# Patient Record
Sex: FEMALE | Race: WHITE | NOT HISPANIC OR LATINO | Employment: UNEMPLOYED | URBAN - METROPOLITAN AREA
[De-identification: names, ages, dates, MRNs, and addresses within clinical notes are randomized per-mention and may not be internally consistent; named-entity substitution may affect disease eponyms.]

---

## 2017-02-20 ENCOUNTER — CONVERSION ENCOUNTER (OUTPATIENT)
Dept: MAMMOGRAPHY | Facility: HOSPITAL | Age: 72
End: 2017-02-20

## 2017-10-13 ENCOUNTER — CONVERSION ENCOUNTER (OUTPATIENT)
Dept: GENERAL RADIOLOGY | Facility: HOSPITAL | Age: 72
End: 2017-10-13

## 2018-01-23 ENCOUNTER — CONVERSION ENCOUNTER (OUTPATIENT)
Dept: GASTROENTEROLOGY | Facility: CLINIC | Age: 73
End: 2018-01-23

## 2018-04-18 ENCOUNTER — OFFICE VISIT CONVERTED (OUTPATIENT)
Dept: SURGERY | Facility: CLINIC | Age: 73
End: 2018-04-18
Attending: SURGERY

## 2018-05-09 ENCOUNTER — OFFICE VISIT CONVERTED (OUTPATIENT)
Dept: SURGERY | Facility: CLINIC | Age: 73
End: 2018-05-09
Attending: SURGERY

## 2018-05-09 ENCOUNTER — CONVERSION ENCOUNTER (OUTPATIENT)
Dept: SURGERY | Facility: CLINIC | Age: 73
End: 2018-05-09

## 2018-08-16 ENCOUNTER — OFFICE VISIT CONVERTED (OUTPATIENT)
Dept: GASTROENTEROLOGY | Facility: CLINIC | Age: 73
End: 2018-08-16
Attending: NURSE PRACTITIONER

## 2018-08-16 ENCOUNTER — CONVERSION ENCOUNTER (OUTPATIENT)
Dept: GASTROENTEROLOGY | Facility: CLINIC | Age: 73
End: 2018-08-16

## 2018-10-15 ENCOUNTER — CONVERSION ENCOUNTER (OUTPATIENT)
Dept: OTHER | Facility: HOSPITAL | Age: 73
End: 2018-10-15

## 2018-10-16 ENCOUNTER — CONVERSION ENCOUNTER (OUTPATIENT)
Dept: GASTROENTEROLOGY | Facility: CLINIC | Age: 73
End: 2018-10-16

## 2018-10-16 ENCOUNTER — OFFICE VISIT CONVERTED (OUTPATIENT)
Dept: GASTROENTEROLOGY | Facility: CLINIC | Age: 73
End: 2018-10-16
Attending: NURSE PRACTITIONER

## 2018-10-25 ENCOUNTER — CONVERSION ENCOUNTER (OUTPATIENT)
Dept: OTHER | Facility: HOSPITAL | Age: 73
End: 2018-10-25

## 2019-04-16 ENCOUNTER — OFFICE VISIT CONVERTED (OUTPATIENT)
Dept: GASTROENTEROLOGY | Facility: CLINIC | Age: 74
End: 2019-04-16
Attending: NURSE PRACTITIONER

## 2019-04-23 ENCOUNTER — HOSPITAL ENCOUNTER (OUTPATIENT)
Dept: OTHER | Facility: HOSPITAL | Age: 74
Discharge: HOME | End: 2019-04-23
Attending: NURSE PRACTITIONER

## 2019-05-07 ENCOUNTER — HOSPITAL ENCOUNTER (OUTPATIENT)
Dept: OTHER | Facility: HOSPITAL | Age: 74
Discharge: HOME | End: 2019-05-07
Attending: NURSE PRACTITIONER

## 2019-05-07 LAB
CREAT BLD-MCNC: 1 MG/DL (ref 0.6–1.4)
GFR SERPLBLD BASED ON 1.73 SQ M-ARVRAT: 55 ML/MIN/{1.73_M2}

## 2019-05-08 ENCOUNTER — HOSPITAL ENCOUNTER (OUTPATIENT)
Dept: MAMMOGRAPHY | Facility: HOSPITAL | Age: 74
Discharge: HOME | End: 2019-05-08
Attending: FAMILY MEDICINE

## 2019-05-15 ENCOUNTER — OFFICE VISIT CONVERTED (OUTPATIENT)
Dept: SURGERY | Facility: CLINIC | Age: 74
End: 2019-05-15
Attending: SURGERY

## 2019-05-16 ENCOUNTER — HOSPITAL ENCOUNTER (OUTPATIENT)
Dept: PET IMAGING | Facility: HOSPITAL | Age: 74
Discharge: HOME | End: 2019-05-16
Attending: INTERNAL MEDICINE

## 2019-05-29 ENCOUNTER — OFFICE VISIT CONVERTED (OUTPATIENT)
Dept: SURGERY | Facility: CLINIC | Age: 74
End: 2019-05-29
Attending: SURGERY

## 2019-06-05 ENCOUNTER — OFFICE VISIT CONVERTED (OUTPATIENT)
Dept: SURGERY | Facility: CLINIC | Age: 74
End: 2019-06-05
Attending: SURGERY

## 2019-06-10 ENCOUNTER — OFFICE VISIT CONVERTED (OUTPATIENT)
Dept: SURGERY | Facility: CLINIC | Age: 74
End: 2019-06-10
Attending: SURGERY

## 2019-06-20 ENCOUNTER — OFFICE VISIT CONVERTED (OUTPATIENT)
Dept: SURGERY | Facility: CLINIC | Age: 74
End: 2019-06-20
Attending: SURGERY

## 2019-07-29 ENCOUNTER — HOSPITAL ENCOUNTER (OUTPATIENT)
Dept: MAMMOGRAPHY | Facility: HOSPITAL | Age: 74
Discharge: HOME | End: 2019-07-29
Attending: INTERNAL MEDICINE

## 2019-12-04 ENCOUNTER — OFFICE VISIT CONVERTED (OUTPATIENT)
Dept: GASTROENTEROLOGY | Facility: CLINIC | Age: 74
End: 2019-12-04
Attending: NURSE PRACTITIONER

## 2019-12-04 ENCOUNTER — CONVERSION ENCOUNTER (OUTPATIENT)
Dept: GASTROENTEROLOGY | Facility: CLINIC | Age: 74
End: 2019-12-04

## 2020-06-04 ENCOUNTER — TELEMEDICINE CONVERTED (OUTPATIENT)
Dept: GASTROENTEROLOGY | Facility: CLINIC | Age: 75
End: 2020-06-04
Attending: NURSE PRACTITIONER

## 2020-12-07 ENCOUNTER — OFFICE VISIT CONVERTED (OUTPATIENT)
Dept: GASTROENTEROLOGY | Facility: CLINIC | Age: 75
End: 2020-12-07
Attending: NURSE PRACTITIONER

## 2021-03-16 ENCOUNTER — HOSPITAL ENCOUNTER (OUTPATIENT)
Dept: GASTROENTEROLOGY | Facility: HOSPITAL | Age: 76
Setting detail: HOSPITAL OUTPATIENT SURGERY
Discharge: HOME | End: 2021-03-16
Attending: INTERNAL MEDICINE

## 2021-03-16 LAB — GLUCOSE BLD-MCNC: 157 MG/DL (ref 65–99)

## 2021-03-25 ENCOUNTER — HOSPITAL ENCOUNTER (OUTPATIENT)
Dept: PET IMAGING | Facility: HOSPITAL | Age: 76
Discharge: HOME | End: 2021-03-25
Attending: INTERNAL MEDICINE

## 2021-03-30 ENCOUNTER — HOSPITAL ENCOUNTER (OUTPATIENT)
Dept: NUCLEAR MEDICINE | Facility: HOSPITAL | Age: 76
Discharge: HOME | End: 2021-03-30
Attending: INTERNAL MEDICINE

## 2021-03-30 LAB
CREAT BLD-MCNC: 1 MG/DL (ref 0.6–1.4)
GFR SERPLBLD BASED ON 1.73 SQ M-ARVRAT: 54 ML/MIN/{1.73_M2}

## 2021-05-13 NOTE — PROGRESS NOTES
"   Progress Note      Patient Name: Mirna Meredith   Patient ID: [id number]   Sex: Female   YOB: 1945    Primary Care Provider: Pan Pacheco MD   Referring Provider: Christina TAFOYA    Visit Date: December 7, 2020    Provider: MICHELET Benítez   Location: [facility] [address]   Location Address: [address] [place] [address]  [id number]   Location Phone: [phone]          Chief Complaint  · Crohn's Disease      History Of Present Illness  Mirna Meredith is a 75 year old /White female who presents to the office today.      Hepatic w/u negative Fibrosure F0-F1, A0    RUQ US 4/23/2019: new biliary ductal dilatation and pancreatic ductal dilatation could not exclude choledocholithiasis, CBD 8 mm    MRI of the abdomen MRCP with and without contrast 5/7/2019: Normal MRI, mild stable pancreatic and biliary ductal dilatation appears unchanged from 2016 and likely related to prior cholecystectomy. Read by DR Gil.    Patient was last seen June 2020 she still had not tried hyoscyamine that had been prescribed December 2019 for some abdominal cramping.  Reported diarrhea controlled with Imodium for in the morning.  Labs were ordered and EnteraGam refilled.  Labs were not received.    Today pt states usually 3stools/day in AM and then resolves. No blood in stool, usually no abd pain except precedes BM at times. Still taking Imodium and Enteragam.\">Patient with long history of Crohn's disease. Patient has history of C. 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 History of positive TB skin test but no active infection ever, and followed with Dr. May previously. History of Imuran for many years. It seemed to be ineffective for pt and she was taken off of it in 2016. Patient has tried Entyvio in 2017 but had neurological sx of dizziness and confusion after so this was DC'd. Patient unable to afford Stelara or Humira. We were " planning on Remicade but after last colonoscopy results Dr. Germain recommended to watch and wait. Budesonide Rx'd in 10/2018 but pt could not afford.    Last colonoscopy May 2018: A few shallow ulcers in the TI, normal mucosa otherwise, 7 mm polyp in the TI thought to be inflammatory at endoscopy with biopsy showing chronic active ileitis; 2 small polyps in the left colon--adenomatous.    RUQ US showed fatty liver 10/2018-->Hepatic w/u negative Fibrosure F0-F1, A0    RUQ US 4/23/2019: new biliary ductal dilatation and pancreatic ductal dilatation could not exclude choledocholithiasis, CBD 8 mm    MRI of the abdomen MRCP with and without contrast 5/7/2019: Normal MRI, mild stable pancreatic and biliary ductal dilatation appears unchanged from 2016 and likely related to prior cholecystectomy. Read by DR Gil.    Patient was last seen June 2020 she still had not tried hyoscyamine that had been prescribed December 2019 for some abdominal cramping.  Reported diarrhea controlled with Imodium for in the morning.  Labs were ordered and EnteraGam refilled.  Labs were not received.    Today pt states usually 3stools/day in AM and then resolves. No blood in stool, usually no abd pain except precedes BM at times. Still taking Imodium and Enteragam.       Past Medical History  Anxiety; Arthritis; Breast cancer; Colon Polyps; Crohns disease; Diabetes; Diarrhea; Essential hypertension; High cholesterol; Hypercholesteremia; Hypertension; Irritable bowel syndrome         Past Surgical History  Bilateral knee replacement; Bilateral mastectomy; Bladder Repair; Bladder Surgery; Cholecystecomy; Colonoscopy; EGD; Gallbladder; Hysterectomy         Medication List  Name Date Started Instructions   anastrozole 1 mg oral tablet  take 1 tablet (1 mg) by oral route once daily   atenolol 50 mg oral tablet  take 1 tablet (50 mg) by oral route once daily   bupropion HCl 300 mg oral tablet extended release 24 hr  take 1 tablet (300 mg) by oral  "route once daily   EnteraGam 5 gram oral powder in packet 12/07/2020 DISSOLVE AS DIRECTED 1 PACKET AND TAKE BY MOUTH DAILY FOR 30 DAYS.   Fish Oil 340-1,000 mg oral capsule  take 1 capsule by oral route daily   folic acid 800 mcg oral tablet  take 1 tablet (0.8 mg) by oral route once daily   IBgard 90 mg oral capsule,delayed,extend.release  1 PO BID   Imodium A-D 2 mg oral tablet  take 4 tablets by oral route daily   memantine 10 mg oral tablet  take 1 tablet (10 mg) by oral route 2 times per day   metformin 500 mg oral tablet  take 1 tablet by oral route daily   Probiotic oral  --    risperidone 3 mg oral tablet  take 1 tablet (3 mg) by oral route once daily   rosuvastatin 10 mg oral tablet  take 1 tablet (10 mg) by oral route once daily   Singulair 10 mg oral tablet  take 1 tablet (10 mg) by oral route once daily in the evening   vitamin E 400 unit oral capsule  take 1 capsule by oral route daily         Allergy List  PENICILLINS         Family Medical History  Hypertension; Family history of breast cancer; No family history of colorectal cancer         Social History  Alcohol (Never); Homemaker; lives with spouse; ; Tobacco (Former)         Review of Systems  · Constitutional  o Admits  o : good general health lately, no acute distress  · Gastrointestinal  o Denies  o : additional gastrointestinal symptoms except as noted in the HPI  · Psychiatric  o Admits  o : pleasant affect      Vitals  Date Time BP Position Site L\R Cuff Size HR RR TEMP (F) WT  HT  BMI kg/m2 BSA m2 O2 Sat FR L/min FiO2 HC       12/07/2020 10:16 /64 Sitting    89 - R  98.6 234lbs 4oz 5'  6\" 37.81 2.22             Physical Examination  · Constitutional  o Appearance  o : well developed, well-nourished, in no acute distress  · Head and Face  o Head  o :   § Inspection  § : atraumatic, normocephalic  · Eyes  o Sclerae  o : sclerae white, no sclerae icterus  · Neck  o Inspection/Palpation  o : supple  · Respiratory  o Respiratory " Effort  o : breathing unlabored  o Inspection of Chest  o : normal appearance, no retractions  · Cardiovascular  o Peripheral Vascular System  o :   § Extremities  § : no cyanosis, clubbing or edema  · Gastrointestinal  o Abdominal Examination  o : soft, nontender to palpation  · Skin and Subcutaneous Tissue  o General Inspection  o : no lesions present, no rashes present  · Neurologic  o Mental Status Examination  o :   § Orientation  § : grossly oriented to person, place and time  § Speech/Language  § : communication ability within normal limits, voice quality normal, articulation of speech normal, no evidence of aphasia  § Attention  § : attention normal, concentration abilities normal  o Sensation  o : grossly intact  o Gait and Station  o :   § Gait Screening  § : normal gait  · Psychiatric  o General  o : Alert and oriented x3  o Mood and Affect  o : Mood and affect are appropriate to circumstances          Assessment  · Pre-op exam     V72.84/Z01.818  · Crohns disease     555.9/K50.90  · Fatty liver     571.8/K76.0      Plan  · Orders  o CBC with Auto Diff [facility] ([id number]) - - 12/07/2020  o CMP [facility] ([id number]) - - 12/07/2020  o PT/INR ([id number]) - - 12/10/2020  · Medications  o Golytely 236-22.74-6.74 -5.86 gram oral recon soln   SIG: take as directed for 1 day   DISP: (1) Box with 0 refills  Prescribed on 12/07/2020     o EnteraGam 5 gram oral powder in packet   SIG: DISSOLVE AS DIRECTED 1 PACKET AND TAKE BY MOUTH DAILY FOR 30 DAYS.   DISP: (30) Packet with 5 refills  Refilled on 12/07/2020     o Medications have been Reconciled  o Transition of Care or Provider Policy  · Instructions  o Please Sign Permit for: colonosocpy   o Indication: crohns  o Surgical Risk and Benefits: Possible risks/complications, benefits, and alternatives to surgical or invasive procedure have been explained to patient and/or legal guardian; Patient has been evaluated and can tolerate anesthesia and/or sedation. Risks, benefits, and  "alternatives to anesthesia and sedation have been explained to patient and/or legal guardian.  o Follow Up after Procedure.  o Encouraged to follow-up with Primary Care Provider for preventative care.  o Patient was educated/instructed on their diagnosis, treatment and medications prior to discharge from the clinic today.  o Patient instructed to seek medical attention urgently for new or worsening symptoms.  o ADDENDUM: I received CT from Dr Grissom after visit and r/w DR Germain. (showed incidental heterogeneity of liver partially seen) He wanted labs already ordered plus PT/INR, so order within note updated.   · Associate Tasks  o Task ID [id number] \"''Provider to Nurse: need additional lab            Electronically Signed by: MICHELET Benítez -Author on December 10, 2020 05:14:48 PM  "

## 2021-05-13 NOTE — PROGRESS NOTES
Progress Note      Patient Name: Mirna Meredith   Patient ID: [id number]   Sex: Female   YOB: 1945    Primary Care Provider: Pan Pacheco MD   Referring Provider: Christina TAFOYA    Visit Date: June 4, 2020    Provider: MICHELET Benítez   Location: [facility]   Location Address: [address] [place] [address]  [id number]   Location Phone: [phone]          Chief Complaint  · 6 month Follow up   · Rt side pain      History Of Present Illness  TELEHEALTH TELEPHONE VISIT  Chief Complaint: PT states she has not had trouble with her crohns. She has had pain on her rt side under her Ribs cage, it has happened about two times. No other issue at this time.   Mirna Meredith is a 74 year old /White female who is presenting for evaluation via telehealth telephone visit. Verbal consent obtained before beginning visit.   Provider spent 11 minutes with the patient during the telehealth visit.   The following staff were present during this visit: Hussain White MA ; Taylor Jewell MA   Past Medical History/ Overview of Patient Symptoms     Hepatic w/u negative Fibrosure F0-F1, A0    RUQ US 4/23/2019: new biliary ductal dilatation and pancreatic ductal dilatation could not exclude choledocholithiasis, CBD 8 mm    MRI of the abdomen MRCP with and without contrast 5/7/2019: Normal MRI, mild stable pancreatic and biliary ductal dilatation appears unchanged from 2016 and likely related to prior cholecystectomy. Read by DR Gil.    9/24/2019 hemoglobin unremarkable except platelets noted at 145, ALT 14, AST 16, alk phos 87, GFR 56, bilirubin 0.3    Pt was last seen in Dec 2019 and was stable.  She had lost 10 pounds over the 8-month interval  and I advised her to monitor her weight.  Today she denies any unintentional weight loss, and reports having a good appetite.  She continues to take her probiotic, IBgard, and EnteraGam daily.  Hyoscyamine was prescribed at the last  "visit to try if she had diarrhea or abdominal cramping as she had complained of some cramping before bowel movement but pt states she hasn't tried Hyoscyamine yet. No blood in stool.   Pt states she had abd pain for 2-3 days about a week ago, feeling better now. No fever. States diarrhea controlled w Imodium, 4 qAM and has 3-4 stools in AM only.   \">  Patient with long history of Crohn's disease. Patient has history of C. 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 History of positive TB skin test but no active infection ever, and followed with Dr. May previously. History of Imuran for many years. It seemed to be ineffective for pt and she was taken off of it in 2016. Patient has tried Entyvio in 2017 but had neurological sx of dizziness and confusion after so this was DC'd. Patient unable to afford Stelara or Humira. We were planning on Remicade but after last colonoscopy results Dr. Germain recommended to watch and wait. Budesonide Rx'd in 10/2018 but pt could not afford.    Last colonoscopy May 2018: A few shallow ulcers in the TI, normal mucosa otherwise, 7 mm polyp in the TI thought to be inflammatory at endoscopy with biopsy showing chronic active ileitis; 2 small polyps in the left colonadenomatous.    RUQ US showed fatty liver 10/2018-->Hepatic w/u negative Fibrosure F0-F1, A0    RUQ US 4/23/2019: new biliary ductal dilatation and pancreatic ductal dilatation could not exclude choledocholithiasis, CBD 8 mm    MRI of the abdomen MRCP with and without contrast 5/7/2019: Normal MRI, mild stable pancreatic and biliary ductal dilatation appears unchanged from 2016 and likely related to prior cholecystectomy. Read by DR Gil.    9/24/2019 hemoglobin unremarkable except platelets noted at 145, ALT 14, AST 16, alk phos 87, GFR 56, bilirubin 0.3    Pt was last seen in Dec 2019 and was stable.  She had lost 10 pounds over the 8-month interval  and I advised her to monitor " her weight.  Today she denies any unintentional weight loss, and reports having a good appetite.  She continues to take her probiotic, IBgard, and EnteraGam daily.  Hyoscyamine was prescribed at the last visit to try if she had diarrhea or abdominal cramping as she had complained of some cramping before bowel movement but pt states she hasn't tried Hyoscyamine yet. No blood in stool.   Pt states she had abd pain for 2-3 days about a week ago, feeling better now. No fever. States diarrhea controlled w Imodium, 4 qAM and has 3-4 stools in AM only.          Past Medical History  Anxiety; Arthritis; Breast cancer; Colon Polyps; Crohns disease; Diabetes; Diarrhea; Essential hypertension; High cholesterol; Hypercholesteremia; Hypertension; Irritable bowel syndrome         Past Surgical History  Bilateral knee replacement; Bilateral mastectomy; Bladder Repair; Bladder Surgery; Cholecystecomy; Colonoscopy; EGD; Gallbladder; Hysterectomy         Medication List  Name Date Started Instructions   atenolol 50 mg oral tablet  take 1 tablet (50 mg) by oral route once daily   bupropion HCl 300 mg oral tablet extended release 24 hr  take 1 tablet (300 mg) by oral route once daily   EnteraGam 5 gram oral powder in packet 01/29/2020 DISSOLVE AS DIRECTED 1 PACKET AND TAKE BY MOUTH DAILY FOR 30 DAYS.   Fish Oil 340-1,000 mg oral capsule  take 1 capsule by oral route daily   folic acid 800 mcg oral tablet  take 1 tablet (0.8 mg) by oral route once daily   IBgard 90 mg oral capsule,delayed,extend.release  1 PO BID   Imodium A-D 2 mg oral tablet  take 4 tablets by oral route daily   memantine 10 mg oral tablet  take 1 tablet (10 mg) by oral route 2 times per day   metformin 500 mg oral tablet  take 1 tablet by oral route daily   Probiotic oral  --    risperidone 3 mg oral tablet  take 1 tablet (3 mg) by oral route once daily   rosuvastatin 10 mg oral tablet  take 1 tablet (10 mg) by oral route once daily   Singulair 10 mg oral tablet   take 1 tablet (10 mg) by oral route once daily in the evening   vitamin E 400 unit oral capsule  take 1 capsule by oral route daily         Allergy List  PENICILLINS         Family Medical History  Hypertension; Family history of breast cancer; No family history of colorectal cancer         Social History  Alcohol (Never); Homemaker; lives with spouse; ; Tobacco (Former)             Assessment  · Crohns disease     555.9/K50.90  · Fatty liver     571.8/K76.0  last imaging negative; thrombocytopenia but no evidence of cirrhosis   · Loose stools     787.7/R19.5      Plan  · Orders  o CBC with Auto Diff [facility] ([id number]) - - 06/04/2020  o CMP [facility] ([id number]) - - 06/04/2020  o PT/INR ([id number]) - - 06/04/2020  · Medications  o EnteraGam 5 gram oral powder in packet   SIG: DISSOLVE AS DIRECTED 1 PACKET AND TAKE BY MOUTH DAILY FOR 30 DAYS.   DISP: (30) Packet with 5 refills  Refilled on 06/04/2020     o Medications have been Reconciled  o Transition of Care or Provider Policy  · Instructions  o Patient is taking medications as prescribed and doing well.   o Call the office with any concerns or questions. Advised pt if she had any further abd pain to call back, or if diarrhea occurs persistently, blood in stool, please call. Pt stated she was fine w a 6 mo f/u and would call if needed anything prior.   o Recent CT from [place] requested, ordered by Dr Grissom            Electronically Signed by: MICHELET Benítez -Author on June 4, 2020 09:58:31 AM

## 2021-05-14 VITALS
DIASTOLIC BLOOD PRESSURE: 64 MMHG | WEIGHT: 234.25 LBS | BODY MASS INDEX: 37.65 KG/M2 | TEMPERATURE: 98.6 F | HEIGHT: 66 IN | HEART RATE: 89 BPM | SYSTOLIC BLOOD PRESSURE: 148 MMHG

## 2021-05-15 VITALS — RESPIRATION RATE: 18 BRPM | HEIGHT: 66 IN | BODY MASS INDEX: 39.86 KG/M2 | WEIGHT: 248 LBS

## 2021-05-15 VITALS — BODY MASS INDEX: 40.02 KG/M2 | RESPIRATION RATE: 14 BRPM | WEIGHT: 249 LBS | HEIGHT: 66 IN

## 2021-05-15 VITALS — BODY MASS INDEX: 40.18 KG/M2 | RESPIRATION RATE: 14 BRPM | WEIGHT: 250 LBS | HEIGHT: 66 IN

## 2021-05-15 VITALS
HEART RATE: 69 BPM | DIASTOLIC BLOOD PRESSURE: 70 MMHG | HEIGHT: 66 IN | SYSTOLIC BLOOD PRESSURE: 183 MMHG | BODY MASS INDEX: 38.57 KG/M2 | WEIGHT: 240 LBS

## 2021-05-15 VITALS
HEART RATE: 81 BPM | SYSTOLIC BLOOD PRESSURE: 144 MMHG | BODY MASS INDEX: 40.18 KG/M2 | DIASTOLIC BLOOD PRESSURE: 58 MMHG | HEIGHT: 66 IN | WEIGHT: 250 LBS

## 2021-05-15 VITALS — RESPIRATION RATE: 14 BRPM | WEIGHT: 250 LBS | HEIGHT: 66 IN | BODY MASS INDEX: 40.18 KG/M2

## 2021-05-15 VITALS — WEIGHT: 248 LBS | RESPIRATION RATE: 16 BRPM | BODY MASS INDEX: 39.86 KG/M2 | HEIGHT: 66 IN

## 2021-05-16 VITALS
DIASTOLIC BLOOD PRESSURE: 79 MMHG | WEIGHT: 244 LBS | SYSTOLIC BLOOD PRESSURE: 139 MMHG | HEIGHT: 66 IN | HEART RATE: 70 BPM | TEMPERATURE: 98.4 F | BODY MASS INDEX: 39.21 KG/M2

## 2021-05-16 VITALS — BODY MASS INDEX: 43.65 KG/M2 | HEIGHT: 63 IN | RESPIRATION RATE: 16 BRPM | WEIGHT: 246.37 LBS

## 2021-05-16 VITALS
WEIGHT: 246 LBS | HEART RATE: 65 BPM | HEIGHT: 66 IN | DIASTOLIC BLOOD PRESSURE: 64 MMHG | BODY MASS INDEX: 39.53 KG/M2 | SYSTOLIC BLOOD PRESSURE: 147 MMHG

## 2021-05-16 VITALS — HEIGHT: 63 IN | RESPIRATION RATE: 16 BRPM | BODY MASS INDEX: 42.88 KG/M2 | WEIGHT: 242 LBS

## 2021-05-16 VITALS
RESPIRATION RATE: 16 BRPM | HEART RATE: 105 BPM | DIASTOLIC BLOOD PRESSURE: 73 MMHG | WEIGHT: 250 LBS | SYSTOLIC BLOOD PRESSURE: 157 MMHG

## 2021-06-07 PROCEDURE — 99285 EMERGENCY DEPT VISIT HI MDM: CPT

## 2021-06-08 ENCOUNTER — APPOINTMENT (OUTPATIENT)
Dept: CT IMAGING | Facility: HOSPITAL | Age: 76
End: 2021-06-08

## 2021-06-08 ENCOUNTER — HOSPITAL ENCOUNTER (INPATIENT)
Facility: HOSPITAL | Age: 76
LOS: 2 days | Discharge: HOME HEALTH | End: 2021-06-10
Attending: EMERGENCY MEDICINE | Admitting: GENERAL PRACTICE

## 2021-06-08 ENCOUNTER — APPOINTMENT (OUTPATIENT)
Dept: GENERAL RADIOLOGY | Facility: HOSPITAL | Age: 76
End: 2021-06-08

## 2021-06-08 DIAGNOSIS — N28.9 RENAL INSUFFICIENCY: ICD-10-CM

## 2021-06-08 DIAGNOSIS — R11.2 NAUSEA AND VOMITING, INTRACTABILITY OF VOMITING NOT SPECIFIED, UNSPECIFIED VOMITING TYPE: ICD-10-CM

## 2021-06-08 DIAGNOSIS — R26.2 DIFFICULTY WALKING: ICD-10-CM

## 2021-06-08 DIAGNOSIS — K50.119 CROHN'S DISEASE OF COLON WITH COMPLICATION (HCC): ICD-10-CM

## 2021-06-08 DIAGNOSIS — E83.42 HYPOMAGNESEMIA: ICD-10-CM

## 2021-06-08 DIAGNOSIS — R10.9 ABDOMINAL PAIN, UNSPECIFIED ABDOMINAL LOCATION: Primary | ICD-10-CM

## 2021-06-08 DIAGNOSIS — R19.7 DIARRHEA, UNSPECIFIED TYPE: ICD-10-CM

## 2021-06-08 DIAGNOSIS — I95.9 TRANSIENT HYPOTENSION: ICD-10-CM

## 2021-06-08 LAB
027 TOXIN: NORMAL
ALBUMIN SERPL-MCNC: 3.8 G/DL (ref 3.5–5.2)
ALBUMIN/GLOB SERPL: 1.3 G/DL
ALP SERPL-CCNC: 108 U/L (ref 39–117)
ALT SERPL W P-5'-P-CCNC: 15 U/L (ref 1–33)
ANION GAP SERPL CALCULATED.3IONS-SCNC: 12.6 MMOL/L (ref 5–15)
ANISOCYTOSIS BLD QL: NORMAL
APTT PPP: 23.1 SECONDS (ref 22.2–34.2)
AST SERPL-CCNC: 21 U/L (ref 1–32)
BACTERIA UR QL AUTO: ABNORMAL /HPF
BASOPHILS # BLD AUTO: 0.02 10*3/MM3 (ref 0–0.2)
BASOPHILS NFR BLD AUTO: 0.5 % (ref 0–1.5)
BILIRUB SERPL-MCNC: 0.5 MG/DL (ref 0–1.2)
BILIRUB UR QL STRIP: NEGATIVE
BUN SERPL-MCNC: 17 MG/DL (ref 8–23)
BUN/CREAT SERPL: 8.6 (ref 7–25)
C COLI+JEJ+UPSA DNA STL QL NAA+NON-PROBE: NOT DETECTED
C DIFF TOX GENS STL QL NAA+PROBE: NEGATIVE
CALCIUM SPEC-SCNC: 9.4 MG/DL (ref 8.6–10.5)
CHLORIDE SERPL-SCNC: 95 MMOL/L (ref 98–107)
CLARITY UR: ABNORMAL
CO2 SERPL-SCNC: 26.4 MMOL/L (ref 22–29)
COLOR UR: YELLOW
CREAT SERPL-MCNC: 1.98 MG/DL (ref 0.57–1)
CRP SERPL-MCNC: 3.59 MG/DL (ref 0–0.5)
CRYPTOSP DNA STL QL NAA+NON-PROBE: NOT DETECTED
D-LACTATE SERPL-SCNC: 1.2 MMOL/L (ref 0.5–2)
D-LACTATE SERPL-SCNC: 1.6 MMOL/L (ref 0.5–2)
DEPRECATED RDW RBC AUTO: 48.4 FL (ref 37–54)
E HISTOLYT DNA STL QL NAA+NON-PROBE: NOT DETECTED
EC STX1+STX2 GENES STL QL NAA+NON-PROBE: NOT DETECTED
EOSINOPHIL # BLD AUTO: 0.06 10*3/MM3 (ref 0–0.4)
EOSINOPHIL NFR BLD AUTO: 1.6 % (ref 0.3–6.2)
ERYTHROCYTE [DISTWIDTH] IN BLOOD BY AUTOMATED COUNT: 16.3 % (ref 12.3–15.4)
G LAMBLIA DNA STL QL NAA+NON-PROBE: NOT DETECTED
GFR SERPL CREATININE-BSD FRML MDRD: 24 ML/MIN/1.73
GLOBULIN UR ELPH-MCNC: 3 GM/DL
GLUCOSE BLDC GLUCOMTR-MCNC: 388 MG/DL (ref 70–130)
GLUCOSE SERPL-MCNC: 171 MG/DL (ref 65–99)
GLUCOSE UR STRIP-MCNC: NEGATIVE MG/DL
HCT VFR BLD AUTO: 34.7 % (ref 34–46.6)
HGB BLD-MCNC: 11.1 G/DL (ref 12–15.9)
HGB UR QL STRIP.AUTO: ABNORMAL
HOLD SPECIMEN: NORMAL
HOLD SPECIMEN: NORMAL
HYALINE CASTS UR QL AUTO: ABNORMAL /LPF
IMM GRANULOCYTES # BLD AUTO: 0.01 10*3/MM3 (ref 0–0.05)
IMM GRANULOCYTES NFR BLD AUTO: 0.3 % (ref 0–0.5)
INR PPP: 0.93 (ref 2–3)
KETONES UR QL STRIP: ABNORMAL
LEUKOCYTE ESTERASE UR QL STRIP.AUTO: ABNORMAL
LIPASE SERPL-CCNC: 61 U/L (ref 13–60)
LYMPHOCYTES # BLD AUTO: 0.37 10*3/MM3 (ref 0.7–3.1)
LYMPHOCYTES NFR BLD AUTO: 9.7 % (ref 19.6–45.3)
MAGNESIUM SERPL-MCNC: 1.4 MG/DL (ref 1.6–2.4)
MCH RBC QN AUTO: 26.4 PG (ref 26.6–33)
MCHC RBC AUTO-ENTMCNC: 32 G/DL (ref 31.5–35.7)
MCV RBC AUTO: 82.6 FL (ref 79–97)
MONOCYTES # BLD AUTO: 0.21 10*3/MM3 (ref 0.1–0.9)
MONOCYTES NFR BLD AUTO: 5.5 % (ref 5–12)
NEUTROPHILS NFR BLD AUTO: 3.14 10*3/MM3 (ref 1.7–7)
NEUTROPHILS NFR BLD AUTO: 82.4 % (ref 42.7–76)
NITRITE UR QL STRIP: NEGATIVE
NRBC BLD AUTO-RTO: 0 /100 WBC (ref 0–0.2)
OVALOCYTES BLD QL SMEAR: NORMAL
PH UR STRIP.AUTO: <=5 [PH] (ref 5–8)
PHOSPHATE SERPL-MCNC: 3.9 MG/DL (ref 2.5–4.5)
PLAT MORPH BLD: NORMAL
PLATELET # BLD AUTO: 212 10*3/MM3 (ref 140–450)
PMV BLD AUTO: 10 FL (ref 6–12)
POLYCHROMASIA BLD QL SMEAR: NORMAL
POTASSIUM SERPL-SCNC: 4.9 MMOL/L (ref 3.5–5.2)
PROT SERPL-MCNC: 6.8 G/DL (ref 6–8.5)
PROT UR QL STRIP: ABNORMAL
PROTHROMBIN TIME: 10.3 SECONDS (ref 9.4–12)
RBC # BLD AUTO: 4.2 10*6/MM3 (ref 3.77–5.28)
RBC # UR: ABNORMAL /HPF
REF LAB TEST METHOD: ABNORMAL
S ENT+BONG DNA STL QL NAA+NON-PROBE: NOT DETECTED
SHIGELLA SP+EIEC IPAH ST NAA+NON-PROBE: NOT DETECTED
SODIUM SERPL-SCNC: 134 MMOL/L (ref 136–145)
SP GR UR STRIP: 1.02 (ref 1–1.03)
SQUAMOUS #/AREA URNS HPF: ABNORMAL /HPF
UROBILINOGEN UR QL STRIP: ABNORMAL
WBC # BLD AUTO: 3.81 10*3/MM3 (ref 3.4–10.8)
WBC MORPH BLD: NORMAL
WBC UR QL AUTO: ABNORMAL /HPF
WHOLE BLOOD HOLD SPECIMEN: NORMAL
WHOLE BLOOD HOLD SPECIMEN: NORMAL

## 2021-06-08 PROCEDURE — 85007 BL SMEAR W/DIFF WBC COUNT: CPT | Performed by: EMERGENCY MEDICINE

## 2021-06-08 PROCEDURE — 81001 URINALYSIS AUTO W/SCOPE: CPT | Performed by: EMERGENCY MEDICINE

## 2021-06-08 PROCEDURE — 87493 C DIFF AMPLIFIED PROBE: CPT | Performed by: PHYSICIAN ASSISTANT

## 2021-06-08 PROCEDURE — 86140 C-REACTIVE PROTEIN: CPT | Performed by: EMERGENCY MEDICINE

## 2021-06-08 PROCEDURE — 36600 WITHDRAWAL OF ARTERIAL BLOOD: CPT | Performed by: EMERGENCY MEDICINE

## 2021-06-08 PROCEDURE — 63710000001 INSULIN LISPRO (HUMAN) PER 5 UNITS: Performed by: INTERNAL MEDICINE

## 2021-06-08 PROCEDURE — 25010000002 MAGNESIUM SULFATE IN D5W 1G/100ML (PREMIX) 1-5 GM/100ML-% SOLUTION: Performed by: PHYSICIAN ASSISTANT

## 2021-06-08 PROCEDURE — 84100 ASSAY OF PHOSPHORUS: CPT | Performed by: EMERGENCY MEDICINE

## 2021-06-08 PROCEDURE — 25010000002 METHYLPREDNISOLONE PER 125 MG: Performed by: INTERNAL MEDICINE

## 2021-06-08 PROCEDURE — 87506 IADNA-DNA/RNA PROBE TQ 6-11: CPT | Performed by: PHYSICIAN ASSISTANT

## 2021-06-08 PROCEDURE — 71045 X-RAY EXAM CHEST 1 VIEW: CPT

## 2021-06-08 PROCEDURE — 82375 ASSAY CARBOXYHB QUANT: CPT | Performed by: EMERGENCY MEDICINE

## 2021-06-08 PROCEDURE — 82805 BLOOD GASES W/O2 SATURATION: CPT | Performed by: EMERGENCY MEDICINE

## 2021-06-08 PROCEDURE — 93005 ELECTROCARDIOGRAM TRACING: CPT | Performed by: EMERGENCY MEDICINE

## 2021-06-08 PROCEDURE — 25010000002 ONDANSETRON PER 1 MG: Performed by: EMERGENCY MEDICINE

## 2021-06-08 PROCEDURE — 83605 ASSAY OF LACTIC ACID: CPT | Performed by: INTERNAL MEDICINE

## 2021-06-08 PROCEDURE — 83605 ASSAY OF LACTIC ACID: CPT | Performed by: EMERGENCY MEDICINE

## 2021-06-08 PROCEDURE — 85610 PROTHROMBIN TIME: CPT | Performed by: EMERGENCY MEDICINE

## 2021-06-08 PROCEDURE — 87040 BLOOD CULTURE FOR BACTERIA: CPT | Performed by: EMERGENCY MEDICINE

## 2021-06-08 PROCEDURE — 85025 COMPLETE CBC W/AUTO DIFF WBC: CPT | Performed by: EMERGENCY MEDICINE

## 2021-06-08 PROCEDURE — 36415 COLL VENOUS BLD VENIPUNCTURE: CPT | Performed by: EMERGENCY MEDICINE

## 2021-06-08 PROCEDURE — 93005 ELECTROCARDIOGRAM TRACING: CPT

## 2021-06-08 PROCEDURE — 83735 ASSAY OF MAGNESIUM: CPT | Performed by: EMERGENCY MEDICINE

## 2021-06-08 PROCEDURE — 80053 COMPREHEN METABOLIC PANEL: CPT | Performed by: EMERGENCY MEDICINE

## 2021-06-08 PROCEDURE — 82962 GLUCOSE BLOOD TEST: CPT

## 2021-06-08 PROCEDURE — 74176 CT ABD & PELVIS W/O CONTRAST: CPT

## 2021-06-08 PROCEDURE — 83050 HGB METHEMOGLOBIN QUAN: CPT | Performed by: EMERGENCY MEDICINE

## 2021-06-08 PROCEDURE — 25010000002 MORPHINE PER 10 MG: Performed by: EMERGENCY MEDICINE

## 2021-06-08 PROCEDURE — 83690 ASSAY OF LIPASE: CPT | Performed by: EMERGENCY MEDICINE

## 2021-06-08 PROCEDURE — 25010000002 METHYLPREDNISOLONE PER 125 MG: Performed by: EMERGENCY MEDICINE

## 2021-06-08 PROCEDURE — 85730 THROMBOPLASTIN TIME PARTIAL: CPT | Performed by: EMERGENCY MEDICINE

## 2021-06-08 PROCEDURE — 25010000002 LEVOFLOXACIN PER 250 MG: Performed by: INTERNAL MEDICINE

## 2021-06-08 PROCEDURE — 87505 NFCT AGENT DETECTION GI: CPT | Performed by: PHYSICIAN ASSISTANT

## 2021-06-08 PROCEDURE — 99223 1ST HOSP IP/OBS HIGH 75: CPT | Performed by: INTERNAL MEDICINE

## 2021-06-08 PROCEDURE — 94799 UNLISTED PULMONARY SVC/PX: CPT

## 2021-06-08 RX ORDER — RISPERIDONE 3 MG/1
3 TABLET ORAL DAILY
Status: DISCONTINUED | OUTPATIENT
Start: 2021-06-08 | End: 2021-06-08

## 2021-06-08 RX ORDER — ONDANSETRON 2 MG/ML
4 INJECTION INTRAMUSCULAR; INTRAVENOUS EVERY 6 HOURS PRN
Status: DISCONTINUED | OUTPATIENT
Start: 2021-06-08 | End: 2021-06-10 | Stop reason: HOSPADM

## 2021-06-08 RX ORDER — ANASTROZOLE 1 MG/1
1 TABLET ORAL DAILY
COMMUNITY

## 2021-06-08 RX ORDER — LISINOPRIL 10 MG/1
10 TABLET ORAL DAILY
COMMUNITY
End: 2021-11-19 | Stop reason: HOSPADM

## 2021-06-08 RX ORDER — IMMUNE GLOB/PLASMA FRA BOVINE 5 G
1 POWDER IN PACKET (EA) ORAL NIGHTLY
COMMUNITY
End: 2021-08-09 | Stop reason: SDUPTHER

## 2021-06-08 RX ORDER — SODIUM CHLORIDE 0.9 % (FLUSH) 0.9 %
10 SYRINGE (ML) INJECTION EVERY 12 HOURS SCHEDULED
Status: DISCONTINUED | OUTPATIENT
Start: 2021-06-08 | End: 2021-06-10 | Stop reason: HOSPADM

## 2021-06-08 RX ORDER — CHOLECALCIFEROL (VITAMIN D3) 125 MCG
5 CAPSULE ORAL NIGHTLY PRN
Status: DISCONTINUED | OUTPATIENT
Start: 2021-06-08 | End: 2021-06-10 | Stop reason: HOSPADM

## 2021-06-08 RX ORDER — MAGNESIUM SULFATE 1 G/100ML
1 INJECTION INTRAVENOUS
Status: COMPLETED | OUTPATIENT
Start: 2021-06-08 | End: 2021-06-08

## 2021-06-08 RX ORDER — ROSUVASTATIN CALCIUM 5 MG/1
10 TABLET, COATED ORAL DAILY
Status: DISCONTINUED | OUTPATIENT
Start: 2021-06-08 | End: 2021-06-08

## 2021-06-08 RX ORDER — ALUMINA, MAGNESIA, AND SIMETHICONE 2400; 2400; 240 MG/30ML; MG/30ML; MG/30ML
15 SUSPENSION ORAL EVERY 6 HOURS PRN
Status: DISCONTINUED | OUTPATIENT
Start: 2021-06-08 | End: 2021-06-10 | Stop reason: HOSPADM

## 2021-06-08 RX ORDER — RISPERIDONE 3 MG/1
3 TABLET ORAL NIGHTLY
Status: DISCONTINUED | OUTPATIENT
Start: 2021-06-08 | End: 2021-06-10 | Stop reason: HOSPADM

## 2021-06-08 RX ORDER — DEXTROSE MONOHYDRATE 100 MG/ML
25 INJECTION, SOLUTION INTRAVENOUS
Status: DISCONTINUED | OUTPATIENT
Start: 2021-06-08 | End: 2021-06-10 | Stop reason: HOSPADM

## 2021-06-08 RX ORDER — SODIUM CHLORIDE 0.9 % (FLUSH) 0.9 %
10 SYRINGE (ML) INJECTION AS NEEDED
Status: DISCONTINUED | OUTPATIENT
Start: 2021-06-08 | End: 2021-06-10 | Stop reason: HOSPADM

## 2021-06-08 RX ORDER — FOLIC ACID 1 MG/1
1 TABLET ORAL DAILY
COMMUNITY

## 2021-06-08 RX ORDER — MEMANTINE HYDROCHLORIDE 10 MG/1
10 TABLET ORAL 2 TIMES DAILY
Status: DISCONTINUED | OUTPATIENT
Start: 2021-06-08 | End: 2021-06-10 | Stop reason: HOSPADM

## 2021-06-08 RX ORDER — ATENOLOL 50 MG/1
50 TABLET ORAL DAILY
Status: DISCONTINUED | OUTPATIENT
Start: 2021-06-08 | End: 2021-06-10 | Stop reason: HOSPADM

## 2021-06-08 RX ORDER — FOLIC ACID 1 MG/1
1 TABLET ORAL DAILY
Status: DISCONTINUED | OUTPATIENT
Start: 2021-06-08 | End: 2021-06-10 | Stop reason: HOSPADM

## 2021-06-08 RX ORDER — MEMANTINE HYDROCHLORIDE 10 MG/1
10 TABLET ORAL 2 TIMES DAILY
COMMUNITY

## 2021-06-08 RX ORDER — METHYLPREDNISOLONE SODIUM SUCCINATE 125 MG/2ML
125 INJECTION, POWDER, LYOPHILIZED, FOR SOLUTION INTRAMUSCULAR; INTRAVENOUS ONCE
Status: COMPLETED | OUTPATIENT
Start: 2021-06-08 | End: 2021-06-08

## 2021-06-08 RX ORDER — NICOTINE POLACRILEX 4 MG
15 LOZENGE BUCCAL
Status: DISCONTINUED | OUTPATIENT
Start: 2021-06-08 | End: 2021-06-10 | Stop reason: HOSPADM

## 2021-06-08 RX ORDER — SODIUM CHLORIDE 9 MG/ML
INJECTION, SOLUTION INTRAVENOUS
Status: DISPENSED
Start: 2021-06-08 | End: 2021-06-08

## 2021-06-08 RX ORDER — ROSUVASTATIN CALCIUM 5 MG/1
10 TABLET, COATED ORAL NIGHTLY
Status: DISCONTINUED | OUTPATIENT
Start: 2021-06-09 | End: 2021-06-10 | Stop reason: HOSPADM

## 2021-06-08 RX ORDER — RISPERIDONE 3 MG/1
3 TABLET ORAL NIGHTLY
COMMUNITY

## 2021-06-08 RX ORDER — ONDANSETRON 2 MG/ML
4 INJECTION INTRAMUSCULAR; INTRAVENOUS ONCE
Status: COMPLETED | OUTPATIENT
Start: 2021-06-08 | End: 2021-06-08

## 2021-06-08 RX ORDER — VITAMIN E 268 MG
1 CAPSULE ORAL DAILY
COMMUNITY

## 2021-06-08 RX ORDER — LEVOFLOXACIN 5 MG/ML
750 INJECTION, SOLUTION INTRAVENOUS
Status: DISCONTINUED | OUTPATIENT
Start: 2021-06-08 | End: 2021-06-10

## 2021-06-08 RX ORDER — ATENOLOL 50 MG/1
50 TABLET ORAL DAILY
Status: ON HOLD | COMMUNITY
End: 2021-12-05

## 2021-06-08 RX ORDER — MONTELUKAST SODIUM 10 MG/1
10 TABLET ORAL DAILY
COMMUNITY

## 2021-06-08 RX ORDER — METHYLPREDNISOLONE SODIUM SUCCINATE 125 MG/2ML
60 INJECTION, POWDER, LYOPHILIZED, FOR SOLUTION INTRAMUSCULAR; INTRAVENOUS EVERY 12 HOURS SCHEDULED
Status: DISCONTINUED | OUTPATIENT
Start: 2021-06-08 | End: 2021-06-10 | Stop reason: HOSPADM

## 2021-06-08 RX ORDER — BUPROPION HYDROCHLORIDE 300 MG/1
300 TABLET ORAL DAILY
COMMUNITY

## 2021-06-08 RX ORDER — BUPROPION HYDROCHLORIDE 150 MG/1
300 TABLET ORAL DAILY
Status: DISCONTINUED | OUTPATIENT
Start: 2021-06-08 | End: 2021-06-10 | Stop reason: HOSPADM

## 2021-06-08 RX ORDER — SODIUM CHLORIDE, SODIUM LACTATE, POTASSIUM CHLORIDE, CALCIUM CHLORIDE 600; 310; 30; 20 MG/100ML; MG/100ML; MG/100ML; MG/100ML
100 INJECTION, SOLUTION INTRAVENOUS CONTINUOUS
Status: DISCONTINUED | OUTPATIENT
Start: 2021-06-08 | End: 2021-06-10 | Stop reason: HOSPADM

## 2021-06-08 RX ORDER — ROSUVASTATIN CALCIUM 10 MG/1
10 TABLET, COATED ORAL NIGHTLY
COMMUNITY

## 2021-06-08 RX ADMIN — ATENOLOL 50 MG: 50 TABLET ORAL at 13:52

## 2021-06-08 RX ADMIN — SODIUM CHLORIDE 500 ML: 9 INJECTION, SOLUTION INTRAVENOUS at 06:33

## 2021-06-08 RX ADMIN — MAGNESIUM SULFATE HEPTAHYDRATE 1 G: 1 INJECTION, SOLUTION INTRAVENOUS at 13:47

## 2021-06-08 RX ADMIN — SODIUM CHLORIDE 500 ML: 9 INJECTION, SOLUTION INTRAVENOUS at 01:26

## 2021-06-08 RX ADMIN — LEVOFLOXACIN 750 MG: 750 INJECTION, SOLUTION INTRAVENOUS at 16:37

## 2021-06-08 RX ADMIN — INSULIN LISPRO 6 UNITS: 100 INJECTION, SOLUTION INTRAVENOUS; SUBCUTANEOUS at 16:37

## 2021-06-08 RX ADMIN — ONDANSETRON 4 MG: 2 INJECTION INTRAMUSCULAR; INTRAVENOUS at 01:27

## 2021-06-08 RX ADMIN — ROSUVASTATIN CALCIUM 10 MG: 5 TABLET, FILM COATED ORAL at 14:28

## 2021-06-08 RX ADMIN — BUPROPION HYDROCHLORIDE 300 MG: 150 TABLET, EXTENDED RELEASE ORAL at 13:52

## 2021-06-08 RX ADMIN — MORPHINE SULFATE 4 MG: 4 INJECTION, SOLUTION INTRAMUSCULAR; INTRAVENOUS at 01:27

## 2021-06-08 RX ADMIN — FOLIC ACID 1 MG: 1 TABLET ORAL at 14:28

## 2021-06-08 RX ADMIN — MEMANTINE 10 MG: 10 TABLET ORAL at 20:38

## 2021-06-08 RX ADMIN — MAGNESIUM SULFATE HEPTAHYDRATE 1 G: 1 INJECTION, SOLUTION INTRAVENOUS at 14:04

## 2021-06-08 RX ADMIN — METRONIDAZOLE 500 MG: 500 INJECTION, SOLUTION INTRAVENOUS at 13:47

## 2021-06-08 RX ADMIN — METRONIDAZOLE 500 MG: 500 INJECTION, SOLUTION INTRAVENOUS at 19:03

## 2021-06-08 RX ADMIN — SODIUM CHLORIDE, POTASSIUM CHLORIDE, SODIUM LACTATE AND CALCIUM CHLORIDE 100 ML/HR: 600; 310; 30; 20 INJECTION, SOLUTION INTRAVENOUS at 13:47

## 2021-06-08 RX ADMIN — ONDANSETRON 4 MG: 2 INJECTION INTRAMUSCULAR; INTRAVENOUS at 03:39

## 2021-06-08 RX ADMIN — METHYLPREDNISOLONE SODIUM SUCCINATE 125 MG: 125 INJECTION, POWDER, FOR SOLUTION INTRAMUSCULAR; INTRAVENOUS at 06:33

## 2021-06-08 RX ADMIN — METHYLPREDNISOLONE SODIUM SUCCINATE 60 MG: 125 INJECTION, POWDER, FOR SOLUTION INTRAMUSCULAR; INTRAVENOUS at 20:38

## 2021-06-08 RX ADMIN — RISPERIDONE 3 MG: 3 TABLET, FILM COATED ORAL at 20:38

## 2021-06-08 RX ADMIN — RISPERIDONE 3 MG: 3 TABLET, FILM COATED ORAL at 14:28

## 2021-06-08 RX ADMIN — SODIUM CHLORIDE, PRESERVATIVE FREE 10 ML: 5 INJECTION INTRAVENOUS at 20:39

## 2021-06-08 RX ADMIN — SODIUM CHLORIDE, PRESERVATIVE FREE 10 ML: 5 INJECTION INTRAVENOUS at 13:48

## 2021-06-08 NOTE — H&P
[facility] HISTORY AND PHYSICAL  Date: 2021   Patient Name: Mirna Meredith  : 1945  MRN: [id number]  Primary Care Physician:  Pan Pacheco MD  Date of admission: 2021    Subjective   Subjective     Chief Complaint: Abdominal pain    HPI:    Mirna Meredith is a 75 y.o. female with PMH Crohn's disease, breast cancer with metastases to the bone and colon currently on chemotherapy, diabetes, hypertension, hyperlipidemia, COPD who presented to the ED on  due to complaints of abdominal pain.  The patient states she has been having significant abdominal pain for about 2 weeks.  Pain is central, does not radiate, feels a cramping pain, 7/10 at its worst.  Comes and goes.  Has not seen any of her physicians so has not taken anything to help.  She then started having loose stools and diarrhea in the last 24 hours.  She also developed nausea and vomiting yesterday prompting her to seek medical attention in the ED.  Found to have an JOESPH on admission as well as evidence of dehydration.  CT abdomen pelvis showed dilated small bowel and colon, concern for infectious enterocolitis.  Due to inability to tolerate oral antibiotics, she is admitted for further care.      Personal History     Past Medical History:  Crohn's disease  Breast cancer with bone and colon metastases on chemotherapy  Type 2 diabetes mellitus  Hypertension  Hyperlipidemia    Past Surgical History:  Double mastectomy, knee replacement, cholecystectomy    Family History:   Father had ESRD    Social History:   Lives alone.  Was a previous 1 PPD smoker for about 20 years but quit 20 years ago.  No alcohol or illicit drug use.    Home Medications:  Peppermint Oil, anastrozole, atenolol, buPROPion XL, folic acid, lisinopril, memantine, metFORMIN, montelukast, risperiDONE, rosuvastatin, and vitamin E    Allergies:  Allergies   Allergen Reactions   • Penicillins Rash       Review of Systems   All systems were reviewed and  negative except for fatigue, weakness, nausea and vomiting    Objective   Objective     Vitals:   Temp:  [97.3 °F (36.3 °C)-99.3 °F (37.4 °C)] 98 °F (36.7 °C)  Heart Rate:  [73-88] 84  Resp:  [17-22] 20  BP: ()/(46-72) 124/71  Flow (L/min):  [2] 2    Physical Exam    Constitutional: Awake, alert, no acute distress   Eyes: Pupils equal, sclerae anicteric, no conjunctival injection   HENT: NCAT, dry mucous membranes    Neck: Supple, no thyromegaly, no lymphadenopathy, trachea midline   Respiratory: Clear to auscultation bilaterally, nonlabored respirations    Cardiovascular: RRR, no murmurs, rubs, or gallops, palpable pedal pulses bilaterally   Gastrointestinal: Positive bowel sounds, soft, slightly TTP diffusely, nondistended   Musculoskeletal: No bilateral ankle edema, no clubbing or cyanosis to extremities   Psychiatric: Appropriate affect, cooperative   Neurologic: Oriented x 3, strength symmetric in all extremities, Cranial Nerves grossly intact to confrontation, speech clear   Skin: No rashes     Result Review    Result Review:  I have personally reviewed the results from the time of this admission to 6/8/2021 12:33 EDT and agree with these findings:  [x]  Laboratory  [x]  Microbiology  []  Radiology  []  EKG/Telemetry   []  Cardiology/Vascular   []  Pathology  []  Old records  []  Other:      Assessment/Plan   Assessment / Plan     Assessment/Plan:   Acute infectious enterocolitis  C. difficile ruled out  Intractable nausea and vomiting  Questionable Crohn's flare  JOESPH  Clinical dehydration  Normocytic anemia  Crohn's disease  Breast cancer with bone and colon metastases on chemotherapy  Type 2 diabetes mellitus  Hypertension  Hyperlipidemia    Admit to hospital for work-up and management of the above  Start broad-spectrum IV Levaquin and Flagyl for intra-abdominal infection  Start Solu-Medrol 60 mg IV every 12 hours  LR at 100 cc/h, monitor renal function closely  Continue appropriate home  medications  Trend renal function and electrolytes with a.m. BMP  Trend Hgb and WBC with a.m. CBC  Clinical course will dictate further management    Discussed case with: ED physician, bedside RN    DVT prophylaxis:  Mechanical DVT prophylaxis orders are present.    CODE STATUS:    Code Status: CPR  Medical Interventions (Level of Support Prior to Arrest): Full      Admission Status:  I believe this patient meets inpatient status.    Electronically signed by Nishant Cornelius MD, 06/08/21, 12:33 PM EDT.

## 2021-06-08 NOTE — ED PROVIDER NOTES
Subjective   Pt is a 75-yo female who reports to the ED with c/o nausea, vomiting, and abdominal pain. Pt has vomited at least 7 times and this started today. Pt vomit was bilious. Pt's last bowel movement was today which was normal. . Pt has had a hysterectomy. Pt has a port due to being on chemo. Pt has had breast cancer twice and found out a few weeks ago by Dr. Germain that it has spread to her colon. Pt was in the ER a few weeks ago due to transudated pleural effusion secondary to cancer.     Pt has a history of Crohn's and COPD.    Pt's daughter is at bedside.     PCP- Pan Germain      History provided by:  Patient and relative   used: No    Vomiting  The primary symptoms include abdominal pain, nausea and vomiting. Primary symptoms do not include fever, diarrhea, dysuria or rash. The illness began today. The onset was sudden.   The abdominal pain began today. The abdominal pain has been unchanged since its onset. Pain location: mid  The abdominal pain is relieved by nothing.   Nausea began today.   The vomiting began today. Vomiting occurs 6 to 10 times per day. The emesis contains bilious material.   The illness does not include chills or back pain. Associated medical issues comments: Crohn's.   Nausea  The primary symptoms include abdominal pain, nausea and vomiting. Primary symptoms do not include fever, diarrhea, dysuria or rash.   The illness does not include chills or back pain. Associated medical issues comments: Crohn's.       Review of Systems   Constitutional: Negative for chills and fever.   HENT: Negative for nosebleeds.    Eyes: Negative for redness.   Respiratory: Negative for cough and shortness of breath.    Cardiovascular: Negative for chest pain.   Gastrointestinal: Positive for abdominal pain, nausea and vomiting. Negative for diarrhea.   Genitourinary: Negative for dysuria and frequency.   Musculoskeletal: Negative for back pain and neck pain.   Skin: Negative  for rash.   Neurological: Negative for seizures and syncope.       Past Medical History:   Diagnosis Date   • Cancer (CMS/HCC)     Breast with mets to colon and bones   • COPD (chronic obstructive pulmonary disease) (CMS/HCC)    • Depression    • Diabetes mellitus (CMS/HCC)    • Hyperlipidemia    • Hypertension        Allergies   Allergen Reactions   • Penicillins Rash       Past Surgical History:   Procedure Laterality Date   • CHOLECYSTECTOMY     • HYSTERECTOMY     • JOINT REPLACEMENT Bilateral     Knees   • MASTECTOMY Bilateral        History reviewed. No pertinent family history.    Social History     Socioeconomic History   • Marital status:      Spouse name: Not on file   • Number of children: Not on file   • Years of education: Not on file   • Highest education level: Not on file   Tobacco Use   • Smoking status: Former Smoker     Types: Cigarettes     Quit date: 2001     Years since quittin.0   Substance and Sexual Activity   • Alcohol use: Not Currently   • Drug use: Never   • Sexual activity: Defer         Objective   Physical Exam  Vitals and nursing note reviewed.   Constitutional:       General: She is not in acute distress.     Appearance: Normal appearance.   HENT:      Head: Normocephalic and atraumatic.      Nose: Nose normal.      Mouth/Throat:      Pharynx: Oropharynx is clear.   Eyes:      General: No scleral icterus.     Conjunctiva/sclera: Conjunctivae normal.   Neck:      Thyroid: No thyroid mass.   Cardiovascular:      Rate and Rhythm: Normal rate and regular rhythm.      Heart sounds: Normal heart sounds. No murmur heard.     Pulmonary:      Effort: No respiratory distress.      Breath sounds: Examination of the right-upper field reveals decreased breath sounds and wheezing. Examination of the left-upper field reveals decreased breath sounds, wheezing and rales. Decreased breath sounds, wheezing and rales present. No rhonchi.   Chest:      Chest wall: No tenderness.       Comments: Port- palpable left superior anterior chest wall  Abdominal:      Palpations: Abdomen is soft.      Tenderness: There is abdominal tenderness (mid). There is no guarding or rebound.      Hernia: No hernia is present.      Comments: No rigidity.   Musculoskeletal:         General: No tenderness. Normal range of motion.      Cervical back: Normal range of motion and neck supple. No tenderness.      Right lower leg: Edema (trace) present.      Left lower leg: Edema (trace) present.   Skin:     General: Skin is warm and dry.   Neurological:      General: No focal deficit present.      Mental Status: She is alert and oriented to person, place, and time.      Sensory: No sensory deficit.      Motor: No weakness.   Psychiatric:         Mood and Affect: Mood normal.         Behavior: Behavior normal.         Procedures         ED Course  ED Course as of Jun 08 0643 Tue Jun 08, 2021   0558 The patient had resolution of her emesis.  However the patient continues to have abdominal pain and the patient has developed diarrhea while in the emergency department    [SD]      ED Course User Index  [SD] Gennaro Daly DO           Labs Reviewed   COMPREHENSIVE METABOLIC PANEL - Abnormal; Notable for the following components:       Result Value    Glucose 171 (*)     Creatinine 1.98 (*)     Sodium 134 (*)     Chloride 95 (*)     eGFR Non  Amer 24 (*)     All other components within normal limits    Narrative:     GFR Normal >60  Chronic Kidney Disease <60  Kidney Failure <15     PROTIME-INR - Abnormal; Notable for the following components:    INR 0.93 (*)     All other components within normal limits    Narrative:     Suggested Therapeutic Ranges For Oral Anticoagulant Therapy:  Level of Therapy                      INR Target Range  Standard Dose                            2.0-3.0  High Dose                                2.5-3.5  Patients not receiving anticoagulant  Therapy Normal Range                      0.6-1.2   MAGNESIUM - Abnormal; Notable for the following components:    Magnesium 1.4 (*)     All other components within normal limits   C-REACTIVE PROTEIN - Abnormal; Notable for the following components:    C-Reactive Protein 3.59 (*)     All other components within normal limits   BLOOD GAS, ARTERIAL - Abnormal; Notable for the following components:    pO2, Arterial 68.5 (*)     HCO3, Arterial 29.1 (*)     Base Excess, Arterial 4.3 (*)     O2 Saturation, Arterial 93.5 (*)     Oxyhemoglobin 92.1 (*)     FHHB 6.4 (*)     All other components within normal limits   CBC WITH AUTO DIFFERENTIAL - Abnormal; Notable for the following components:    Hemoglobin 11.1 (*)     MCH 26.4 (*)     RDW 16.3 (*)     Neutrophil % 82.4 (*)     Lymphocyte % 9.7 (*)     Lymphocytes, Absolute 0.37 (*)     All other components within normal limits   LIPASE - Abnormal; Notable for the following components:    Lipase 61 (*)     All other components within normal limits   APTT - Normal   PHOSPHORUS - Normal   LACTIC ACID, PLASMA - Normal   BLOOD CULTURE   BLOOD CULTURE   RAINBOW DRAW    Narrative:     The following orders were created for panel order [place] Draw.  Procedure                               Abnormality         Status                     ---------                               -----------         ------                     Light Blue Top[[id number]]                                   Final result               Green Top (Gel)[[id number]]                                  Final result               Lavender Top[[id number]]                                     Final result               Gold Top - SST[[id number]]                                   Final result                 Please view results for these tests on the individual orders.   SCAN SLIDE   CALCIUM, IONIZED   URINALYSIS W/ MICROSCOPIC IF INDICATED (NO CULTURE)   CBC AND DIFFERENTIAL    Narrative:     The following orders were created for panel order CBC &  Differential.  Procedure                               Abnormality         Status                     ---------                               -----------         ------                     Scan Slide[[id number]]                                       Final result               CBC Auto Differential[[id number]]        Abnormal            Final result                 Please view results for these tests on the individual orders.   LIGHT BLUE TOP   GREEN TOP   LAVENDER TOP   GOLD TOP - SST         CT Abdomen Pelvis Without Contrast    Result Date: 6/8/2021  Narrative: PROCEDURE: CT ABDOMEN PELVIS WO CONTRAST  COMPARISONS: [facility], CR, XR CHEST 1 VW, 6/08/2021, 0:28.   [facility], CT, CHEST W/O CONTRAST, 5/17/2021, 16:43.  INDICATIONS: UNSPECIFIED ABDOMINAL PAIN, ACUTE, NONLOCALIZED; HISTORY OF BREAST CARCINOMA.  TECHNIQUE: 718 CT images were created without intravenous contrast.  No oral contrast agent was administered for the study.  PROTOCOL:   Standard imaging protocol performed    RADIATION:   DLP: 897.9mGy*cm   Automated exposure control was utilized to minimize radiation dose.  FINDINGS: Diffuse osseous metastatic disease is seen as on prior exams.  A definite pathologic fracture or impending pathologic fracture is not appreciated by this study.  There is a small left pleural effusion.  The left pleural effusion has decreased in size since the prior CT exam.  There may be minimal right pleural effusion.  The right pleural effusion is also decreased in size since the prior CT exam.  A trace amount of pericardial effusion is seen.  Mild atelectasis and/or infiltrate(s) is (are) suggested in the lower lobe of the left lung.  Atelectasis is favored over pneumonia as the etiology.  A tiny hiatal hernia is identified.  There is a small amount of ascites.  Grossly, no hepatic metastases are suspected.  The lack of intravenous contrast limits assessment for hepatic metastases.  Abdominal  ultrasound examination, tailored at evaluation of the liver, may be helpful in further imaging evaluation if clinically warranted.  No hepatomegaly.  No splenomegaly.  No renal stones or hydronephrosis or obstructive uropathy.  No acute pancreatitis is seen.  The patient has undergone cholecystectomy and hysterectomy.  There is a stool and fluid distended colon without definite pneumatosis or change in caliber to suggest a mechanical bowel obstruction.  No pneumoperitoneum is seen.  The appendix is not clearly identified.  Please correlate with the surgical history.  Colonic diverticulosis is seen without definite acute diverticulitis.  Also, there is dilated small bowel with fluid; again, no pneumatosis is associated with the small bowel.  A definite transition point in the caliber of the small bowel is not appreciated.  Minimal, if any, mural thickening is seen.  There is mild distension of the stomach as well.  The gastrointestinal CT findings are thought more likely to represent a generalized adynamic ileus than to represent a mechanical bowel obstruction.  Again, the lack of intravenous and oral contrast agents on the study may limit assessment for possible mechanical bowel obstruction.  An infectious/inflammatory enterocolitis cannot be excluded.  There are pelvic phleboliths.  CONCLUSION:   1. The patient has undergone cholecystectomy and hysterectomy.  There may have been appendectomy.  Please correlate with the surgical history.  The appendix is not clearly identified.   2. Diffuse osseous metastatic disease is seen, as before.   3. There is a small amount of ascites.   4. The bilateral pleural effusions have decreased since 5/17/2021.   5. Atelectasis and/or pneumonia in the lower lobe of the left lung is possible.  It has improved since the 5/17/2021 study.   6. There is dilated small bowel and colon, which may represent a generalized adynamic ileus.  An infectious/inflammatory enterocolitis cannot be  excluded.  A definite caliber change in the bowel to suggest a mechanical obstruction is not appreciated by nonenhanced CT examination.  CT examination of the abdomen and pelvis after administration of oral contrast agent may be helpful in further imaging assessment if clinically indicated and if not contraindicated.  No pneumoperitoneum or pneumatosis.  No portal or mesenteric venous gas is seen.   7. No hydronephrosis or obstructive uropathy.  No nephrolithiasis or ureterolithiasis.   8. There may be a small hiatal hernia.   9. No acute pancreatitis is suggested.   10. No definite adrenal mass is seen.   11. Please see above comments for further detail.     ELIJAH DOUGLAS JR, MD       Electronically Signed and Approved By: ELIJAH DOUGLAS JR, MD on 6/08/2021 at 4:26               XR Chest 1 View    Result Date: 6/8/2021  Narrative: PROCEDURE: XR CHEST 1 VW  COMPARISON: [facility], , CHEST AP/PA 1 VIEW, 5/18/2021, 12:23.  INDICATIONS: SHORTNESS OF BREATH; CHEST PAIN X TODAY; H/O BREAST CARCINOMA.  FINDINGS: A single AP upright portable chest radiograph was performed.  Interval decrease in opacification within the left lung base is noted since the prior exam.  Also, decreased pleural effusions are suspected bilaterally.  No new infiltrate or atelectasis is suggested.  There is possible interval exchange of the left-sided central venous line with its distal aspect more parallel to the superior vena cava (SVC) margins than on the prior study (versus differences in cardiac motion in vascular pulsation).  Its tip is in the expected location of the upper to mid SVC.  Surgical clips are projected over the bilateral chest, as before.  The thoracic aorta is atherosclerotic and ectatic.  Degenerative changes involve the bilateral shoulders.  The cardiac silhouette is less prominent than on prior study.  No cardiac enlargement is suspected.  CONCLUSION: Favorable progression is suggested radiographically with  continued decrease in bilateral pleural effusions and left basilar airspace disease since 5/18/2021.  Please see above comments for further detail.      ELIJAH DOUGLAS JR, MD       Electronically Signed and Approved By: ELIJAH DOUGLAS JR, MD on 6/08/2021 at 1:50                                        MDM    Final diagnoses:   Abdominal pain, unspecified abdominal location   Crohn's disease of colon with complication (CMS/HCC)   Nausea and vomiting, intractability of vomiting not specified, unspecified vomiting type   Diarrhea, unspecified type   Hypomagnesemia   Renal insufficiency   Transient hypotension       Documentation assistance provided by paige Norris.  Information recorded by the scribbri was done at my direction and has been verified and validated by me.     Zina Norris  06/08/21 0055       Zina Norris  06/08/21 0103       Gennaro Daly DO  06/08/21 0644

## 2021-06-09 LAB
ANION GAP SERPL CALCULATED.3IONS-SCNC: 10.4 MMOL/L (ref 5–15)
BUN SERPL-MCNC: 25 MG/DL (ref 8–23)
BUN/CREAT SERPL: 16.1 (ref 7–25)
CALCIUM SPEC-SCNC: 9 MG/DL (ref 8.6–10.5)
CHLORIDE SERPL-SCNC: 95 MMOL/L (ref 98–107)
CLUMPED PLATELETS: PRESENT
CO2 SERPL-SCNC: 25.6 MMOL/L (ref 22–29)
CREAT SERPL-MCNC: 1.55 MG/DL (ref 0.57–1)
DEPRECATED RDW RBC AUTO: 48.9 FL (ref 37–54)
ERYTHROCYTE [DISTWIDTH] IN BLOOD BY AUTOMATED COUNT: 16.6 % (ref 12.3–15.4)
GFR SERPL CREATININE-BSD FRML MDRD: 32 ML/MIN/1.73
GLUCOSE BLDC GLUCOMTR-MCNC: 244 MG/DL (ref 70–130)
GLUCOSE BLDC GLUCOMTR-MCNC: 277 MG/DL (ref 70–130)
GLUCOSE BLDC GLUCOMTR-MCNC: 298 MG/DL (ref 70–130)
GLUCOSE SERPL-MCNC: 267 MG/DL (ref 65–99)
HCT VFR BLD AUTO: 29 % (ref 34–46.6)
HGB BLD-MCNC: 9.1 G/DL (ref 12–15.9)
HYPOCHROMIA BLD QL: ABNORMAL
LARGE PLATELETS: ABNORMAL
LYMPHOCYTES # BLD MANUAL: 0.28 10*3/MM3 (ref 0.7–3.1)
LYMPHOCYTES NFR BLD MANUAL: 1 % (ref 5–12)
LYMPHOCYTES NFR BLD MANUAL: 6 % (ref 19.6–45.3)
MAGNESIUM SERPL-MCNC: 2 MG/DL (ref 1.6–2.4)
MCH RBC QN AUTO: 25.9 PG (ref 26.6–33)
MCHC RBC AUTO-ENTMCNC: 31.4 G/DL (ref 31.5–35.7)
MCV RBC AUTO: 82.4 FL (ref 79–97)
MICROCYTES BLD QL: ABNORMAL
MONOCYTES # BLD AUTO: 0.05 10*3/MM3 (ref 0.1–0.9)
NEUTROPHILS # BLD AUTO: 4.28 10*3/MM3 (ref 1.7–7)
NEUTROPHILS NFR BLD MANUAL: 76 % (ref 42.7–76)
NEUTS BAND NFR BLD MANUAL: 17 % (ref 0–5)
OVALOCYTES BLD QL SMEAR: ABNORMAL
PLATELET # BLD AUTO: 174 10*3/MM3 (ref 140–450)
PMV BLD AUTO: 10.1 FL (ref 6–12)
POTASSIUM SERPL-SCNC: 4.5 MMOL/L (ref 3.5–5.2)
RBC # BLD AUTO: 3.52 10*6/MM3 (ref 3.77–5.28)
SODIUM SERPL-SCNC: 131 MMOL/L (ref 136–145)
WBC # BLD AUTO: 4.6 10*3/MM3 (ref 3.4–10.8)
WBC MORPH BLD: NORMAL

## 2021-06-09 PROCEDURE — 85007 BL SMEAR W/DIFF WBC COUNT: CPT | Performed by: INTERNAL MEDICINE

## 2021-06-09 PROCEDURE — 80048 BASIC METABOLIC PNL TOTAL CA: CPT | Performed by: PHYSICIAN ASSISTANT

## 2021-06-09 PROCEDURE — 94799 UNLISTED PULMONARY SVC/PX: CPT

## 2021-06-09 PROCEDURE — 63710000001 INSULIN LISPRO (HUMAN) PER 5 UNITS: Performed by: INTERNAL MEDICINE

## 2021-06-09 PROCEDURE — 99233 SBSQ HOSP IP/OBS HIGH 50: CPT | Performed by: INTERNAL MEDICINE

## 2021-06-09 PROCEDURE — 82962 GLUCOSE BLOOD TEST: CPT

## 2021-06-09 PROCEDURE — 25010000002 METHYLPREDNISOLONE PER 125 MG: Performed by: INTERNAL MEDICINE

## 2021-06-09 PROCEDURE — 83735 ASSAY OF MAGNESIUM: CPT | Performed by: PHYSICIAN ASSISTANT

## 2021-06-09 PROCEDURE — 85025 COMPLETE CBC W/AUTO DIFF WBC: CPT | Performed by: INTERNAL MEDICINE

## 2021-06-09 PROCEDURE — 97161 PT EVAL LOW COMPLEX 20 MIN: CPT

## 2021-06-09 RX ADMIN — MEMANTINE 10 MG: 10 TABLET ORAL at 08:23

## 2021-06-09 RX ADMIN — SODIUM CHLORIDE, PRESERVATIVE FREE 10 ML: 5 INJECTION INTRAVENOUS at 08:25

## 2021-06-09 RX ADMIN — METHYLPREDNISOLONE SODIUM SUCCINATE 60 MG: 125 INJECTION, POWDER, FOR SOLUTION INTRAMUSCULAR; INTRAVENOUS at 20:27

## 2021-06-09 RX ADMIN — INSULIN LISPRO 4 UNITS: 100 INJECTION, SOLUTION INTRAVENOUS; SUBCUTANEOUS at 12:35

## 2021-06-09 RX ADMIN — SODIUM CHLORIDE, POTASSIUM CHLORIDE, SODIUM LACTATE AND CALCIUM CHLORIDE 100 ML/HR: 600; 310; 30; 20 INJECTION, SOLUTION INTRAVENOUS at 02:24

## 2021-06-09 RX ADMIN — INSULIN LISPRO 4 UNITS: 100 INJECTION, SOLUTION INTRAVENOUS; SUBCUTANEOUS at 17:55

## 2021-06-09 RX ADMIN — ROSUVASTATIN CALCIUM 10 MG: 5 TABLET, FILM COATED ORAL at 20:27

## 2021-06-09 RX ADMIN — SODIUM CHLORIDE, PRESERVATIVE FREE 10 ML: 5 INJECTION INTRAVENOUS at 20:28

## 2021-06-09 RX ADMIN — INSULIN LISPRO 3 UNITS: 100 INJECTION, SOLUTION INTRAVENOUS; SUBCUTANEOUS at 08:24

## 2021-06-09 RX ADMIN — METRONIDAZOLE 500 MG: 500 INJECTION, SOLUTION INTRAVENOUS at 19:20

## 2021-06-09 RX ADMIN — BUPROPION HYDROCHLORIDE 300 MG: 150 TABLET, EXTENDED RELEASE ORAL at 09:15

## 2021-06-09 RX ADMIN — METRONIDAZOLE 500 MG: 500 INJECTION, SOLUTION INTRAVENOUS at 10:43

## 2021-06-09 RX ADMIN — MEMANTINE 10 MG: 10 TABLET ORAL at 20:27

## 2021-06-09 RX ADMIN — FOLIC ACID 1 MG: 1 TABLET ORAL at 08:23

## 2021-06-09 RX ADMIN — ATENOLOL 50 MG: 50 TABLET ORAL at 08:23

## 2021-06-09 RX ADMIN — SODIUM CHLORIDE, POTASSIUM CHLORIDE, SODIUM LACTATE AND CALCIUM CHLORIDE 100 ML/HR: 600; 310; 30; 20 INJECTION, SOLUTION INTRAVENOUS at 23:03

## 2021-06-09 RX ADMIN — METHYLPREDNISOLONE SODIUM SUCCINATE 60 MG: 125 INJECTION, POWDER, FOR SOLUTION INTRAMUSCULAR; INTRAVENOUS at 08:24

## 2021-06-09 RX ADMIN — SODIUM CHLORIDE, POTASSIUM CHLORIDE, SODIUM LACTATE AND CALCIUM CHLORIDE 100 ML/HR: 600; 310; 30; 20 INJECTION, SOLUTION INTRAVENOUS at 12:56

## 2021-06-09 RX ADMIN — RISPERIDONE 3 MG: 3 TABLET, FILM COATED ORAL at 20:27

## 2021-06-09 RX ADMIN — METRONIDAZOLE 500 MG: 500 INJECTION, SOLUTION INTRAVENOUS at 02:20

## 2021-06-09 NOTE — PLAN OF CARE
Goal Outcome Evaluation:  Plan of Care Reviewed With: patient        Progress: improving  Outcome Summary: Pt presents with functional limitations that impede ability to safely and independently transfer and ambulate. Skilled PT services are needed to address mobility needs.

## 2021-06-09 NOTE — PLAN OF CARE
Goal Outcome Evaluation:      No change in condition today. Vital signs remain stable. Patient up to chair multiple times today. Continue plan of care.

## 2021-06-09 NOTE — PLAN OF CARE
Goal Outcome Evaluation:  Plan of Care Reviewed With: patient        Progress: improving  Outcome Summary: denies pain/nausea. mult bm this shift but eased up throughout shift. no new issues/needs noted.

## 2021-06-09 NOTE — THERAPY EVALUATION
Acute Care - Physical Therapy Initial Evaluation   [facility]     Patient Name: Mirna Meredith  : 1945  MRN: [id number]  Today's Date: 2021           PT Assessment (last 12 hours)      PT Evaluation and Treatment     Row Name 21 1703          Physical Therapy Time and Intention    Subjective Information  no complaints  -CS     Document Type  evaluation  -CS     Mode of Treatment  individual therapy;physical therapy  -CS     Patient Effort  good  -CS     Row Name 21 1703          General Information    Patient Profile Reviewed  yes  -CS     Prior Level of Function  independent:;all household mobility;gait;transfer;bed mobility;ADL's  -CS     Equipment Currently Used at Home  none  -CS     Existing Precautions/Restrictions  fall  -CS     Row Name 21 1703          Previous Level of Function/Home Environm    BADLs, Premorbid Functional Level  independent  -CS     IADLs, Premorbid Functional Level  needs assistance for safe performance  -CS     Household Ambulation, Premorbid Functional Level  independent  -CS     Community Ambulation, Premorbid Functional Level  needs assistance for safe performance  -CS     Row Name 21 1703          Living Environment    Current Living Arrangements  home/apartment/condo  -CS     Lives With  alone daughter comes in evening and night time  -CS     Row Name 21 1703          Home Use of Assistive/Adaptive Equipment    Equipment Currently Used at Home  none  -CS     Row Name 21 1703          Cognition    Orientation Status (Cognition)  oriented x 3  -CS     Row Name 21          Range of Motion Comprehensive    General Range of Motion  bilateral lower extremity ROM WFL  -CS     Row Name 21          Strength Comprehensive (MMT)    General Manual Muscle Testing (MMT) Assessment  lower extremity strength deficits identified  -CS     Comment, General Manual Muscle Testing (MMT) Assessment  3+/5  -CS     Row Name 21           Bed Mobility    Bed Mobility  rolling left;rolling right  -     All Activities, [place] (Bed Mobility)  modified independence  -CS     Assistive Device (Bed Mobility)  bed rails  -CS     Comment (Bed Mobility)  sit<>supine transfer contact guard assist  -CS     Row Name 06/09/21 1703          Transfers    Transfers  bed-chair transfer;chair-bed transfer;sit-stand transfer;stand-sit transfer  -CS     Bed-Chair [place] (Transfers)  contact guard;minimum assist (75% patient effort)  -CS     Chair-Bed [place] (Transfers)  contact guard;minimum assist (75% patient effort)  -CS     Sit-Stand [place] (Transfers)  contact guard  -CS     Stand-Sit [place] (Transfers)  contact guard  -CS     Row Name 06/09/21 1703          Gait/Stairs (Locomotion)    Gait/Stairs Locomotion  gait/ambulation independence  -     [place] Level (Gait)  contact guard  -CS     Assistive Device (Gait)  -- hha  -CS     Distance in Feet (Gait)  40  -     Row Name 06/09/21 1703          Safety Issues, Functional Mobility    Safety Issues Affecting Function (Mobility)  impulsivity;insight into deficits/self-awareness;problem-solving  -     Impairments Affecting Function (Mobility)  balance;endurance/activity tolerance  -     Row Name 06/09/21 1703          Plan of Care Review    Plan of Care Reviewed With  patient  -CS     Progress  improving  -CS     Outcome Summary  Pt presents with functional limitations that impede ability to safely and independently transfer and ambulate. Skilled PT services are needed to address mobility needs.  -     Row Name 06/09/21 1703          Physical Therapy Goals    Bed Mobility Goal Selection (PT)  bed mobility, PT goal 1  -     Transfer Goal Selection (PT)  transfer, PT goal 1  -     Gait Training Goal Selection (PT)  gait training, PT goal 1  -     Row Name 06/09/21 1703          Bed Mobility Goal 1 (PT)    Activity/Assistive Device (Bed Mobility Goal 1, PT)  bed  mobility activities, all  -CS     [place] Level/Cues Needed (Bed Mobility Goal 1, PT)  independent  -CS     Time Frame (Bed Mobility Goal 1, PT)  10 days  -CS     Row Name 06/09/21 1703          Transfer Goal 1 (PT)    Activity/Assistive Device (Transfer Goal 1, PT)  sit-to-stand/stand-to-sit;bed-to-chair/chair-to-bed  -CS     [place] Level/Cues Needed (Transfer Goal 1, PT)  independent  -CS     Time Frame (Transfer Goal 1, PT)  10 days  -CS     Row Name 06/09/21 1703          Gait Training Goal 1 (PT)    Activity/Assistive Device (Gait Training Goal 1, PT)  gait (walking locomotion);walker, rolling  -CS     [place] Level (Gait Training Goal 1, PT)  modified independence  -CS     Distance (Gait Training Goal 1, PT)  150  -CS     Time Frame (Gait Training Goal 1, PT)  10 days  -CS     Row Name 06/09/21 1703          Therapy Assessment/Plan (PT)    Rehab Potential (PT)  good, to achieve stated therapy goals  -CS     Criteria for Skilled Interventions Met (PT)  yes;skilled treatment is necessary  -CS     Problem List (PT)  problems related to;balance;mobility;strength  -CS     Activity Limitations Related to Problem List (PT)  unable to ambulate safely;unable to transfer safely  -CS     Row Name 06/09/21 1703          PT Evaluation Complexity    History, PT Evaluation Complexity  no personal factors and/or comorbidities  -CS     Examination of Body Systems (PT Eval Complexity)  total of 4 or more elements  -CS     Clinical Presentation (PT Evaluation Complexity)  stable  -CS     Clinical Decision Making (PT Evaluation Complexity)  low complexity  -CS     Overall Complexity (PT Evaluation Complexity)  low complexity  -CS     Row Name 06/09/21 1703          Therapy Plan Review/Discharge Plan (PT)    Therapy Plan Review (PT)  patient  -CS       User Key  (r) = Recorded By, (t) = Taken By, (c) = Cosigned By    Initials Name Provider Type    CS Annabella Guo, PT Physical Therapist        Physical Therapy  Education                 Title: PT OT SLP Therapies (Done)     Topic: Physical Therapy (Done)     Point: Mobility training (Done)     Learning Progress Summary           Patient Acceptance, E,TB, VU by  at 6/9/2021 1714                   Point: Precautions (Done)     Learning Progress Summary           Patient Acceptance, E,TB, VU by  at 6/9/2021 1714                               User Key     Initials Effective Dates Name Provider Type Discipline     04/25/21 -  Annabella Guo, MARILIA Physical Therapist PT              PT Recommendation and Plan  Anticipated Discharge Disposition (PT): home with home health, home with assist  Planned Therapy Interventions (PT): bed mobility training, gait training, strengthening, transfer training  Therapy Frequency (PT): daily  Plan of Care Reviewed With: patient  Progress: improving  Outcome Summary: Pt presents with functional limitations that impede ability to safely and independently transfer and ambulate. Skilled PT services are needed to address mobility needs.  Outcome Measures     Row Name 06/09/21 1700             How much help from another person do you currently need...    Turning from your back to your side while in flat bed without using bedrails?  4  -CS      Moving from lying on back to sitting on the side of a flat bed without bedrails?  4  -CS      Moving to and from a bed to a chair (including a wheelchair)?  3  -CS      Standing up from a chair using your arms (e.g., wheelchair, bedside chair)?  3  -CS      Climbing 3-5 steps with a railing?  2  -CS      To walk in hospital room?  3  -CS      AM-PAC 6 Clicks Score (PT)  19  -CS         Functional Assessment    Outcome Measure Options  AM-PAC 6 Clicks Basic Mobility (PT)  -CS        User Key  (r) = Recorded By, (t) = Taken By, (c) = Cosigned By    Initials Name Provider Type    CS Annabella Guo PT Physical Therapist           Time Calculation:   PT Charges     Row Name 06/09/21 1702             Time  Calculation    PT Received On  06/09/21  -CS      PT Goal Re-Cert Due Date  06/18/21  -CS         Untimed Charges    PT Eval/Re-eval Minutes  50  -CS         Total Minutes    Untimed Charges Total Minutes  50  -CS       Total Minutes  50  -CS        User Key  (r) = Recorded By, (t) = Taken By, (c) = Cosigned By    Initials Name Provider Type    CS Annabella Guo, PT Physical Therapist        Therapy Charges for Today     Code Description Service Date Service Provider Modifiers Qty    [id number] HC PT EVAL LOW COMPLEXITY 4 6/9/2021 Annablela Guo, PT GP 1          PT G-Codes  Outcome Measure Options: AM-PAC 6 Clicks Basic Mobility (PT)  AM-PAC 6 Clicks Score (PT): 19    Annabella Guo PT  6/9/2021

## 2021-06-09 NOTE — PROGRESS NOTES
[facility]   Hospitalist Progress Note  Date: 2021  Patient Name: Mirna Meredith  : 1945  MRN: [id number]  Date of admission: 2021      Subjective   Subjective     Chief Complaint: Abdominal pain    Summary: 75 y.o. female with PMH Crohn's disease, breast cancer with metastases to the bone and colon currently on chemotherapy, diabetes, hypertension, hyperlipidemia, COPD who presented to the ED on  due to complaints of abdominal pain.  The patient states she has been having significant abdominal pain for about 2 weeks.  Pain is central, does not radiate, feels a cramping pain, 7/10 at its worst.  Comes and goes.  Has not seen any of her physicians so has not taken anything to help.  She then started having loose stools and diarrhea in the last 24 hours.  She also developed nausea and vomiting yesterday prompting her to seek medical attention in the ED.  Found to have an JOESPH on admission as well as evidence of dehydration.  CT abdomen pelvis showed dilated small bowel and colon, concern for infectious enterocolitis.  Due to inability to tolerate oral antibiotics, she is admitted for further care.  Started on Levaquin and Flagyl as well as steroids due to concern for Crohn's flare.  Her symptoms improved rapidly and her diet was advanced.     Interval Followup: No events overnight.  Patient states she is feeling significantly better today.  States her diarrhea has slowed down considerably.  Nausea and vomiting have improved as well as her abdominal pain.  She was able to tolerate oral intake.    Review of Systems   Denies chest pain or shortness of air    Objective   Objective     Vitals:   Temp:  [97.6 °F (36.4 °C)-98.2 °F (36.8 °C)] 97.9 °F (36.6 °C)  Heart Rate:  [78-91] 91  Resp:  [18-20] 18  BP: (108-142)/(48-70) 142/64  Flow (L/min):  [2] 2  Physical Exam    Constitutional: Awake, alert, no acute distress, pleasant   Eyes: Pupils equal, sclerae anicteric, no conjunctival injection, hard  of hearing   HENT: NCAT, mucous membranes moist   Neck: Supple, no thyromegaly, no lymphadenopathy, trachea midline   Respiratory: Clear to auscultation bilaterally, nonlabored respirations    Cardiovascular: RRR, no murmurs, rubs, or gallops, palpable pedal pulses bilaterally   Gastrointestinal: Positive bowel sounds, soft, nontender, nondistended   Musculoskeletal: No bilateral ankle edema, no clubbing or cyanosis to extremities   Psychiatric: Appropriate affect, cooperative   Neurologic: Oriented x 3, strength symmetric in all extremities, Cranial Nerves grossly intact to confrontation, speech clear   Skin: No rashes     Result Review    Result Review:  I have personally reviewed the results from the time of this admission to 6/9/2021 13:28 EDT and agree with these findings:  [x]  Laboratory  [x]  Microbiology  []  Radiology  []  EKG/Telemetry   []  Cardiology/Vascular   []  Pathology  []  Old records  []  Other:    Assessment/Plan   Assessment / Plan     Assessment/Plan:  Acute infectious enterocolitis  C. difficile ruled out  Intractable nausea and vomiting  Questionable Crohn's flare  JOESPH  Clinical dehydration  Normocytic anemia  Crohn's disease  Breast cancer with bone and colon metastases on chemotherapy  Type 2 diabetes mellitus  Hypertension  Hyperlipidemia     Continue to monitor in the hospital for work-up and management of the above  Continue IV Levaquin and Flagyl for intra-abdominal infection  Continue Solu-Medrol 60 mg IV every 12 hours, will likely transition to prednisone tomorrow with taper at discharge  LR at 100 cc/h, monitor renal function closely  Advance to low residue diet  Continue appropriate home medications  Trend renal function and electrolytes with a.m. BMP  Trend Hgb and WBC with a.m. CBC  Clinical course will dictate further management    Discussed plan with RN.    DVT prophylaxis:  Mechanical DVT prophylaxis orders are present.    CODE STATUS:   Code Status: CPR  Medical  Interventions (Level of Support Prior to Arrest): Full      Electronically signed by Nishant Cornelius MD, 06/09/21, 1:28 PM EDT.

## 2021-06-09 NOTE — CONSULTS
The Pt has a good attitude. She was calm and feels happy. Spiritual Conversation provided. The Pt is happy because she says she will be discharged tomorrow. She is grateful for the visit.

## 2021-06-10 VITALS
BODY MASS INDEX: 35.5 KG/M2 | RESPIRATION RATE: 16 BRPM | WEIGHT: 220.9 LBS | TEMPERATURE: 97.9 F | DIASTOLIC BLOOD PRESSURE: 68 MMHG | HEIGHT: 66 IN | SYSTOLIC BLOOD PRESSURE: 161 MMHG | OXYGEN SATURATION: 96 % | HEART RATE: 81 BPM

## 2021-06-10 LAB
ANION GAP SERPL CALCULATED.3IONS-SCNC: 10.6 MMOL/L (ref 5–15)
BASOPHILS # BLD AUTO: 0.01 10*3/MM3 (ref 0–0.2)
BASOPHILS NFR BLD AUTO: 0.2 % (ref 0–1.5)
BUN SERPL-MCNC: 18 MG/DL (ref 8–23)
BUN/CREAT SERPL: 17.8 (ref 7–25)
CALCIUM SPEC-SCNC: 9.1 MG/DL (ref 8.6–10.5)
CHLORIDE SERPL-SCNC: 97 MMOL/L (ref 98–107)
CO2 SERPL-SCNC: 27.4 MMOL/L (ref 22–29)
CREAT SERPL-MCNC: 1.01 MG/DL (ref 0.57–1)
DEPRECATED RDW RBC AUTO: 47.9 FL (ref 37–54)
EOSINOPHIL # BLD AUTO: 0 10*3/MM3 (ref 0–0.4)
EOSINOPHIL NFR BLD AUTO: 0 % (ref 0.3–6.2)
ERYTHROCYTE [DISTWIDTH] IN BLOOD BY AUTOMATED COUNT: 16.3 % (ref 12.3–15.4)
GFR SERPL CREATININE-BSD FRML MDRD: 53 ML/MIN/1.73
GLUCOSE BLDC GLUCOMTR-MCNC: 246 MG/DL (ref 70–130)
GLUCOSE BLDC GLUCOMTR-MCNC: 308 MG/DL (ref 70–130)
GLUCOSE SERPL-MCNC: 253 MG/DL (ref 65–99)
HCT VFR BLD AUTO: 31.4 % (ref 34–46.6)
HGB BLD-MCNC: 9.9 G/DL (ref 12–15.9)
IMM GRANULOCYTES # BLD AUTO: 0.07 10*3/MM3 (ref 0–0.05)
IMM GRANULOCYTES NFR BLD AUTO: 1.1 % (ref 0–0.5)
LYMPHOCYTES # BLD AUTO: 0.59 10*3/MM3 (ref 0.7–3.1)
LYMPHOCYTES NFR BLD AUTO: 9 % (ref 19.6–45.3)
MAGNESIUM SERPL-MCNC: 1.7 MG/DL (ref 1.6–2.4)
MCH RBC QN AUTO: 25.9 PG (ref 26.6–33)
MCHC RBC AUTO-ENTMCNC: 31.5 G/DL (ref 31.5–35.7)
MCV RBC AUTO: 82.2 FL (ref 79–97)
MONOCYTES # BLD AUTO: 0.51 10*3/MM3 (ref 0.1–0.9)
MONOCYTES NFR BLD AUTO: 7.8 % (ref 5–12)
NEUTROPHILS NFR BLD AUTO: 5.39 10*3/MM3 (ref 1.7–7)
NEUTROPHILS NFR BLD AUTO: 81.9 % (ref 42.7–76)
NRBC BLD AUTO-RTO: 0 /100 WBC (ref 0–0.2)
PLATELET # BLD AUTO: 179 10*3/MM3 (ref 140–450)
PMV BLD AUTO: 10.7 FL (ref 6–12)
POTASSIUM SERPL-SCNC: 4.4 MMOL/L (ref 3.5–5.2)
QT INTERVAL: 356 MS
RBC # BLD AUTO: 3.82 10*6/MM3 (ref 3.77–5.28)
SODIUM SERPL-SCNC: 135 MMOL/L (ref 136–145)
WBC # BLD AUTO: 6.57 10*3/MM3 (ref 3.4–10.8)

## 2021-06-10 PROCEDURE — 83735 ASSAY OF MAGNESIUM: CPT | Performed by: INTERNAL MEDICINE

## 2021-06-10 PROCEDURE — 25010000002 LEVOFLOXACIN PER 250 MG: Performed by: INTERNAL MEDICINE

## 2021-06-10 PROCEDURE — 25010000002 HEPARIN LOCK FLUSH PER 10 UNITS: Performed by: INTERNAL MEDICINE

## 2021-06-10 PROCEDURE — 80048 BASIC METABOLIC PNL TOTAL CA: CPT | Performed by: INTERNAL MEDICINE

## 2021-06-10 PROCEDURE — 63710000001 INSULIN LISPRO (HUMAN) PER 5 UNITS: Performed by: INTERNAL MEDICINE

## 2021-06-10 PROCEDURE — 85025 COMPLETE CBC W/AUTO DIFF WBC: CPT | Performed by: INTERNAL MEDICINE

## 2021-06-10 PROCEDURE — 97110 THERAPEUTIC EXERCISES: CPT

## 2021-06-10 PROCEDURE — 25010000002 METHYLPREDNISOLONE PER 125 MG: Performed by: INTERNAL MEDICINE

## 2021-06-10 PROCEDURE — 99239 HOSP IP/OBS DSCHRG MGMT >30: CPT | Performed by: INTERNAL MEDICINE

## 2021-06-10 PROCEDURE — 82962 GLUCOSE BLOOD TEST: CPT

## 2021-06-10 PROCEDURE — 97116 GAIT TRAINING THERAPY: CPT

## 2021-06-10 PROCEDURE — 94799 UNLISTED PULMONARY SVC/PX: CPT

## 2021-06-10 RX ORDER — METRONIDAZOLE 500 MG/1
500 TABLET ORAL 3 TIMES DAILY
Qty: 15 TABLET | Refills: 0 | Status: SHIPPED | OUTPATIENT
Start: 2021-06-10 | End: 2021-10-22 | Stop reason: HOSPADM

## 2021-06-10 RX ORDER — PREDNISONE 20 MG/1
20 TABLET ORAL DAILY
Qty: 14 TABLET | Refills: 0 | Status: SHIPPED | OUTPATIENT
Start: 2021-06-10 | End: 2021-06-10 | Stop reason: SDUPTHER

## 2021-06-10 RX ORDER — LEVOFLOXACIN 5 MG/ML
750 INJECTION, SOLUTION INTRAVENOUS EVERY 24 HOURS
Status: DISCONTINUED | OUTPATIENT
Start: 2021-06-11 | End: 2021-06-10 | Stop reason: HOSPADM

## 2021-06-10 RX ORDER — CIPROFLOXACIN 250 MG/1
500 TABLET, FILM COATED ORAL EVERY 12 HOURS SCHEDULED
Status: SHIPPED | OUTPATIENT
Start: 2021-06-10 | End: 2021-06-15

## 2021-06-10 RX ORDER — HEPARIN SODIUM (PORCINE) LOCK FLUSH IV SOLN 100 UNIT/ML 100 UNIT/ML
400 SOLUTION INTRAVENOUS ONCE
Status: COMPLETED | OUTPATIENT
Start: 2021-06-10 | End: 2021-06-10

## 2021-06-10 RX ORDER — PREDNISONE 20 MG/1
40 TABLET ORAL DAILY
Qty: 14 TABLET | Refills: 0 | Status: SHIPPED | OUTPATIENT
Start: 2021-06-10 | End: 2021-06-17

## 2021-06-10 RX ADMIN — METHYLPREDNISOLONE SODIUM SUCCINATE 60 MG: 125 INJECTION, POWDER, FOR SOLUTION INTRAMUSCULAR; INTRAVENOUS at 08:37

## 2021-06-10 RX ADMIN — HEPARIN SODIUM (PORCINE) LOCK FLUSH IV SOLN 100 UNIT/ML 400 UNITS: 100 SOLUTION at 15:22

## 2021-06-10 RX ADMIN — METRONIDAZOLE 500 MG: 500 INJECTION, SOLUTION INTRAVENOUS at 02:46

## 2021-06-10 RX ADMIN — SODIUM CHLORIDE, POTASSIUM CHLORIDE, SODIUM LACTATE AND CALCIUM CHLORIDE 100 ML/HR: 600; 310; 30; 20 INJECTION, SOLUTION INTRAVENOUS at 09:40

## 2021-06-10 RX ADMIN — FOLIC ACID 1 MG: 1 TABLET ORAL at 08:36

## 2021-06-10 RX ADMIN — MEMANTINE 10 MG: 10 TABLET ORAL at 08:36

## 2021-06-10 RX ADMIN — LEVOFLOXACIN 750 MG: 750 INJECTION, SOLUTION INTRAVENOUS at 10:33

## 2021-06-10 RX ADMIN — METRONIDAZOLE 500 MG: 500 INJECTION, SOLUTION INTRAVENOUS at 09:40

## 2021-06-10 RX ADMIN — INSULIN LISPRO 3 UNITS: 100 INJECTION, SOLUTION INTRAVENOUS; SUBCUTANEOUS at 08:36

## 2021-06-10 RX ADMIN — SODIUM CHLORIDE, PRESERVATIVE FREE 10 ML: 5 INJECTION INTRAVENOUS at 08:39

## 2021-06-10 RX ADMIN — INSULIN LISPRO 5 UNITS: 100 INJECTION, SOLUTION INTRAVENOUS; SUBCUTANEOUS at 12:20

## 2021-06-10 RX ADMIN — BUPROPION HYDROCHLORIDE 300 MG: 150 TABLET, EXTENDED RELEASE ORAL at 08:36

## 2021-06-10 RX ADMIN — ATENOLOL 50 MG: 50 TABLET ORAL at 08:36

## 2021-06-10 NOTE — DISCHARGE SUMMARY
[facility]         HOSPITALIST  DISCHARGE SUMMARY    Patient Name: Mirna Meredith  : 1945  MRN: [id number]    Date of Admission: 2021  Date of Discharge: 6/10/2021  Primary Care Physician: Pan Pacheco MD    Consults     Date and Time Order Name Status Description    2021  5:56 AM Inpatient Hospitalist Consult Completed           Active and Resolved Hospital Problems:  Active Hospital Problems    Diagnosis POA   • Abdominal pain [R10.9] Yes      Resolved Hospital Problems   No resolved problems to display.   Acute infectious enterocolitis  C. difficile ruled out  Intractable nausea and vomiting  Crohn's disease with flare  JOESPH, resolved  Clinical dehydration  Normocytic anemia  Breast cancer with bone and colon metastases on chemotherapy  Type 2 diabetes mellitus  Hypertension  Hyperlipidemia    Hospital Course     Hospital Course:  Mirna Meredith is a 75 y.o. female PMH Crohn's disease, breast cancer with metastases to the bone and colon currently on chemotherapy, diabetes, hypertension, hyperlipidemia, COPD who presented to the ED on  due to complaints of abdominal pain.  The patient states she has been having significant abdominal pain for about 2 weeks.  Pain is central, does not radiate, feels a cramping pain, 7/10 at its worst.  Comes and goes.  She then started having loose stools and diarrhea in the last 24 hours.  She also developed nausea and vomiting yesterday prompting her to seek medical attention in the ED.  Found to have an JOESPH on admission as well as evidence of dehydration.  CT abdomen pelvis showed dilated small bowel and colon, concern for infectious enterocolitis.  Due to inability to tolerate oral antibiotics, she is admitted for further care.  Started on Levaquin and Flagyl as well as steroids due to concern for Crohn's flare.  Her symptoms improved rapidly and her diet was advanced.  On the day of discharge, the patient was pain-free and was  tolerating a regular diet.  She was discharged home in stable condition on 6/10/2021 to complete 7 days of ciprofloxacin and Flagyl.  She is also given 7 days of steroids at discharge.  Recommend follow with PCP in 1 week.    Part of this note is an electronic transcription of spoken language to printed text. The electronic translation/transcription may permit erroneous, or at times, nonsensical words or phrases to be inadvertently transcribed; I have reviewed the note for such errors however some may still exist.      Day of Discharge     Vital Signs:  Temp:  [97.7 °F (36.5 °C)-98.1 °F (36.7 °C)] 97.7 °F (36.5 °C)  Heart Rate:  [86-98] 86  Resp:  [16-18] 18  BP: (130-150)/(61-71) 133/71  Physical Exam:   Gen: NAD, WDWN, pleasant  ENT: PERRL, EOMI, hard of hearing  CV: RRR no MRG   Pulm: CTAB, no w/r/r    GI: Abd soft, NTND, +bs  Neuro: Moving all extremities spontaneously, CN II-XII grossly intact   Psych: A&O*3, normal mood and affect   Skin: No lesions or rashes noted      Discharge Details        Discharge Medications      New Medications      Instructions Start Date   metroNIDAZOLE 500 MG tablet  Commonly known as: Flagyl   500 mg, Oral, 3 Times Daily      predniSONE 20 MG tablet  Commonly known as: DELTASONE   20 mg, Oral, Daily         Continue These Medications      Instructions Start Date   anastrozole 1 MG tablet  Commonly known as: ARIMIDEX   Oral, Daily      atenolol 50 MG tablet  Commonly known as: TENORMIN   50 mg, Oral, Daily      buPROPion  MG 24 hr tablet  Commonly known as: WELLBUTRIN XL   300 mg, Oral, Daily      EnteraGam 5 g pack  Generic drug: SBI/Protein Isolate   1 packet, Oral, Nightly, Mix 1 packet with 6-8 ounces of water and drink nightly.      folic acid 1 MG tablet  Commonly known as: FOLVITE   1 mg, Oral, Daily      IBGARD PO   Oral      lisinopril 10 MG tablet  Commonly known as: PRINIVIL,ZESTRIL   10 mg, Oral, Daily      memantine 10 MG tablet  Commonly known as: NAMENDA   10  mg, Oral, 2 Times Daily      metFORMIN 500 MG tablet  Commonly known as: GLUCOPHAGE   500 mg, Oral, 2 Times Daily With Meals      montelukast 10 MG tablet  Commonly known as: SINGULAIR   10 mg, Oral, Daily      risperiDONE 3 MG tablet  Commonly known as: risperDAL   3 mg, Oral, Nightly      rosuvastatin 10 MG tablet  Commonly known as: CRESTOR   10 mg, Oral, Nightly      vitamin E 400 UNIT capsule   1 capsule, Oral, Daily             Allergies   Allergen Reactions   • Penicillins Rash       Discharge Disposition:  Home or Self Care    Diet:  Hospital:  Diet Order   Procedures   • Diet Regular; Low Fiber / Low Residue       Discharge Activity:   Activity Instructions     Activity as Tolerated            CODE STATUS:  Code Status and Medical Interventions:   Ordered at: 06/08/21 0932     Code Status:    CPR     Medical Interventions (Level of Support Prior to Arrest):    Full         No future appointments.    Additional Instructions for the Follow-ups that You Need to Schedule     Discharge Follow-up with PCP   As directed       Currently Documented PCP:    Pan Pacheco MD    PCP Phone Number:    [phone]     Follow Up Details: 3-5 days               Pertinent  and/or Most Recent Results     PROCEDURES:   None  LAB RESULTS:      Lab 06/10/21  0855 06/09/21  0613 06/08/21  0958 06/08/21  0027   WBC 6.57 4.60  --  3.81   HEMOGLOBIN 9.9* 9.1*  --  11.1*   HEMATOCRIT 31.4* 29.0*  --  34.7   PLATELETS 179 174  --  212   NEUTROS ABS 5.39 4.28  --  3.14   IMMATURE GRANS (ABS) 0.07*  --   --  0.01   LYMPHS ABS 0.59*  --   --  0.37*   MONOS ABS 0.51  --   --  0.21   EOS ABS 0.00  --   --  0.06   MCV 82.2 82.4  --  82.6   CRP  --   --   --  3.59*   LACTATE  --   --  1.2 1.6   PROTIME  --   --   --  10.3   APTT  --   --   --  23.1         Lab 06/10/21  0855 06/09/21  0613 06/08/21  0027   SODIUM 135* 131* 134*   POTASSIUM 4.4 4.5 4.9   CHLORIDE 97* 95* 95*   CO2 27.4 25.6 26.4   ANION GAP 10.6 10.4 12.6   BUN 18 25*  17   CREATININE 1.01* 1.55* 1.98*   GLUCOSE 253* 267* 171*   CALCIUM 9.1 9.0 9.4   MAGNESIUM 1.7 2.0 1.4*   PHOSPHORUS  --   --  3.9         Lab 06/08/21  0027   TOTAL PROTEIN 6.8   ALBUMIN 3.80   GLOBULIN 3.0   ALT (SGPT) 15   AST (SGOT) 21   BILIRUBIN 0.5   ALK PHOS 108   LIPASE 61*         Lab 06/08/21  0027   PROTIME 10.3   INR 0.93*                 Lab 06/08/21  0056   PH, ARTERIAL 7.434   PCO2, ARTERIAL 44.4   PO2 ART 68.5*   O2 SATURATION ART 93.5*   FIO2 21   HCO3 ART 29.1*   BASE EXCESS ART 4.3*   CARBOXYHEMOGLOBIN 1.2     Brief Urine Lab Results  (Last result in the past 365 days)      Color   Clarity   Blood   Leuk Est   Nitrite   Protein   CREAT   Urine HCG        06/08/21 1132 Yellow Turbid Trace Moderate (2+) Negative Trace             Microbiology Results (last 10 days)     Procedure Component Value - Date/Time    Clostridium Difficile Toxin - Stool, Per Rectum [[id number]] Collected: 06/08/21 1017    Lab Status: Final result Specimen: Stool from Per Rectum Updated: 06/08/21 1138    Narrative:      The following orders were created for panel order Clostridium Difficile Toxin - Stool, Per Rectum.  Procedure                               Abnormality         Status                     ---------                               -----------         ------                     Clostridium Difficile To...[[id number]]                      Final result                 Please view results for these tests on the individual orders.    Enteric Bacterial Panel - Stool, Per Rectum [[id number]]  (Normal) Collected: 06/08/21 1017    Lab Status: Final result Specimen: Stool from Per Rectum Updated: 06/08/21 1436     Salmonella Not Detected     Campylobacter Not Detected     Shigella/Enteroinvasive E. coli (EIEC) Not Detected     Shiga-like toxin-producing E. coli (STEC) stx1/stx2 Not Detected    Enteric Parasite Panel - Stool, Per Rectum [[id number]]  (Normal) Collected: 06/08/21 1017    Lab Status: Final result Specimen:  Stool from Per Rectum Updated: 06/08/21 1436     Giardia lamblia Not Detected     Cryptosporidium Not Detected     Entamoeba histolytica Not Detected    Clostridium Difficile Toxin, PCR - Stool, Per Rectum [[id number]] Collected: 06/08/21 1017    Lab Status: Final result Specimen: Stool from Per Rectum Updated: 06/08/21 1138     C. Difficile Toxins by PCR Negative     027 Toxin Presumptive Negative    Blood Culture - Blood, Arm, Left [[id number]] Collected: 06/08/21 0059    Lab Status: Preliminary result Specimen: Blood from Arm, Left Updated: 06/10/21 0116     Blood Culture No growth at 2 days    Blood Culture - Blood, Arm, Left [[id number]] Collected: 06/08/21 0027    Lab Status: Preliminary result Specimen: Blood from Arm, Left Updated: 06/10/21 0045     Blood Culture No growth at 2 days                           Labs Pending at Discharge:  Pending Labs     Order Current Status    Blood Culture - Blood, Arm, Left Preliminary result    Blood Culture - Blood, Arm, Left Preliminary result            Time spent on Discharge including face to face service:  32 minutes    Electronically signed by Nishant Cornelius MD, 06/10/21, 2:40 PM EDT.

## 2021-06-10 NOTE — THERAPY TREATMENT NOTE
Acute Care - Physical Therapy Treatment Note   [facility]     Patient Name: Mirna Meredith  : 1945  MRN: [id number]  Today's Date: 6/10/2021           PT Assessment (last 12 hours)      PT Evaluation and Treatment     Row Name 06/10/21 1110          Physical Therapy Time and Intention    Subjective Information  complains of;weakness  -DK     Document Type  therapy note (daily note)  -DK     Mode of Treatment  individual therapy;physical therapy  -DK     Patient Effort  good  -DK     Symptoms Noted During/After Treatment  fatigue;shortness of breath  -     Row Name 06/10/21 1110          Cognition    Affect/Mental Status (Cognitive)  WFL  -DK     Orientation Status (Cognition)  oriented to;person;place;situation  -DK     Follows Commands (Cognition)  WFL  -DK     Cognitive Function (Cognitive)  WFL  -DK     Personal Safety Interventions  gait belt;nonskid shoes/slippers when out of bed;supervised activity  -     Row Name 06/10/21 1110          Pain Scale: Word Pre/Post-Treatment    Pain: Word Scale, Pretreatment  0 - no pain  -DK     Posttreatment Pain Rating  0 - no pain  -DK     Row Name 06/10/21 1110          Transfers    Transfers  sit-stand transfer;stand-sit transfer  -DK     Bed-Chair [place] (Transfers)  standby assist;contact guard;1 person assist  -DK     Chair-Bed [place] (Transfers)  standby assist;contact guard;1 person assist  -DK     Assistive Device (Bed-Chair Transfers)  walker, front-wheeled  -DK     Sit-Stand [place] (Transfers)  standby assist;contact guard;1 person assist  -DK     Stand-Sit [place] (Transfers)  standby assist;contact guard;1 person assist  -DK     Row Name 06/10/21 1110          Chair-Bed Transfer    Assistive Device (Chair-Bed Transfers)  walker, front-wheeled  -DK     Row Name 06/10/21 1110          Sit-Stand Transfer    Assistive Device (Sit-Stand Transfers)  walker, front-wheeled  -DK     Row Name 06/10/21 1110          Stand-Sit Transfer     Assistive Device (Stand-Sit Transfers)  walker, front-wheeled  -DK     Row Name 06/10/21 1110          Gait/Stairs (Locomotion)    Gait/Stairs Locomotion  distance ambulated;gait/ambulation assistive device  -     [place] Level (Gait)  contact guard  -DK     Assistive Device (Gait)  walker, front-wheeled  -     Distance in Feet (Gait)  120  -DK     Pattern (Gait)  step-through  -     Row Name 06/10/21 1110          Safety Issues, Functional Mobility    Safety Issues Affecting Function (Mobility)  impulsivity;judgment;awareness of need for assistance;safety precaution awareness  -DK     Impairments Affecting Function (Mobility)  balance;strength;endurance/activity tolerance  -     Row Name 06/10/21 1110          Balance    Balance Assessment  standing dynamic balance  -     Dynamic Standing Balance  mild impairment  -     Balance Interventions  standing;dynamic;tandem gait  -     Row Name 06/10/21 1110          Motor Skills    Motor Skills  therapeutic exercise  -     Therapeutic Exercise  hip;knee;ankle  -     Row Name 06/10/21 1110          Hip (Therapeutic Exercise)    Hip (Therapeutic Exercise)  AROM (active range of motion)  -     Hip AROM (Therapeutic Exercise)  bilateral;flexion;extension;sitting;aBduction;aDduction;10 repetitions  -     Row Name 06/10/21 1110          Knee (Therapeutic Exercise)    Knee (Therapeutic Exercise)  AROM (active range of motion)  -     Knee AROM (Therapeutic Exercise)  bilateral;flexion;extension;LAQ (long arc quad);sitting;10 repetitions  -     Row Name 06/10/21 1110          Ankle (Therapeutic Exercise)    Ankle (Therapeutic Exercise)  AROM (active range of motion)  -     Ankle AROM (Therapeutic Exercise)  dorsiflexion;plantarflexion;bilateral;sitting;10 repetitions;2 sets  -     Row Name 06/10/21 1110          Plan of Care Review    Plan of Care Reviewed With  patient  -DK     Progress  improving  -     Row Name 06/10/21 1110           Positioning and Restraints    Pre-Treatment Position  sitting in chair/recliner  -DK     Post Treatment Position  chair  -DK     In Chair  reclined;call light within reach;encouraged to call for assist;exit alarm on;legs elevated  -DK     Row Name 06/10/21 1110          Therapy Assessment/Plan (PT)    Rehab Potential (PT)  good, to achieve stated therapy goals  -DK     Criteria for Skilled Interventions Met (PT)  skilled treatment is necessary  -DK     Problem List (PT)  balance;strength;hearing  -DK     Activity Limitations Related to Problem List (PT)  unable to ambulate safely  -DK     Row Name 06/10/21 1110          Progress Summary (PT)    Progress Toward Functional Goals (PT)  progress toward functional goals is good  -DK       User Key  (r) = Recorded By, (t) = Taken By, (c) = Cosigned By    Initials Name Provider Type    Lydia Sandoval PTA Physical Therapy Assistant        Physical Therapy Education                 Title: PT OT SLP Therapies (Done)     Topic: Physical Therapy (Done)     Point: Mobility training (Done)     Learning Progress Summary           Patient Acceptance, E,TB, VU by  at 6/9/2021 1714                   Point: Precautions (Done)     Learning Progress Summary           Patient Acceptance, E,TB, VU by  at 6/9/2021 1714                               User Key     Initials Effective Dates Name Provider Type Discipline     04/25/21 -  Annabella Guo, PT Physical Therapist PT              PT Recommendation and Plan  Planned Therapy Interventions (PT): balance training, gait training, strengthening, transfer training  Therapy Frequency (PT): daily  Progress Summary (PT)  Progress Toward Functional Goals (PT): progress toward functional goals is good  Plan of Care Reviewed With: patient  Progress: improving  Outcome Measures     Row Name 06/10/21 1100 06/09/21 1700          How much help from another person do you currently need...    Turning from your back to your side while in flat  bed without using bedrails?  4  -DK  4  -CS     Moving from lying on back to sitting on the side of a flat bed without bedrails?  4  -DK  4  -CS     Moving to and from a bed to a chair (including a wheelchair)?  3  -DK  3  -CS     Standing up from a chair using your arms (e.g., wheelchair, bedside chair)?  3  -DK  3  -CS     Climbing 3-5 steps with a railing?  3  -DK  2  -CS     To walk in hospital room?  3  -DK  3  -CS     AM-PAC 6 Clicks Score (PT)  20  -DK  19  -CS        Functional Assessment    Outcome Measure Options  AM-PAC 6 Clicks Basic Mobility (PT)  -DK  AM-PAC 6 Clicks Basic Mobility (PT)  -CS       User Key  (r) = Recorded By, (t) = Taken By, (c) = Cosigned By    Initials Name Provider Type    Lydia Sandoval PTA Physical Therapy Assistant    CS Annabella Guo, PT Physical Therapist           Time Calculation:   PT Charges     Row Name 06/10/21 1116             Time Calculation    PT Received On  06/10/21  -DK      PT Goal Re-Cert Due Date  06/18/21  -DK         Timed Charges    23108 - PT Therapeutic Exercise Minutes  12  -DK      28423 - Gait Training Minutes   9  -DK      04349 - PT Therapeutic Activity Minutes  5  -DK         Total Minutes    Timed Charges Total Minutes  26  -DK       Total Minutes  26  -DK        User Key  (r) = Recorded By, (t) = Taken By, (c) = Cosigned By    Initials Name Provider Type    Lydia Sandoval PTA Physical Therapy Assistant        Therapy Charges for Today     Code Description Service Date Service Provider Modifiers Qty    31137032879 HC PT THER PROC EA 15 MIN 6/10/2021 Lydia Freeman PTA GP 1    24548190360 HC GAIT TRAINING EA 15 MIN 6/10/2021 Lydia Freeman PTA GP 1          PT G-Codes  Outcome Measure Options: AM-PAC 6 Clicks Basic Mobility (PT)  AM-PAC 6 Clicks Score (PT): 20    Lydia Freeman PTA  6/10/2021

## 2021-06-12 LAB
BASE EXCESS BLDA CALC-SCNC: 4.3 MMOL/L (ref -2–2)
BDY SITE: ABNORMAL
COHGB MFR BLD: 1.2 % (ref 0–1.5)
FHHB: 6.4 % (ref 0–5)
HCO3 BLDA-SCNC: 29.1 MMOL/L (ref 22–26)
HGB BLDA-MCNC: 11.9 G/DL (ref 11.7–14.6)
INHALED O2 CONCENTRATION: 21 %
LACTATE BLDA-SCNC: ABNORMAL MMOL/L
METHGB BLD QL: 0.3 % (ref 0–1.5)
MODALITY: ABNORMAL
OXYHGB MFR BLDV: 92.1 % (ref 94–99)
PCO2 BLDA: 44.4 MM HG (ref 35–45)
PH BLDA: 7.43 PH UNITS (ref 7.35–7.45)
PO2 BLD: 326 MM[HG] (ref 0–500)
PO2 BLDA: 68.5 MM HG (ref 80–100)
SAO2 % BLDCOA: 93.5 % (ref 95–99)

## 2021-06-13 LAB
BACTERIA SPEC AEROBE CULT: NORMAL
BACTERIA SPEC AEROBE CULT: NORMAL

## 2021-07-15 ENCOUNTER — TRANSCRIBE ORDERS (OUTPATIENT)
Dept: ADMINISTRATIVE | Facility: HOSPITAL | Age: 76
End: 2021-07-15

## 2021-07-15 DIAGNOSIS — R10.2 PELVIC PAIN: ICD-10-CM

## 2021-07-15 DIAGNOSIS — R10.9 ABDOMINAL PAIN, UNSPECIFIED ABDOMINAL LOCATION: Primary | ICD-10-CM

## 2021-07-15 DIAGNOSIS — R91.1 PULMONARY NODULE: ICD-10-CM

## 2021-07-30 ENCOUNTER — HOSPITAL ENCOUNTER (OUTPATIENT)
Dept: CT IMAGING | Facility: HOSPITAL | Age: 76
Discharge: HOME | End: 2021-07-30
Admitting: INTERNAL MEDICINE

## 2021-07-30 DIAGNOSIS — R10.2 PELVIC PAIN: ICD-10-CM

## 2021-07-30 DIAGNOSIS — R91.1 PULMONARY NODULE: ICD-10-CM

## 2021-07-30 DIAGNOSIS — R10.9 ABDOMINAL PAIN, UNSPECIFIED ABDOMINAL LOCATION: ICD-10-CM

## 2021-07-30 LAB — CREAT BLDA-MCNC: 0.8 MG/DL

## 2021-07-30 PROCEDURE — 82565 ASSAY OF CREATININE: CPT

## 2021-07-30 PROCEDURE — 74177 CT ABD & PELVIS W/CONTRAST: CPT | Performed by: RADIOLOGY

## 2021-07-30 PROCEDURE — 0 IOPAMIDOL PER 1 ML: Performed by: INTERNAL MEDICINE

## 2021-07-30 PROCEDURE — 71260 CT THORAX DX C+: CPT

## 2021-07-30 PROCEDURE — 71260 CT THORAX DX C+: CPT | Performed by: RADIOLOGY

## 2021-07-30 PROCEDURE — 74177 CT ABD & PELVIS W/CONTRAST: CPT

## 2021-07-30 RX ADMIN — IOPAMIDOL 100 ML: 755 INJECTION, SOLUTION INTRAVENOUS at 11:50

## 2021-08-09 ENCOUNTER — TELEPHONE (OUTPATIENT)
Dept: GASTROENTEROLOGY | Facility: CLINIC | Age: 76
End: 2021-08-09

## 2021-08-09 RX ORDER — IMMUNE GLOB/PLASMA FRA BOVINE 5 G
1 POWDER IN PACKET (EA) ORAL NIGHTLY
Qty: 30 PACKET | Refills: 5 | Status: SHIPPED | OUTPATIENT
Start: 2021-08-09 | End: 2021-09-08

## 2021-08-09 NOTE — TELEPHONE ENCOUNTER
Pt's daughter called requesting RF of EnteraGam.     [facility]  Last Office Visit: 12.7.20  Procedure: 3.16.21 (no f/u was requested after this).    Pt's daughter only requests call if there is an issue sending in the medication.    [facility]

## 2021-09-09 ENCOUNTER — APPOINTMENT (OUTPATIENT)
Dept: CT IMAGING | Facility: HOSPITAL | Age: 76
End: 2021-09-09

## 2021-09-09 ENCOUNTER — HOSPITAL ENCOUNTER (EMERGENCY)
Facility: HOSPITAL | Age: 76
Discharge: HOME | End: 2021-09-09
Attending: EMERGENCY MEDICINE | Admitting: EMERGENCY MEDICINE

## 2021-09-09 ENCOUNTER — APPOINTMENT (OUTPATIENT)
Dept: GENERAL RADIOLOGY | Facility: HOSPITAL | Age: 76
End: 2021-09-09

## 2021-09-09 VITALS
DIASTOLIC BLOOD PRESSURE: 63 MMHG | BODY MASS INDEX: 34.99 KG/M2 | RESPIRATION RATE: 15 BRPM | SYSTOLIC BLOOD PRESSURE: 142 MMHG | WEIGHT: 210 LBS | HEIGHT: 65 IN | OXYGEN SATURATION: 97 % | TEMPERATURE: 98.2 F | HEART RATE: 86 BPM

## 2021-09-09 DIAGNOSIS — S02.2XXA CLOSED FRACTURE OF NASAL BONE, INITIAL ENCOUNTER: ICD-10-CM
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
## 2021-09-09 DIAGNOSIS — S02.401A CLOSED FRACTURE OF MAXILLARY SINUS, INITIAL ENCOUNTER (HCC): ICD-10-CM

## 2021-09-09 PROCEDURE — 70486 CT MAXILLOFACIAL W/O DYE: CPT

## 2021-09-09 PROCEDURE — 71101 X-RAY EXAM UNILAT RIBS/CHEST: CPT

## 2021-09-09 PROCEDURE — 99282 EMERGENCY DEPT VISIT SF MDM: CPT

## 2021-09-09 PROCEDURE — 70450 CT HEAD/BRAIN W/O DYE: CPT

## 2021-09-09 RX ORDER — LIDOCAINE HYDROCHLORIDE AND EPINEPHRINE 10; 10 MG/ML; UG/ML
10 INJECTION, SOLUTION INFILTRATION; PERINEURAL ONCE
Status: DISCONTINUED | OUTPATIENT
Start: 2021-09-09 | End: 2021-09-10 | Stop reason: HOSPADM

## 2021-09-09 RX ORDER — TRAMADOL HYDROCHLORIDE 50 MG/1
50 TABLET ORAL EVERY 6 HOURS PRN
Qty: 15 TABLET | Refills: 0 | Status: SHIPPED | OUTPATIENT
Start: 2021-09-09 | End: 2021-10-09 | Stop reason: SDUPTHER

## 2021-09-10 NOTE — DISCHARGE INSTRUCTIONS
Apply ice to the sore areas. Do not blow your nose. Apply bacitracin ointment or Polysporin ointment which are both available over-the-counter using a small Q-tip inside each nostril twice daily. Take medications as directed. Return for worsening symptoms. Follow-up with ear nose and throat doctor next week at 10:30 AM on Thursday. Call for an appointment.

## 2021-10-09 ENCOUNTER — HOSPITAL ENCOUNTER (EMERGENCY)
Facility: HOSPITAL | Age: 76
Discharge: HOME | End: 2021-10-09
Attending: EMERGENCY MEDICINE | Admitting: EMERGENCY MEDICINE

## 2021-10-09 VITALS
HEART RATE: 75 BPM | HEIGHT: 66 IN | TEMPERATURE: 98.7 F | BODY MASS INDEX: 33.89 KG/M2 | RESPIRATION RATE: 18 BRPM | OXYGEN SATURATION: 96 % | SYSTOLIC BLOOD PRESSURE: 116 MMHG | DIASTOLIC BLOOD PRESSURE: 80 MMHG

## 2021-10-09 DIAGNOSIS — B02.9 HERPES ZOSTER WITHOUT COMPLICATION: Primary | ICD-10-CM

## 2021-10-09 PROCEDURE — 99283 EMERGENCY DEPT VISIT LOW MDM: CPT

## 2021-10-09 PROCEDURE — 63710000001 ONDANSETRON ODT 4 MG TABLET DISPERSIBLE: Performed by: EMERGENCY MEDICINE

## 2021-10-09 RX ORDER — TRAMADOL HYDROCHLORIDE 50 MG/1
50 TABLET ORAL ONCE
Status: COMPLETED | OUTPATIENT
Start: 2021-10-09 | End: 2021-10-09

## 2021-10-09 RX ORDER — ACYCLOVIR 400 MG/1
400 TABLET ORAL
Qty: 50 TABLET | Refills: 0 | Status: SHIPPED | OUTPATIENT
Start: 2021-10-09 | End: 2021-10-22 | Stop reason: HOSPADM

## 2021-10-09 RX ORDER — ONDANSETRON 4 MG/1
4 TABLET, ORALLY DISINTEGRATING ORAL EVERY 6 HOURS PRN
Qty: 20 TABLET | Refills: 0 | Status: SHIPPED | OUTPATIENT
Start: 2021-10-09

## 2021-10-09 RX ORDER — TRAMADOL HYDROCHLORIDE 50 MG/1
50 TABLET ORAL EVERY 8 HOURS PRN
Qty: 15 TABLET | Refills: 0 | Status: SHIPPED | OUTPATIENT
Start: 2021-10-09 | End: 2021-11-11

## 2021-10-09 RX ORDER — ONDANSETRON 4 MG/1
4 TABLET, ORALLY DISINTEGRATING ORAL ONCE
Status: COMPLETED | OUTPATIENT
Start: 2021-10-09 | End: 2021-10-09

## 2021-10-09 RX ORDER — PREDNISONE 20 MG/1
20 TABLET ORAL
Qty: 21 TABLET | Refills: 0 | Status: SHIPPED | OUTPATIENT
Start: 2021-10-09 | End: 2021-11-11

## 2021-10-09 RX ADMIN — ONDANSETRON 4 MG: 4 TABLET, ORALLY DISINTEGRATING ORAL at 19:32

## 2021-10-09 RX ADMIN — TRAMADOL HYDROCHLORIDE 50 MG: 50 TABLET, FILM COATED ORAL at 21:14

## 2021-10-10 NOTE — ED PROVIDER NOTES
Time: 8:30 PM EDT  Arrived by: private car  Chief Complaint: rash  History provided by: patient  History is limited by: N/A     History of Present Illness:  Patient is a 76 y.o. year old female that presents to the emergency department with rash. Pt family member noticed a rash last night worsening today with blisters. Pt has been c/o pain at home so one family member was worried about possible shingles. Pt also c/o nausea.     Pt has been on chemo for a couple of months and most recent treatment was last Tuesday.   Pt is not vaccinated for COVID-19 but has had the virus.     Rash  Location:  Torso  Torso rash location:  Upper back (right side)  Quality: blistering and painful    Pain details:     Severity:  Mild    Onset quality:  Gradual    Duration:  1 day    Timing:  Constant    Progression:  Worsening  Severity:  Mild  Onset quality:  Gradual  Duration:  1 day  Timing:  Constant  Progression:  Worsening  Chronicity:  New  Relieved by:  Nothing  Worsened by:  Nothing  Associated symptoms: nausea    Associated symptoms: no diarrhea, no fever, no shortness of breath and not vomiting        Similar Symptoms Previously: no  Recently seen: yes      Patient Care Team  Primary Care Provider: Pan Pacheco MD    Past Medical History:     Allergies   Allergen Reactions   • Penicillins Rash     Past Medical History:   Diagnosis Date   • Cancer (HCC)     Breast with mets to colon and bones   • COPD (chronic obstructive pulmonary disease) (HCC)    • Depression    • Diabetes mellitus (HCC)    • Hyperlipidemia    • Hypertension      Past Surgical History:   Procedure Laterality Date   • CHOLECYSTECTOMY     • HYSTERECTOMY     • JOINT REPLACEMENT Bilateral     Knees   • MASTECTOMY Bilateral      History reviewed. No pertinent family history.    Home Medications:  Prior to Admission medications    Medication Sig Start Date End Date Taking? Authorizing Provider   anastrozole (ARIMIDEX) 1 MG tablet Take  by mouth Daily.     Vasile Giron MD   atenolol (TENORMIN) 50 MG tablet Take 50 mg by mouth Daily.    Vasile Giron MD   buPROPion XL (WELLBUTRIN XL) 300 MG 24 hr tablet Take 300 mg by mouth Daily.    Vasile Giron MD   folic acid (FOLVITE) 1 MG tablet Take 1 mg by mouth Daily.    Vasile Giron MD   lisinopril (PRINIVIL,ZESTRIL) 10 MG tablet Take 10 mg by mouth Daily.    Vasile Giron MD   memantine (NAMENDA) 10 MG tablet Take 10 mg by mouth 2 (Two) Times a Day.    Vasile Giron MD   metFORMIN (GLUCOPHAGE) 500 MG tablet Take 500 mg by mouth 2 (Two) Times a Day With Meals.    Vasile Giron MD   metroNIDAZOLE (Flagyl) 500 MG tablet Take 1 tablet by mouth 3 (Three) Times a Day. 6/10/21   Nishant Mejias MD   montelukast (SINGULAIR) 10 MG tablet Take 10 mg by mouth Daily.    Vasile Giron MD   Peppermint Oil (IBGARD PO) Take  by mouth.    Vasile Giron MD   risperiDONE (risperDAL) 3 MG tablet Take 3 mg by mouth Every Night.    Vasile Giron MD   rosuvastatin (CRESTOR) 10 MG tablet Take 10 mg by mouth Every Night.    Vasile Giron MD   traMADol (ULTRAM) 50 MG tablet Take 1 tablet by mouth Every 6 (Six) Hours As Needed for Moderate Pain  (Pain). 21   Dalton Villasenor DO   vitamin E 400 UNIT capsule Take 1 capsule by mouth Daily.    Vasile Giron MD        Social History:   Social History     Tobacco Use   • Smoking status: Former Smoker     Types: Cigarettes     Quit date: 1961     Years since quittin.3   • Smokeless tobacco: Not on file   Substance Use Topics   • Alcohol use: Not Currently   • Drug use: Never     Recent travel: not applicable     Review of Systems:  Review of Systems   Constitutional: Negative for chills and fever.   HENT: Negative for nosebleeds.    Eyes: Negative for redness.   Respiratory: Negative for cough and shortness of breath.    Cardiovascular: Negative for chest pain.   Gastrointestinal: Positive for  "nausea. Negative for diarrhea and vomiting.   Genitourinary: Negative for dysuria and frequency.   Musculoskeletal: Negative for back pain and neck pain.   Skin: Positive for rash.   Neurological: Negative for seizures and syncope.        Physical Exam:  /80 (BP Location: Right arm, Patient Position: Sitting)   Pulse 75   Temp 98.7 °F (37.1 °C) (Oral)   Resp 18   Ht 167.6 cm (66\")   SpO2 94%   BMI 33.89 kg/m²     Physical Exam  Vitals and nursing note reviewed.   Constitutional:       General: She is not in acute distress.     Appearance: Normal appearance. She is not toxic-appearing.   HENT:      Head: Normocephalic and atraumatic.      Nose: Nose normal.      Mouth/Throat:      Mouth: Mucous membranes are moist.   Eyes:      General: Lids are normal. No scleral icterus.     Conjunctiva/sclera: Conjunctivae normal.   Cardiovascular:      Rate and Rhythm: Normal rate and regular rhythm.      Pulses: Normal pulses.      Heart sounds: Normal heart sounds. No murmur heard.      Pulmonary:      Effort: Pulmonary effort is normal. No respiratory distress.      Breath sounds: Normal breath sounds and air entry. No decreased air movement. No decreased breath sounds, wheezing, rhonchi or rales.   Chest:      Chest wall: No tenderness.   Abdominal:      Palpations: Abdomen is soft.      Tenderness: There is no abdominal tenderness. There is no guarding or rebound.   Musculoskeletal:         General: No tenderness. Normal range of motion.      Cervical back: Normal range of motion and neck supple. No tenderness.      Right lower leg: No edema.      Left lower leg: No edema.   Skin:     General: Skin is warm and dry.      Findings: Erythema and rash present. Rash is vesicular.      Comments: Rash in a dermatomal pattern located on right upper back with blanching. Does not cross midline.    Neurological:      Mental Status: She is alert and oriented to person, place, and time.      Sensory: Sensation is intact.      " Motor: Motor function is intact.   Psychiatric:         Behavior: Behavior normal.                Medications in the Emergency Department:  Medications   traMADol (ULTRAM) tablet 50 mg (has no administration in time range)   ondansetron ODT (ZOFRAN-ODT) disintegrating tablet 4 mg (4 mg Oral Given 10/9/21 1932)        Labs  Lab Results (last 24 hours)     ** No results found for the last 24 hours. **           Imaging:  No Radiology Exams Resulted Within Past 24 Hours    Procedures:  Procedures    Progress                            Medical Decision Making:  MDM  Number of Diagnoses or Management Options  Risk of Complications, Morbidity, and/or Mortality  Presenting problems: low  Management options: low    Patient Progress  Patient progress: stable       Final diagnoses:   Herpes zoster without complication        Disposition:  ED Disposition     ED Disposition Condition Comment    Discharge Stable           This medical record created using voice recognition software and may contain unintended errors.    Documentation assistance provided by Zina Norris acting as scribe for Eliu Mack MD. Information recorded by the scribe was done at my direction and has been verified and validated by me.          Zina Norris  10/09/21 2044       Zina Norris  10/09/21 2045       Eliu Mack MD  10/09/21 2055

## 2021-10-10 NOTE — DISCHARGE INSTRUCTIONS
Do not take either the prednisone or acyclovir until you have discussed this with your oncology team, to ensure this will not adversely affect your treatment.

## 2021-10-18 ENCOUNTER — APPOINTMENT (OUTPATIENT)
Dept: CT IMAGING | Facility: HOSPITAL | Age: 76
End: 2021-10-18

## 2021-10-18 ENCOUNTER — HOSPITAL ENCOUNTER (INPATIENT)
Facility: HOSPITAL | Age: 76
LOS: 4 days | Discharge: HOME HEALTH | End: 2021-10-22
Attending: EMERGENCY MEDICINE | Admitting: FAMILY MEDICINE

## 2021-10-18 ENCOUNTER — APPOINTMENT (OUTPATIENT)
Dept: GENERAL RADIOLOGY | Facility: HOSPITAL | Age: 76
End: 2021-10-18

## 2021-10-18 DIAGNOSIS — R26.2 DIFFICULTY WALKING: ICD-10-CM

## 2021-10-18 DIAGNOSIS — N17.0 ACUTE KIDNEY INJURY (AKI) WITH ACUTE TUBULAR NECROSIS (ATN) (HCC): ICD-10-CM

## 2021-10-18 DIAGNOSIS — N39.0 UTI (URINARY TRACT INFECTION) WITH PYURIA: Primary | ICD-10-CM

## 2021-10-18 DIAGNOSIS — Z78.9 DECREASED ACTIVITIES OF DAILY LIVING (ADL): ICD-10-CM

## 2021-10-18 DIAGNOSIS — A41.9 SEPSIS WITHOUT ACUTE ORGAN DYSFUNCTION, DUE TO UNSPECIFIED ORGANISM (HCC): ICD-10-CM

## 2021-10-18 DIAGNOSIS — E86.0 DEHYDRATION: ICD-10-CM

## 2021-10-18 LAB
027 TOXIN: NORMAL
ALBUMIN SERPL-MCNC: 3.7 G/DL (ref 3.5–5.2)
ALBUMIN/GLOB SERPL: 1.3 G/DL
ALP SERPL-CCNC: 151 U/L (ref 39–117)
ALT SERPL W P-5'-P-CCNC: 13 U/L (ref 1–33)
ANION GAP SERPL CALCULATED.3IONS-SCNC: 15 MMOL/L (ref 5–15)
AST SERPL-CCNC: 27 U/L (ref 1–32)
BACTERIA BLD CULT: ABNORMAL
BACTERIA UR QL AUTO: ABNORMAL /HPF
BASOPHILS # BLD AUTO: 0.02 10*3/MM3 (ref 0–0.2)
BASOPHILS NFR BLD AUTO: 0.1 % (ref 0–1.5)
BILIRUB SERPL-MCNC: 0.6 MG/DL (ref 0–1.2)
BILIRUB UR QL STRIP: NEGATIVE
BUN SERPL-MCNC: 44 MG/DL (ref 8–23)
BUN/CREAT SERPL: 20.3 (ref 7–25)
C DIFF TOX GENS STL QL NAA+PROBE: NEGATIVE
CALCIUM SPEC-SCNC: 9 MG/DL (ref 8.6–10.5)
CHLORIDE SERPL-SCNC: 90 MMOL/L (ref 98–107)
CK SERPL-CCNC: 114 U/L (ref 20–180)
CLARITY UR: ABNORMAL
CO2 SERPL-SCNC: 27 MMOL/L (ref 22–29)
COLOR UR: ABNORMAL
CREAT SERPL-MCNC: 2.17 MG/DL (ref 0.57–1)
D-LACTATE SERPL-SCNC: 1.7 MMOL/L (ref 0.5–2)
D-LACTATE SERPL-SCNC: 2.4 MMOL/L (ref 0.5–2)
DEPRECATED RDW RBC AUTO: 56.6 FL (ref 37–54)
EOSINOPHIL # BLD AUTO: 0 10*3/MM3 (ref 0–0.4)
EOSINOPHIL NFR BLD AUTO: 0 % (ref 0.3–6.2)
ERYTHROCYTE [DISTWIDTH] IN BLOOD BY AUTOMATED COUNT: 17.5 % (ref 12.3–15.4)
GFR SERPL CREATININE-BSD FRML MDRD: 22 ML/MIN/1.73
GLOBULIN UR ELPH-MCNC: 2.9 GM/DL
GLUCOSE BLDC GLUCOMTR-MCNC: 265 MG/DL (ref 70–99)
GLUCOSE SERPL-MCNC: 264 MG/DL (ref 65–99)
GLUCOSE UR STRIP-MCNC: ABNORMAL MG/DL
HCT VFR BLD AUTO: 30.6 % (ref 34–46.6)
HGB BLD-MCNC: 9.9 G/DL (ref 12–15.9)
HGB UR QL STRIP.AUTO: ABNORMAL
HOLD SPECIMEN: NORMAL
HOLD SPECIMEN: NORMAL
HYALINE CASTS UR QL AUTO: ABNORMAL /LPF
IMM GRANULOCYTES # BLD AUTO: 0.24 10*3/MM3 (ref 0–0.05)
IMM GRANULOCYTES NFR BLD AUTO: 1.3 % (ref 0–0.5)
KETONES UR QL STRIP: ABNORMAL
LEUKOCYTE ESTERASE UR QL STRIP.AUTO: ABNORMAL
LYMPHOCYTES # BLD AUTO: 1.42 10*3/MM3 (ref 0.7–3.1)
LYMPHOCYTES NFR BLD AUTO: 7.7 % (ref 19.6–45.3)
MAGNESIUM SERPL-MCNC: 1.8 MG/DL (ref 1.6–2.4)
MCH RBC QN AUTO: 28.6 PG (ref 26.6–33)
MCHC RBC AUTO-ENTMCNC: 32.4 G/DL (ref 31.5–35.7)
MCV RBC AUTO: 88.4 FL (ref 79–97)
MONOCYTES # BLD AUTO: 1.26 10*3/MM3 (ref 0.1–0.9)
MONOCYTES NFR BLD AUTO: 6.8 % (ref 5–12)
NEUTROPHILS NFR BLD AUTO: 15.54 10*3/MM3 (ref 1.7–7)
NEUTROPHILS NFR BLD AUTO: 84.1 % (ref 42.7–76)
NITRITE UR QL STRIP: NEGATIVE
NRBC BLD AUTO-RTO: 0.3 /100 WBC (ref 0–0.2)
PH UR STRIP.AUTO: 5.5 [PH] (ref 5–8)
PLATELET # BLD AUTO: 142 10*3/MM3 (ref 140–450)
PMV BLD AUTO: 10.5 FL (ref 6–12)
POTASSIUM SERPL-SCNC: 4.1 MMOL/L (ref 3.5–5.2)
PROT SERPL-MCNC: 6.6 G/DL (ref 6–8.5)
PROT UR QL STRIP: ABNORMAL
RBC # BLD AUTO: 3.46 10*6/MM3 (ref 3.77–5.28)
RBC # UR: ABNORMAL /HPF
REF LAB TEST METHOD: ABNORMAL
SARS-COV-2 N GENE RESP QL NAA+PROBE: NOT DETECTED
SODIUM SERPL-SCNC: 132 MMOL/L (ref 136–145)
SP GR UR STRIP: 1.02 (ref 1–1.03)
SQUAMOUS #/AREA URNS HPF: ABNORMAL /HPF
TRANS CELLS #/AREA URNS HPF: ABNORMAL /HPF
TROPONIN T SERPL-MCNC: 0.03 NG/ML (ref 0–0.03)
UROBILINOGEN UR QL STRIP: ABNORMAL
WBC # BLD AUTO: 18.48 10*3/MM3 (ref 3.4–10.8)
WBC UR QL AUTO: ABNORMAL /HPF
WHOLE BLOOD HOLD SPECIMEN: NORMAL
WHOLE BLOOD HOLD SPECIMEN: NORMAL

## 2021-10-18 PROCEDURE — 87186 SC STD MICRODIL/AGAR DIL: CPT | Performed by: EMERGENCY MEDICINE

## 2021-10-18 PROCEDURE — 70486 CT MAXILLOFACIAL W/O DYE: CPT

## 2021-10-18 PROCEDURE — 87077 CULTURE AEROBIC IDENTIFY: CPT | Performed by: EMERGENCY MEDICINE

## 2021-10-18 PROCEDURE — 72125 CT NECK SPINE W/O DYE: CPT

## 2021-10-18 PROCEDURE — 71045 X-RAY EXAM CHEST 1 VIEW: CPT

## 2021-10-18 PROCEDURE — 25010000002 HEPARIN (PORCINE) PER 1000 UNITS: Performed by: FAMILY MEDICINE

## 2021-10-18 PROCEDURE — 25010000002 CEFTRIAXONE PER 250 MG: Performed by: EMERGENCY MEDICINE

## 2021-10-18 PROCEDURE — 70450 CT HEAD/BRAIN W/O DYE: CPT

## 2021-10-18 PROCEDURE — 63710000001 INSULIN LISPRO (HUMAN) PER 5 UNITS: Performed by: FAMILY MEDICINE

## 2021-10-18 PROCEDURE — 82962 GLUCOSE BLOOD TEST: CPT

## 2021-10-18 PROCEDURE — 83605 ASSAY OF LACTIC ACID: CPT | Performed by: EMERGENCY MEDICINE

## 2021-10-18 PROCEDURE — 99284 EMERGENCY DEPT VISIT MOD MDM: CPT

## 2021-10-18 PROCEDURE — 87493 C DIFF AMPLIFIED PROBE: CPT | Performed by: EMERGENCY MEDICINE

## 2021-10-18 PROCEDURE — 82550 ASSAY OF CK (CPK): CPT | Performed by: FAMILY MEDICINE

## 2021-10-18 PROCEDURE — 80053 COMPREHEN METABOLIC PANEL: CPT | Performed by: EMERGENCY MEDICINE

## 2021-10-18 PROCEDURE — 87150 DNA/RNA AMPLIFIED PROBE: CPT | Performed by: EMERGENCY MEDICINE

## 2021-10-18 PROCEDURE — 87040 BLOOD CULTURE FOR BACTERIA: CPT | Performed by: EMERGENCY MEDICINE

## 2021-10-18 PROCEDURE — 93005 ELECTROCARDIOGRAM TRACING: CPT | Performed by: EMERGENCY MEDICINE

## 2021-10-18 PROCEDURE — 84484 ASSAY OF TROPONIN QUANT: CPT | Performed by: EMERGENCY MEDICINE

## 2021-10-18 PROCEDURE — 85025 COMPLETE CBC W/AUTO DIFF WBC: CPT | Performed by: EMERGENCY MEDICINE

## 2021-10-18 PROCEDURE — 87635 SARS-COV-2 COVID-19 AMP PRB: CPT | Performed by: EMERGENCY MEDICINE

## 2021-10-18 PROCEDURE — 81001 URINALYSIS AUTO W/SCOPE: CPT | Performed by: EMERGENCY MEDICINE

## 2021-10-18 PROCEDURE — 99223 1ST HOSP IP/OBS HIGH 75: CPT | Performed by: FAMILY MEDICINE

## 2021-10-18 PROCEDURE — 83735 ASSAY OF MAGNESIUM: CPT | Performed by: EMERGENCY MEDICINE

## 2021-10-18 PROCEDURE — 87086 URINE CULTURE/COLONY COUNT: CPT | Performed by: EMERGENCY MEDICINE

## 2021-10-18 RX ORDER — POLYETHYLENE GLYCOL 3350 17 G/17G
17 POWDER, FOR SOLUTION ORAL DAILY PRN
Status: DISCONTINUED | OUTPATIENT
Start: 2021-10-18 | End: 2021-10-22 | Stop reason: HOSPADM

## 2021-10-18 RX ORDER — ACETAMINOPHEN 325 MG/1
650 TABLET ORAL EVERY 4 HOURS PRN
Status: DISCONTINUED | OUTPATIENT
Start: 2021-10-18 | End: 2021-10-22 | Stop reason: HOSPADM

## 2021-10-18 RX ORDER — IPRATROPIUM BROMIDE AND ALBUTEROL SULFATE 2.5; .5 MG/3ML; MG/3ML
3 SOLUTION RESPIRATORY (INHALATION) EVERY 4 HOURS PRN
Status: DISCONTINUED | OUTPATIENT
Start: 2021-10-18 | End: 2021-10-22 | Stop reason: HOSPADM

## 2021-10-18 RX ORDER — ONDANSETRON 4 MG/1
4 TABLET, FILM COATED ORAL EVERY 6 HOURS PRN
Status: DISCONTINUED | OUTPATIENT
Start: 2021-10-18 | End: 2021-10-22 | Stop reason: HOSPADM

## 2021-10-18 RX ORDER — CEFTRIAXONE SODIUM 2 G/50ML
2 INJECTION, SOLUTION INTRAVENOUS DAILY
Status: COMPLETED | OUTPATIENT
Start: 2021-10-19 | End: 2021-10-21

## 2021-10-18 RX ORDER — HEPARIN SODIUM 5000 [USP'U]/ML
5000 INJECTION, SOLUTION INTRAVENOUS; SUBCUTANEOUS EVERY 8 HOURS SCHEDULED
Status: DISCONTINUED | OUTPATIENT
Start: 2021-10-18 | End: 2021-10-22 | Stop reason: HOSPADM

## 2021-10-18 RX ORDER — METFORMIN HYDROCHLORIDE 500 MG/1
1000 TABLET, EXTENDED RELEASE ORAL
Status: ON HOLD | COMMUNITY
End: 2021-12-05

## 2021-10-18 RX ORDER — SERTRALINE HYDROCHLORIDE 25 MG/1
25 TABLET, FILM COATED ORAL DAILY
COMMUNITY
End: 2021-11-11

## 2021-10-18 RX ORDER — SODIUM CHLORIDE 0.9 % (FLUSH) 0.9 %
10 SYRINGE (ML) INJECTION AS NEEDED
Status: DISCONTINUED | OUTPATIENT
Start: 2021-10-18 | End: 2021-10-22 | Stop reason: HOSPADM

## 2021-10-18 RX ORDER — AMOXICILLIN 250 MG
2 CAPSULE ORAL 2 TIMES DAILY PRN
Status: DISCONTINUED | OUTPATIENT
Start: 2021-10-18 | End: 2021-10-22 | Stop reason: HOSPADM

## 2021-10-18 RX ORDER — SODIUM CHLORIDE, SODIUM LACTATE, POTASSIUM CHLORIDE, CALCIUM CHLORIDE 600; 310; 30; 20 MG/100ML; MG/100ML; MG/100ML; MG/100ML
100 INJECTION, SOLUTION INTRAVENOUS CONTINUOUS
Status: DISCONTINUED | OUTPATIENT
Start: 2021-10-18 | End: 2021-10-20

## 2021-10-18 RX ORDER — DEXTROSE MONOHYDRATE 100 MG/ML
25 INJECTION, SOLUTION INTRAVENOUS
Status: DISCONTINUED | OUTPATIENT
Start: 2021-10-18 | End: 2021-10-22 | Stop reason: HOSPADM

## 2021-10-18 RX ORDER — NICOTINE POLACRILEX 4 MG
15 LOZENGE BUCCAL
Status: DISCONTINUED | OUTPATIENT
Start: 2021-10-18 | End: 2021-10-22 | Stop reason: HOSPADM

## 2021-10-18 RX ORDER — ONDANSETRON 2 MG/ML
4 INJECTION INTRAMUSCULAR; INTRAVENOUS EVERY 6 HOURS PRN
Status: DISCONTINUED | OUTPATIENT
Start: 2021-10-18 | End: 2021-10-22 | Stop reason: HOSPADM

## 2021-10-18 RX ORDER — CEFTRIAXONE SODIUM 1 G/50ML
1 INJECTION, SOLUTION INTRAVENOUS ONCE
Status: COMPLETED | OUTPATIENT
Start: 2021-10-18 | End: 2021-10-18

## 2021-10-18 RX ORDER — CEFTRIAXONE SODIUM 1 G/50ML
1 INJECTION, SOLUTION INTRAVENOUS ONCE
Status: DISCONTINUED | OUTPATIENT
Start: 2021-10-18 | End: 2021-10-21

## 2021-10-18 RX ORDER — NITROGLYCERIN 0.4 MG/1
0.4 TABLET SUBLINGUAL
Status: DISCONTINUED | OUTPATIENT
Start: 2021-10-18 | End: 2021-10-22 | Stop reason: HOSPADM

## 2021-10-18 RX ORDER — ACYCLOVIR 800 MG/1
400 TABLET ORAL
Status: COMPLETED | OUTPATIENT
Start: 2021-10-18 | End: 2021-10-19

## 2021-10-18 RX ORDER — FERROUS SULFATE 325(65) MG
325 TABLET ORAL
Status: ON HOLD | COMMUNITY
End: 2021-12-05

## 2021-10-18 RX ORDER — IMMUNE GLOB/PLASMA FRA BOVINE 5 G
1 POWDER IN PACKET (EA) ORAL DAILY
COMMUNITY
Start: 2021-03-08

## 2021-10-18 RX ORDER — BISACODYL 5 MG/1
5 TABLET, DELAYED RELEASE ORAL DAILY PRN
Status: DISCONTINUED | OUTPATIENT
Start: 2021-10-18 | End: 2021-10-22 | Stop reason: HOSPADM

## 2021-10-18 RX ORDER — MONTELUKAST SODIUM 10 MG/1
10 TABLET ORAL DAILY
Status: DISCONTINUED | OUTPATIENT
Start: 2021-10-18 | End: 2021-10-22 | Stop reason: HOSPADM

## 2021-10-18 RX ORDER — BISACODYL 10 MG
10 SUPPOSITORY, RECTAL RECTAL DAILY PRN
Status: DISCONTINUED | OUTPATIENT
Start: 2021-10-18 | End: 2021-10-22 | Stop reason: HOSPADM

## 2021-10-18 RX ORDER — SODIUM CHLORIDE 0.9 % (FLUSH) 0.9 %
10 SYRINGE (ML) INJECTION EVERY 12 HOURS SCHEDULED
Status: DISCONTINUED | OUTPATIENT
Start: 2021-10-18 | End: 2021-10-22 | Stop reason: HOSPADM

## 2021-10-18 RX ORDER — MEMANTINE HYDROCHLORIDE 10 MG/1
10 TABLET ORAL 2 TIMES DAILY
Status: DISCONTINUED | OUTPATIENT
Start: 2021-10-18 | End: 2021-10-22 | Stop reason: HOSPADM

## 2021-10-18 RX ADMIN — ACYCLOVIR 400 MG: 800 TABLET ORAL at 17:34

## 2021-10-18 RX ADMIN — HEPARIN SODIUM 5000 UNITS: 5000 INJECTION, SOLUTION INTRAVENOUS; SUBCUTANEOUS at 21:01

## 2021-10-18 RX ADMIN — HEPARIN SODIUM 5000 UNITS: 5000 INJECTION, SOLUTION INTRAVENOUS; SUBCUTANEOUS at 15:29

## 2021-10-18 RX ADMIN — SODIUM CHLORIDE, POTASSIUM CHLORIDE, SODIUM LACTATE AND CALCIUM CHLORIDE 100 ML/HR: 600; 310; 30; 20 INJECTION, SOLUTION INTRAVENOUS at 15:29

## 2021-10-18 RX ADMIN — ACYCLOVIR 400 MG: 800 TABLET ORAL at 21:00

## 2021-10-18 RX ADMIN — CEFTRIAXONE SODIUM 1 G: 1 INJECTION, SOLUTION INTRAVENOUS at 08:03

## 2021-10-18 RX ADMIN — INSULIN LISPRO 4 UNITS: 100 INJECTION, SOLUTION INTRAVENOUS; SUBCUTANEOUS at 17:34

## 2021-10-18 RX ADMIN — SODIUM CHLORIDE, POTASSIUM CHLORIDE, SODIUM LACTATE AND CALCIUM CHLORIDE 1000 ML: 600; 310; 30; 20 INJECTION, SOLUTION INTRAVENOUS at 09:43

## 2021-10-18 RX ADMIN — MEMANTINE 10 MG: 10 TABLET ORAL at 22:12

## 2021-10-19 LAB
ANION GAP SERPL CALCULATED.3IONS-SCNC: 10 MMOL/L (ref 5–15)
ARTERIAL PATENCY WRIST A: POSITIVE
BASE EXCESS BLDA CALC-SCNC: 5.1 MMOL/L (ref -2–2)
BDY SITE: ABNORMAL
BUN SERPL-MCNC: 26 MG/DL (ref 8–23)
BUN/CREAT SERPL: 17.4 (ref 7–25)
CALCIUM SPEC-SCNC: 8.6 MG/DL (ref 8.6–10.5)
CHLORIDE SERPL-SCNC: 93 MMOL/L (ref 98–107)
CO2 SERPL-SCNC: 27 MMOL/L (ref 22–29)
COHGB MFR BLD: 0.6 % (ref 0–1.5)
CREAT SERPL-MCNC: 1.49 MG/DL (ref 0.57–1)
DEPRECATED RDW RBC AUTO: 56.2 FL (ref 37–54)
ERYTHROCYTE [DISTWIDTH] IN BLOOD BY AUTOMATED COUNT: 17.4 % (ref 12.3–15.4)
FHHB: 8.1 % (ref 0–5)
GFR SERPL CREATININE-BSD FRML MDRD: 34 ML/MIN/1.73
GLUCOSE BLDC GLUCOMTR-MCNC: 196 MG/DL (ref 70–99)
GLUCOSE BLDC GLUCOMTR-MCNC: 232 MG/DL (ref 70–99)
GLUCOSE BLDC GLUCOMTR-MCNC: 291 MG/DL (ref 70–99)
GLUCOSE SERPL-MCNC: 179 MG/DL (ref 65–99)
HCO3 BLDA-SCNC: 28.7 MMOL/L (ref 22–26)
HCT VFR BLD AUTO: 27.3 % (ref 34–46.6)
HGB BLD-MCNC: 8.8 G/DL (ref 12–15.9)
HGB BLDA-MCNC: 9.9 G/DL (ref 11.7–14.6)
INHALED O2 CONCENTRATION: 21 %
LACTATE BLDA-SCNC: ABNORMAL MMOL/L
MCH RBC QN AUTO: 28.4 PG (ref 26.6–33)
MCHC RBC AUTO-ENTMCNC: 32.2 G/DL (ref 31.5–35.7)
MCV RBC AUTO: 88.1 FL (ref 79–97)
METHGB BLD QL: 0.3 % (ref 0–1.5)
MODALITY: ABNORMAL
OXYHGB MFR BLDV: 91 % (ref 94–99)
PCO2 BLDA: 38.5 MM HG (ref 35–45)
PH BLDA: 7.49 PH UNITS (ref 7.35–7.45)
PLATELET # BLD AUTO: 103 10*3/MM3 (ref 140–450)
PMV BLD AUTO: 10.6 FL (ref 6–12)
PO2 BLD: 304 MM[HG] (ref 0–500)
PO2 BLDA: 63.8 MM HG (ref 80–100)
POTASSIUM SERPL-SCNC: 3.5 MMOL/L (ref 3.5–5.2)
RBC # BLD AUTO: 3.1 10*6/MM3 (ref 3.77–5.28)
SAO2 % BLDCOA: 91.8 % (ref 95–99)
SODIUM SERPL-SCNC: 130 MMOL/L (ref 136–145)
WBC # BLD AUTO: 12.18 10*3/MM3 (ref 3.4–10.8)

## 2021-10-19 PROCEDURE — 85027 COMPLETE CBC AUTOMATED: CPT | Performed by: FAMILY MEDICINE

## 2021-10-19 PROCEDURE — 25010000003 CEFTRIAXONE PER 250 MG: Performed by: FAMILY MEDICINE

## 2021-10-19 PROCEDURE — 99233 SBSQ HOSP IP/OBS HIGH 50: CPT | Performed by: INTERNAL MEDICINE

## 2021-10-19 PROCEDURE — 82375 ASSAY CARBOXYHB QUANT: CPT | Performed by: PHYSICIAN ASSISTANT

## 2021-10-19 PROCEDURE — 36415 COLL VENOUS BLD VENIPUNCTURE: CPT | Performed by: FAMILY MEDICINE

## 2021-10-19 PROCEDURE — 25010000002 HEPARIN (PORCINE) PER 1000 UNITS: Performed by: FAMILY MEDICINE

## 2021-10-19 PROCEDURE — 63710000001 INSULIN LISPRO (HUMAN) PER 5 UNITS: Performed by: FAMILY MEDICINE

## 2021-10-19 PROCEDURE — 80048 BASIC METABOLIC PNL TOTAL CA: CPT | Performed by: FAMILY MEDICINE

## 2021-10-19 PROCEDURE — 82805 BLOOD GASES W/O2 SATURATION: CPT | Performed by: PHYSICIAN ASSISTANT

## 2021-10-19 PROCEDURE — 82962 GLUCOSE BLOOD TEST: CPT

## 2021-10-19 PROCEDURE — 36600 WITHDRAWAL OF ARTERIAL BLOOD: CPT | Performed by: PHYSICIAN ASSISTANT

## 2021-10-19 PROCEDURE — 83050 HGB METHEMOGLOBIN QUAN: CPT | Performed by: PHYSICIAN ASSISTANT

## 2021-10-19 RX ADMIN — INSULIN LISPRO 4 UNITS: 100 INJECTION, SOLUTION INTRAVENOUS; SUBCUTANEOUS at 12:01

## 2021-10-19 RX ADMIN — ACETAMINOPHEN 650 MG: 325 TABLET ORAL at 07:42

## 2021-10-19 RX ADMIN — HEPARIN SODIUM 5000 UNITS: 5000 INJECTION, SOLUTION INTRAVENOUS; SUBCUTANEOUS at 06:10

## 2021-10-19 RX ADMIN — MEMANTINE 10 MG: 10 TABLET ORAL at 20:09

## 2021-10-19 RX ADMIN — ACETAMINOPHEN 650 MG: 325 TABLET ORAL at 01:00

## 2021-10-19 RX ADMIN — ACYCLOVIR 400 MG: 800 TABLET ORAL at 12:01

## 2021-10-19 RX ADMIN — HEPARIN SODIUM 5000 UNITS: 5000 INJECTION, SOLUTION INTRAVENOUS; SUBCUTANEOUS at 14:33

## 2021-10-19 RX ADMIN — INSULIN LISPRO 3 UNITS: 100 INJECTION, SOLUTION INTRAVENOUS; SUBCUTANEOUS at 17:32

## 2021-10-19 RX ADMIN — HEPARIN SODIUM 5000 UNITS: 5000 INJECTION, SOLUTION INTRAVENOUS; SUBCUTANEOUS at 20:09

## 2021-10-19 RX ADMIN — INSULIN LISPRO 2 UNITS: 100 INJECTION, SOLUTION INTRAVENOUS; SUBCUTANEOUS at 08:26

## 2021-10-19 RX ADMIN — ACYCLOVIR 400 MG: 800 TABLET ORAL at 06:09

## 2021-10-19 RX ADMIN — SODIUM CHLORIDE, POTASSIUM CHLORIDE, SODIUM LACTATE AND CALCIUM CHLORIDE 100 ML/HR: 600; 310; 30; 20 INJECTION, SOLUTION INTRAVENOUS at 01:00

## 2021-10-19 RX ADMIN — ACYCLOVIR 400 MG: 800 TABLET ORAL at 14:33

## 2021-10-19 RX ADMIN — MEMANTINE 10 MG: 10 TABLET ORAL at 08:26

## 2021-10-19 RX ADMIN — MONTELUKAST 10 MG: 10 TABLET, FILM COATED ORAL at 08:26

## 2021-10-19 RX ADMIN — Medication 5 G: at 20:08

## 2021-10-19 RX ADMIN — SODIUM CHLORIDE, PRESERVATIVE FREE 10 ML: 5 INJECTION INTRAVENOUS at 08:26

## 2021-10-19 RX ADMIN — CEFTRIAXONE SODIUM 2 G: 2 INJECTION, SOLUTION INTRAVENOUS at 08:26

## 2021-10-20 LAB
ANION GAP SERPL CALCULATED.3IONS-SCNC: 9 MMOL/L (ref 5–15)
BACTERIA SPEC AEROBE CULT: ABNORMAL
BUN SERPL-MCNC: 14 MG/DL (ref 8–23)
BUN/CREAT SERPL: 13.2 (ref 7–25)
CALCIUM SPEC-SCNC: 8.3 MG/DL (ref 8.6–10.5)
CHLORIDE SERPL-SCNC: 96 MMOL/L (ref 98–107)
CO2 SERPL-SCNC: 28 MMOL/L (ref 22–29)
CREAT SERPL-MCNC: 1.06 MG/DL (ref 0.57–1)
DEPRECATED RDW RBC AUTO: 55.5 FL (ref 37–54)
ERYTHROCYTE [DISTWIDTH] IN BLOOD BY AUTOMATED COUNT: 17.2 % (ref 12.3–15.4)
GFR SERPL CREATININE-BSD FRML MDRD: 50 ML/MIN/1.73
GLUCOSE BLDC GLUCOMTR-MCNC: 202 MG/DL (ref 70–99)
GLUCOSE BLDC GLUCOMTR-MCNC: 256 MG/DL (ref 70–99)
GLUCOSE BLDC GLUCOMTR-MCNC: 308 MG/DL (ref 70–99)
GLUCOSE SERPL-MCNC: 169 MG/DL (ref 65–99)
GRAM STN SPEC: ABNORMAL
HCT VFR BLD AUTO: 25 % (ref 34–46.6)
HGB BLD-MCNC: 8.2 G/DL (ref 12–15.9)
ISOLATED FROM: ABNORMAL
ISOLATED FROM: ABNORMAL
MCH RBC QN AUTO: 28.6 PG (ref 26.6–33)
MCHC RBC AUTO-ENTMCNC: 32.8 G/DL (ref 31.5–35.7)
MCV RBC AUTO: 87.1 FL (ref 79–97)
PLATELET # BLD AUTO: 82 10*3/MM3 (ref 140–450)
PMV BLD AUTO: 11.1 FL (ref 6–12)
POTASSIUM SERPL-SCNC: 3.6 MMOL/L (ref 3.5–5.2)
RBC # BLD AUTO: 2.87 10*6/MM3 (ref 3.77–5.28)
SODIUM SERPL-SCNC: 133 MMOL/L (ref 136–145)
WBC # BLD AUTO: 8.19 10*3/MM3 (ref 3.4–10.8)

## 2021-10-20 PROCEDURE — 97530 THERAPEUTIC ACTIVITIES: CPT

## 2021-10-20 PROCEDURE — 36415 COLL VENOUS BLD VENIPUNCTURE: CPT | Performed by: FAMILY MEDICINE

## 2021-10-20 PROCEDURE — 85027 COMPLETE CBC AUTOMATED: CPT | Performed by: FAMILY MEDICINE

## 2021-10-20 PROCEDURE — 97165 OT EVAL LOW COMPLEX 30 MIN: CPT

## 2021-10-20 PROCEDURE — 97161 PT EVAL LOW COMPLEX 20 MIN: CPT

## 2021-10-20 PROCEDURE — 99233 SBSQ HOSP IP/OBS HIGH 50: CPT | Performed by: INTERNAL MEDICINE

## 2021-10-20 PROCEDURE — 25010000003 CEFTRIAXONE PER 250 MG: Performed by: FAMILY MEDICINE

## 2021-10-20 PROCEDURE — 25010000002 HEPARIN (PORCINE) PER 1000 UNITS: Performed by: FAMILY MEDICINE

## 2021-10-20 PROCEDURE — 80048 BASIC METABOLIC PNL TOTAL CA: CPT | Performed by: FAMILY MEDICINE

## 2021-10-20 PROCEDURE — 63710000001 INSULIN LISPRO (HUMAN) PER 5 UNITS: Performed by: FAMILY MEDICINE

## 2021-10-20 PROCEDURE — 82962 GLUCOSE BLOOD TEST: CPT

## 2021-10-20 RX ORDER — ROSUVASTATIN CALCIUM 5 MG/1
10 TABLET, COATED ORAL NIGHTLY
Status: DISCONTINUED | OUTPATIENT
Start: 2021-10-20 | End: 2021-10-22 | Stop reason: HOSPADM

## 2021-10-20 RX ORDER — BUPROPION HYDROCHLORIDE 150 MG/1
300 TABLET ORAL DAILY
Status: DISCONTINUED | OUTPATIENT
Start: 2021-10-20 | End: 2021-10-22 | Stop reason: HOSPADM

## 2021-10-20 RX ORDER — ANASTROZOLE 1 MG/1
1 TABLET ORAL DAILY
Status: DISCONTINUED | OUTPATIENT
Start: 2021-10-20 | End: 2021-10-22 | Stop reason: HOSPADM

## 2021-10-20 RX ORDER — SERTRALINE HYDROCHLORIDE 25 MG/1
25 TABLET, FILM COATED ORAL DAILY
Status: DISCONTINUED | OUTPATIENT
Start: 2021-10-20 | End: 2021-10-22 | Stop reason: HOSPADM

## 2021-10-20 RX ORDER — ATENOLOL 50 MG/1
50 TABLET ORAL DAILY
Status: DISCONTINUED | OUTPATIENT
Start: 2021-10-20 | End: 2021-10-22 | Stop reason: HOSPADM

## 2021-10-20 RX ORDER — FERROUS SULFATE 325(65) MG
325 TABLET ORAL
Status: DISCONTINUED | OUTPATIENT
Start: 2021-10-20 | End: 2021-10-22 | Stop reason: HOSPADM

## 2021-10-20 RX ORDER — FOLIC ACID 1 MG/1
1 TABLET ORAL DAILY
Status: DISCONTINUED | OUTPATIENT
Start: 2021-10-20 | End: 2021-10-22 | Stop reason: HOSPADM

## 2021-10-20 RX ORDER — RISPERIDONE 3 MG/1
3 TABLET ORAL NIGHTLY
Status: DISCONTINUED | OUTPATIENT
Start: 2021-10-20 | End: 2021-10-22 | Stop reason: HOSPADM

## 2021-10-20 RX ADMIN — MEMANTINE 10 MG: 10 TABLET ORAL at 20:23

## 2021-10-20 RX ADMIN — RISPERIDONE 3 MG: 3 TABLET, FILM COATED ORAL at 20:23

## 2021-10-20 RX ADMIN — SERTRALINE 25 MG: 25 TABLET, FILM COATED ORAL at 11:32

## 2021-10-20 RX ADMIN — BUPROPION HYDROCHLORIDE 300 MG: 150 TABLET, EXTENDED RELEASE ORAL at 11:31

## 2021-10-20 RX ADMIN — INSULIN LISPRO 4 UNITS: 100 INJECTION, SOLUTION INTRAVENOUS; SUBCUTANEOUS at 12:32

## 2021-10-20 RX ADMIN — ROSUVASTATIN CALCIUM 10 MG: 5 TABLET, FILM COATED ORAL at 20:22

## 2021-10-20 RX ADMIN — Medication 5 G: at 10:28

## 2021-10-20 RX ADMIN — MEMANTINE 10 MG: 10 TABLET ORAL at 08:19

## 2021-10-20 RX ADMIN — MONTELUKAST 10 MG: 10 TABLET, FILM COATED ORAL at 08:18

## 2021-10-20 RX ADMIN — FERROUS SULFATE TAB 325 MG (65 MG ELEMENTAL FE) 325 MG: 325 (65 FE) TAB at 11:30

## 2021-10-20 RX ADMIN — SODIUM CHLORIDE, PRESERVATIVE FREE 10 ML: 5 INJECTION INTRAVENOUS at 20:22

## 2021-10-20 RX ADMIN — INSULIN LISPRO 3 UNITS: 100 INJECTION, SOLUTION INTRAVENOUS; SUBCUTANEOUS at 21:24

## 2021-10-20 RX ADMIN — FOLIC ACID 1 MG: 1 TABLET ORAL at 11:30

## 2021-10-20 RX ADMIN — HEPARIN SODIUM 5000 UNITS: 5000 INJECTION, SOLUTION INTRAVENOUS; SUBCUTANEOUS at 21:04

## 2021-10-20 RX ADMIN — ANASTROZOLE 1 MG: 1 TABLET ORAL at 11:30

## 2021-10-20 RX ADMIN — SODIUM CHLORIDE, POTASSIUM CHLORIDE, SODIUM LACTATE AND CALCIUM CHLORIDE 100 ML/HR: 600; 310; 30; 20 INJECTION, SOLUTION INTRAVENOUS at 04:49

## 2021-10-20 RX ADMIN — CEFTRIAXONE SODIUM 2 G: 2 INJECTION, SOLUTION INTRAVENOUS at 09:45

## 2021-10-20 RX ADMIN — ATENOLOL 50 MG: 50 TABLET ORAL at 11:31

## 2021-10-20 RX ADMIN — INSULIN LISPRO 2 UNITS: 100 INJECTION, SOLUTION INTRAVENOUS; SUBCUTANEOUS at 08:16

## 2021-10-20 RX ADMIN — HEPARIN SODIUM 5000 UNITS: 5000 INJECTION, SOLUTION INTRAVENOUS; SUBCUTANEOUS at 16:31

## 2021-10-20 RX ADMIN — HEPARIN SODIUM 5000 UNITS: 5000 INJECTION, SOLUTION INTRAVENOUS; SUBCUTANEOUS at 04:49

## 2021-10-20 RX ADMIN — SODIUM CHLORIDE, PRESERVATIVE FREE 10 ML: 5 INJECTION INTRAVENOUS at 10:29

## 2021-10-21 LAB
ANION GAP SERPL CALCULATED.3IONS-SCNC: 10.7 MMOL/L (ref 5–15)
BUN SERPL-MCNC: 15 MG/DL (ref 8–23)
BUN/CREAT SERPL: 15.3 (ref 7–25)
CALCIUM SPEC-SCNC: 8.8 MG/DL (ref 8.6–10.5)
CHLORIDE SERPL-SCNC: 98 MMOL/L (ref 98–107)
CO2 SERPL-SCNC: 26.3 MMOL/L (ref 22–29)
CREAT SERPL-MCNC: 0.98 MG/DL (ref 0.57–1)
DEPRECATED RDW RBC AUTO: 55.8 FL (ref 37–54)
ERYTHROCYTE [DISTWIDTH] IN BLOOD BY AUTOMATED COUNT: 17.1 % (ref 12.3–15.4)
GFR SERPL CREATININE-BSD FRML MDRD: 55 ML/MIN/1.73
GLUCOSE BLDC GLUCOMTR-MCNC: 191 MG/DL (ref 70–99)
GLUCOSE BLDC GLUCOMTR-MCNC: 264 MG/DL (ref 70–99)
GLUCOSE BLDC GLUCOMTR-MCNC: 361 MG/DL (ref 70–99)
GLUCOSE SERPL-MCNC: 221 MG/DL (ref 65–99)
HCT VFR BLD AUTO: 27.5 % (ref 34–46.6)
HGB BLD-MCNC: 8.8 G/DL (ref 12–15.9)
MCH RBC QN AUTO: 28.5 PG (ref 26.6–33)
MCHC RBC AUTO-ENTMCNC: 32 G/DL (ref 31.5–35.7)
MCV RBC AUTO: 89 FL (ref 79–97)
PLATELET # BLD AUTO: 101 10*3/MM3 (ref 140–450)
PMV BLD AUTO: 11.5 FL (ref 6–12)
POTASSIUM SERPL-SCNC: 3.6 MMOL/L (ref 3.5–5.2)
QT INTERVAL: 338 MS
RBC # BLD AUTO: 3.09 10*6/MM3 (ref 3.77–5.28)
SODIUM SERPL-SCNC: 135 MMOL/L (ref 136–145)
WBC # BLD AUTO: 8.42 10*3/MM3 (ref 3.4–10.8)

## 2021-10-21 PROCEDURE — 97110 THERAPEUTIC EXERCISES: CPT

## 2021-10-21 PROCEDURE — 80048 BASIC METABOLIC PNL TOTAL CA: CPT | Performed by: FAMILY MEDICINE

## 2021-10-21 PROCEDURE — 85027 COMPLETE CBC AUTOMATED: CPT | Performed by: FAMILY MEDICINE

## 2021-10-21 PROCEDURE — 36415 COLL VENOUS BLD VENIPUNCTURE: CPT | Performed by: FAMILY MEDICINE

## 2021-10-21 PROCEDURE — 63710000001 INSULIN LISPRO (HUMAN) PER 5 UNITS: Performed by: FAMILY MEDICINE

## 2021-10-21 PROCEDURE — 82962 GLUCOSE BLOOD TEST: CPT

## 2021-10-21 PROCEDURE — 25010000003 CEFTRIAXONE PER 250 MG: Performed by: INTERNAL MEDICINE

## 2021-10-21 PROCEDURE — 99232 SBSQ HOSP IP/OBS MODERATE 35: CPT | Performed by: INTERNAL MEDICINE

## 2021-10-21 PROCEDURE — 97116 GAIT TRAINING THERAPY: CPT

## 2021-10-21 PROCEDURE — 25010000002 HEPARIN (PORCINE) PER 1000 UNITS: Performed by: FAMILY MEDICINE

## 2021-10-21 RX ORDER — CEFDINIR 300 MG/1
300 CAPSULE ORAL EVERY 12 HOURS SCHEDULED
Status: DISCONTINUED | OUTPATIENT
Start: 2021-10-22 | End: 2021-10-22 | Stop reason: HOSPADM

## 2021-10-21 RX ORDER — LISINOPRIL 10 MG/1
10 TABLET ORAL
Status: DISCONTINUED | OUTPATIENT
Start: 2021-10-21 | End: 2021-10-22 | Stop reason: HOSPADM

## 2021-10-21 RX ADMIN — INSULIN LISPRO 3 UNITS: 100 INJECTION, SOLUTION INTRAVENOUS; SUBCUTANEOUS at 09:03

## 2021-10-21 RX ADMIN — ROSUVASTATIN CALCIUM 10 MG: 5 TABLET, FILM COATED ORAL at 20:29

## 2021-10-21 RX ADMIN — INSULIN LISPRO 2 UNITS: 100 INJECTION, SOLUTION INTRAVENOUS; SUBCUTANEOUS at 17:48

## 2021-10-21 RX ADMIN — Medication 5 G: at 09:03

## 2021-10-21 RX ADMIN — SERTRALINE 25 MG: 25 TABLET, FILM COATED ORAL at 09:01

## 2021-10-21 RX ADMIN — RISPERIDONE 3 MG: 3 TABLET, FILM COATED ORAL at 20:29

## 2021-10-21 RX ADMIN — HEPARIN SODIUM 5000 UNITS: 5000 INJECTION, SOLUTION INTRAVENOUS; SUBCUTANEOUS at 21:09

## 2021-10-21 RX ADMIN — INSULIN LISPRO 6 UNITS: 100 INJECTION, SOLUTION INTRAVENOUS; SUBCUTANEOUS at 12:34

## 2021-10-21 RX ADMIN — FERROUS SULFATE TAB 325 MG (65 MG ELEMENTAL FE) 325 MG: 325 (65 FE) TAB at 09:01

## 2021-10-21 RX ADMIN — MEMANTINE 10 MG: 10 TABLET ORAL at 09:01

## 2021-10-21 RX ADMIN — LISINOPRIL 10 MG: 10 TABLET ORAL at 10:26

## 2021-10-21 RX ADMIN — HEPARIN SODIUM 5000 UNITS: 5000 INJECTION, SOLUTION INTRAVENOUS; SUBCUTANEOUS at 05:05

## 2021-10-21 RX ADMIN — ANASTROZOLE 1 MG: 1 TABLET ORAL at 09:01

## 2021-10-21 RX ADMIN — MEMANTINE 10 MG: 10 TABLET ORAL at 20:29

## 2021-10-21 RX ADMIN — SODIUM CHLORIDE, PRESERVATIVE FREE 10 ML: 5 INJECTION INTRAVENOUS at 20:29

## 2021-10-21 RX ADMIN — BUPROPION HYDROCHLORIDE 300 MG: 150 TABLET, EXTENDED RELEASE ORAL at 09:01

## 2021-10-21 RX ADMIN — FOLIC ACID 1 MG: 1 TABLET ORAL at 09:01

## 2021-10-21 RX ADMIN — HEPARIN SODIUM 5000 UNITS: 5000 INJECTION, SOLUTION INTRAVENOUS; SUBCUTANEOUS at 15:09

## 2021-10-21 RX ADMIN — ATENOLOL 50 MG: 50 TABLET ORAL at 09:01

## 2021-10-21 RX ADMIN — SODIUM CHLORIDE, PRESERVATIVE FREE 10 ML: 5 INJECTION INTRAVENOUS at 09:00

## 2021-10-21 RX ADMIN — CEFTRIAXONE SODIUM 2 G: 2 INJECTION, SOLUTION INTRAVENOUS at 12:34

## 2021-10-21 RX ADMIN — MONTELUKAST 10 MG: 10 TABLET, FILM COATED ORAL at 09:01

## 2021-10-22 VITALS
OXYGEN SATURATION: 96 % | HEART RATE: 78 BPM | DIASTOLIC BLOOD PRESSURE: 72 MMHG | BODY MASS INDEX: 32.84 KG/M2 | SYSTOLIC BLOOD PRESSURE: 145 MMHG | TEMPERATURE: 97.7 F | WEIGHT: 204.37 LBS | RESPIRATION RATE: 18 BRPM | HEIGHT: 66 IN

## 2021-10-22 LAB — GLUCOSE BLDC GLUCOMTR-MCNC: 306 MG/DL (ref 70–99)

## 2021-10-22 PROCEDURE — 25010000002 HEPARIN (PORCINE) PER 1000 UNITS: Performed by: FAMILY MEDICINE

## 2021-10-22 PROCEDURE — 97110 THERAPEUTIC EXERCISES: CPT

## 2021-10-22 PROCEDURE — 99239 HOSP IP/OBS DSCHRG MGMT >30: CPT | Performed by: INTERNAL MEDICINE

## 2021-10-22 PROCEDURE — 63710000001 INSULIN LISPRO (HUMAN) PER 5 UNITS: Performed by: FAMILY MEDICINE

## 2021-10-22 PROCEDURE — 82962 GLUCOSE BLOOD TEST: CPT

## 2021-10-22 RX ORDER — CEFDINIR 300 MG/1
300 CAPSULE ORAL EVERY 12 HOURS SCHEDULED
Qty: 14 CAPSULE | Refills: 0 | Status: SHIPPED | OUTPATIENT
Start: 2021-10-22 | End: 2021-10-29

## 2021-10-22 RX ADMIN — SERTRALINE 25 MG: 25 TABLET, FILM COATED ORAL at 10:42

## 2021-10-22 RX ADMIN — Medication 5 G: at 08:22

## 2021-10-22 RX ADMIN — LISINOPRIL 10 MG: 10 TABLET ORAL at 08:19

## 2021-10-22 RX ADMIN — ANASTROZOLE 1 MG: 1 TABLET ORAL at 08:19

## 2021-10-22 RX ADMIN — ATENOLOL 50 MG: 50 TABLET ORAL at 08:18

## 2021-10-22 RX ADMIN — HEPARIN SODIUM 5000 UNITS: 5000 INJECTION, SOLUTION INTRAVENOUS; SUBCUTANEOUS at 05:14

## 2021-10-22 RX ADMIN — MONTELUKAST 10 MG: 10 TABLET, FILM COATED ORAL at 08:17

## 2021-10-22 RX ADMIN — CEFDINIR 300 MG: 300 CAPSULE ORAL at 08:19

## 2021-10-22 RX ADMIN — MEMANTINE 10 MG: 10 TABLET ORAL at 08:23

## 2021-10-22 RX ADMIN — FERROUS SULFATE TAB 325 MG (65 MG ELEMENTAL FE) 325 MG: 325 (65 FE) TAB at 08:20

## 2021-10-22 RX ADMIN — FOLIC ACID 1 MG: 1 TABLET ORAL at 08:17

## 2021-10-22 RX ADMIN — INSULIN LISPRO 5 UNITS: 100 INJECTION, SOLUTION INTRAVENOUS; SUBCUTANEOUS at 08:21

## 2021-10-22 RX ADMIN — BUPROPION HYDROCHLORIDE 300 MG: 150 TABLET, EXTENDED RELEASE ORAL at 08:19

## 2021-10-23 ENCOUNTER — READMISSION MANAGEMENT (OUTPATIENT)
Dept: CALL CENTER | Facility: HOSPITAL | Age: 76
End: 2021-10-23

## 2021-10-23 NOTE — OUTREACH NOTE
Prep Survey      Responses   [facility]   Is LACE score < 7 ? No   Emergency Room discharge w/ pulse ox? No   Eligibility Readm Mgmt   Discharge diagnosis Sepsis without acute organ dysfunction, due to unspecified organism    Does the patient have one of the following disease processes/diagnoses(primary or secondary)? Sepsis   Does the patient have Home health ordered? Yes   What is the Home health agency?  [facility]    Is there a DME ordered? No   Prep survey completed? Yes          Brandy Buitrago RN

## 2021-10-25 ENCOUNTER — READMISSION MANAGEMENT (OUTPATIENT)
Dept: CALL CENTER | Facility: HOSPITAL | Age: 76
End: 2021-10-25

## 2021-10-25 NOTE — OUTREACH NOTE
Sepsis Week 1 Survey      Responses   [facility] patient discharged from? [facility]   Does the patient have one of the following disease processes/diagnoses(primary or secondary)? Sepsis   Week 1 attempt successful? No   Unsuccessful attempts Attempt 1          Rosa M Escobar RN

## 2021-10-28 ENCOUNTER — READMISSION MANAGEMENT (OUTPATIENT)
Dept: CALL CENTER | Facility: HOSPITAL | Age: 76
End: 2021-10-28

## 2021-10-28 NOTE — OUTREACH NOTE
Medical Week 1 Survey      Responses   [facility] patient discharged from? [facility]   Does the patient have one of the following disease processes/diagnoses(primary or secondary)? Sepsis   Discharge diagnosis Sepsis without acute organ dysfunction, due to unspecified organism           Candi Brewster RN

## 2021-11-01 ENCOUNTER — READMISSION MANAGEMENT (OUTPATIENT)
Dept: CALL CENTER | Facility: HOSPITAL | Age: 76
End: 2021-11-01

## 2021-11-01 NOTE — OUTREACH NOTE
Sepsis Week 1 Survey      Responses   [facility] patient discharged from? [facility]   Does the patient have one of the following disease processes/diagnoses(primary or secondary)? Sepsis   Week 1 attempt successful? Yes   Call start time 1602   Call end time 1610   Discharge diagnosis Sepsis without acute organ dysfunction, due to unspecified organism    Is patient permission given to speak with other caregiver? Yes   Person spoke with today (if not patient) and relationship dtr   Meds reviewed with patient/caregiver? Yes   Is the patient having any side effects they believe may be caused by any medication additions or changes? No   Does the patient have all medications related to this admission filled (includes all antibiotics, inhalers, nebulizers,steroids,etc.) Yes   Is the patient taking all medications as directed (includes completed medication regime)? Yes   Does the patient have a primary care provider?  Yes   Does the patient have an appointment with their PCP within 7 days of discharge? Yes   Has the patient kept scheduled appointments due by today? Yes   What is the Home health agency?  [facility]    Has home health visited the patient within 72 hours of discharge? Yes   Psychosocial issues? No   Comments Shingles are flared back up.   Did the patient receive a copy of their discharge instructions? Yes   Nursing interventions Reviewed instructions with patient   What is the patient's perception of their health status since discharge? Improving   Nursing interventions Nurse provided patient education   Is the patient/caregiver able to teach back Sepsis? S - Shivering,fever or very cold,  E - Extreme pain or generalized discomfort (worst ever,especially abdomen),  P - Pale or discolored skin,  S - Short of breath,  S - Sleepy, difficult to arouse,confused,  I -   I feel like I might die-a feeling of hopelessness   Nursing interventions Nurse provided reassurance to patient   Is patient/caregiver able to  teach back steps to recovery at home? Set small, achievable goals for return to baseline health,  Rest and regain strength,  Talk about feelings with family/friends,  Eat a balanced diet,  Exercise as tolerated,  Make a list of questions for PCP appoinment   Is the patient/caregiver able to teach back signs and symptoms of worsening condition: Fever,  Edema   If the patient is a current smoker, are they able to teach back resources for cessation? Not a smoker   Is the patient/caregiver able to teach back the hierarchy of who to call/visit for symptoms/problems? PCP, Specialist, Home health nurse, Urgent Care, ED, 911 Yes   Additional teach back comments Long talk on pain control. She received Gabapentin today and norco for shingle pain, discussed Advil. Appetite is poor due to pain.   Week 1 call completed? Yes   Wrap up additional comments Worse due to Shingles flare up but better from sepsis standpoint.          Taylor Morris RN

## 2021-11-07 NOTE — PROGRESS NOTES
Enter Query Response Below      Query Response:   No, Acute Kidney Injury only is supported.            If applicable, please update the problem list.       Patient: Mirna Meredith        : 1945  Account: [id number]           Admit Date:          Options to Respond to Query:    1. Access the Encounter     a. From the To-Do Side bar, click Respond With Note.     b. Click New Note     c. Answer query within the yellow box.                d. Update the Problem List if applicable.     ,    The Discharge Summary includes Sepsis, Acute Kidney Injury with acute tubular necrosis, and UTI.  The H&P through the 10/21/21 Progress Note indicate Acute Kidney Injury only.   Treatment included a Lactated Ringers Bolus, a Lactated Ringers infusion @ 100 cc/hr.     10/18/21 Creatinine 2.17  10/19/21 Creatinine 1.49  10/20/21 Creatinine 1.06  10/21/21 Creatinine 0.98      After study, was Acute Kidney Injury with Acute Tubular Necrosis clinically supported during this admission?     Yes, please include additional clinical indicators:____________   No, Acute Kidney Injury only is supported.   Other- specify________   Unable to determine      By submitting this query, we are merely seeking further clarification of documentation to accurately reflect all conditions that you are monitoring, evaluating, treating or that extend the hospitalization or utilize additional resources of care. Please utilize your independent clinical judgment when addressing the question(s) above.     This query and your response, once completed, will be entered into the legal medical record.    Sincerely,  Vesna FERRO, RN  Clinical Documentation Integrity Program   [email or web address]

## 2021-11-10 ENCOUNTER — READMISSION MANAGEMENT (OUTPATIENT)
Dept: CALL CENTER | Facility: HOSPITAL | Age: 76
End: 2021-11-10

## 2021-11-10 NOTE — OUTREACH NOTE
Sepsis Week 2 Survey      Responses   [facility] patient discharged from? [facility]   Does the patient have one of the following disease processes/diagnoses(primary or secondary)? Sepsis   Week 2 attempt successful? No   Unsuccessful attempts Attempt 1          Carrie Dowd LPN

## 2021-11-11 ENCOUNTER — APPOINTMENT (OUTPATIENT)
Dept: CT IMAGING | Facility: HOSPITAL | Age: 76
End: 2021-11-11

## 2021-11-11 ENCOUNTER — READMISSION MANAGEMENT (OUTPATIENT)
Dept: CALL CENTER | Facility: HOSPITAL | Age: 76
End: 2021-11-11

## 2021-11-11 ENCOUNTER — HOSPITAL ENCOUNTER (INPATIENT)
Facility: HOSPITAL | Age: 76
LOS: 8 days | Discharge: NURSING, REHAB OR LONG-TERM CARE | End: 2021-11-19
Attending: EMERGENCY MEDICINE | Admitting: INTERNAL MEDICINE

## 2021-11-11 ENCOUNTER — APPOINTMENT (OUTPATIENT)
Dept: GENERAL RADIOLOGY | Facility: HOSPITAL | Age: 76
End: 2021-11-11

## 2021-11-11 DIAGNOSIS — Z78.9 DECREASED ACTIVITIES OF DAILY LIVING (ADL): ICD-10-CM

## 2021-11-11 DIAGNOSIS — G93.41 METABOLIC ENCEPHALOPATHY: Primary | ICD-10-CM

## 2021-11-11 DIAGNOSIS — R26.2 DIFFICULTY WALKING: ICD-10-CM

## 2021-11-11 DIAGNOSIS — N13.1 HYDRONEPHROSIS DUE TO OBSTRUCTION OF URETER: ICD-10-CM

## 2021-11-11 DIAGNOSIS — N39.0 URINARY TRACT INFECTION WITHOUT HEMATURIA, SITE UNSPECIFIED: ICD-10-CM

## 2021-11-11 DIAGNOSIS — R13.12 DYSPHAGIA, OROPHARYNGEAL: ICD-10-CM

## 2021-11-11 LAB
ALBUMIN SERPL-MCNC: 3.7 G/DL (ref 3.5–5.2)
ALBUMIN/GLOB SERPL: 1 G/DL
ALP SERPL-CCNC: 117 U/L (ref 39–117)
ALT SERPL W P-5'-P-CCNC: 11 U/L (ref 1–33)
ANION GAP SERPL CALCULATED.3IONS-SCNC: 12.3 MMOL/L (ref 5–15)
ANISOCYTOSIS BLD QL: NORMAL
AST SERPL-CCNC: 33 U/L (ref 1–32)
BACTERIA UR QL AUTO: ABNORMAL /HPF
BASOPHILS # BLD AUTO: 0.01 10*3/MM3 (ref 0–0.2)
BASOPHILS NFR BLD AUTO: 0.2 % (ref 0–1.5)
BILIRUB SERPL-MCNC: 0.4 MG/DL (ref 0–1.2)
BILIRUB UR QL STRIP: NEGATIVE
BUN SERPL-MCNC: 24 MG/DL (ref 8–23)
BUN/CREAT SERPL: 11.5 (ref 7–25)
CALCIUM SPEC-SCNC: 9.3 MG/DL (ref 8.6–10.5)
CHLORIDE SERPL-SCNC: 93 MMOL/L (ref 98–107)
CLARITY UR: ABNORMAL
CO2 SERPL-SCNC: 26.7 MMOL/L (ref 22–29)
COLOR UR: YELLOW
CREAT SERPL-MCNC: 2.09 MG/DL (ref 0.57–1)
D-LACTATE SERPL-SCNC: 1.7 MMOL/L (ref 0.5–2)
DEPRECATED RDW RBC AUTO: 57.2 FL (ref 37–54)
EOSINOPHIL # BLD AUTO: 0 10*3/MM3 (ref 0–0.4)
EOSINOPHIL NFR BLD AUTO: 0 % (ref 0.3–6.2)
ERYTHROCYTE [DISTWIDTH] IN BLOOD BY AUTOMATED COUNT: 18.3 % (ref 12.3–15.4)
GFR SERPL CREATININE-BSD FRML MDRD: 23 ML/MIN/1.73
GLOBULIN UR ELPH-MCNC: 3.7 GM/DL
GLUCOSE SERPL-MCNC: 199 MG/DL (ref 65–99)
GLUCOSE UR STRIP-MCNC: NEGATIVE MG/DL
HCT VFR BLD AUTO: 25.3 % (ref 34–46.6)
HGB BLD-MCNC: 8.2 G/DL (ref 12–15.9)
HGB UR QL STRIP.AUTO: ABNORMAL
HOLD SPECIMEN: NORMAL
HOLD SPECIMEN: NORMAL
HYALINE CASTS UR QL AUTO: ABNORMAL /LPF
HYPOCHROMIA BLD QL: NORMAL
IMM GRANULOCYTES # BLD AUTO: 0.44 10*3/MM3 (ref 0–0.05)
IMM GRANULOCYTES NFR BLD AUTO: 9.3 % (ref 0–0.5)
KETONES UR QL STRIP: NEGATIVE
LEUKOCYTE ESTERASE UR QL STRIP.AUTO: ABNORMAL
LYMPHOCYTES # BLD AUTO: 0.48 10*3/MM3 (ref 0.7–3.1)
LYMPHOCYTES NFR BLD AUTO: 10.2 % (ref 19.6–45.3)
MACROCYTES BLD QL SMEAR: NORMAL
MCH RBC QN AUTO: 28.4 PG (ref 26.6–33)
MCHC RBC AUTO-ENTMCNC: 32.4 G/DL (ref 31.5–35.7)
MCV RBC AUTO: 87.5 FL (ref 79–97)
MICROCYTES BLD QL: NORMAL
MONOCYTES # BLD AUTO: 0.07 10*3/MM3 (ref 0.1–0.9)
MONOCYTES NFR BLD AUTO: 1.5 % (ref 5–12)
NEUTROPHILS NFR BLD AUTO: 3.71 10*3/MM3 (ref 1.7–7)
NEUTROPHILS NFR BLD AUTO: 78.8 % (ref 42.7–76)
NITRITE UR QL STRIP: NEGATIVE
NRBC BLD AUTO-RTO: 0 /100 WBC (ref 0–0.2)
PH UR STRIP.AUTO: 5.5 [PH] (ref 5–8)
PLATELET # BLD AUTO: 124 10*3/MM3 (ref 140–450)
PMV BLD AUTO: 10.3 FL (ref 6–12)
POTASSIUM SERPL-SCNC: 4.5 MMOL/L (ref 3.5–5.2)
PROT SERPL-MCNC: 7.4 G/DL (ref 6–8.5)
PROT UR QL STRIP: ABNORMAL
RBC # BLD AUTO: 2.89 10*6/MM3 (ref 3.77–5.28)
RBC # UR: ABNORMAL /HPF
REF LAB TEST METHOD: ABNORMAL
SMALL PLATELETS BLD QL SMEAR: NORMAL
SODIUM SERPL-SCNC: 132 MMOL/L (ref 136–145)
SP GR UR STRIP: 1.02 (ref 1–1.03)
SQUAMOUS #/AREA URNS HPF: ABNORMAL /HPF
UROBILINOGEN UR QL STRIP: ABNORMAL
WBC # BLD AUTO: 4.71 10*3/MM3 (ref 3.4–10.8)
WBC MORPH BLD: NORMAL
WBC UR QL AUTO: ABNORMAL /HPF
WHOLE BLOOD HOLD SPECIMEN: NORMAL
WHOLE BLOOD HOLD SPECIMEN: NORMAL

## 2021-11-11 PROCEDURE — 71045 X-RAY EXAM CHEST 1 VIEW: CPT

## 2021-11-11 PROCEDURE — P9612 CATHETERIZE FOR URINE SPEC: HCPCS

## 2021-11-11 PROCEDURE — 87040 BLOOD CULTURE FOR BACTERIA: CPT | Performed by: EMERGENCY MEDICINE

## 2021-11-11 PROCEDURE — 85025 COMPLETE CBC W/AUTO DIFF WBC: CPT | Performed by: EMERGENCY MEDICINE

## 2021-11-11 PROCEDURE — 99223 1ST HOSP IP/OBS HIGH 75: CPT | Performed by: FAMILY MEDICINE

## 2021-11-11 PROCEDURE — 25010000002 CEFTRIAXONE PER 250 MG: Performed by: EMERGENCY MEDICINE

## 2021-11-11 PROCEDURE — 83605 ASSAY OF LACTIC ACID: CPT | Performed by: EMERGENCY MEDICINE

## 2021-11-11 PROCEDURE — 81001 URINALYSIS AUTO W/SCOPE: CPT | Performed by: EMERGENCY MEDICINE

## 2021-11-11 PROCEDURE — 87086 URINE CULTURE/COLONY COUNT: CPT | Performed by: EMERGENCY MEDICINE

## 2021-11-11 PROCEDURE — 25010000002 ONDANSETRON PER 1 MG: Performed by: EMERGENCY MEDICINE

## 2021-11-11 PROCEDURE — 93010 ELECTROCARDIOGRAM REPORT: CPT | Performed by: INTERNAL MEDICINE

## 2021-11-11 PROCEDURE — 93005 ELECTROCARDIOGRAM TRACING: CPT | Performed by: EMERGENCY MEDICINE

## 2021-11-11 PROCEDURE — 85007 BL SMEAR W/DIFF WBC COUNT: CPT | Performed by: EMERGENCY MEDICINE

## 2021-11-11 PROCEDURE — 99285 EMERGENCY DEPT VISIT HI MDM: CPT

## 2021-11-11 PROCEDURE — 70450 CT HEAD/BRAIN W/O DYE: CPT

## 2021-11-11 PROCEDURE — 80053 COMPREHEN METABOLIC PANEL: CPT | Performed by: EMERGENCY MEDICINE

## 2021-11-11 RX ORDER — VALACYCLOVIR HYDROCHLORIDE 1 G/1
1000 TABLET, FILM COATED ORAL 3 TIMES DAILY
Status: ON HOLD | COMMUNITY
End: 2021-12-05

## 2021-11-11 RX ORDER — ONDANSETRON 2 MG/ML
4 INJECTION INTRAMUSCULAR; INTRAVENOUS ONCE
Status: COMPLETED | OUTPATIENT
Start: 2021-11-11 | End: 2021-11-11

## 2021-11-11 RX ORDER — SODIUM CHLORIDE 0.9 % (FLUSH) 0.9 %
10 SYRINGE (ML) INJECTION AS NEEDED
Status: DISCONTINUED | OUTPATIENT
Start: 2021-11-11 | End: 2021-11-19 | Stop reason: HOSPADM

## 2021-11-11 RX ORDER — CEFTRIAXONE SODIUM 1 G/50ML
1 INJECTION, SOLUTION INTRAVENOUS ONCE
Status: COMPLETED | OUTPATIENT
Start: 2021-11-11 | End: 2021-11-11

## 2021-11-11 RX ORDER — CEPHALEXIN 500 MG/1
500 CAPSULE ORAL 2 TIMES DAILY
COMMUNITY
End: 2021-11-19 | Stop reason: HOSPADM

## 2021-11-11 RX ADMIN — ONDANSETRON 4 MG: 2 INJECTION INTRAMUSCULAR; INTRAVENOUS at 20:35

## 2021-11-11 RX ADMIN — CEFTRIAXONE SODIUM 1 G: 1 INJECTION, SOLUTION INTRAVENOUS at 18:18

## 2021-11-11 RX ADMIN — SODIUM CHLORIDE 1000 ML: 9 INJECTION, SOLUTION INTRAVENOUS at 18:17

## 2021-11-11 NOTE — OUTREACH NOTE
Sepsis Week 2 Survey      Responses   [facility] patient discharged from? [facility]   Does the patient have one of the following disease processes/diagnoses(primary or secondary)? Sepsis   Week 2 attempt successful? No   Unsuccessful attempts Attempt 2          Rsoa M Escobar RN

## 2021-11-11 NOTE — ED PROVIDER NOTES
"Time: 5:57 PM EST  Arrived by: Ambulance  Chief Complaint:   Chief Complaint   Patient presents with   • Weakness - Generalized   • Altered Mental Status     History provided by: Patient and family  History is limited by: N/A     History of Present Illness:    Mirna Meredith is a 76 y.o. female who presents to the emergency department today with complaints of moderate altered mental status.    In the ED, the patient reports that she is unsure why she is in the ED today. However, her family states that \"she is not herself at all.\" They elaborate that a few weeks ago, she was treated for shingles and was prescribed a new medication. One week later, her family member states that the patient became confused and had difficulty walking. She also complained of nausea at that time. The patient was subsequently found to have a septic UTI and was admitted to the hospital. She was recently discharged in stable condition.    1-2 days ago, the patient's family member states that the patient began to have similar symptoms once again including nausea, vomiting, and difficulty walking. Her PCP confirmed that she had a UTI a few days ago and started her on medications. During this visit, the office advised that her hemoglobin was also \"borderline.\"     When the family member returned from work today, she states that the patient was not responding well to verbal stimuli and could not ambulate. She is concerned that the patient's UTI may have progressed to sepsis once again.    The patient confirms that she was feeling nauseous previously but is not experiencing this symptom right now. She has an occasional cough and states that she does currently have a fever.    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 She is a former smoker but denies drug or alcohol use. There are no other acute complaints at this time.         History " provided by:  Patient and relative   used: No    Altered Mental Status  Presenting symptoms: confusion    Severity:  Moderate  Most recent episode:  2 days ago  Episode history:  Continuous  Duration:  2 days  Timing:  Constant  Progression:  Worsening  Chronicity:  Recurrent  Context: recent infection    Context comment:  Patient had recent shingles diagnosis and UTI diagnosis. She has a recent history of septic UTI.  Associated symptoms: fever, nausea (patient denies nausea right now), vomiting and weakness    Associated symptoms: no rash and no seizures        Similar Symptoms Previously: Yes.  Recently seen: Patient was admitted on 10/18/2021 with a UTI, dehydration, and sepsis.      Patient Care Team  Primary Care Provider: Pan Pacheco MD    Past Medical History:     Allergies   Allergen Reactions   • Penicillins Rash     Past Medical History:   Diagnosis Date   • Cancer (HCC)     Breast with mets to colon and bones   • COPD (chronic obstructive pulmonary disease) (HCC)    • Depression    • Diabetes mellitus (HCC)    • Hyperlipidemia    • Hypertension      Past Surgical History:   Procedure Laterality Date   • CHOLECYSTECTOMY     • HYSTERECTOMY     • JOINT REPLACEMENT Bilateral     Knees   • MASTECTOMY Bilateral      History reviewed. No pertinent family history.    Home Medications:  Prior to Admission medications    Medication Sig Start Date End Date Taking? Authorizing Provider   anastrozole (ARIMIDEX) 1 MG tablet Take 1 mg by mouth Daily.    Vasile Giron MD   atenolol (TENORMIN) 50 MG tablet Take 50 mg by mouth Daily.    Vasile Giron MD   buPROPion XL (WELLBUTRIN XL) 300 MG 24 hr tablet Take 300 mg by mouth Daily.    Vasile Giron MD   ferrous sulfate 325 (65 FE) MG tablet Take 325 mg by mouth Daily With Breakfast.    Vasile Giron MD   folic acid (FOLVITE) 1 MG tablet Take 1 mg by mouth Daily.    Vasile Giron MD   lisinopril  (PRINIVIL,ZESTRIL) 10 MG tablet Take 10 mg by mouth Daily.    Vasile Giron MD   memantine (NAMENDA) 10 MG tablet Take 10 mg by mouth 2 (Two) Times a Day.    Vasile Giron MD   metFORMIN ER (GLUCOPHAGE-XR) 500 MG 24 hr tablet Take 1,000 mg by mouth Daily With Breakfast.    Vasile Giron MD   montelukast (SINGULAIR) 10 MG tablet Take 10 mg by mouth Daily.    Vasile Giron MD   ondansetron ODT (ZOFRAN-ODT) 4 MG disintegrating tablet Place 1 tablet on the tongue Every 6 (Six) Hours As Needed for Nausea or Vomiting. 10/9/21   Eliu Mack MD   Peppermint Oil (IBGARD PO) Take  by mouth.    Vasile Giron MD   predniSONE (DELTASONE) 20 MG tablet Take 1 tablet by mouth 3 (Three) Times a Day With Meals. 10/9/21   Eliu Mack MD   risperiDONE (risperDAL) 3 MG tablet Take 3 mg by mouth Every Night.    Vasile Giron MD   rosuvastatin (CRESTOR) 10 MG tablet Take 10 mg by mouth Every Night.    Vasile Giron MD   SBI/Protein Isolate (EnteraGam) 5 g pack Take 1 packet by mouth Daily. 3/8/21   Vasile Giron MD   sertraline (ZOLOFT) 25 MG tablet Take 25 mg by mouth Daily.    Vasile Giron MD   traMADol (ULTRAM) 50 MG tablet Take 1 tablet by mouth Every 8 (Eight) Hours As Needed for Moderate Pain . 10/9/21   Eliu Mack MD   vitamin E 400 UNIT capsule Take 1 capsule by mouth Daily.    Vasile Giron MD        Social History:   Social History     Tobacco Use   • Smoking status: Former Smoker     Types: Cigarettes     Quit date: 1961     Years since quittin.4   • Smokeless tobacco: Not on file   Substance Use Topics   • Alcohol use: Not Currently   • Drug use: Never     Recent travel: not applicable     Review of Systems:  Review of Systems   Constitutional: Positive for fever.   HENT: Negative for nosebleeds.    Eyes: Negative for redness.   Respiratory: Positive for cough. Negative for shortness of breath.    Cardiovascular:  Negative for chest pain.   Gastrointestinal: Positive for nausea (patient denies nausea right now) and vomiting.   Genitourinary: Negative for dysuria and frequency.   Musculoskeletal: Negative for back pain and neck pain.   Skin: Negative for rash.   Neurological: Positive for weakness. Negative for seizures and syncope.   Psychiatric/Behavioral: Positive for confusion.   All other systems reviewed and are negative.       Physical Exam:  /63 (BP Location: Left arm, Patient Position: Lying)   Pulse 94   Temp 100.1 °F (37.8 °C) (Oral)   Resp 16   SpO2 93%     Physical Exam  Vitals and nursing note reviewed.   Constitutional:       General: She is not in acute distress.     Appearance: She is obese.      Comments: Patient appears deconditioned.   HENT:      Head: Normocephalic and atraumatic.      Nose: Nose normal.      Mouth/Throat:      Mouth: Mucous membranes are moist.   Eyes:      General: No scleral icterus.  Cardiovascular:      Rate and Rhythm: Normal rate and regular rhythm.      Heart sounds: Normal heart sounds. No murmur heard.      Pulmonary:      Effort: No respiratory distress.      Breath sounds: Normal breath sounds.   Abdominal:      Palpations: Abdomen is soft.      Tenderness: There is no abdominal tenderness.   Musculoskeletal:         General: No tenderness. Normal range of motion.      Cervical back: Normal range of motion and neck supple.      Right lower leg: No edema.      Left lower leg: No edema.   Skin:     General: Skin is warm and dry.      Coloration: Skin is pale.   Neurological:      Mental Status: She is alert. Mental status is at baseline.   Psychiatric:         Behavior: Behavior normal.                Medications in the Emergency Department:  Medications   sodium chloride 0.9 % flush 10 mL (has no administration in time range)   cefTRIAXone (ROCEPHIN) IVPB 1 g (0 g Intravenous Stopped 11/11/21 2030)   sodium chloride 0.9 % bolus 1,000 mL (0 mL Intravenous Stopped  11/11/21 2031)   ondansetron (ZOFRAN) injection 4 mg (4 mg Intravenous Given 11/11/21 2035)        Labs  Labs Reviewed   COMPREHENSIVE METABOLIC PANEL - Abnormal; Notable for the following components:       Result Value    Glucose 199 (*)     BUN 24 (*)     Creatinine 2.09 (*)     Sodium 132 (*)     Chloride 93 (*)     AST (SGOT) 33 (*)     eGFR Non  Amer 23 (*)     All other components within normal limits    Narrative:     GFR Normal >60  Chronic Kidney Disease <60  Kidney Failure <15     CBC WITH AUTO DIFFERENTIAL - Abnormal; Notable for the following components:    RBC 2.89 (*)     Hemoglobin 8.2 (*)     Hematocrit 25.3 (*)     RDW 18.3 (*)     RDW-SD 57.2 (*)     Platelets 124 (*)     Neutrophil % 78.8 (*)     Lymphocyte % 10.2 (*)     Monocyte % 1.5 (*)     Eosinophil % 0.0 (*)     Immature Grans % 9.3 (*)     Lymphocytes, Absolute 0.48 (*)     Monocytes, Absolute 0.07 (*)     Immature Grans, Absolute 0.44 (*)     All other components within normal limits   URINALYSIS W/ CULTURE IF INDICATED - Abnormal; Notable for the following components:    Appearance, UA Cloudy (*)     Blood, UA Trace (*)     Protein,  mg/dL (2+) (*)     Leuk Esterase, UA Moderate (2+) (*)     All other components within normal limits   URINALYSIS, MICROSCOPIC ONLY - Abnormal; Notable for the following components:    RBC, UA 3-5 (*)     WBC, UA Too Numerous to Count (*)     Bacteria, UA 1+ (*)     All other components within normal limits   LACTIC ACID, PLASMA - Normal   BLOOD CULTURE   BLOOD CULTURE   URINE CULTURE   RAINBOW DRAW    Narrative:     The following orders were created for panel order [place] Draw.  Procedure                               Abnormality         Status                     ---------                               -----------         ------                     Green Top (Gel)[[id number]]                                  Final result               Lavender Top[[id number]]                                      Final result               Gold Top - SST[[id number]]                                   Final result               Light Blue Top[[id number]]                                   Final result                 Please view results for these tests on the individual orders.   SCAN SLIDE   CBC AND DIFFERENTIAL    Narrative:     The following orders were created for panel order CBC & Differential.  Procedure                               Abnormality         Status                     ---------                               -----------         ------                     CBC Auto Differential[[id number]]        Abnormal            Final result               Scan Slide[[id number]]                                       Final result                 Please view results for these tests on the individual orders.   GREEN TOP   LAVENDER TOP   GOLD TOP - SST   LIGHT BLUE TOP        Imaging:  CT Head Without Contrast    Result Date: 11/11/2021  PROCEDURE: CT HEAD WO CONTRAST  COMPARISON:  [facility], CT, CT HEAD WO CONTRAST, 10/18/2021, 7:13. INDICATIONS: Mental status change, unknown cause  PROTOCOL:   Standard imaging protocol performed    RADIATION:   DLP: 1082.2mGy*cm   MA and/or KV was adjusted to minimize radiation dose.     TECHNIQUE: After obtaining the patient's consent, CT images were obtained without non-ionic intravenous contrast material.  FINDINGS:  There is underlying cerebral atrophy.  There are white matter changes in the periventricular areas which could reflect more chronic small vessel ischemic change.  There is no underlying hemorrhage.  There is no midline shift.  There is no definite orbital abnormality.  It looks like there is probably a mucous retention cyst or polyp in the right maxillary sinus as well some mucosal thickening.  There is deviation nasal septum to the left.  There are some opacified mastoid air cells bilaterally.  CONCLUSION:  1. There is some underlying cerebral atrophy. 2. There are  white matter changes which while nonspecific could reflect more chronic small vessel ischemic change. 3. There are some opacified mastoid air cells bilaterally.  There is some mucosal thickening what looks like a polyp per retention cyst in the right maxillary sinus.  Depending on clinical findings at some point follow-up MR may be warranted in further workup.    ANTONINO FRIEND MD       Electronically Signed and Approved By: ANTONINO FRIEND MD on 11/11/2021 at 20:12             XR Chest 1 View    Result Date: 11/11/2021  PROCEDURE: XR CHEST 1 VW  COMPARISON: [facility], CT, CT CHEST W CONTRAST DIAGNOSTIC, 7/30/2021, 11:43.  [facility], CR, XR CHEST 1 VW, 10/18/2021, 6:41.  INDICATIONS: sepsis .  History of breast cancer  FINDINGS:  There is a port catheter noted with its tip at the junction of the brachiocephalic vein with the superior vena cava.  The heart is not definitely enlarged.  There is no duane pulmonary consolidation or definite pleural effusions.  There are surgical clips about the chest wall.  There is a sclerotic appearance to the anterior aspect of the left 6th rib that could reflect metastatic disease.  Patient has known metastatic disease.  CONCLUSION:  1. No definite acute pulmonary process. 2. There is an appearance to the left 6th rib that could relate to metastatic disease.       ANTONINO FRIEND MD       Electronically Signed and Approved By: ANTONINO FRIEND MD on 11/11/2021 at 18:36               Procedures:  Procedures    Progress                            Medical Decision Making:  MDM  Number of Diagnoses or Management Options     Amount and/or Complexity of Data Reviewed  Clinical lab tests: reviewed  Tests in the radiology section of CPT®: reviewed  Tests in the medicine section of CPT®: reviewed  Decide to obtain previous medical records or to obtain history from someone other than the patient: yes  Obtain history from someone other than the patient: yes (Family  member)  Review and summarize past medical records: yes (Patient was recently diagnosed with shingles. She was discharged last month on the 18th following a UTI, dehydration, and sepsis. She was prescribed Omnicef to continue at home. Her blood cultures and urine cultures were both positive for proteus which was only resistant to Macrobid and tetracycline.)  Discuss the patient with other providers: yes (Admitting physician.)       Differential diagnosis includes urinary tract infection, sepsis, traumatic brain injury, and stroke among others.    Cardiac monitor interpretation: Normal sinus rhythm. Rate 95.    Pulse oximetry interpretation: 93% SPO2 on room air at rest. Normal.    Patient's work-up indicates too numerous to count white blood cells and positive leukocyte esterase in the patient's urine concerning for UTI, normal lactic acid, chronic anemia on CBC, and chronic kidney disease on metabolic panel. The patient's chest x-ray and head CT are essentially normal for age.    On reevaluation the patient was resting comfortably and in no acute distress. The patient does not meet criteria as of 10 PM for full sepsis fluid bolus.  Final diagnoses:   Metabolic encephalopathy   Urinary tract infection without hematuria, site unspecified        Disposition:  ED Disposition     ED Disposition Condition Comment    Decision to Admit  Level of Care: Med/Surg [1]   Diagnosis: Metabolic encephalopathy [348.31.ICD-9-CM]   Admitting Physician: RENUKA VELASCO [[id number]]   Attending Physician: RENUKA VELASCO [[id number]]   Isolate for COVID?: No [0]   Certification: I Certify That Inpatient Hospital Services Are Medically Necessary For Greater Than 2 Midnights            Documentation assistance provided by Valentine Villela acting as scribe for Dr. Tremayne Calloway. Information recorded by the scribe was done at my direction and has been verified and validated by me.        Valentine Villela  11/11/21 1852       Valentine Villela  11/11/21 1949        Valentine Villela  11/11/21 1950       Tremayne Calloway DO  11/11/21 0759

## 2021-11-12 ENCOUNTER — READMISSION MANAGEMENT (OUTPATIENT)
Dept: CALL CENTER | Facility: HOSPITAL | Age: 76
End: 2021-11-12

## 2021-11-12 ENCOUNTER — APPOINTMENT (OUTPATIENT)
Dept: CT IMAGING | Facility: HOSPITAL | Age: 76
End: 2021-11-12

## 2021-11-12 ENCOUNTER — APPOINTMENT (OUTPATIENT)
Dept: GENERAL RADIOLOGY | Facility: HOSPITAL | Age: 76
End: 2021-11-12

## 2021-11-12 LAB
ABO GROUP BLD: NORMAL
ABO GROUP BLD: NORMAL
AMMONIA BLD-SCNC: 21 UMOL/L (ref 11–51)
ANION GAP SERPL CALCULATED.3IONS-SCNC: 11 MMOL/L (ref 5–15)
ANISOCYTOSIS BLD QL: ABNORMAL
BACTERIA SPEC AEROBE CULT: NORMAL
BASOPHILS # BLD AUTO: 0.01 10*3/MM3 (ref 0–0.2)
BASOPHILS # BLD MANUAL: 0.03 10*3/MM3 (ref 0–0.2)
BASOPHILS NFR BLD AUTO: 0.3 % (ref 0–1.5)
BASOPHILS NFR BLD AUTO: 1 % (ref 0–1.5)
BLD GP AB SCN SERPL QL: NEGATIVE
BUN SERPL-MCNC: 19 MG/DL (ref 8–23)
BUN/CREAT SERPL: 12.2 (ref 7–25)
CALCIUM SPEC-SCNC: 8.8 MG/DL (ref 8.6–10.5)
CEA SERPL-MCNC: 20.9 NG/ML
CHLORIDE SERPL-SCNC: 98 MMOL/L (ref 98–107)
CO2 SERPL-SCNC: 25 MMOL/L (ref 22–29)
CREAT SERPL-MCNC: 1.56 MG/DL (ref 0.57–1)
DACRYOCYTES BLD QL SMEAR: ABNORMAL
DEPRECATED RDW RBC AUTO: 57.9 FL (ref 37–54)
DEPRECATED RDW RBC AUTO: 61.1 FL (ref 37–54)
EOSINOPHIL # BLD AUTO: 0 10*3/MM3 (ref 0–0.4)
EOSINOPHIL NFR BLD AUTO: 0 % (ref 0.3–6.2)
ERYTHROCYTE [DISTWIDTH] IN BLOOD BY AUTOMATED COUNT: 18.6 % (ref 12.3–15.4)
ERYTHROCYTE [DISTWIDTH] IN BLOOD BY AUTOMATED COUNT: 18.6 % (ref 12.3–15.4)
GFR SERPL CREATININE-BSD FRML MDRD: 32 ML/MIN/1.73
GLUCOSE BLDC GLUCOMTR-MCNC: 144 MG/DL (ref 70–99)
GLUCOSE BLDC GLUCOMTR-MCNC: 177 MG/DL (ref 70–99)
GLUCOSE BLDC GLUCOMTR-MCNC: 180 MG/DL (ref 70–99)
GLUCOSE SERPL-MCNC: 141 MG/DL (ref 65–99)
HBA1C MFR BLD: 8.7 % (ref 4.8–5.6)
HCT VFR BLD AUTO: 22.3 % (ref 34–46.6)
HCT VFR BLD AUTO: 24.6 % (ref 34–46.6)
HCT VFR BLD AUTO: 26.1 % (ref 34–46.6)
HGB BLD-MCNC: 7.2 G/DL (ref 12–15.9)
HGB BLD-MCNC: 7.4 G/DL (ref 12–15.9)
HGB BLD-MCNC: 8.4 G/DL (ref 12–15.9)
IMM GRANULOCYTES # BLD AUTO: 0.26 10*3/MM3 (ref 0–0.05)
IMM GRANULOCYTES NFR BLD AUTO: 7.3 % (ref 0–0.5)
LYMPHOCYTES # BLD AUTO: 0.75 10*3/MM3 (ref 0.7–3.1)
LYMPHOCYTES # BLD MANUAL: 0.55 10*3/MM3 (ref 0.7–3.1)
LYMPHOCYTES NFR BLD AUTO: 21.1 % (ref 19.6–45.3)
LYMPHOCYTES NFR BLD MANUAL: 15 % (ref 19.6–45.3)
LYMPHOCYTES NFR BLD MANUAL: 4 % (ref 5–12)
MCH RBC QN AUTO: 27.9 PG (ref 26.6–33)
MCH RBC QN AUTO: 28.2 PG (ref 26.6–33)
MCHC RBC AUTO-ENTMCNC: 30.1 G/DL (ref 31.5–35.7)
MCHC RBC AUTO-ENTMCNC: 32.3 G/DL (ref 31.5–35.7)
MCV RBC AUTO: 87.5 FL (ref 79–97)
MCV RBC AUTO: 92.8 FL (ref 79–97)
MICROCYTES BLD QL: ABNORMAL
MONOCYTES # BLD AUTO: 0.14 10*3/MM3 (ref 0.1–0.9)
MONOCYTES # BLD AUTO: 0.14 10*3/MM3 (ref 0.1–0.9)
MONOCYTES NFR BLD AUTO: 3.9 % (ref 5–12)
NEUTROPHILS # BLD AUTO: 2.7 10*3/MM3 (ref 1.7–7)
NEUTROPHILS NFR BLD AUTO: 2.4 10*3/MM3 (ref 1.7–7)
NEUTROPHILS NFR BLD AUTO: 67.4 % (ref 42.7–76)
NEUTROPHILS NFR BLD MANUAL: 79 % (ref 42.7–76)
NEUTS HYPERSEG # BLD: ABNORMAL 10*3/UL
NRBC BLD AUTO-RTO: 0 /100 WBC (ref 0–0.2)
PATHOLOGY REVIEW: YES
PLATELET # BLD AUTO: 89 10*3/MM3 (ref 140–450)
PLATELET # BLD AUTO: 97 10*3/MM3 (ref 140–450)
PMV BLD AUTO: 10.2 FL (ref 6–12)
PMV BLD AUTO: 10.6 FL (ref 6–12)
POIKILOCYTOSIS BLD QL SMEAR: ABNORMAL
POTASSIUM SERPL-SCNC: 3.9 MMOL/L (ref 3.5–5.2)
QT INTERVAL: 323 MS
RBC # BLD AUTO: 2.55 10*6/MM3 (ref 3.77–5.28)
RBC # BLD AUTO: 2.65 10*6/MM3 (ref 3.77–5.28)
RBC MORPH BLD: NORMAL
RH BLD: POSITIVE
RH BLD: POSITIVE
ROULEAUX BLD QL SMEAR: ABNORMAL
SCAN SLIDE: NORMAL
SMALL PLATELETS BLD QL SMEAR: ABNORMAL
SMALL PLATELETS BLD QL SMEAR: NORMAL
SODIUM SERPL-SCNC: 134 MMOL/L (ref 136–145)
T&S EXPIRATION DATE: NORMAL
VARIANT LYMPHS NFR BLD MANUAL: 1 % (ref 0–5)
WBC # BLD AUTO: 3.42 10*3/MM3 (ref 3.4–10.8)
WBC # BLD AUTO: 3.56 10*3/MM3 (ref 3.4–10.8)
WBC MORPH BLD: NORMAL

## 2021-11-12 PROCEDURE — 85007 BL SMEAR W/DIFF WBC COUNT: CPT | Performed by: FAMILY MEDICINE

## 2021-11-12 PROCEDURE — 99233 SBSQ HOSP IP/OBS HIGH 50: CPT | Performed by: INTERNAL MEDICINE

## 2021-11-12 PROCEDURE — 80048 BASIC METABOLIC PNL TOTAL CA: CPT | Performed by: FAMILY MEDICINE

## 2021-11-12 PROCEDURE — P9016 RBC LEUKOCYTES REDUCED: HCPCS

## 2021-11-12 PROCEDURE — 74176 CT ABD & PELVIS W/O CONTRAST: CPT

## 2021-11-12 PROCEDURE — 97161 PT EVAL LOW COMPLEX 20 MIN: CPT

## 2021-11-12 PROCEDURE — 86901 BLOOD TYPING SEROLOGIC RH(D): CPT | Performed by: INTERNAL MEDICINE

## 2021-11-12 PROCEDURE — 83036 HEMOGLOBIN GLYCOSYLATED A1C: CPT | Performed by: INTERNAL MEDICINE

## 2021-11-12 PROCEDURE — 86850 RBC ANTIBODY SCREEN: CPT | Performed by: INTERNAL MEDICINE

## 2021-11-12 PROCEDURE — 85018 HEMOGLOBIN: CPT | Performed by: INTERNAL MEDICINE

## 2021-11-12 PROCEDURE — 85014 HEMATOCRIT: CPT | Performed by: INTERNAL MEDICINE

## 2021-11-12 PROCEDURE — 63710000001 INSULIN LISPRO (HUMAN) PER 5 UNITS: Performed by: INTERNAL MEDICINE

## 2021-11-12 PROCEDURE — 86900 BLOOD TYPING SEROLOGIC ABO: CPT

## 2021-11-12 PROCEDURE — 86923 COMPATIBILITY TEST ELECTRIC: CPT

## 2021-11-12 PROCEDURE — 25010000002 CEFTRIAXONE PER 250 MG: Performed by: FAMILY MEDICINE

## 2021-11-12 PROCEDURE — 36430 TRANSFUSION BLD/BLD COMPNT: CPT

## 2021-11-12 PROCEDURE — 82378 CARCINOEMBRYONIC ANTIGEN: CPT | Performed by: INTERNAL MEDICINE

## 2021-11-12 PROCEDURE — 25010000002 ONDANSETRON PER 1 MG: Performed by: FAMILY MEDICINE

## 2021-11-12 PROCEDURE — 85007 BL SMEAR W/DIFF WBC COUNT: CPT | Performed by: INTERNAL MEDICINE

## 2021-11-12 PROCEDURE — 85025 COMPLETE CBC W/AUTO DIFF WBC: CPT | Performed by: INTERNAL MEDICINE

## 2021-11-12 PROCEDURE — 82962 GLUCOSE BLOOD TEST: CPT

## 2021-11-12 PROCEDURE — 85025 COMPLETE CBC W/AUTO DIFF WBC: CPT | Performed by: FAMILY MEDICINE

## 2021-11-12 PROCEDURE — 82140 ASSAY OF AMMONIA: CPT | Performed by: INTERNAL MEDICINE

## 2021-11-12 PROCEDURE — 92610 EVALUATE SWALLOWING FUNCTION: CPT

## 2021-11-12 PROCEDURE — 72114 X-RAY EXAM L-S SPINE BENDING: CPT

## 2021-11-12 PROCEDURE — 86901 BLOOD TYPING SEROLOGIC RH(D): CPT

## 2021-11-12 PROCEDURE — 86300 IMMUNOASSAY TUMOR CA 15-3: CPT | Performed by: INTERNAL MEDICINE

## 2021-11-12 PROCEDURE — 86900 BLOOD TYPING SEROLOGIC ABO: CPT | Performed by: INTERNAL MEDICINE

## 2021-11-12 RX ORDER — CHOLECALCIFEROL (VITAMIN D3) 125 MCG
5 CAPSULE ORAL NIGHTLY PRN
Status: DISCONTINUED | OUTPATIENT
Start: 2021-11-12 | End: 2021-11-19 | Stop reason: HOSPADM

## 2021-11-12 RX ORDER — SERTRALINE HYDROCHLORIDE 25 MG/1
25 TABLET, FILM COATED ORAL DAILY
Status: DISCONTINUED | OUTPATIENT
Start: 2021-11-12 | End: 2021-11-19 | Stop reason: HOSPADM

## 2021-11-12 RX ORDER — ACETAMINOPHEN 650 MG/1
650 SUPPOSITORY RECTAL EVERY 4 HOURS PRN
Status: DISCONTINUED | OUTPATIENT
Start: 2021-11-12 | End: 2021-11-19 | Stop reason: HOSPADM

## 2021-11-12 RX ORDER — DEXTROSE MONOHYDRATE 100 MG/ML
25 INJECTION, SOLUTION INTRAVENOUS
Status: DISCONTINUED | OUTPATIENT
Start: 2021-11-12 | End: 2021-11-19 | Stop reason: HOSPADM

## 2021-11-12 RX ORDER — NICOTINE POLACRILEX 4 MG
15 LOZENGE BUCCAL
Status: DISCONTINUED | OUTPATIENT
Start: 2021-11-12 | End: 2021-11-19 | Stop reason: HOSPADM

## 2021-11-12 RX ORDER — FERROUS SULFATE 325(65) MG
325 TABLET ORAL
Status: DISCONTINUED | OUTPATIENT
Start: 2021-11-12 | End: 2021-11-17

## 2021-11-12 RX ORDER — ROSUVASTATIN CALCIUM 5 MG/1
10 TABLET, COATED ORAL NIGHTLY
Status: DISCONTINUED | OUTPATIENT
Start: 2021-11-12 | End: 2021-11-19 | Stop reason: HOSPADM

## 2021-11-12 RX ORDER — ONDANSETRON 2 MG/ML
4 INJECTION INTRAMUSCULAR; INTRAVENOUS EVERY 6 HOURS PRN
Status: DISCONTINUED | OUTPATIENT
Start: 2021-11-12 | End: 2021-11-19 | Stop reason: HOSPADM

## 2021-11-12 RX ORDER — ACETAMINOPHEN 160 MG/5ML
650 SOLUTION ORAL EVERY 4 HOURS PRN
Status: DISCONTINUED | OUTPATIENT
Start: 2021-11-12 | End: 2021-11-19 | Stop reason: HOSPADM

## 2021-11-12 RX ORDER — POLYETHYLENE GLYCOL 3350 17 G/17G
17 POWDER, FOR SOLUTION ORAL DAILY PRN
Status: DISCONTINUED | OUTPATIENT
Start: 2021-11-12 | End: 2021-11-18

## 2021-11-12 RX ORDER — AMOXICILLIN 250 MG
2 CAPSULE ORAL 2 TIMES DAILY
Status: DISCONTINUED | OUTPATIENT
Start: 2021-11-12 | End: 2021-11-18

## 2021-11-12 RX ORDER — SODIUM CHLORIDE, SODIUM LACTATE, POTASSIUM CHLORIDE, CALCIUM CHLORIDE 600; 310; 30; 20 MG/100ML; MG/100ML; MG/100ML; MG/100ML
50 INJECTION, SOLUTION INTRAVENOUS CONTINUOUS
Status: DISCONTINUED | OUTPATIENT
Start: 2021-11-12 | End: 2021-11-14

## 2021-11-12 RX ORDER — SODIUM CHLORIDE 0.9 % (FLUSH) 0.9 %
10 SYRINGE (ML) INJECTION EVERY 12 HOURS SCHEDULED
Status: DISCONTINUED | OUTPATIENT
Start: 2021-11-12 | End: 2021-11-19 | Stop reason: HOSPADM

## 2021-11-12 RX ORDER — SODIUM CHLORIDE 0.9 % (FLUSH) 0.9 %
10 SYRINGE (ML) INJECTION AS NEEDED
Status: DISCONTINUED | OUTPATIENT
Start: 2021-11-12 | End: 2021-11-19 | Stop reason: HOSPADM

## 2021-11-12 RX ORDER — FOLIC ACID 1 MG/1
1 TABLET ORAL DAILY
Status: DISCONTINUED | OUTPATIENT
Start: 2021-11-12 | End: 2021-11-19 | Stop reason: HOSPADM

## 2021-11-12 RX ORDER — CEFTRIAXONE SODIUM 1 G/50ML
1 INJECTION, SOLUTION INTRAVENOUS
Status: DISCONTINUED | OUTPATIENT
Start: 2021-11-12 | End: 2021-11-13

## 2021-11-12 RX ORDER — BISACODYL 10 MG
10 SUPPOSITORY, RECTAL RECTAL DAILY PRN
Status: DISCONTINUED | OUTPATIENT
Start: 2021-11-12 | End: 2021-11-18

## 2021-11-12 RX ORDER — ACETAMINOPHEN 325 MG/1
650 TABLET ORAL EVERY 4 HOURS PRN
Status: DISCONTINUED | OUTPATIENT
Start: 2021-11-12 | End: 2021-11-19 | Stop reason: HOSPADM

## 2021-11-12 RX ORDER — RISPERIDONE 3 MG/1
3 TABLET ORAL NIGHTLY
Status: DISCONTINUED | OUTPATIENT
Start: 2021-11-12 | End: 2021-11-19 | Stop reason: HOSPADM

## 2021-11-12 RX ORDER — BISACODYL 5 MG/1
5 TABLET, DELAYED RELEASE ORAL DAILY PRN
Status: DISCONTINUED | OUTPATIENT
Start: 2021-11-12 | End: 2021-11-18

## 2021-11-12 RX ORDER — TRAMADOL HYDROCHLORIDE 50 MG/1
50 TABLET ORAL EVERY 12 HOURS PRN
Status: DISPENSED | OUTPATIENT
Start: 2021-11-12 | End: 2021-11-19

## 2021-11-12 RX ADMIN — SODIUM CHLORIDE, PRESERVATIVE FREE 10 ML: 5 INJECTION INTRAVENOUS at 08:53

## 2021-11-12 RX ADMIN — FOLIC ACID 1 MG: 1 TABLET ORAL at 13:16

## 2021-11-12 RX ADMIN — FERROUS SULFATE TAB 325 MG (65 MG ELEMENTAL FE) 325 MG: 325 (65 FE) TAB at 13:16

## 2021-11-12 RX ADMIN — INSULIN LISPRO 2 UNITS: 100 INJECTION, SOLUTION INTRAVENOUS; SUBCUTANEOUS at 18:02

## 2021-11-12 RX ADMIN — SODIUM CHLORIDE, POTASSIUM CHLORIDE, SODIUM LACTATE AND CALCIUM CHLORIDE 50 ML/HR: 600; 310; 30; 20 INJECTION, SOLUTION INTRAVENOUS at 06:28

## 2021-11-12 RX ADMIN — SERTRALINE 25 MG: 25 TABLET, FILM COATED ORAL at 13:59

## 2021-11-12 RX ADMIN — RISPERIDONE 3 MG: 3 TABLET, FILM COATED ORAL at 21:11

## 2021-11-12 RX ADMIN — CEFTRIAXONE SODIUM 1 G: 1 INJECTION, SOLUTION INTRAVENOUS at 08:52

## 2021-11-12 RX ADMIN — ONDANSETRON 4 MG: 2 INJECTION INTRAMUSCULAR; INTRAVENOUS at 18:02

## 2021-11-12 RX ADMIN — SODIUM CHLORIDE, PRESERVATIVE FREE 10 ML: 5 INJECTION INTRAVENOUS at 08:52

## 2021-11-12 RX ADMIN — ROSUVASTATIN CALCIUM 10 MG: 5 TABLET, FILM COATED ORAL at 21:11

## 2021-11-12 NOTE — H&P
[facility] HISTORY AND PHYSICAL  Date: 2021   Patient Name: Mrina Meredith  : 1945  MRN: [id number]  Primary Care Physician:  aPn Pacheco MD  Date of admission: 2021    Subjective   Subjective     Chief Complaint: Confusion and weakness    HPI:    Mirna Meredith is a 76 y.o. female presents to the hospital due to confusion and weakness.  This seems to be worse over the last 1 to 2 days.  Family brought her in stating she is not herself.  Patient was recently admitted less than 1 month ago for similar symptoms which were found to be secondary to urinary tract infection.  She was treated and seemed to be having improvement but over the last 1 to 2 days had a significant decline in her ability to ambulate and has been more confused and hypersomnolent.  In the emergency department she was not febrile but had a temperature of 100.1.  She was, however tachycardic.  She received IV fluids and Rocephin in the emergency department due to urinary tract infection discovered on urinalysis in the ER.  Due to her inability to ambulate due to weakness and her metabolic encephalopathy we are asked to admit for further care.      Personal History     Past Medical History:  Past Medical History:   Diagnosis Date   • Cancer (HCC)     Breast with mets to colon and bones   • COPD (chronic obstructive pulmonary disease) (HCC)    • Depression    • Diabetes mellitus (HCC)    • Hyperlipidemia    • Hypertension          Past Surgical History:  Past Surgical History:   Procedure Laterality Date   • CHOLECYSTECTOMY     • HYSTERECTOMY     • JOINT REPLACEMENT Bilateral     Knees   • MASTECTOMY Bilateral          Family History:   History reviewed. No pertinent family history.      Social History:   Social History     Tobacco Use   • Smoking status: Former Smoker     Types: Cigarettes     Quit date: 1961     Years since quittin.4   • Smokeless tobacco: Not on file   Substance Use Topics    • Alcohol use: Not Currently   • Drug use: Never         Home Medications:  Palbociclib, Peppermint Oil, SBI/Protein Isolate, anastrozole, atenolol, buPROPion XL, cephalexin, ferrous sulfate, folic acid, lisinopril, memantine, metFORMIN ER, montelukast, ondansetron ODT, risperiDONE, rosuvastatin, valACYclovir, and vitamin E    Allergies:  Allergies   Allergen Reactions   • Penicillins Rash       Review of Systems  Unable to assess due to altered mental status    Objective   Objective     Vitals:   Temp:  [100.1 °F (37.8 °C)] 100.1 °F (37.8 °C)  Heart Rate:  [] 94  Resp:  [16-24] 16  BP: (106-146)/() 146/63  Flow (L/min):  [2] 2    Physical Exam    Constitutional: Hypersomnolent, no acute distress, answers simple questions but keeps her eyes closed   Eyes: Patient will not open her eyes but she is verbally responsive to verbal questions   HENT: NCAT, mucous membranes dry   Neck: Supple, no thyromegaly, no lymphadenopathy, trachea midline   Respiratory: Diminished, clear to auscultation bilaterally, nonlabored respirations    Cardiovascular: RRR, no murmurs, rubs, or gallops, palpable pedal pulses bilaterally   Gastrointestinal: Positive bowel sounds, soft, nontender, nondistended   Musculoskeletal: No bilateral ankle edema, no clubbing or cyanosis to extremities   Psychiatric: Appropriate affect, cooperative   Neurologic: Hypersomnolent, diminished strength equally in all extremities, Cranial Nerves not able to be evaluated   Skin: No rashes     Result Review    Result Review:  I have personally reviewed the results from the time of this admission to 11/11/2021 23:44 EST and agree with these findings:  [x]  Laboratory  [x]  Microbiology  [x]  Radiology  [x]  EKG/Telemetry   []  Cardiology/Vascular   []  Pathology  []  Old records  []  Other:      Assessment/Plan   Assessment / Plan     Assessment/Plan:   • Complicated Urinary tract infection  • Altered mental status - 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  May need rehab placement    DVT prophylaxis:  Mechanical DVT prophylaxis orders are present.    CODE STATUS:    Code Status (Patient has no pulse and is not breathing): No CPR (Do Not Attempt to Resuscitate)  Medical Interventions (Patient has pulse or is breathing): Full Support      Admission Status:  I believe this patient meets inpatient status.    Electronically signed by Dao Cheek DO, 11/11/21, 11:44 PM EST.

## 2021-11-12 NOTE — THERAPY EVALUATION
Acute Care - Speech Language Pathology   Swallow Initial Evaluation [facility]     Patient Name: Mirna Meredith  : 1945  MRN: [id number]  Today's Date: 2021               Admit Date: 2021    Visit Dx:     ICD-10-CM ICD-9-CM   1. Metabolic encephalopathy  G93.41 348.31   2. Urinary tract infection without hematuria, site unspecified  N39.0 599.0     Patient Active Problem List   Diagnosis   • Abdominal pain   • Sepsis (HCC)   • Metabolic encephalopathy     Past Medical History:   Diagnosis Date   • Cancer (HCC)     Breast with mets to colon and bones   • COPD (chronic obstructive pulmonary disease) (HCC)    • Depression    • Diabetes mellitus (HCC)    • Hyperlipidemia    • Hypertension      Past Surgical History:   Procedure Laterality Date   • CHOLECYSTECTOMY     • HYSTERECTOMY     • JOINT REPLACEMENT Bilateral     Knees   • MASTECTOMY Bilateral      PAIN SCALE: None noted    PRECAUTIONS/CONTRAINDICATIONS:  None noted    SUSPECTED ABUSE/NEGLECT/EXPLOITATION: None noted    SOCIAL/PSYCHOLOGICAL NEEDS/BARRIERS: None noted    PAST SOCIAL HISTORY: Lives at home with family    PRIOR FUNCTION: Some dependence for care    PATIENT GOALS/EXPECTATIONS: Did not report    HISTORY: This patient is a 76-year-old white female with history of metastatic breast cancer with mets to the colon and bones. Patient with recent UTI. Admitted with metabolic encephalopathy and UTI. Patient denies difficulty swallowing however does report frequent nausea over the past week.    CURRENT DIET LEVEL: N.p.o.    OBJECTIVE:    TEST ADMINISTERED: Clinical dysphagia evaluation    COGNITION/SAFETY AWARENESS: Alert and oriented    BEHAVIORAL OBSERVATIONS: Pleasant and cooperative    ORAL MOTOR EXAM: Patient is edentulous. Right labial droop noted.    VOICE QUALITY: Mild hoarseness reduced volume    REFLEX EXAM: Deferred    POSTURE: 90° upright    FEEDING/SWALLOWING FUNCTION: Assessed thin liquids, purée consistencies, and soft  solids    CLINICAL OBSERVATIONS: Oral stage is characterized by prolonged mastication with soft solids. Appears to have timely oral transit. Bolus control mildly diminished. Scattered oral residue noted after completion of swallow with soft solids. Pharyngeal phase appears timely with good laryngeal elevation per palpation. No overt clinical signs or symptoms of aspiration were noted with any trial presented. Denies globus sensation after completion of swallow. Vocal quality was clear to auscultation.    DYSPHAGIA CRITERIA: Oral stage dysphagia    FUNCTIONAL ASSESSMENT INSTRUMENT: Patient currently scored a level 5 of 7 on Functional Communication Measures for swallowing indicating a 20-39% limitation in function.    ASSESSMENT/ PLAN OF CARE:  Pt presents with limitations, noted below, that impede her ability to swallow safely. The skills of a therapist will be required to safely and effectively implement the following treatment plan to restore maximal level of function.    PROBLEMS:  1. Dysphagia   LTG 1: (30 days) patient will increase ability to tolerate least restrictive diet and improve functional communication measure for swallowing to a 6 of 7.   STG 1a: (14 days) patient tolerate mechanical soft diet thin liquids without clinical sign or symptom of aspiration with 8 of 10 trials.   STG 1b: (14 days) patient will utilize compensatory swallow strategies independently.   STG 1c: (14 days) patient/family teaching regarding diet recommendations, safe swallow strategies and signs and symptoms of aspiration.     TREATMENT: Dysphagia therapy to ensure diet tolerance, advance to least restrictive diet and analyze for aspiration risk.      FREQUENCY/DURATION: Twice daily 5 times a week    REHAB POTENTIAL:  Pt has good rehab potential.  The following limitations may influence improvement/ length of tx medical status.    RECOMMENDATIONS:   1.   DIET: Mechanical soft diet-ground meats with sauce/gravy, single sips of  thin liquids    2.  POSITION: 90° upright    3.  COMPENSATORY STRATEGIES: Fork mash solids, small bites/drinks, oral meds whole in applesauce    YES: Pt/responsible party agrees with plan of care and has been informed of all alternatives, risks and benefits.    EDUCATION  The patient has been educated in the following areas:   Dysphagia (Swallowing Impairment).   Leticia Ceballos, SLP  11/12/2021

## 2021-11-12 NOTE — THERAPY EVALUATION
Acute Care - Physical Therapy Initial Evaluation   [facility]     Patient Name: Mirna Meredith  : 1945  MRN: [id number]  Today's Date: 2021      Visit Dx:   Admit date: 2021     Referring Physician: Cesar Sweet MD     Surgery Date:* No surgery found *            ICD-10-CM ICD-9-CM   1. Metabolic encephalopathy  G93.41 348.31   2. Urinary tract infection without hematuria, site unspecified  N39.0 599.0   3. Dysphagia, oropharyngeal  R13.12 787.22   4. Difficulty walking  R26.2 719.7     Patient Active Problem List   Diagnosis   • Abdominal pain   • Sepsis (HCC)   • Metabolic encephalopathy     Past Medical History:   Diagnosis Date   • Cancer (HCC)     Breast with mets to colon and bones   • COPD (chronic obstructive pulmonary disease) (HCC)    • Depression    • Diabetes mellitus (HCC)    • Hyperlipidemia    • Hypertension      Past Surgical History:   Procedure Laterality Date   • CHOLECYSTECTOMY     • HYSTERECTOMY     • JOINT REPLACEMENT Bilateral     Knees   • MASTECTOMY Bilateral      PT Assessment (last 12 hours)     PT Evaluation and Treatment     Row Name 21 1136          Physical Therapy Time and Intention    Subjective Information no complaints  -CS     Document Type evaluation  -CS     Mode of Treatment individual therapy; physical therapy  -CS     Patient Effort excellent  -CS     Row Name 21 1136          General Information    Patient Profile Reviewed yes  -CS     Patient Observations alert; cooperative; agree to therapy  -CS     Prior Level of Function --  Pt states her daughter assists her with all ADL's and transfers.  -CS     Equipment Currently Used at Home walker, rolling; wheelchair  Pt states she has been using her wheelchair more often.  -CS     Row Name 21 1136          Living Environment    Current Living Arrangements home/apartment/condo  -CS     Home Accessibility stairs to enter home; stairs within home  -CS     Lives With child(katerin), adult  -CS      Row Name 11/12/21 1136          Home Main Entrance    Number of Stairs, Main Entrance one  -     Row Name 11/12/21 1136          Stairs Within Home, Primary    Stairs, Within Home, Primary one flight to basement  -     Stairs Comment, Within Home, Primary Pt states she does not go into basement.  -     Row Name 11/12/21 1136          Range of Motion (ROM)    Range of Motion bilateral lower extremities; ROM is WFL  -[facility] Name 11/12/21 1136          Strength (Manual Muscle Testing)    Strength (Manual Muscle Testing) --  Pt's BLE strength was assessed to be 4-/5 in all muscle groups other than hip flexors which was assessed to be 2+/5.  -     Row Name 11/12/21 1136          Bed Mobility    Bed Mobility supine-sit  -     Supine-Sit [place] (Bed Mobility) minimum assist (75% patient effort)  -     Assistive Device (Bed Mobility) bed rails  -     Row Name 11/12/21 1136          Transfers    Transfers sit-stand transfer; bed-chair transfer  -     Bed-Chair [place] (Transfers) moderate assist (50% patient effort); minimum assist (75% patient effort)  -     Assistive Device (Bed-Chair Transfers) walker, front-wheeled  -     Sit-Stand [place] (Transfers) moderate assist (50% patient effort)  -     Row Name 11/12/21 1136          Sit-Stand Transfer    Assistive Device (Sit-Stand Transfers) walker, front-wheeled  -     Row Name 11/12/21 1136          Gait/Stairs (Locomotion)    Gait/Stairs Locomotion gait/ambulation independence; gait/ambulation assistive device  -     [place] Level (Gait) minimum assist (75% patient effort)  -     Assistive Device (Gait) walker, front-wheeled  -     Distance in Feet (Gait) 3'  -     Row Name 11/12/21 1136          Balance    Balance Assessment standing dynamic balance  -     Dynamic Standing Balance mild impairment  -     Row Name 11/12/21 1136          Plan of Care Review    Plan of Care Reviewed With patient  -CS     Outcome  Summary Pt presents with deficits in strength, balance, and mobility. The patient would benefit from skilled physical therapy to address the mentioned impairments.  -     Row Name 11/12/21 1136          Physical Therapy Goals    Bed Mobility Goal Selection (PT) bed mobility, PT goal 1  -CS     Transfer Goal Selection (PT) transfer, PT goal 1  -CS     Gait Training Goal Selection (PT) gait training, PT goal 1  -CS     Strength Goal Selection (PT) strength, PT goal 1  -     Row Name 11/12/21 1136          Bed Mobility Goal 1 (PT)    Activity/Assistive Device (Bed Mobility Goal 1, PT) bed mobility activities, all  -CS     [place] Level/Cues Needed (Bed Mobility Goal 1, PT) standby assist  -CS     Time Frame (Bed Mobility Goal 1, PT) 10 days  -     Row Name 11/12/21 1136          Transfer Goal 1 (PT)    Activity/Assistive Device (Transfer Goal 1, PT) sit-to-stand/stand-to-sit  -CS     [place] Level/Cues Needed (Transfer Goal 1, PT) contact guard assist  -CS     Time Frame (Transfer Goal 1, PT) 10 days  -     Row Name 11/12/21 1136          Gait Training Goal 1 (PT)    Activity/Assistive Device (Gait Training Goal 1, PT) gait (walking locomotion); assistive device use; walker, rolling  -CS     [place] Level (Gait Training Goal 1, PT) contact guard assist  -CS     Distance (Gait Training Goal 1, PT) 50'  -CS     Time Frame (Gait Training Goal 1, PT) 10 days  -     Row Name 11/12/21 1136          Strength Goal 1 (PT)    Strength Goal 1 (PT) Pt will be able to achieve a 4-/5 MMT grade in B hip flexors.  -     Row Name 11/12/21 1136          Therapy Assessment/Plan (PT)    Rehab Potential (PT) good, to achieve stated therapy goals  -CS     Criteria for Skilled Interventions Met (PT) yes; meets criteria  -CS     Predicted Duration of Therapy Intervention (PT) 10 days  -     Problem List (PT) problems related to; balance; mobility; range of motion (ROM); strength  -     Row Name 11/12/21 1136           PT Evaluation Complexity    History, PT Evaluation Complexity 1-2 personal factors and/or comorbidities  -CS     Examination of Body Systems (PT Eval Complexity) total of 4 or more elements  -CS     Clinical Presentation (PT Evaluation Complexity) stable  -CS     Clinical Decision Making (PT Evaluation Complexity) low complexity  -CS     Overall Complexity (PT Evaluation Complexity) low complexity  -CS     Row Name 11/12/21 1136          Therapy Plan Review/Discharge Plan (PT)    Therapy Plan Review (PT) evaluation/treatment results reviewed; patient  -CS           User Key  (r) = Recorded By, (t) = Taken By, (c) = Cosigned By    Initials Name Provider Type    CS Annabella Guo PT Physical Therapist              Physical Therapy Education                 Title: PT OT SLP Therapies (Done)     Topic: Physical Therapy (Done)     Point: Mobility training (Done)     Learning Progress Summary           Patient Acceptance, E,TB, VU by  at 11/12/2021 1151                   Point: Home exercise program (Done)     Learning Progress Summary           Patient Acceptance, E,TB, VU by CS at 11/12/2021 1151                   Point: Body mechanics (Done)     Learning Progress Summary           Patient Acceptance, E,TB, VU by CS at 11/12/2021 1151                   Point: Precautions (Done)     Learning Progress Summary           Patient Acceptance, E,TB, VU by CS at 11/12/2021 1151                               User Key     Initials Effective Dates Name Provider Type Discipline     04/25/21 -  Annabella Guo PT Physical Therapist PT              PT Recommendation and Plan  Anticipated Discharge Disposition (PT): home with home health, home with assist  Planned Therapy Interventions (PT): balance training, bed mobility training, gait training, neuromuscular re-education, strengthening, transfer training  Therapy Frequency (PT): daily  Plan of Care Reviewed With: patient  Outcome Summary: Pt presents with deficits in  strength, balance, and mobility. The patient would benefit from skilled physical therapy to address the mentioned impairments.   Outcome Measures     Row Name 11/12/21 1100             How much help from another person do you currently need...    Turning from your back to your side while in flat bed without using bedrails? 3  -CS      Moving from lying on back to sitting on the side of a flat bed without bedrails? 3  -CS      Moving to and from a bed to a chair (including a wheelchair)? 2  -CS      Standing up from a chair using your arms (e.g., wheelchair, bedside chair)? 2  -CS      Climbing 3-5 steps with a railing? 1  -CS      To walk in hospital room? 2  -CS      AM-PAC 6 Clicks Score (PT) 13  -CS              Functional Assessment    Outcome Measure Options AM-PAC 6 Clicks Basic Mobility (PT)  -CS            User Key  (r) = Recorded By, (t) = Taken By, (c) = Cosigned By    Initials Name Provider Type    CS Annabella Guo, PT Physical Therapist                 Time Calculation:    PT Charges     Row Name 11/12/21 1153             Time Calculation    PT Received On 11/12/21  -CS      PT Goal Re-Cert Due Date 11/21/21  -CS              Untimed Charges    PT Eval/Re-eval Minutes 40  -CS              Total Minutes    Untimed Charges Total Minutes 40  -CS       Total Minutes 40  -CS            User Key  (r) = Recorded By, (t) = Taken By, (c) = Cosigned By    Initials Name Provider Type    Annabella Glass, PT Physical Therapist              Therapy Charges for Today     Code Description Service Date Service Provider Modifiers Qty    16324095509 HC PT EVAL LOW COMPLEXITY 3 11/12/2021 Annabella Guo, PT GP 1          PT G-Codes  Outcome Measure Options: AM-PAC 6 Clicks Basic Mobility (PT)  AM-PAC 6 Clicks Score (PT): 13    Annabella Gou PT  11/12/2021

## 2021-11-12 NOTE — OUTREACH NOTE
Sepsis Week 3 Survey      Responses   [facility] patient discharged from? [facility]   Does the patient have one of the following disease processes/diagnoses(primary or secondary)? Sepsis   Week 3 attempt successful? No   Revoke Readmitted          Sara Deutsch RN

## 2021-11-12 NOTE — CONSULTS
[facility]   Consult Note    Patient Name: Mirna Meredith  : 1945  MRN: [id number]  Primary Care Physician:  Pan Pacheco MD  Date of admission: 2021    Subjective   Subjective     Reason for Consult: Patient known to me with history of metastatic breast cancer we have been taking care of her for a long time initially with hormonal treatment and then patient failed with metastasis to the bones lungs as well as to the small bowel and liver patient was treated with chemotherapy for last few months but recently showing signs of failure of treatment with previous chemotherapy regimens she has recently been started on CDK 4 inhibitors but has extensive disease in calling her bowel she also has history of ulcerative colitis and has been in the care of Dr. Chow for that she has had recurrent every episodes of respiratory distress she also has had episodes of C. difficile colitis feeling extremely weak she has a chemotherapy induced anemia and therefore 2 units of packed RBCs will be given she appears to be depressed we will start her on Zoloft complaining of pain in the back patient will be started on Ultram every 12 hours x-ray of the back CT scan of the abdomen will be done to assess the status of ascites and status of her bony metastases as well as liver metastases    HPI: Patient with metastatic cancer of the breast status post chemotherapy has recently been started on CDK 4 inhibitors the previous CT scan had shown shown some improvement but her tumor markers are getting worse    Mirna Meredith is a 76 y.o. female extensively metastatic carcinoma of the breast admitted with confusion disorientation complaining of extreme weakness and tachycardia probably because of anemia she has had history of diarrhea we will check the stool for C. difficile colitis    Review of Systems   All systems were reviewed and negative except for: Reviewed    Personal History     Past Medical History:    Diagnosis Date   • Cancer (HCC)     Breast with mets to colon and bones   • COPD (chronic obstructive pulmonary disease) (HCC)    • Depression    • Diabetes mellitus (HCC)    • Hyperlipidemia    • Hypertension        Past Surgical History:   Procedure Laterality Date   • CHOLECYSTECTOMY     • HYSTERECTOMY     • JOINT REPLACEMENT Bilateral     Knees   • MASTECTOMY Bilateral        Family History: family history is not on file. Otherwise pertinent FHx was reviewed and not pertinent to current issue.    Social History:  reports that she quit smoking about 60 years ago. Her smoking use included cigarettes. She does not have any smokeless tobacco history on file. She reports previous alcohol use. She reports that she does not use drugs.    Home Medications:  Palbociclib, Peppermint Oil, SBI/Protein Isolate, anastrozole, atenolol, buPROPion XL, cephalexin, ferrous sulfate, folic acid, lisinopril, memantine, metFORMIN ER, montelukast, ondansetron ODT, risperiDONE, rosuvastatin, valACYclovir, and vitamin E      Allergies:  Allergies   Allergen Reactions   • Penicillins Rash       Objective   Objective     Vitals:   Temp:  [98.3 °F (36.8 °C)-100.1 °F (37.8 °C)] 98.8 °F (37.1 °C)  Heart Rate:  [] 92  Resp:  [16-24] 18  BP: (106-154)/() 134/55  Flow (L/min):  [2] 2  Physical Exam    Constitutional: Awake, alert   Eyes: PERRLA, sclerae anicteric, no conjunctival injection   HENT: NCAT, mucous membranes moist   Neck: Supple, no thyromegaly, no lymphadenopathy, trachea midline   Respiratory: Clear to auscultation bilaterally, nonlabored respirations    Cardiovascular: RRR, no murmurs, rubs, or gallops, palpable pedal pulses bilaterally   Gastrointestinal: Positive bowel sounds, soft, nontender, nondistended   Musculoskeletal: No bilateral ankle edema, no clubbing or cyanosis to extremities   Psychiatric: Appropriate affect, cooperative   Neurologic: Oriented x 3, strength symmetric in all extremities, Cranial Nerves  grossly intact to confrontation, speech clear   Skin: No rashes     Result Review    Result Review:  I have personally reviewed the results from the time of this admission to 11/12/2021 12:41 EST and agree with these findings:  [x]  Laboratory  []  Microbiology  []  Radiology  []  EKG/Telemetry   []  Cardiology/Vascular   []  Pathology  []  Old records  []  Other: Anemia  Most notable findings include: Anemia metastatic disease to the bones liver and bowel    Assessment/Plan   Assessment / Plan     Brief Patient Summary:  Mirna Meredith is a 76 y.o. female who admitted with confusion extreme weakness shortness of fair tachycardia symptomatic anemia chemotherapy-induced with some neuropathy    Active Hospital Problems:  Active Hospital Problems    Diagnosis    • Metabolic encephalopathy        Plan:   We will transfuse her 2 units of packed RBCs we will get a CT scan of the abdomen we will start her on Zoloft patient appears to be depressed has been crying        Electronically signed by Jose G Grissom MD, 11/12/21, 12:41 PM EST.

## 2021-11-12 NOTE — PLAN OF CARE
Goal Outcome Evaluation:      FUNCTIONAL ASSESSMENT INSTRUMENT: Patient currently scored a level 5 of 7 on Functional Communication Measures for swallowing indicating a 20-39% limitation in function.     ASSESSMENT/ PLAN OF CARE:  Pt presents with limitations, noted below, that impede her ability to swallow safely. The skills of a therapist will be required to safely and effectively implement the following treatment plan to restore maximal level of function.     PROBLEMS:  1. Dysphagia              LTG 1: (30 days) patient will increase ability to tolerate least restrictive diet and improve functional communication measure for swallowing to a 6 of 7.              STG 1a: (14 days) patient tolerate mechanical soft diet thin liquids without clinical sign or symptom of aspiration with 8 of 10 trials.              STG 1b: (14 days) patient will utilize compensatory swallow strategies independently.              STG 1c: (14 days) patient/family teaching regarding diet recommendations, safe swallow strategies and signs and symptoms of aspiration.                 TREATMENT: Dysphagia therapy to ensure diet tolerance, advance to least restrictive diet and analyze for aspiration risk.        FREQUENCY/DURATION: Twice daily 5 times a week     REHAB POTENTIAL:  Pt has good rehab potential.  The following limitations may influence improvement/ length of tx medical status.     RECOMMENDATIONS:   1.   DIET: Mechanical soft diet-ground meats with sauce/gravy, single sips of thin liquids     2.  POSITION: 90° upright     3.  COMPENSATORY STRATEGIES: [place] mash solids, small bites/drinks, oral meds whole in applesauce     YES: Pt/responsible party agrees with plan of care and has been informed of all alternatives, risks and benefits.     EDUCATION  The patient has been educated in the following areas:   Dysphagia (Swallowing Impairment).

## 2021-11-12 NOTE — PLAN OF CARE
Goal Outcome Evaluation:  Plan of Care Reviewed With: patient, daughter        Progress: improving  Outcome Summary: Patient's mental status is improving, alert and oriented x3. Orders to transfuse two bags of PRBC's. First one is currently infusing at this time. Patient is able to swallow safely, diet order now in place post speech therapy evaluation.

## 2021-11-12 NOTE — PROGRESS NOTES
[facility]   Hospitalist Progress Note  Date: 2021  Patient Name: Mirna Meredith  : 1945  MRN: [id number]  Date of admission: 2021      Subjective   Subjective     Chief Complaint: Confusion and weakness    Summary:   Mirna Meredith is a 76 y.o. female presents to the hospital due to confusion and weakness.  This seems to be worse over the last 1 to 2 days.  Family brought her in stating she is not herself.  Patient was recently admitted less than 1 month ago for similar symptoms which were found to be secondary to urinary tract infection.  She was treated and seemed to be having improvement but over the last 1 to 2 days had a significant decline in her ability to ambulate and has been more confused and hypersomnolent.  In the emergency department she was not febrile but had a temperature of 100.1.  She was, however tachycardic.  She received IV fluids and Rocephin in the emergency department due to urinary tract infection discovered on urinalysis in the ER.  Due to her inability to ambulate due to weakness and her metabolic encephalopathy we are asked to admit for further care.    Interval Followup:  Vitals are stable.  On room air.  Patient very hard of hearing.  Poor historian  Complaining of low back pain.  Appears to be acute on chronic.  No urinary complaints.  No diarrhea or abdominal pain    Review of Systems   All systems were reviewed and negative except for: Summary interval follow-up    Objective   Objective     Vitals:   Temp:  [98.3 °F (36.8 °C)-100.1 °F (37.8 °C)] 98.5 °F (36.9 °C)  Heart Rate:  [] 95  Resp:  [16-24] 16  BP: (106-154)/() 142/55  Flow (L/min):  [2] 2  Physical Exam    Constitutional: Awake and alert, no acute distress, answers simple questions but keeps her eyes closed              Eyes: Patient will not open her eyes but she is verbally responsive to verbal questions              HENT: NCAT, mucous membranes dry              Neck: Supple, no  thyromegaly, no lymphadenopathy, trachea midline              Respiratory: Diminished, clear to auscultation bilaterally, nonlabored respirations               Cardiovascular: RRR, no murmurs, rubs, or gallops, palpable pedal pulses bilaterally              Gastrointestinal: Positive bowel sounds, soft, nontender, nondistended              Musculoskeletal: No bilateral ankle edema, no clubbing or cyanosis to extremities              Psychiatric: Appropriate affect, cooperative              Neurologic: Awake and alert.  Orientation could not be checked due to hearing issues, diminished strength equally in all extremities, Cranial Nerves not able to be evaluated              Skin: No rashes        Result Review    Result Review:  I have personally reviewed the results from the time of this admission to 11/12/2021 17:09 EST and agree with these findings:  [x]  Laboratory  [x]  Microbiology  [x]  Radiology  [x]  EKG/Telemetry   []  Cardiology/Vascular   []  Pathology  [x]  Old records  [x]  Other: Medications     Assessment/Plan   Assessment / Plan     Assessment:  · Complicated Urinary tract infection  · Acute kidney injury with baseline creatinine around 1.  Likely prerenal.  Improving.  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  · History of C. difficile.  · Low back pain  · Anemia.  · Thrombocytopenia.  · Hard of hearing           Plan:  Continue gentle IV hydration  IV Rocephin.  Heme-onc consulted as per family request.  Hold oral chemotherapy due to active infection.  Sliding scale insulin  Appreciate speech therapy input cleared by speech.  Resume some of the home medications including risperidone  CT of the head and chest x-ray negative.  PT OT.    Discussed plan with RN and .  Needs rehab placement    DVT  prophylaxis:  Mechanical DVT prophylaxis orders are present.    CODE STATUS:   Code Status (Patient has no pulse and is not breathing): No CPR (Do Not Attempt to Resuscitate)  Medical Interventions (Patient has pulse or is breathing): Full Support      Part of this note may be an electronic transcription/translation of spoken language to printed text using the Dragon Dictation System.     Electronically signed by Cesar Sweet MD, 11/12/21, 5:09 PM EST.

## 2021-11-12 NOTE — PLAN OF CARE
Goal Outcome Evaluation:  Plan of Care Reviewed With: patient           Outcome Summary: Pt presents with deficits in strength, balance, and mobility. The patient would benefit from skilled physical therapy to address the mentioned impairments.

## 2021-11-13 ENCOUNTER — PREP FOR SURGERY (OUTPATIENT)
Dept: OTHER | Facility: HOSPITAL | Age: 76
End: 2021-11-13

## 2021-11-13 ENCOUNTER — ANESTHESIA EVENT (OUTPATIENT)
Dept: MEDSURG UNIT | Facility: HOSPITAL | Age: 76
End: 2021-11-13

## 2021-11-13 ENCOUNTER — ANESTHESIA (OUTPATIENT)
Dept: MEDSURG UNIT | Facility: HOSPITAL | Age: 76
End: 2021-11-13

## 2021-11-13 DIAGNOSIS — N13.1 HYDRONEPHROSIS DUE TO OBSTRUCTION OF URETER: Primary | ICD-10-CM

## 2021-11-13 LAB
ALBUMIN SERPL-MCNC: 2.9 G/DL (ref 3.5–5.2)
ALBUMIN SERPL-MCNC: 3.1 G/DL (ref 3.5–5.2)
ALBUMIN/GLOB SERPL: 0.8 G/DL
ALP SERPL-CCNC: 142 U/L (ref 39–117)
ALT SERPL W P-5'-P-CCNC: 17 U/L (ref 1–33)
ANION GAP SERPL CALCULATED.3IONS-SCNC: 10.6 MMOL/L (ref 5–15)
ANION GAP SERPL CALCULATED.3IONS-SCNC: 8.7 MMOL/L (ref 5–15)
AST SERPL-CCNC: 43 U/L (ref 1–32)
BACTERIA UR QL AUTO: ABNORMAL /HPF
BASOPHILS # BLD AUTO: 0.01 10*3/MM3 (ref 0–0.2)
BASOPHILS # BLD AUTO: 0.02 10*3/MM3 (ref 0–0.2)
BASOPHILS NFR BLD AUTO: 0.4 % (ref 0–1.5)
BASOPHILS NFR BLD AUTO: 0.8 % (ref 0–1.5)
BILIRUB SERPL-MCNC: 0.5 MG/DL (ref 0–1.2)
BILIRUB UR QL STRIP: NEGATIVE
BUN SERPL-MCNC: 10 MG/DL (ref 8–23)
BUN SERPL-MCNC: 11 MG/DL (ref 8–23)
BUN/CREAT SERPL: 7.6 (ref 7–25)
BUN/CREAT SERPL: 8 (ref 7–25)
CALCIUM SPEC-SCNC: 8.9 MG/DL (ref 8.6–10.5)
CALCIUM SPEC-SCNC: 9.1 MG/DL (ref 8.6–10.5)
CHLORIDE SERPL-SCNC: 97 MMOL/L (ref 98–107)
CHLORIDE SERPL-SCNC: 97 MMOL/L (ref 98–107)
CLARITY UR: CLEAR
CO2 SERPL-SCNC: 27.3 MMOL/L (ref 22–29)
CO2 SERPL-SCNC: 28.4 MMOL/L (ref 22–29)
COLOR UR: YELLOW
CREAT SERPL-MCNC: 1.32 MG/DL (ref 0.57–1)
CREAT SERPL-MCNC: 1.37 MG/DL (ref 0.57–1)
DEPRECATED RDW RBC AUTO: 55 FL (ref 37–54)
DEPRECATED RDW RBC AUTO: 55.9 FL (ref 37–54)
EOSINOPHIL # BLD AUTO: 0 10*3/MM3 (ref 0–0.4)
EOSINOPHIL # BLD AUTO: 0.01 10*3/MM3 (ref 0–0.4)
EOSINOPHIL NFR BLD AUTO: 0 % (ref 0.3–6.2)
EOSINOPHIL NFR BLD AUTO: 0.4 % (ref 0.3–6.2)
ERYTHROCYTE [DISTWIDTH] IN BLOOD BY AUTOMATED COUNT: 17.6 % (ref 12.3–15.4)
ERYTHROCYTE [DISTWIDTH] IN BLOOD BY AUTOMATED COUNT: 17.8 % (ref 12.3–15.4)
GFR SERPL CREATININE-BSD FRML MDRD: 37 ML/MIN/1.73
GFR SERPL CREATININE-BSD FRML MDRD: 39 ML/MIN/1.73
GLOBULIN UR ELPH-MCNC: 3.7 GM/DL
GLUCOSE BLDC GLUCOMTR-MCNC: 147 MG/DL (ref 70–99)
GLUCOSE BLDC GLUCOMTR-MCNC: 148 MG/DL (ref 70–99)
GLUCOSE BLDC GLUCOMTR-MCNC: 201 MG/DL (ref 70–99)
GLUCOSE SERPL-MCNC: 157 MG/DL (ref 65–99)
GLUCOSE SERPL-MCNC: 188 MG/DL (ref 65–99)
GLUCOSE UR STRIP-MCNC: NEGATIVE MG/DL
HCT VFR BLD AUTO: 27.9 % (ref 34–46.6)
HCT VFR BLD AUTO: 29.1 % (ref 34–46.6)
HGB BLD-MCNC: 8.9 G/DL (ref 12–15.9)
HGB BLD-MCNC: 9.4 G/DL (ref 12–15.9)
HGB UR QL STRIP.AUTO: ABNORMAL
HYALINE CASTS UR QL AUTO: ABNORMAL /LPF
IMM GRANULOCYTES # BLD AUTO: 0.03 10*3/MM3 (ref 0–0.05)
IMM GRANULOCYTES # BLD AUTO: 0.05 10*3/MM3 (ref 0–0.05)
IMM GRANULOCYTES NFR BLD AUTO: 1.2 % (ref 0–0.5)
IMM GRANULOCYTES NFR BLD AUTO: 1.8 % (ref 0–0.5)
KETONES UR QL STRIP: NEGATIVE
LEUKOCYTE ESTERASE UR QL STRIP.AUTO: ABNORMAL
LYMPHOCYTES # BLD AUTO: 0.57 10*3/MM3 (ref 0.7–3.1)
LYMPHOCYTES # BLD AUTO: 0.63 10*3/MM3 (ref 0.7–3.1)
LYMPHOCYTES NFR BLD AUTO: 21 % (ref 19.6–45.3)
LYMPHOCYTES NFR BLD AUTO: 25 % (ref 19.6–45.3)
MCH RBC QN AUTO: 28.5 PG (ref 26.6–33)
MCH RBC QN AUTO: 28.5 PG (ref 26.6–33)
MCHC RBC AUTO-ENTMCNC: 31.9 G/DL (ref 31.5–35.7)
MCHC RBC AUTO-ENTMCNC: 32.3 G/DL (ref 31.5–35.7)
MCV RBC AUTO: 88.2 FL (ref 79–97)
MCV RBC AUTO: 89.4 FL (ref 79–97)
MONOCYTES # BLD AUTO: 0.15 10*3/MM3 (ref 0.1–0.9)
MONOCYTES # BLD AUTO: 0.23 10*3/MM3 (ref 0.1–0.9)
MONOCYTES NFR BLD AUTO: 5.5 % (ref 5–12)
MONOCYTES NFR BLD AUTO: 9.1 % (ref 5–12)
MUCOUS THREADS URNS QL MICRO: ABNORMAL /HPF
NEUTROPHILS NFR BLD AUTO: 1.6 10*3/MM3 (ref 1.7–7)
NEUTROPHILS NFR BLD AUTO: 1.94 10*3/MM3 (ref 1.7–7)
NEUTROPHILS NFR BLD AUTO: 63.5 % (ref 42.7–76)
NEUTROPHILS NFR BLD AUTO: 71.3 % (ref 42.7–76)
NITRITE UR QL STRIP: NEGATIVE
NRBC BLD AUTO-RTO: 0 /100 WBC (ref 0–0.2)
NRBC BLD AUTO-RTO: 0 /100 WBC (ref 0–0.2)
PH UR STRIP.AUTO: 5.5 [PH] (ref 5–8)
PHOSPHATE SERPL-MCNC: 2.4 MG/DL (ref 2.5–4.5)
PLATELET # BLD AUTO: 79 10*3/MM3 (ref 140–450)
PLATELET # BLD AUTO: 81 10*3/MM3 (ref 140–450)
PMV BLD AUTO: 10.2 FL (ref 6–12)
PMV BLD AUTO: 10.3 FL (ref 6–12)
POTASSIUM SERPL-SCNC: 4 MMOL/L (ref 3.5–5.2)
POTASSIUM SERPL-SCNC: 4 MMOL/L (ref 3.5–5.2)
PROT SERPL-MCNC: 6.8 G/DL (ref 6–8.5)
PROT UR QL STRIP: ABNORMAL
RBC # BLD AUTO: 3.12 10*6/MM3 (ref 3.77–5.28)
RBC # BLD AUTO: 3.3 10*6/MM3 (ref 3.77–5.28)
RBC # UR: ABNORMAL /HPF
REF LAB TEST METHOD: ABNORMAL
SODIUM SERPL-SCNC: 133 MMOL/L (ref 136–145)
SODIUM SERPL-SCNC: 136 MMOL/L (ref 136–145)
SODIUM UR-SCNC: 95 MMOL/L
SP GR UR STRIP: 1.01 (ref 1–1.03)
SQUAMOUS #/AREA URNS HPF: ABNORMAL /HPF
UROBILINOGEN UR QL STRIP: ABNORMAL
WBC # BLD AUTO: 2.52 10*3/MM3 (ref 3.4–10.8)
WBC # BLD AUTO: 2.72 10*3/MM3 (ref 3.4–10.8)
WBC UR QL AUTO: ABNORMAL /HPF

## 2021-11-13 PROCEDURE — 81001 URINALYSIS AUTO W/SCOPE: CPT | Performed by: INTERNAL MEDICINE

## 2021-11-13 PROCEDURE — 97165 OT EVAL LOW COMPLEX 30 MIN: CPT

## 2021-11-13 PROCEDURE — 36430 TRANSFUSION BLD/BLD COMPNT: CPT

## 2021-11-13 PROCEDURE — 97530 THERAPEUTIC ACTIVITIES: CPT

## 2021-11-13 PROCEDURE — 63710000001 INSULIN LISPRO (HUMAN) PER 5 UNITS: Performed by: INTERNAL MEDICINE

## 2021-11-13 PROCEDURE — 86923 COMPATIBILITY TEST ELECTRIC: CPT

## 2021-11-13 PROCEDURE — 85025 COMPLETE CBC W/AUTO DIFF WBC: CPT | Performed by: UROLOGY

## 2021-11-13 PROCEDURE — P9016 RBC LEUKOCYTES REDUCED: HCPCS

## 2021-11-13 PROCEDURE — 85025 COMPLETE CBC W/AUTO DIFF WBC: CPT | Performed by: INTERNAL MEDICINE

## 2021-11-13 PROCEDURE — 84300 ASSAY OF URINE SODIUM: CPT | Performed by: INTERNAL MEDICINE

## 2021-11-13 PROCEDURE — 99233 SBSQ HOSP IP/OBS HIGH 50: CPT | Performed by: INTERNAL MEDICINE

## 2021-11-13 PROCEDURE — 82962 GLUCOSE BLOOD TEST: CPT

## 2021-11-13 PROCEDURE — 80053 COMPREHEN METABOLIC PANEL: CPT | Performed by: UROLOGY

## 2021-11-13 PROCEDURE — 80069 RENAL FUNCTION PANEL: CPT | Performed by: INTERNAL MEDICINE

## 2021-11-13 PROCEDURE — 25010000002 CEFTRIAXONE PER 250 MG: Performed by: FAMILY MEDICINE

## 2021-11-13 PROCEDURE — 99221 1ST HOSP IP/OBS SF/LOW 40: CPT | Performed by: UROLOGY

## 2021-11-13 PROCEDURE — 87086 URINE CULTURE/COLONY COUNT: CPT | Performed by: INTERNAL MEDICINE

## 2021-11-13 PROCEDURE — 86900 BLOOD TYPING SEROLOGIC ABO: CPT

## 2021-11-13 RX ORDER — SODIUM CHLORIDE 0.9 % (FLUSH) 0.9 %
10 SYRINGE (ML) INJECTION AS NEEDED
Status: CANCELLED | OUTPATIENT
Start: 2021-11-13

## 2021-11-13 RX ORDER — CEFTRIAXONE SODIUM 1 G/50ML
1 INJECTION, SOLUTION INTRAVENOUS
Status: COMPLETED | OUTPATIENT
Start: 2021-11-14 | End: 2021-11-14

## 2021-11-13 RX ORDER — CEFTRIAXONE SODIUM 1 G/50ML
1 INJECTION, SOLUTION INTRAVENOUS ONCE
Status: DISCONTINUED | OUTPATIENT
Start: 2021-11-13 | End: 2021-11-13

## 2021-11-13 RX ORDER — SODIUM CHLORIDE 0.9 % (FLUSH) 0.9 %
10 SYRINGE (ML) INJECTION EVERY 12 HOURS SCHEDULED
Status: DISCONTINUED | OUTPATIENT
Start: 2021-11-13 | End: 2021-11-14 | Stop reason: HOSPADM

## 2021-11-13 RX ORDER — ONDANSETRON 2 MG/ML
4 INJECTION INTRAMUSCULAR; INTRAVENOUS EVERY 6 HOURS PRN
Status: CANCELLED | OUTPATIENT
Start: 2021-11-13

## 2021-11-13 RX ORDER — SODIUM CHLORIDE, SODIUM LACTATE, POTASSIUM CHLORIDE, CALCIUM CHLORIDE 600; 310; 30; 20 MG/100ML; MG/100ML; MG/100ML; MG/100ML
100 INJECTION, SOLUTION INTRAVENOUS CONTINUOUS
Status: CANCELLED | OUTPATIENT
Start: 2021-11-13

## 2021-11-13 RX ORDER — SACCHAROMYCES BOULARDII 250 MG
250 CAPSULE ORAL 2 TIMES DAILY
Status: DISCONTINUED | OUTPATIENT
Start: 2021-11-13 | End: 2021-11-19 | Stop reason: HOSPADM

## 2021-11-13 RX ORDER — SODIUM CHLORIDE, SODIUM LACTATE, POTASSIUM CHLORIDE, CALCIUM CHLORIDE 600; 310; 30; 20 MG/100ML; MG/100ML; MG/100ML; MG/100ML
100 INJECTION, SOLUTION INTRAVENOUS CONTINUOUS
Status: DISCONTINUED | OUTPATIENT
Start: 2021-11-13 | End: 2021-11-15

## 2021-11-13 RX ORDER — MEMANTINE HYDROCHLORIDE 10 MG/1
10 TABLET ORAL 2 TIMES DAILY
Status: DISCONTINUED | OUTPATIENT
Start: 2021-11-13 | End: 2021-11-19 | Stop reason: HOSPADM

## 2021-11-13 RX ORDER — SODIUM CHLORIDE 0.9 % (FLUSH) 0.9 %
10 SYRINGE (ML) INJECTION EVERY 12 HOURS SCHEDULED
Status: CANCELLED | OUTPATIENT
Start: 2021-11-13

## 2021-11-13 RX ORDER — ONDANSETRON 2 MG/ML
4 INJECTION INTRAMUSCULAR; INTRAVENOUS EVERY 6 HOURS PRN
Status: DISCONTINUED | OUTPATIENT
Start: 2021-11-13 | End: 2021-11-19 | Stop reason: HOSPADM

## 2021-11-13 RX ORDER — CEFTRIAXONE SODIUM 1 G/50ML
1 INJECTION, SOLUTION INTRAVENOUS ONCE
Status: CANCELLED | OUTPATIENT
Start: 2021-11-13 | End: 2021-11-13

## 2021-11-13 RX ORDER — SODIUM CHLORIDE 0.9 % (FLUSH) 0.9 %
10 SYRINGE (ML) INJECTION AS NEEDED
Status: DISCONTINUED | OUTPATIENT
Start: 2021-11-13 | End: 2021-11-14 | Stop reason: HOSPADM

## 2021-11-13 RX ORDER — ANASTROZOLE 1 MG/1
1 TABLET ORAL DAILY
Status: DISCONTINUED | OUTPATIENT
Start: 2021-11-13 | End: 2021-11-13

## 2021-11-13 RX ADMIN — SENNOSIDES AND DOCUSATE SODIUM 2 TABLET: 50; 8.6 TABLET ORAL at 09:25

## 2021-11-13 RX ADMIN — FOLIC ACID 1 MG: 1 TABLET ORAL at 09:25

## 2021-11-13 RX ADMIN — MUPIROCIN 1 APPLICATION: 20 OINTMENT TOPICAL at 21:32

## 2021-11-13 RX ADMIN — ROSUVASTATIN CALCIUM 10 MG: 5 TABLET, FILM COATED ORAL at 21:32

## 2021-11-13 RX ADMIN — SODIUM CHLORIDE, PRESERVATIVE FREE 10 ML: 5 INJECTION INTRAVENOUS at 09:25

## 2021-11-13 RX ADMIN — SERTRALINE 25 MG: 25 TABLET, FILM COATED ORAL at 09:25

## 2021-11-13 RX ADMIN — MEMANTINE 10 MG: 10 TABLET ORAL at 21:32

## 2021-11-13 RX ADMIN — MEMANTINE 10 MG: 10 TABLET ORAL at 11:19

## 2021-11-13 RX ADMIN — RDII 250 MG CAPSULE 250 MG: at 21:32

## 2021-11-13 RX ADMIN — SODIUM CHLORIDE, POTASSIUM CHLORIDE, SODIUM LACTATE AND CALCIUM CHLORIDE 50 ML/HR: 600; 310; 30; 20 INJECTION, SOLUTION INTRAVENOUS at 18:57

## 2021-11-13 RX ADMIN — INSULIN LISPRO 3 UNITS: 100 INJECTION, SOLUTION INTRAVENOUS; SUBCUTANEOUS at 13:11

## 2021-11-13 RX ADMIN — FERROUS SULFATE TAB 325 MG (65 MG ELEMENTAL FE) 325 MG: 325 (65 FE) TAB at 09:24

## 2021-11-13 RX ADMIN — RISPERIDONE 3 MG: 3 TABLET, FILM COATED ORAL at 21:32

## 2021-11-13 NOTE — PLAN OF CARE
Goal Outcome Evaluation:  Plan of Care Reviewed With: patient        Progress: no change  Outcome Summary: Patient presents with endurance and balance limitations that impede his/her ability to perform ADLS. The skills of a therapist are necessary to maximize independence with ADLs.

## 2021-11-13 NOTE — PROGRESS NOTES
[facility]     Progress Note    Patient Name: Mirna Meredith  : 1945  MRN: [id number]  Primary Care Physician:  Pan Pacheco MD  Date of admission: 2021    Subjective   Subjective     Chief Complaint: Patient known to me for many years with metastatic breast cancer she also has history of ulcerative colitis has had diarrhea apparently she was recently admitted to the hospital which I was not aware of and was found to have C. difficile positive and has been treated for that patient also had been taking CDK 4 inhibitors and that could also be contributing to her diarrhea we will hold off those medications anastrozole has already been stopped and she has been started on different medication she has failed on previous chemotherapy she is more alert and oriented today and her backache has also improved she does have an abnormal CT scan showing hydronephrosis  And a urology consultation has been made we will continue rest of the other management  HPI:  Patient Reports patient admitted with probable UTI C. difficile colitis confusion back and probably urinary tract obstruction with hydronephrosis    Review of Systems   All systems were reviewed and negative except for: Reviewed    Objective   Objective     Vitals:   Temp:  [98.4 °F (36.9 °C)-100.4 °F (38 °C)] 99.6 °F (37.6 °C)  Heart Rate:  [92-98] 96  Resp:  [16-18] 16  BP: (110-149)/(54-82) 134/71    Physical Exam    Constitutional: Awake, alert   Eyes: PERRLA, sclerae anicteric, no conjunctival injection   HENT: NCAT, mucous membranes moist   Neck: Supple, no thyromegaly, no lymphadenopathy, trachea midline   Respiratory: Clear to auscultation bilaterally, nonlabored respirations    Cardiovascular: RRR, no murmurs, rubs, or gallops, palpable pedal pulses bilaterally   Gastrointestinal: Positive bowel sounds, soft, nontender, nondistended   Musculoskeletal: No bilateral ankle edema, no clubbing or cyanosis to extremities   Psychiatric:  Appropriate affect, cooperative   Neurologic: Oriented x 3, strength symmetric in all extremities, Cranial Nerves grossly intact to confrontation, speech clear   Skin: No rashes     Result Review    Result Review:  I have personally reviewed the results from the time of this admission to 11/13/2021 10:24 EST and agree with these findings:  [x]  Laboratory  []  Microbiology  []  Radiology  []  EKG/Telemetry   []  Cardiology/Vascular   []  Pathology  []  Old records  []  Other: Reviewed  Most notable findings include: No induced anemia blood transfusions given    Assessment/Plan   Assessment / Plan     Brief Patient Summary:  Mirna Meredith is a 76 y.o. female who has been transfused hemoglobin has gone up to 8.9 she is feeling much better today    Active Hospital Problems:  Active Hospital Problems    Diagnosis    • Metabolic encephalopathy        Plan:   New as per primary care physician    DVT prophylaxis:  Mechanical DVT prophylaxis orders are present.    CODE STATUS:   Code Status (Patient has no pulse and is not breathing): No CPR (Do Not Attempt to Resuscitate)  Medical Interventions (Patient has pulse or is breathing): Full Support    Disposition:  I expect patient to be discharged patient is stable urology consultation in progress.    Electronically signed by Jose G Grissom MD, 11/13/21, 10:24 AM EST.

## 2021-11-13 NOTE — PROGRESS NOTES
[facility]   Hospitalist Progress Note  Date: 2021  Patient Name: Mirna Meredith  : 1945  MRN: [id number]  Date of admission: 2021      Subjective   Subjective     Chief Complaint: Confusion and weakness    Summary:   Mirna Meredith is a 76 y.o. female presents to the hospital due to confusion and weakness.  This seems to be worse over the last 1 to 2 days.  Family brought her in stating she is not herself.  Patient was recently admitted less than 1 month ago for similar symptoms which were found to be secondary to urinary tract infection.  She was treated and seemed to be having improvement but over the last 1 to 2 days had a significant decline in her ability to ambulate and has been more confused and hypersomnolent.  In the emergency department she was not febrile but had a temperature of 100.1.  She was, however tachycardic.  She received IV fluids and Rocephin in the emergency department due to urinary tract infection discovered on urinalysis in the ER.  Due to her inability to ambulate due to weakness and her metabolic encephalopathy we are asked to admit for further care.    Interval Followup:  Vitals are stable.  On room air.  Patient very hard of hearing.   Patient is awake alert oriented x3.  No urinary complaints.  Started having watery diarrhea today more than 3 episodes per nursing staff  Back pain is better today.    Review of Systems   All systems were reviewed and negative except for: Summary interval follow-up    Objective   Objective     Vitals:   Temp:  [98.1 °F (36.7 °C)-100.4 °F (38 °C)] 98.1 °F (36.7 °C)  Heart Rate:  [] 99  Resp:  [16-20] 20  BP: ()/(53-83) 96/83  Physical Exam    Constitutional: Awake and alert, no acute distress, answers simple questions but keeps her eyes closed              Eyes: Patient will not open her eyes but she is verbally responsive to verbal questions              HENT: NCAT, mucous membranes dry              Neck: Supple,  no thyromegaly, no lymphadenopathy, trachea midline              Respiratory: Diminished, clear to auscultation bilaterally, nonlabored respirations               Cardiovascular: RRR, no murmurs, rubs, or gallops, palpable pedal pulses bilaterally              Gastrointestinal: Positive bowel sounds, soft, nontender, nondistended              Musculoskeletal: No bilateral ankle edema, no clubbing or cyanosis to extremities              Psychiatric: Appropriate affect, cooperative              Neurologic: Awake and alert.  Oriented x3, diminished strength equally in all extremities, Cranial Nerves not able to be evaluated              Skin: No rashes        Result Review    Result Review:  I have personally reviewed the results from the time of this admission to 11/13/2021 15:19 EST and agree with these findings:  [x]  Laboratory  [x]  Microbiology  [x]  Radiology  [x]  EKG/Telemetry   []  Cardiology/Vascular   []  Pathology  [x]  Old records  [x]  Other: Medications     Assessment/Plan   Assessment / Plan     Assessment:  · Question urinary tract infection.  Urine culture with mixed penny  · Acute kidney injury with baseline creatinine around 1.  Likely prerenal.  Improving.  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  Improving   · Moderate to severe right hydronephrosis.  Question distal ureteral stricture.  · Ascites.  Likely malignant.  · Question new pleural-based metastatic disease  · Weakness with functional paraplegia - secondary to #1  · COPD  · Diabetes.  Hemoglobin A1c of 8.7%  · Hypertension  · History of breast cancer with mets to colon, liver, lung and bone.  · History of C. difficile.   · Diarrhea  · Low back pain.  DJD  · Anemia.  Chemotherapy-induced  · Thrombocytopenia.  · Hard of hearing.  · Elevated CEA           Plan:  Continue gentle IV hydration  DC IV Rocephin.  Repeat UA and urine culture via straight cath  Heme-onc consulted as per family request.  Resumed oral  chemotherapy as urine culture is negative.  Discussed with urology.  Appreciate input for cystoscopy and stent in morning  Sliding scale insulin  Appreciate speech therapy input cleared by speech.  Resumed some of the home medications including risperidone  CT of the head and chest x-ray negative.  CT abdomen pelvis noted.  Progression of the disease even with chemotherapy  X-ray L-spine noted  PT OT.  Trend creatinine.  Discussed plan with RN and .  Rehab placement will be possible if her chemo pills are stopped.  Will discuss with heme-onc    DVT prophylaxis:  Mechanical DVT prophylaxis orders are present.    CODE STATUS:   Code Status (Patient has no pulse and is not breathing): No CPR (Do Not Attempt to Resuscitate)  Medical Interventions (Patient has pulse or is breathing): Full Support      Part of this note may be an electronic transcription/translation of spoken language to printed text using the Dragon Dictation System.       Electronically signed by Cesar Sweet MD, 11/13/21, 3:24 PM EST.

## 2021-11-13 NOTE — PLAN OF CARE
Problem: Adult Inpatient Plan of Care  Goal: Plan of Care Review  Outcome: Ongoing, Progressing  Flowsheets  Taken 11/13/2021 1542 by Irene Silva RNA  Progress: no change  Outcome Summary: Patient A/O x4. Extremely hard of hearing. No complaints of pain. Has had more than 3 loose stools. CDiff rule out ordered. IV antibiotics discontinued. Straight cath complete for urine sample. Continue plan of care.  Taken 11/13/2021 1012 by Nga Sharma OT  Plan of Care Reviewed With: patient  Goal: Patient-Specific Goal (Individualized)  Outcome: Ongoing, Progressing  Goal: Absence of Hospital-Acquired Illness or Injury  Outcome: Ongoing, Progressing  Intervention: Identify and Manage Fall Risk  Recent Flowsheet Documentation  Taken 11/13/2021 1500 by Irene Silva RNA  Safety Promotion/Fall Prevention: safety round/check completed  Taken 11/13/2021 1300 by Irene Silva RNA  Safety Promotion/Fall Prevention: safety round/check completed  Taken 11/13/2021 0900 by Irene Silva RNA  Safety Promotion/Fall Prevention: safety round/check completed  Taken 11/13/2021 0720 by Irene Silva RNA  Safety Promotion/Fall Prevention: safety round/check completed  Goal: Optimal Comfort and Wellbeing  Outcome: Ongoing, Progressing  Intervention: Provide Person-Centered Care  Recent Flowsheet Documentation  Taken 11/13/2021 0815 by Irene Silva RNA  Trust Relationship/Rapport:   care explained   choices provided   emotional support provided   questions encouraged   thoughts/feelings acknowledged  Goal: Readiness for Transition of Care  Outcome: Ongoing, Progressing     Problem: Fall Injury Risk  Goal: Absence of Fall and Fall-Related Injury  Outcome: Ongoing, Progressing  Intervention: Promote Injury-Free Environment  Recent Flowsheet Documentation  Taken 11/13/2021 1500 by Irene Silva RNA  Safety Promotion/Fall Prevention: safety round/check completed  Taken 11/13/2021 1300 by Irene Silva RNA  Safety  Promotion/Fall Prevention: safety round/check completed  Taken 11/13/2021 0900 by Irene Silva RNA  Safety Promotion/Fall Prevention: safety round/check completed  Taken 11/13/2021 0720 by Irene Silva RNA  Safety Promotion/Fall Prevention: safety round/check completed     Problem: Skin Injury Risk Increased  Goal: Skin Health and Integrity  Outcome: Ongoing, Progressing   Goal Outcome Evaluation:           Progress: no change  Outcome Summary: Patient A/O x4. Extremely hard of hearing. No complaints of pain. Has had more than 3 loose stools. CDiff rule out ordered. IV antibiotics discontinued. Straight cath complete for urine sample. Continue plan of care.

## 2021-11-13 NOTE — PLAN OF CARE
Goal Outcome Evaluation:    Patient vital signs within expected ranges. 2 units of PRBCs ordered, first finished, pt taken down to CT, 2nd unit started around midnight. No complaints or concerns at this time. No duane signs of bleeding noted at this time.

## 2021-11-13 NOTE — CONSULTS
[facility]   Urology Consult Note    Patient Name: Mirna Meredith  : 1945  MRN: [id number]  Primary Care Physician:  Pan Pacheco MD  Date of admission: 2021    Subjective   Subjective     Chief Complaint: Right hydronephrosis    Metastatic breast cancer    HPI:    Mirna Meredith is a 76 y.o. female was admitted with mental confusion in the hospital and a possible urinary tract infection.  On the CT scan she is found to have right hydronephrosis.  Looking at her previous scan in July she started having hydronephrosis at that point and now it is worst.  She has been having slight pain in the right flank but no acute pain    Review of Systems    Metastatic breast disease with pleural effusion and involvement of her small intestine according to the history and now right hydronephrosis    Personal History     Past Medical History:   Diagnosis Date   • Cancer (HCC)     Breast with mets to colon and bones   • COPD (chronic obstructive pulmonary disease) (HCC)    • Depression    • Diabetes mellitus (HCC)    • Hyperlipidemia    • Hypertension        Past Surgical History:   Procedure Laterality Date   • CHOLECYSTECTOMY     • HYSTERECTOMY     • JOINT REPLACEMENT Bilateral     Knees   • MASTECTOMY Bilateral        Family History: family history is not on file. Otherwise pertinent FHx was reviewed and not pertinent to current issue.    Social History:  reports that she quit smoking about 60 years ago. Her smoking use included cigarettes. She does not have any smokeless tobacco history on file. She reports previous alcohol use. She reports that she does not use drugs.    Home Medications:  Palbociclib, Peppermint Oil, SBI/Protein Isolate, anastrozole, atenolol, buPROPion XL, cephalexin, ferrous sulfate, folic acid, lisinopril, memantine, metFORMIN ER, montelukast, ondansetron ODT, risperiDONE, rosuvastatin, valACYclovir, and vitamin E      Allergies:  Allergies   Allergen Reactions   • Penicillins  Rash       Objective   Objective     Vitals:   Temp:  [98.1 °F (36.7 °C)-100.4 °F (38 °C)] 98.1 °F (36.7 °C)  Heart Rate:  [] 99  Resp:  [16-20] 20  BP: ()/(53-83) 96/83  Physical Exam     Physical Exam  Constitutional:       Appearance: She is obese. She is ill-appearing.   HENT:      Head: Normocephalic and atraumatic.      Ears:      Comments: Patient is deaf  Eyes:      Pupils: Pupils are equal, round, and reactive to light.   Cardiovascular:      Rate and Rhythm: Normal rate and regular rhythm.      Pulses: Normal pulses.      Heart sounds: Normal heart sounds. No murmur heard.      Pulmonary:      Effort: Respiratory distress present.      Breath sounds: No rhonchi or rales.   Abdominal:      Palpations: Abdomen is soft.      Tenderness: There is abdominal tenderness. There is right CVA tenderness. There is no left CVA tenderness.      Comments: Slight tenderness right upper quadrant compared to left side   Musculoskeletal:      Cervical back: Normal range of motion and neck supple. No rigidity or tenderness.      Comments: Patient has difficulty with walking since she had urosepsis about 3 weeks ago   Lymphadenopathy:      Cervical: No cervical adenopathy.   Skin:     General: Skin is warm.      Coloration: Skin is not jaundiced.   Neurological:      General: No focal deficit present.      Mental Status: She is alert and oriented to person, place, and time.      Motor: Weakness present.      Gait: Gait abnormal.   Psychiatric:         Mood and Affect: Mood normal.         Behavior: Behavior normal.         Thought Content: Thought content normal.         Judgment: Judgment normal.         Result Review    Result Review:  I have personally reviewed the results from the time of this admission to 11/13/2021 13:54 EST and agree with these findings:  []  Laboratory  []  Microbiology  []  Radiology  []  EKG/Telemetry   []  Cardiology/Vascular   []  Pathology  []  Old records  []  Other:  Most notable  findings include: Right hydronephrosis secondary to metastatic breast cancer    Assessment/Plan   Assessment / Plan     Brief Patient Summary:  Mirna Meredith is a 76 y.o. female who who has metastatic breast disease    Active Hospital Problems:  Active Hospital Problems    Diagnosis    • Metabolic encephalopathy        Plan: I do cystoscopy right retrograde and stent insertion tomorrow.  Risk benefits and alternate treatment admit discussed especially infection bleeding and injury to urinary bladder right ureter and right kidney accepted by the patient and her daughter      DVT prophylaxis:  Mechanical DVT prophylaxis orders are present.    CODE STATUS:    Code Status (Patient has no pulse and is not breathing): No CPR (Do Not Attempt to Resuscitate)  Medical Interventions (Patient has pulse or is breathing): Full Support        Electronically signed by Allen Rodriguez MD, 11/13/21, 1:54 PM EST.

## 2021-11-13 NOTE — THERAPY EVALUATION
Patient Name: Mirna Meredith  : 1945    MRN: [id number]                              Today's Date: 2021       Admit Date: 2021    Visit Dx:     ICD-10-CM ICD-9-CM   1. Metabolic encephalopathy  G93.41 348.31   2. Urinary tract infection without hematuria, site unspecified  N39.0 599.0   3. Dysphagia, oropharyngeal  R13.12 787.22   4. Difficulty walking  R26.2 719.7   5. Decreased activities of daily living (ADL)  Z78.9 V49.89     Patient Active Problem List   Diagnosis   • Abdominal pain   • Sepsis (HCC)   • Metabolic encephalopathy     Past Medical History:   Diagnosis Date   • Cancer (HCC)     Breast with mets to colon and bones   • COPD (chronic obstructive pulmonary disease) (HCC)    • Depression    • Diabetes mellitus (HCC)    • Hyperlipidemia    • Hypertension      Past Surgical History:   Procedure Laterality Date   • CHOLECYSTECTOMY     • HYSTERECTOMY     • JOINT REPLACEMENT Bilateral     Knees   • MASTECTOMY Bilateral       General Information     Row Name 21 1019 21 1008       OT Time and Intention    Document Type therapy note (daily note)  -AC evaluation  -AC    Mode of Treatment individual therapy; occupational therapy  -AC individual therapy; occupational therapy  -    Row Name 21 1019 21 1008       General Information    Patient Profile Reviewed -- yes  -AC    Prior Level of Function -- --  unknown, patient [place]/confused? per PT report daughter assist with adls and transfers.  -AC    Existing Precautions/Restrictions fall  -AC fall  -AC    Barriers to Rehab -- cognitive status; hearing deficit  -    Row Name 21 1008          Occupational Profile    Reason for Services/Referral (Occupational Profile) Pt. is a 76 year old female admitted for the above diagnosis. Pt. referred to OT services to assess independence with ADLs and adl transfers/fx'l mobility. No previous OT services for current condition.  -AC     Row Name 21 1008          Living  Environment    Lives With child(katerin), adult  -     Row Name 11/13/21 1008          Home Main Entrance    Number of Stairs, Main Entrance none  -     Row Name 11/13/21 1019 11/13/21 1008       Cognition    Orientation Status (Cognition) oriented to; person  - oriented to; person  -    Row Name 11/13/21 1019 11/13/21 1008       Safety Issues, Functional Mobility    Impairments Affecting Function (Mobility) balance; cognition; endurance/activity tolerance  - balance; cognition; endurance/activity tolerance  -          User Key  (r) = Recorded By, (t) = Taken By, (c) = Cosigned By    Initials Name Provider Type     Nga Sharma OT Occupational Therapist                 Mobility/ADL's     Row Name 11/13/21 1019 11/13/21 1010       Bed Mobility    Bed Mobility supine-sit  - supine-sit  -    Supine-Sit Coleman (Bed Mobility) minimum assist (75% patient effort); verbal cues; nonverbal cues (demo/gesture); 1 person assist  - minimum assist (75% patient effort)  -    Assistive Device (Bed Mobility) bed rails  - bed rails  -    Row Name 11/13/21 1019 11/13/21 1010       Transfers    Transfers sit-stand transfer; bed-chair transfer  - sit-stand transfer; bed-chair transfer  -    Bed-Chair Coleman (Transfers) moderate assist (50% patient effort); minimum assist (75% patient effort)  - --    Assistive Device (Bed-Chair Transfers) --  bed rail  - --    Sit-Stand Coleman (Transfers) contact guard; 1 person assist; verbal cues; nonverbal cues (demo/gesture)  - contact guard; 1 person assist  -    Row Name 11/13/21 1010          Sit-Stand Transfer    Assistive Device (Sit-Stand Transfers) --  hand held assist  -     Row Name 11/13/21 1019 11/13/21 1010       Functional Mobility    Functional Mobility- Ind. Level 1 person; contact guard assist; nonverbal cues required (demo/gesture); verbal cues required  - 1 person; contact guard assist  -    Functional Mobility- Comment  pt. was CGA with hand held assist to side step x2 toward the head of hte bed with increased cues.  -AC pt. was CGA with hand held assist to side step x2 toward the head of the bed.  -    Row Name 11/13/21 1010          Activities of Daily Living    BADL Assessment/Intervention --  patient is setup for upper body bathing/dressing/grooming/ min/mod A for lower body bathing/dressing/ toileting  -           User Key  (r) = Recorded By, (t) = Taken By, (c) = Cosigned By    Initials Name Provider Type     Nga Sharma OT Occupational Therapist               Obj/Interventions     Row Name 11/13/21 1012          Sensory Assessment (Somatosensory)    Sensory Assessment (Somatosensory) sensation intact  -     Row Name 11/13/21 1012          Vision Assessment/Intervention    Visual Impairment/Limitations WFL  -     Row Name 11/13/21 1012          Range of Motion Comprehensive    General Range of Motion bilateral upper extremity ROM WNL  -[facility] Name 11/13/21 1012          Strength Comprehensive (MMT)    General Manual Muscle Testing (MMT) Assessment no strength deficits identified  -     Row Name 11/13/21 1020 11/13/21 1012       Motor Skills    Motor Skills -- coordination; functional endurance  -    Coordination -- WFL  -    Functional Endurance fair  -AC fair  -    Row Name 11/13/21 1020 11/13/21 1012       Balance    Balance Assessment standing dynamic balance  - standing dynamic balance  -AC    Dynamic Standing Balance mild impairment  -AC mild impairment  -    Balance Interventions standing; supported; dynamic; minimal challenge; occupation based/functional task  - --          User Key  (r) = Recorded By, (t) = Taken By, (c) = Cosigned By    Initials Name Provider Type    Nga Angel OT Occupational Therapist               Goals/Plan     Row Name 11/13/21 1015          Bed Mobility Goal 1 (OT)    Activity/Assistive Device (Bed Mobility Goal 1, OT) bed mobility activities, all  -AC      [place] Level/Cues Needed (Bed Mobility Goal 1, OT) modified independence  -AC     Time Frame (Bed Mobility Goal 1, OT) long term goal (LTG); 10 days  -     Row Name 11/13/21 1015          Transfer Goal 1 (OT)    Activity/Assistive Device (Transfer Goal 1, OT) transfers, all  -AC     [place] Level/Cues Needed (Transfer Goal 1, OT) modified independence  -AC     Time Frame (Transfer Goal 1, OT) long term goal (LTG); 10 days  -     Row Name 11/13/21 1015          Bathing Goal 1 (OT)    Activity/Device (Bathing Goal 1, OT) bathing skills, all  -AC     [place] Level/Cues Needed (Bathing Goal 1, OT) minimum assist (75% or more patient effort)  -AC     Time Frame (Bathing Goal 1, OT) long term goal (LTG); 10 days  -     Row Name 11/13/21 1015          Dressing Goal 1 (OT)    Activity/Device (Dressing Goal 1, OT) dressing skills, all  -AC     [place]/Cues Needed (Dressing Goal 1, OT) minimum assist (75% or more patient effort)  -AC     Time Frame (Dressing Goal 1, OT) long term goal (LTG); 10 days  -     Row Name 11/13/21 1015          Toileting Goal 1 (OT)    Activity/Device (Toileting Goal 1, OT) toileting skills, all  -AC     [place] Level/Cues Needed (Toileting Goal 1, OT) modified independence  -AC     Time Frame (Toileting Goal 1, OT) long term goal (LTG); 10 days  -     Row Name 11/13/21 1015          Grooming Goal 1 (OT)    Activity/Device (Grooming Goal 1, OT) grooming skills, all  -AC     [place] (Grooming Goal 1, OT) modified independence  -AC     Time Frame (Grooming Goal 1, OT) long term goal (LTG); 10 days  -     Row Name 11/13/21 1015          Therapy Assessment/Plan (OT)    Planned Therapy Interventions (OT) activity tolerance training; BADL retraining; functional balance retraining; occupation/activity based interventions; patient/caregiver education/training; ROM/therapeutic exercise; transfer/mobility retraining  -AC           User Key  (r) = Recorded By, (t) =  Taken By, (c) = Cosigned By    Initials Name Provider Type    AC Nga Sharma OT Occupational Therapist               Clinical Impression     Row Name 11/13/21 1012          Pain Assessment    Additional Documentation Pain Scale: FACES Pre/Post-Treatment (Group)  -AC     Row Name 11/13/21 1012          Pain Scale: FACES Pre/Post-Treatment    Pain: FACES Scale, Pretreatment 0-->no hurt  -AC     Posttreatment Pain Rating 0-->no hurt  -AC     Row Name 11/13/21 1012          Plan of Care Review    Plan of Care Reviewed With patient  -     Progress no change  -     Outcome Summary Patient presents with endurance and balance limitations that impede his/her ability to perform ADLS. The skills of a therapist are necessary to maximize independence with ADLs.  -AC     Row Name 11/13/21 1012          Therapy Assessment/Plan (OT)    Patient/Family Therapy Goal Statement (OT) Patient would like to maximize independence with adls.  -     Rehab Potential (OT) good, to achieve stated therapy goals  -     Criteria for Skilled Therapeutic Interventions Met (OT) yes; meets criteria; skilled treatment is necessary  -     Therapy Frequency (OT) 5 times/wk  -AC     Row Name 11/13/21 1012          Therapy Plan Review/Discharge Plan (OT)    Equipment Needs Upon Discharge (OT) commode chair; walker, rolling; shower chair  -     Anticipated Discharge Disposition (OT) inpatient rehabilitation facility  -           User Key  (r) = Recorded By, (t) = Taken By, (c) = Cosigned By    Initials Name Provider Type    Nga Angel OT Occupational Therapist               Outcome Measures     Row Name 11/13/21 1016          How much help from another is currently needed...    Putting on and taking off regular lower body clothing? 3  -AC     Bathing (including washing, rinsing, and drying) 3  -AC     Toileting (which includes using toilet bed pan or urinal) 3  -AC     Putting on and taking off regular upper body clothing 4  -AC      Taking care of personal grooming (such as brushing teeth) 4  -AC     Eating meals 4  -AC     AM-PAC 6 Clicks Score (OT) 21  -AC     Row Name 11/13/21 1016          Functional Assessment    Outcome Measure Options AM-PAC 6 Clicks Daily Activity (OT); Optimal Instrument  -AC     Row Name 11/13/21 1016          Optimal Instrument    Optimal Instrument Optimal - 3  -AC     Bending/Stooping 2  -AC     Standing 2  -AC     Reaching 1  -AC     From the list, choose the 3 activities you would most like to be able to do without any difficulty Bending/stooping; Reaching; Standing  -AC     Total Score Optimal - 3 5  -AC           User Key  (r) = Recorded By, (t) = Taken By, (c) = Cosigned By    Initials Name Provider Type    Nga Angel OT Occupational Therapist                Occupational Therapy Education                 Title: PT OT SLP Therapies (Done)     Topic: Occupational Therapy (Done)     Point: ADL training (Done)     Description:   Instruct learner(s) on proper safety adaptation and remediation techniques during self care or transfers.   Instruct in proper use of assistive devices.              Learning Progress Summary           Patient Acceptance, E, VU by  at 11/13/2021 1017                   Point: Home exercise program (Done)     Description:   Instruct learner(s) on appropriate technique for monitoring, assisting and/or progressing therapeutic exercises/activities.              Learning Progress Summary           Patient Acceptance, E, VU by  at 11/13/2021 1017                   Point: Precautions (Done)     Description:   Instruct learner(s) on prescribed precautions during self-care and functional transfers.              Learning Progress Summary           Patient Acceptance, E, VU by  at 11/13/2021 1017                   Point: Body mechanics (Done)     Description:   Instruct learner(s) on proper positioning and spine alignment during self-care, functional mobility activities and/or exercises.               Learning Progress Summary           Patient Acceptance, E, VU by  at 11/13/2021 1017                               User Key     Initials Effective Dates Name Provider Type Discipline     06/16/21 -  Nga Sharma OT Occupational Therapist OT              OT Recommendation and Plan  Planned Therapy Interventions (OT): activity tolerance training, BADL retraining, functional balance retraining, occupation/activity based interventions, patient/caregiver education/training, ROM/therapeutic exercise, transfer/mobility retraining  Therapy Frequency (OT): 5 times/wk  Plan of Care Review  Plan of Care Reviewed With: patient  Progress: no change  Outcome Summary: Patient presents with endurance and balance limitations that impede his/her ability to perform ADLS. The skills of a therapist are necessary to maximize independence with ADLs.     Time Calculation:    Time Calculation- OT     Row Name 11/13/21 1019             Time Calculation- OT    OT Received On 11/13/21  -      OT Goal Re-Cert Due Date 11/22/21  -              Timed Charges    74085 - OT Therapeutic Activity Minutes 10  -AC              Untimed Charges    OT Eval/Re-eval Minutes 26  -AC              Total Minutes    Timed Charges Total Minutes 10  -AC      Untimed Charges Total Minutes 26  -AC       Total Minutes 36  -AC            User Key  (r) = Recorded By, (t) = Taken By, (c) = Cosigned By    Initials Name Provider Type     Nga Sharma OT Occupational Therapist              Therapy Charges for Today     Code Description Service Date Service Provider Modifiers Qty    09302539323 HC OT THERAPEUTIC ACT EA 15 MIN 11/13/2021 Nga Sharma OT GO 1    79329155574 HC OT EVAL LOW COMPLEXITY 2 11/13/2021 Nga Sharma OT GO 1               Nga Sharma OT  11/13/2021

## 2021-11-13 NOTE — H&P (VIEW-ONLY)
[facility]   Urology Consult Note    Patient Name: Mirna Meredith  : 1945  MRN: [id number]  Primary Care Physician:  aPn Pacheco MD  Date of admission: 2021    Subjective   Subjective     Chief Complaint: Right hydronephrosis    Metastatic breast cancer    HPI:    Mirna Meredith is a 76 y.o. female was admitted with mental confusion in the hospital and a possible urinary tract infection.  On the CT scan she is found to have right hydronephrosis.  Looking at her previous scan in July she started having hydronephrosis at that point and now it is worst.  She has been having slight pain in the right flank but no acute pain    Review of Systems    Metastatic breast disease with pleural effusion and involvement of her small intestine according to the history and now right hydronephrosis    Personal History     Past Medical History:   Diagnosis Date   • Cancer (HCC)     Breast with mets to colon and bones   • COPD (chronic obstructive pulmonary disease) (HCC)    • Depression    • Diabetes mellitus (HCC)    • Hyperlipidemia    • Hypertension        Past Surgical History:   Procedure Laterality Date   • CHOLECYSTECTOMY     • HYSTERECTOMY     • JOINT REPLACEMENT Bilateral     Knees   • MASTECTOMY Bilateral        Family History: family history is not on file. Otherwise pertinent FHx was reviewed and not pertinent to current issue.    Social History:  reports that she quit smoking about 60 years ago. Her smoking use included cigarettes. She does not have any smokeless tobacco history on file. She reports previous alcohol use. She reports that she does not use drugs.    Home Medications:  Palbociclib, Peppermint Oil, SBI/Protein Isolate, anastrozole, atenolol, buPROPion XL, cephalexin, ferrous sulfate, folic acid, lisinopril, memantine, metFORMIN ER, montelukast, ondansetron ODT, risperiDONE, rosuvastatin, valACYclovir, and vitamin E      Allergies:  Allergies   Allergen Reactions   • Penicillins  Rash       Objective   Objective     Vitals:   Temp:  [98.1 °F (36.7 °C)-100.4 °F (38 °C)] 98.1 °F (36.7 °C)  Heart Rate:  [] 99  Resp:  [16-20] 20  BP: ()/(53-83) 96/83  Physical Exam     Physical Exam  Constitutional:       Appearance: She is obese. She is ill-appearing.   HENT:      Head: Normocephalic and atraumatic.      Ears:      Comments: Patient is deaf  Eyes:      Pupils: Pupils are equal, round, and reactive to light.   Cardiovascular:      Rate and Rhythm: Normal rate and regular rhythm.      Pulses: Normal pulses.      Heart sounds: Normal heart sounds. No murmur heard.      Pulmonary:      Effort: Respiratory distress present.      Breath sounds: No rhonchi or rales.   Abdominal:      Palpations: Abdomen is soft.      Tenderness: There is abdominal tenderness. There is right CVA tenderness. There is no left CVA tenderness.      Comments: Slight tenderness right upper quadrant compared to left side   Musculoskeletal:      Cervical back: Normal range of motion and neck supple. No rigidity or tenderness.      Comments: Patient has difficulty with walking since she had urosepsis about 3 weeks ago   Lymphadenopathy:      Cervical: No cervical adenopathy.   Skin:     General: Skin is warm.      Coloration: Skin is not jaundiced.   Neurological:      General: No focal deficit present.      Mental Status: She is alert and oriented to person, place, and time.      Motor: Weakness present.      Gait: Gait abnormal.   Psychiatric:         Mood and Affect: Mood normal.         Behavior: Behavior normal.         Thought Content: Thought content normal.         Judgment: Judgment normal.         Result Review    Result Review:  I have personally reviewed the results from the time of this admission to 11/13/2021 13:54 EST and agree with these findings:  []  Laboratory  []  Microbiology  []  Radiology  []  EKG/Telemetry   []  Cardiology/Vascular   []  Pathology  []  Old records  []  Other:  Most notable  findings include: Right hydronephrosis secondary to metastatic breast cancer    Assessment/Plan   Assessment / Plan     Brief Patient Summary:  Mirna Meredith is a 76 y.o. female who who has metastatic breast disease    Active Hospital Problems:  Active Hospital Problems    Diagnosis    • Metabolic encephalopathy        Plan: I do cystoscopy right retrograde and stent insertion tomorrow.  Risk benefits and alternate treatment admit discussed especially infection bleeding and injury to urinary bladder right ureter and right kidney accepted by the patient and her daughter      DVT prophylaxis:  Mechanical DVT prophylaxis orders are present.    CODE STATUS:    Code Status (Patient has no pulse and is not breathing): No CPR (Do Not Attempt to Resuscitate)  Medical Interventions (Patient has pulse or is breathing): Full Support        Electronically signed by Allen Rodriguez MD, 11/13/21, 1:54 PM EST.

## 2021-11-14 ENCOUNTER — APPOINTMENT (OUTPATIENT)
Dept: GENERAL RADIOLOGY | Facility: HOSPITAL | Age: 76
End: 2021-11-14

## 2021-11-14 ENCOUNTER — ANESTHESIA EVENT (OUTPATIENT)
Dept: PERIOP | Facility: HOSPITAL | Age: 76
End: 2021-11-14

## 2021-11-14 ENCOUNTER — ANESTHESIA (OUTPATIENT)
Dept: PERIOP | Facility: HOSPITAL | Age: 76
End: 2021-11-14

## 2021-11-14 PROBLEM — N13.1 HYDRONEPHROSIS DUE TO OBSTRUCTION OF URETER: Status: ACTIVE | Noted: 2021-11-14

## 2021-11-14 PROBLEM — N13.1 HYDRONEPHROSIS DUE TO OBSTRUCTION OF URETER: Status: RESOLVED | Noted: 2021-11-14 | Resolved: 2021-11-14

## 2021-11-14 LAB
027 TOXIN: NORMAL
ALBUMIN SERPL-MCNC: 2.9 G/DL (ref 3.5–5.2)
ALBUMIN SERPL-MCNC: 2.9 G/DL (ref 3.5–5.2)
ALBUMIN/GLOB SERPL: 0.8 G/DL
ALP SERPL-CCNC: 201 U/L (ref 39–117)
ALT SERPL W P-5'-P-CCNC: 17 U/L (ref 1–33)
ANION GAP SERPL CALCULATED.3IONS-SCNC: 15 MMOL/L (ref 5–15)
ANION GAP SERPL CALCULATED.3IONS-SCNC: 8.7 MMOL/L (ref 5–15)
AST SERPL-CCNC: 43 U/L (ref 1–32)
BACTERIA SPEC AEROBE CULT: NO GROWTH
BASOPHILS # BLD AUTO: 0 10*3/MM3 (ref 0–0.2)
BASOPHILS # BLD AUTO: 0.01 10*3/MM3 (ref 0–0.2)
BASOPHILS NFR BLD AUTO: 0 % (ref 0–1.5)
BASOPHILS NFR BLD AUTO: 0.5 % (ref 0–1.5)
BH BB BLOOD EXPIRATION DATE: NORMAL
BH BB BLOOD EXPIRATION DATE: NORMAL
BH BB BLOOD TYPE BARCODE: 6200
BH BB BLOOD TYPE BARCODE: 6200
BH BB DISPENSE STATUS: NORMAL
BH BB DISPENSE STATUS: NORMAL
BH BB PRODUCT CODE: NORMAL
BH BB PRODUCT CODE: NORMAL
BH BB UNIT NUMBER: NORMAL
BH BB UNIT NUMBER: NORMAL
BILIRUB SERPL-MCNC: 1 MG/DL (ref 0–1.2)
BUN SERPL-MCNC: 10 MG/DL (ref 8–23)
BUN SERPL-MCNC: 9 MG/DL (ref 8–23)
BUN/CREAT SERPL: 7.2 (ref 7–25)
BUN/CREAT SERPL: 7.4 (ref 7–25)
C DIFF TOX GENS STL QL NAA+PROBE: NEGATIVE
CALCIUM SPEC-SCNC: 8.9 MG/DL (ref 8.6–10.5)
CALCIUM SPEC-SCNC: 9 MG/DL (ref 8.6–10.5)
CANCER AG27-29 SERPL-ACNC: 185.2 U/ML (ref 0–38.6)
CHLORIDE SERPL-SCNC: 96 MMOL/L (ref 98–107)
CHLORIDE SERPL-SCNC: 97 MMOL/L (ref 98–107)
CO2 SERPL-SCNC: 24 MMOL/L (ref 22–29)
CO2 SERPL-SCNC: 27.3 MMOL/L (ref 22–29)
CREAT SERPL-MCNC: 1.21 MG/DL (ref 0.57–1)
CREAT SERPL-MCNC: 1.38 MG/DL (ref 0.57–1)
CROSSMATCH INTERPRETATION: NORMAL
CROSSMATCH INTERPRETATION: NORMAL
D-LACTATE SERPL-SCNC: 1.8 MMOL/L (ref 0.5–2)
DEPRECATED RDW RBC AUTO: 54.9 FL (ref 37–54)
DEPRECATED RDW RBC AUTO: 55.9 FL (ref 37–54)
EOSINOPHIL # BLD AUTO: 0 10*3/MM3 (ref 0–0.4)
EOSINOPHIL # BLD AUTO: 0.02 10*3/MM3 (ref 0–0.4)
EOSINOPHIL NFR BLD AUTO: 0 % (ref 0.3–6.2)
EOSINOPHIL NFR BLD AUTO: 0.9 % (ref 0.3–6.2)
ERYTHROCYTE [DISTWIDTH] IN BLOOD BY AUTOMATED COUNT: 17.4 % (ref 12.3–15.4)
ERYTHROCYTE [DISTWIDTH] IN BLOOD BY AUTOMATED COUNT: 17.7 % (ref 12.3–15.4)
GFR SERPL CREATININE-BSD FRML MDRD: 37 ML/MIN/1.73
GFR SERPL CREATININE-BSD FRML MDRD: 43 ML/MIN/1.73
GLOBULIN UR ELPH-MCNC: 3.5 GM/DL
GLUCOSE BLDC GLUCOMTR-MCNC: 126 MG/DL (ref 70–99)
GLUCOSE BLDC GLUCOMTR-MCNC: 128 MG/DL (ref 70–99)
GLUCOSE BLDC GLUCOMTR-MCNC: 136 MG/DL (ref 70–99)
GLUCOSE BLDC GLUCOMTR-MCNC: 142 MG/DL (ref 70–99)
GLUCOSE BLDC GLUCOMTR-MCNC: 95 MG/DL (ref 70–99)
GLUCOSE SERPL-MCNC: 114 MG/DL (ref 65–99)
GLUCOSE SERPL-MCNC: 133 MG/DL (ref 65–99)
HCT VFR BLD AUTO: 29.5 % (ref 34–46.6)
HCT VFR BLD AUTO: 30.3 % (ref 34–46.6)
HGB BLD-MCNC: 9.5 G/DL (ref 12–15.9)
HGB BLD-MCNC: 9.8 G/DL (ref 12–15.9)
IMM GRANULOCYTES # BLD AUTO: 0.03 10*3/MM3 (ref 0–0.05)
IMM GRANULOCYTES # BLD AUTO: 0.03 10*3/MM3 (ref 0–0.05)
IMM GRANULOCYTES NFR BLD AUTO: 1.4 % (ref 0–0.5)
IMM GRANULOCYTES NFR BLD AUTO: 20 % (ref 0–0.5)
LYMPHOCYTES # BLD AUTO: 0.08 10*3/MM3 (ref 0.7–3.1)
LYMPHOCYTES # BLD AUTO: 0.57 10*3/MM3 (ref 0.7–3.1)
LYMPHOCYTES NFR BLD AUTO: 27 % (ref 19.6–45.3)
LYMPHOCYTES NFR BLD AUTO: 53.3 % (ref 19.6–45.3)
MCH RBC QN AUTO: 28.4 PG (ref 26.6–33)
MCH RBC QN AUTO: 28.5 PG (ref 26.6–33)
MCHC RBC AUTO-ENTMCNC: 32.2 G/DL (ref 31.5–35.7)
MCHC RBC AUTO-ENTMCNC: 32.3 G/DL (ref 31.5–35.7)
MCV RBC AUTO: 87.8 FL (ref 79–97)
MCV RBC AUTO: 88.6 FL (ref 79–97)
MONOCYTES # BLD AUTO: 0 10*3/MM3 (ref 0.1–0.9)
MONOCYTES # BLD AUTO: 0.17 10*3/MM3 (ref 0.1–0.9)
MONOCYTES NFR BLD AUTO: 0 % (ref 5–12)
MONOCYTES NFR BLD AUTO: 8.1 % (ref 5–12)
NEUTROPHILS NFR BLD AUTO: 0.04 10*3/MM3 (ref 1.7–7)
NEUTROPHILS NFR BLD AUTO: 1.31 10*3/MM3 (ref 1.7–7)
NEUTROPHILS NFR BLD AUTO: 26.7 % (ref 42.7–76)
NEUTROPHILS NFR BLD AUTO: 62.1 % (ref 42.7–76)
NRBC BLD AUTO-RTO: 0 /100 WBC (ref 0–0.2)
NRBC BLD AUTO-RTO: 0 /100 WBC (ref 0–0.2)
PHOSPHATE SERPL-MCNC: 2.4 MG/DL (ref 2.5–4.5)
PLATELET # BLD AUTO: 56 10*3/MM3 (ref 140–450)
PLATELET # BLD AUTO: 83 10*3/MM3 (ref 140–450)
PMV BLD AUTO: 10.1 FL (ref 6–12)
PMV BLD AUTO: 10.8 FL (ref 6–12)
POTASSIUM SERPL-SCNC: 3.5 MMOL/L (ref 3.5–5.2)
POTASSIUM SERPL-SCNC: 4 MMOL/L (ref 3.5–5.2)
PROCALCITONIN SERPL-MCNC: 2.18 NG/ML (ref 0–0.25)
PROT SERPL-MCNC: 6.4 G/DL (ref 6–8.5)
QT INTERVAL: 280 MS
RBC # BLD AUTO: 3.33 10*6/MM3 (ref 3.77–5.28)
RBC # BLD AUTO: 3.45 10*6/MM3 (ref 3.77–5.28)
SODIUM SERPL-SCNC: 132 MMOL/L (ref 136–145)
SODIUM SERPL-SCNC: 136 MMOL/L (ref 136–145)
TROPONIN T SERPL-MCNC: 0.04 NG/ML (ref 0–0.03)
TROPONIN T SERPL-MCNC: 0.05 NG/ML (ref 0–0.03)
UNIT  ABO: NORMAL
UNIT  ABO: NORMAL
UNIT  RH: NORMAL
UNIT  RH: NORMAL
WBC # BLD AUTO: 0.15 10*3/MM3 (ref 3.4–10.8)
WBC # BLD AUTO: 2.11 10*3/MM3 (ref 3.4–10.8)

## 2021-11-14 PROCEDURE — 87040 BLOOD CULTURE FOR BACTERIA: CPT | Performed by: INTERNAL MEDICINE

## 2021-11-14 PROCEDURE — 25010000002 KETOROLAC TROMETHAMINE PER 15 MG

## 2021-11-14 PROCEDURE — 25010000002 KETOROLAC TROMETHAMINE PER 15 MG: Performed by: INTERNAL MEDICINE

## 2021-11-14 PROCEDURE — 0T7B8DZ DILATION OF BLADDER WITH INTRALUMINAL DEVICE, VIA NATURAL OR ARTIFICIAL OPENING ENDOSCOPIC: ICD-10-PCS | Performed by: UROLOGY

## 2021-11-14 PROCEDURE — 87086 URINE CULTURE/COLONY COUNT: CPT | Performed by: PHYSICIAN ASSISTANT

## 2021-11-14 PROCEDURE — 93010 ELECTROCARDIOGRAM REPORT: CPT | Performed by: INTERNAL MEDICINE

## 2021-11-14 PROCEDURE — 0T768DZ DILATION OF RIGHT URETER WITH INTRALUMINAL DEVICE, VIA NATURAL OR ARTIFICIAL OPENING ENDOSCOPIC: ICD-10-PCS | Performed by: UROLOGY

## 2021-11-14 PROCEDURE — 99291 CRITICAL CARE FIRST HOUR: CPT | Performed by: INTERNAL MEDICINE

## 2021-11-14 PROCEDURE — 0 IOPAMIDOL PER 1 ML: Performed by: UROLOGY

## 2021-11-14 PROCEDURE — C1769 GUIDE WIRE: HCPCS | Performed by: UROLOGY

## 2021-11-14 PROCEDURE — 94640 AIRWAY INHALATION TREATMENT: CPT

## 2021-11-14 PROCEDURE — 25010000002 CEFTRIAXONE PER 250 MG: Performed by: UROLOGY

## 2021-11-14 PROCEDURE — 84145 PROCALCITONIN (PCT): CPT | Performed by: INTERNAL MEDICINE

## 2021-11-14 PROCEDURE — 85025 COMPLETE CBC W/AUTO DIFF WBC: CPT | Performed by: INTERNAL MEDICINE

## 2021-11-14 PROCEDURE — 25010000002 PROPOFOL 10 MG/ML EMULSION: Performed by: NURSE ANESTHETIST, CERTIFIED REGISTERED

## 2021-11-14 PROCEDURE — 93005 ELECTROCARDIOGRAM TRACING: CPT | Performed by: INTERNAL MEDICINE

## 2021-11-14 PROCEDURE — 74420 UROGRAPHY RTRGR +-KUB: CPT

## 2021-11-14 PROCEDURE — 87493 C DIFF AMPLIFIED PROBE: CPT | Performed by: INTERNAL MEDICINE

## 2021-11-14 PROCEDURE — 25010000002 FENTANYL CITRATE (PF) 50 MCG/ML SOLUTION: Performed by: NURSE ANESTHETIST, CERTIFIED REGISTERED

## 2021-11-14 PROCEDURE — 83605 ASSAY OF LACTIC ACID: CPT | Performed by: INTERNAL MEDICINE

## 2021-11-14 PROCEDURE — C2617 STENT, NON-COR, TEM W/O DEL: HCPCS | Performed by: UROLOGY

## 2021-11-14 PROCEDURE — 80069 RENAL FUNCTION PANEL: CPT | Performed by: INTERNAL MEDICINE

## 2021-11-14 PROCEDURE — 25010000002 CEFTRIAXONE PER 250 MG: Performed by: NURSE ANESTHETIST, CERTIFIED REGISTERED

## 2021-11-14 PROCEDURE — 71045 X-RAY EXAM CHEST 1 VIEW: CPT

## 2021-11-14 PROCEDURE — 80053 COMPREHEN METABOLIC PANEL: CPT | Performed by: INTERNAL MEDICINE

## 2021-11-14 PROCEDURE — C1758 CATHETER, URETERAL: HCPCS | Performed by: UROLOGY

## 2021-11-14 PROCEDURE — 82962 GLUCOSE BLOOD TEST: CPT

## 2021-11-14 PROCEDURE — 0 MEPERIDINE PER 100 MG: Performed by: STUDENT IN AN ORGANIZED HEALTH CARE EDUCATION/TRAINING PROGRAM

## 2021-11-14 PROCEDURE — 25010000002 CEFTRIAXONE PER 250 MG: Performed by: INTERNAL MEDICINE

## 2021-11-14 PROCEDURE — 97116 GAIT TRAINING THERAPY: CPT

## 2021-11-14 PROCEDURE — 84484 ASSAY OF TROPONIN QUANT: CPT | Performed by: INTERNAL MEDICINE

## 2021-11-14 PROCEDURE — BT1D1ZZ FLUOROSCOPY OF RIGHT KIDNEY, URETER AND BLADDER USING LOW OSMOLAR CONTRAST: ICD-10-PCS | Performed by: UROLOGY

## 2021-11-14 PROCEDURE — 52332 CYSTOSCOPY AND TREATMENT: CPT | Performed by: UROLOGY

## 2021-11-14 DEVICE — STNT URETRL CLASSC DBL PIG 6F 26CM: Type: IMPLANTABLE DEVICE | Site: URETER | Status: FUNCTIONAL

## 2021-11-14 RX ORDER — CEFTRIAXONE 1 G/1
1 INJECTION, POWDER, FOR SOLUTION INTRAMUSCULAR; INTRAVENOUS ONCE
Status: DISCONTINUED | OUTPATIENT
Start: 2021-11-14 | End: 2021-11-14

## 2021-11-14 RX ORDER — PROPOFOL 10 MG/ML
VIAL (ML) INTRAVENOUS AS NEEDED
Status: DISCONTINUED | OUTPATIENT
Start: 2021-11-14 | End: 2021-11-14 | Stop reason: SURG

## 2021-11-14 RX ORDER — KETOROLAC TROMETHAMINE 30 MG/ML
INJECTION, SOLUTION INTRAMUSCULAR; INTRAVENOUS
Status: COMPLETED
Start: 2021-11-14 | End: 2021-11-14

## 2021-11-14 RX ORDER — SODIUM CHLORIDE 0.9 % (FLUSH) 0.9 %
10 SYRINGE (ML) INJECTION EVERY 12 HOURS SCHEDULED
Status: DISCONTINUED | OUTPATIENT
Start: 2021-11-14 | End: 2021-11-14 | Stop reason: HOSPADM

## 2021-11-14 RX ORDER — MAGNESIUM HYDROXIDE 1200 MG/15ML
LIQUID ORAL AS NEEDED
Status: DISCONTINUED | OUTPATIENT
Start: 2021-11-14 | End: 2021-11-14 | Stop reason: HOSPADM

## 2021-11-14 RX ORDER — CEFTRIAXONE SODIUM 1 G/50ML
1 INJECTION, SOLUTION INTRAVENOUS ONCE
Status: COMPLETED | OUTPATIENT
Start: 2021-11-14 | End: 2021-11-14

## 2021-11-14 RX ORDER — LIDOCAINE HYDROCHLORIDE 20 MG/ML
INJECTION, SOLUTION INFILTRATION; PERINEURAL AS NEEDED
Status: DISCONTINUED | OUTPATIENT
Start: 2021-11-14 | End: 2021-11-14 | Stop reason: SURG

## 2021-11-14 RX ORDER — IPRATROPIUM BROMIDE AND ALBUTEROL SULFATE 2.5; .5 MG/3ML; MG/3ML
SOLUTION RESPIRATORY (INHALATION)
Status: COMPLETED
Start: 2021-11-14 | End: 2021-11-14

## 2021-11-14 RX ORDER — PROMETHAZINE HYDROCHLORIDE 25 MG/1
25 SUPPOSITORY RECTAL ONCE AS NEEDED
Status: DISCONTINUED | OUTPATIENT
Start: 2021-11-14 | End: 2021-11-14 | Stop reason: HOSPADM

## 2021-11-14 RX ORDER — KETOROLAC TROMETHAMINE 30 MG/ML
30 INJECTION, SOLUTION INTRAMUSCULAR; INTRAVENOUS ONCE
Status: COMPLETED | OUTPATIENT
Start: 2021-11-14 | End: 2021-11-14

## 2021-11-14 RX ORDER — OXYCODONE HYDROCHLORIDE 5 MG/1
5 TABLET ORAL
Status: DISCONTINUED | OUTPATIENT
Start: 2021-11-14 | End: 2021-11-14 | Stop reason: HOSPADM

## 2021-11-14 RX ORDER — POTASSIUM CHLORIDE 750 MG/1
40 CAPSULE, EXTENDED RELEASE ORAL ONCE
Status: COMPLETED | OUTPATIENT
Start: 2021-11-14 | End: 2021-11-14

## 2021-11-14 RX ORDER — SODIUM CHLORIDE 0.9 % (FLUSH) 0.9 %
10 SYRINGE (ML) INJECTION AS NEEDED
Status: DISCONTINUED | OUTPATIENT
Start: 2021-11-14 | End: 2021-11-14 | Stop reason: HOSPADM

## 2021-11-14 RX ORDER — SODIUM CHLORIDE 9 MG/ML
1000 INJECTION, SOLUTION INTRAVENOUS ONCE
Status: COMPLETED | OUTPATIENT
Start: 2021-11-14 | End: 2021-11-14

## 2021-11-14 RX ORDER — CEFTRIAXONE 1 G/1
INJECTION, POWDER, FOR SOLUTION INTRAMUSCULAR; INTRAVENOUS AS NEEDED
Status: DISCONTINUED | OUTPATIENT
Start: 2021-11-14 | End: 2021-11-14 | Stop reason: SURG

## 2021-11-14 RX ORDER — CEFTRIAXONE SODIUM 1 G/50ML
1 INJECTION, SOLUTION INTRAVENOUS EVERY 24 HOURS
Status: DISCONTINUED | OUTPATIENT
Start: 2021-11-15 | End: 2021-11-19 | Stop reason: HOSPADM

## 2021-11-14 RX ORDER — FENTANYL CITRATE 50 UG/ML
INJECTION, SOLUTION INTRAMUSCULAR; INTRAVENOUS AS NEEDED
Status: DISCONTINUED | OUTPATIENT
Start: 2021-11-14 | End: 2021-11-14 | Stop reason: SURG

## 2021-11-14 RX ORDER — SODIUM CHLORIDE 9 MG/ML
9 INJECTION, SOLUTION INTRAVENOUS CONTINUOUS PRN
Status: DISCONTINUED | OUTPATIENT
Start: 2021-11-14 | End: 2021-11-19 | Stop reason: HOSPADM

## 2021-11-14 RX ORDER — IPRATROPIUM BROMIDE AND ALBUTEROL SULFATE 2.5; .5 MG/3ML; MG/3ML
3 SOLUTION RESPIRATORY (INHALATION) ONCE
Status: COMPLETED | OUTPATIENT
Start: 2021-11-14 | End: 2021-11-14

## 2021-11-14 RX ORDER — ONDANSETRON 2 MG/ML
4 INJECTION INTRAMUSCULAR; INTRAVENOUS ONCE AS NEEDED
Status: DISCONTINUED | OUTPATIENT
Start: 2021-11-14 | End: 2021-11-14 | Stop reason: HOSPADM

## 2021-11-14 RX ORDER — MEPERIDINE HYDROCHLORIDE 25 MG/ML
12.5 INJECTION INTRAMUSCULAR; INTRAVENOUS; SUBCUTANEOUS
Status: DISCONTINUED | OUTPATIENT
Start: 2021-11-14 | End: 2021-11-14 | Stop reason: HOSPADM

## 2021-11-14 RX ORDER — PROMETHAZINE HYDROCHLORIDE 12.5 MG/1
25 TABLET ORAL ONCE AS NEEDED
Status: DISCONTINUED | OUTPATIENT
Start: 2021-11-14 | End: 2021-11-14 | Stop reason: HOSPADM

## 2021-11-14 RX ORDER — IPRATROPIUM BROMIDE AND ALBUTEROL SULFATE 2.5; .5 MG/3ML; MG/3ML
3 SOLUTION RESPIRATORY (INHALATION) EVERY 4 HOURS PRN
Status: DISCONTINUED | OUTPATIENT
Start: 2021-11-14 | End: 2021-11-15

## 2021-11-14 RX ADMIN — SODIUM CHLORIDE, POTASSIUM CHLORIDE, SODIUM LACTATE AND CALCIUM CHLORIDE 100 ML/HR: 600; 310; 30; 20 INJECTION, SOLUTION INTRAVENOUS at 19:17

## 2021-11-14 RX ADMIN — MEMANTINE 10 MG: 10 TABLET ORAL at 21:16

## 2021-11-14 RX ADMIN — CEFTRIAXONE SODIUM 1 G: 1 INJECTION, SOLUTION INTRAVENOUS at 11:09

## 2021-11-14 RX ADMIN — IPRATROPIUM BROMIDE AND ALBUTEROL SULFATE: 2.5; .5 SOLUTION RESPIRATORY (INHALATION) at 13:00

## 2021-11-14 RX ADMIN — SODIUM CHLORIDE, POTASSIUM CHLORIDE, SODIUM LACTATE AND CALCIUM CHLORIDE 100 ML/HR: 600; 310; 30; 20 INJECTION, SOLUTION INTRAVENOUS at 05:12

## 2021-11-14 RX ADMIN — MUPIROCIN: 20 OINTMENT TOPICAL at 08:25

## 2021-11-14 RX ADMIN — PROPOFOL 120 MCG/KG/MIN: 10 INJECTION, EMULSION INTRAVENOUS at 11:43

## 2021-11-14 RX ADMIN — SODIUM CHLORIDE 1000 ML: 9 INJECTION, SOLUTION INTRAVENOUS at 16:46

## 2021-11-14 RX ADMIN — SODIUM CHLORIDE 500 ML: 9 INJECTION, SOLUTION INTRAVENOUS at 23:33

## 2021-11-14 RX ADMIN — LIDOCAINE HYDROCHLORIDE 100 MG: 20 INJECTION, SOLUTION INFILTRATION; PERINEURAL at 11:43

## 2021-11-14 RX ADMIN — SENNOSIDES AND DOCUSATE SODIUM 2 TABLET: 50; 8.6 TABLET ORAL at 21:15

## 2021-11-14 RX ADMIN — KETOROLAC TROMETHAMINE: 30 INJECTION, SOLUTION INTRAMUSCULAR; INTRAVENOUS at 14:03

## 2021-11-14 RX ADMIN — MEPERIDINE HYDROCHLORIDE 12.5 MG: 25 INJECTION INTRAMUSCULAR; INTRAVENOUS; SUBCUTANEOUS at 13:32

## 2021-11-14 RX ADMIN — MUPIROCIN 1 APPLICATION: 20 OINTMENT TOPICAL at 21:17

## 2021-11-14 RX ADMIN — PROPOFOL 40 MG: 10 INJECTION, EMULSION INTRAVENOUS at 11:43

## 2021-11-14 RX ADMIN — RDII 250 MG CAPSULE 250 MG: at 21:16

## 2021-11-14 RX ADMIN — ROSUVASTATIN CALCIUM 10 MG: 5 TABLET, FILM COATED ORAL at 21:16

## 2021-11-14 RX ADMIN — POTASSIUM CHLORIDE 40 MEQ: 750 CAPSULE, EXTENDED RELEASE ORAL at 17:50

## 2021-11-14 RX ADMIN — KETOROLAC TROMETHAMINE 30 MG: 30 INJECTION, SOLUTION INTRAMUSCULAR; INTRAVENOUS at 16:01

## 2021-11-14 RX ADMIN — SODIUM CHLORIDE, PRESERVATIVE FREE 10 ML: 5 INJECTION INTRAVENOUS at 21:19

## 2021-11-14 RX ADMIN — CEFTRIAXONE SODIUM 1 G: 1 INJECTION, SOLUTION INTRAVENOUS at 15:20

## 2021-11-14 RX ADMIN — RISPERIDONE 3 MG: 3 TABLET, FILM COATED ORAL at 21:16

## 2021-11-14 RX ADMIN — SODIUM CHLORIDE, POTASSIUM CHLORIDE, SODIUM LACTATE AND CALCIUM CHLORIDE 1000 ML: 600; 310; 30; 20 INJECTION, SOLUTION INTRAVENOUS at 17:49

## 2021-11-14 RX ADMIN — ACETAMINOPHEN 650 MG: 325 TABLET ORAL at 13:14

## 2021-11-14 RX ADMIN — CEFTRIAXONE 1 G: 1 INJECTION, POWDER, FOR SOLUTION INTRAMUSCULAR; INTRAVENOUS at 11:47

## 2021-11-14 RX ADMIN — ACETAMINOPHEN 650 MG: 325 TABLET ORAL at 21:53

## 2021-11-14 RX ADMIN — SODIUM CHLORIDE 1000 ML: 9 INJECTION, SOLUTION INTRAVENOUS at 14:00

## 2021-11-14 RX ADMIN — FENTANYL CITRATE 50 MCG: 50 INJECTION, SOLUTION INTRAMUSCULAR; INTRAVENOUS at 11:43

## 2021-11-14 NOTE — ADDENDUM NOTE
Addendum  created 11/14/21 1320 by Yasmine Craig, DO    Order list changed, Order sets accessed

## 2021-11-14 NOTE — ANESTHESIA POSTPROCEDURE EVALUATION
Patient: Mirna Mreedith    Procedure Summary     Date: 11/14/21 Room / Location: [facility] OR 07 / [facility] MAIN OR    Anesthesia Start: 1135 Anesthesia Stop: 1212    Procedure: CYSTOSCOPY RETROGRADE PYELOGRAM AND STENT INSERTION (Right ) Diagnosis:       Hydronephrosis due to obstruction of ureter      (Hydronephrosis due to obstruction of ureter [N13.1])    Surgeons: Allen Rodriguez MD Provider: Yasmine Craig DO    Anesthesia Type: general, MAC ASA Status: 3          Anesthesia Type: general, MAC    Vitals  Vitals Value Taken Time   /87 11/14/21 1232   Temp 36.8 °C (98.2 °F) 11/14/21 1210   Pulse 104 11/14/21 1236   Resp 16 11/14/21 1225   SpO2 94 % 11/14/21 1236   Vitals shown include unvalidated device data.        Post Anesthesia Care and Evaluation    Patient location during evaluation: bedside  Patient participation: complete - patient participated  Level of consciousness: awake  Pain management: adequate  Airway patency: patent  Anesthetic complications: No anesthetic complications  PONV Status: none  Cardiovascular status: acceptable  Respiratory status: acceptable  Hydration status: acceptable    Comments: An Anesthesiologist personally participated in the most demanding procedures (including induction and emergence if applicable) in the anesthesia plan, monitored the course of anesthesia administration at frequent intervals and remained physically present and available for immediate diagnosis and treatment of emergencies.       Patient became tachycardic and febrile in PACU.Possibly septic post-procedure. 500 cc fluid bolus given with no improvement in HR. EKG obtained at bedside shows sinus tachycardia. Dr. Sweet at bedside agrees. Patient will go to an ICU bed.

## 2021-11-14 NOTE — NURSING NOTE
Patient left for surgery around 1130. She came out of surgery. Dr. Rodriguez is concerned about possible sepsis and she is being transferred to ICU. Report given to CARLOS Panda in ICU. Notified her daughter Jessy of update.

## 2021-11-14 NOTE — CONSULTS
Pulmonary / Critical Care Consult Note      Patient Name: Mirna Meredith  : 1945  MRN: [id number]  Primary Care Physician:  Pan Pacheco MD  Referring Physician: Cesar Sweet MD  Date of admission: 2021    Subjective   Subjective     Reason for Consult/ Chief Complaint:   Sepsis    HPI:  Mirna Meredith is a 76 y.o. female  Presented to the ED on  secondary to confusion and weakness.  Patient had recently been admitted one month prior secondary to UTI.  While in ER patient was noted to have elevated temp.  Patient was started on Ceftriaxone.  Patient was admitted for concerns for Urosepsis.  CT of abdomen and pelvis was performed and patient was with right hydronephrosis.  Patient had right ureteral stent placed today.  While in PACU patient was noted to be tachycardic in the 140's and with elevated temperature.  Patient was given 1L fluid bolus, Started on Rocephin, blood cultures drawn and patient was transferred to ICU for ongoing monitoring.  Pulmonary Critical care was consulted for further evaluation and treatment in a patient with Urosepsis, status post right ureteral stent placement.       Review of Systems  Constitutional symptoms: Fatigue, otherwise denied complaints   Ear, nose, throat: Dry mouth; otherwise denied complaints  Cardiovascular:  Denied complaints  Respiratory: Denied complaints  Gastrointestinal: Denied complaints  Musculoskeletal: Weakness, otherwise denied complaints  Skin: Denied complaints      Personal History     Past Medical History:   Diagnosis Date   • Cancer (HCC)     Breast with mets to colon and bones   • COPD (chronic obstructive pulmonary disease) (HCC)    • Depression    • Diabetes mellitus (HCC)    • Hyperlipidemia    • Hypertension        Past Surgical History:   Procedure Laterality Date   • CHOLECYSTECTOMY     • HYSTERECTOMY     • JOINT REPLACEMENT Bilateral     Knees   • MASTECTOMY Bilateral        Family History:   No pulmonary  comorbidities noted in primary family members    Social History:  reports that she quit smoking about 60 years ago. Her smoking use included cigarettes. She does not have any smokeless tobacco history on file. She reports previous alcohol use. She reports that she does not use drugs.    Home Medications:  Palbociclib, Peppermint Oil, SBI/Protein Isolate, anastrozole, atenolol, buPROPion XL, cephalexin, ferrous sulfate, folic acid, lisinopril, memantine, metFORMIN ER, montelukast, ondansetron ODT, risperiDONE, rosuvastatin, valACYclovir, and vitamin E    Allergies:  Allergies   Allergen Reactions   • Penicillins Rash       Objective    Objective     Vitals:   Temp:  [98.2 °F (36.8 °C)-103.2 °F (39.6 °C)] 101.6 °F (38.7 °C)  Heart Rate:  [] 117  Resp:  [16-28] 16  BP: ()/() 117/61  Flow (L/min):  [3-6] 3    Physical Exam:  Vital Signs Reviewed   Overweight with BMI of 32, Alert, NAD.    HEENT:  PERRL, EOMI.  OP, nares clear  Neck:  Supple, no JVD, no thyromegaly  Chest:  good aeration, diminished with rhonchi bilaterally, tympanic to percussion bilaterally, no work of breathing noted  CV: Sinus tachycardia, no MGR, pulses 2+, equal.  Abd:  Soft, NT, ND, + BS, no HSM  EXT:  no clubbing, no cyanosis, no edema  Neuro:  A&Ox3, CN grossly intact, no focal deficits.  Skin: No rashes or lesions noted      Result Review    Result Review:  I have personally reviewed the results from the time of this admission to 11/14/2021 18:33 EST and agree with these findings:  [x]  Laboratory  [x]  Microbiology  [x]  Radiology  [x]  EKG/Telemetry   []  Cardiology/Vascular   []  Pathology  []  Old records  []  Other:  Most notable findings include:  Chest x-ray 11/14 no active disease; port placement on left    CT of abdomen pelvis 11/13: Small left pleural effusion, small to moderate amount of ascites, diffuse osseous metastatic disease  New pleural-based metastatic disease, moderate to severe right-sided hydronephrosis  and right hydroureter    C. difficile negative  Blood cultures x2 in process  Lactic acid 1.8  Creatinine 1.38  Potassium 3.5  Pro-Ayo 2.18  WBC 0.15  Hgb/HCT: 9.8/30.3  Platelet count 56    Urine culture 11/13 NGTD  Blood culture 11/11 NGTD    Assessment/Plan   Assessment / Plan     Active Hospital Problems:  Active Hospital Problems    Diagnosis    • Metabolic encephalopathy        Impression:  Sepsis   Right hydronephrosis, s/p ureteral stent placement concerning for UTI  JOESPH secondary to prerenal etiology versus acute tubular necrosis  Ascites, likely malignant  New pleural-based metastatic disease  Weakness with functional paraplegia  COPD  Diabetes.  Baseline hemoglobin A1c 8.7  HTN  History of breast cancer with mets to colon, liver lung and bone  History of C. difficile  Chronic low back pain  Pancytopenia  Hard of hearing           Plan:  Continue supplemental O2.  Titrate to maintain SPO2 greater than 90%  Blood cultures x2 ordered.    Continue ceftriaxone.  De-escalate based off cultures  Received 2 L fluid bolus.  Continue LR at 100 cc an hour  2D echo ordered  BNP ordered  Lactate WNL.  Pro-Ayo elevated 2.18  Can start diet.  Advance as tolerated  Trend renal function electrolytes.  Replace as needed  Urology on board.  Appreciate assistance  Hematology/Oncology on board.  Appreciate their assistance.  Holding oral chemotherapy for now       DVT prophylaxis:  Mechanical DVT prophylaxis orders are present.     Code Status and Medical Interventions:   Ordered at: 11/12/21 0246     Code Status (Patient has no pulse and is not breathing):    No CPR (Do Not Attempt to Resuscitate)     Medical Interventions (Patient has pulse or is breathing):    Full Support        The patient is critically ill in the ICU with sepsis, right hydronephrosis status post ureteral stent placement, pancytopenia. Multidisciplinary bedside critical care rounds were performed with nursing staff, respiratory therapy, pharmacy,  nutritional services, social work. I have personally reviewed the chart, labs and any pertinent imaging available.  I have spent 30 minutes of critical care time, excluding procedures, in the care of this patient.    Electronically signed by Juan Miguel Mcmullen MD, 11/14/21, 6:34 PM EST.        The above plan is a product of multidisciplinary rounds performed this morning on 11/14/2021 at bedside with the critical care team consisting of the on-call physician, to my home,  and myself Kimberly Young PA-C, bedside RN, pharmacist, respiratory therapist, dietitian and other Allied health services.    Dictated utilizing Dragon Dictation.    Scribed for Dr. Mcmullen by Kimberly Young.

## 2021-11-14 NOTE — ANESTHESIA PREPROCEDURE EVALUATION
Anesthesia Evaluation     Patient summary reviewed and Nursing notes reviewed                Airway   Mallampati: II  TM distance: >3 FB  Neck ROM: full  No difficulty expected  Dental    (+) lower dentures and upper dentures    Pulmonary - normal exam    breath sounds clear to auscultation  (+) COPD,   Cardiovascular - normal exam    Rhythm: regular    (+) hypertension, hyperlipidemia,       Neuro/Psych- negative ROS  GI/Hepatic/Renal/Endo    (+) obesity,   diabetes mellitus,     Musculoskeletal (-) negative ROS    Abdominal    Substance History - negative use     OB/GYN negative ob/gyn ROS         Other   blood dyscrasia anemia thrombocytopenia,   history of cancer                  Anesthesia Plan    ASA 4 - emergent     general     intravenous induction     Anesthetic plan, all risks, benefits, and alternatives have been provided, discussed and informed consent has been obtained with: patient.

## 2021-11-14 NOTE — NURSING NOTE
Pt arrived to pacu in stable condition and at 1245 started to complain of being really cold and shivering.  Heart rate and temperature started to increase and pt started wheezing.  Duo neb treatment was given at 1300.  Anesthesia and Dr. Sweet were notified of pt condition and new orders were noted and followed out.  Dr. Sweet at bedside at 1425 and further orders were noted and followed out and pt was transferred to ICU from the pacu

## 2021-11-14 NOTE — PROGRESS NOTES
[facility]   Hospitalist Progress Note  Date: 2021  Patient Name: Mirna Meredith  : 1945  MRN: [id number]  Date of admission: 2021      Subjective   Subjective     Chief Complaint: Confusion and weakness    Summary:   Mirna Meredith is a 76 y.o. female presents to the hospital due to confusion and weakness.  This seems to be worse over the last 1 to 2 days.  Family brought her in stating she is not herself.  Patient was recently admitted less than 1 month ago for similar symptoms which were found to be secondary to urinary tract infection.  She was treated and seemed to be having improvement but over the last 1 to 2 days had a significant decline in her ability to ambulate and has been more confused and hypersomnolent.  In the emergency department she was not febrile but had a temperature of 100.1.  She was, however tachycardic.  She received IV fluids and Rocephin in the emergency department due to urinary tract infection discovered on urinalysis in the ER.  Due to her inability to ambulate due to weakness and her metabolic encephalopathy we are asked to admit for further care.    Interval Followup:  Called to catch you by recovery nurse.  Patient after right ureteric stent developed fever,  tachycardic  up 140 and was put on oxygen 4 L nasal cannula.  Patient given Tylenol, IV Toradol, IV fluid bolus 1 L.  Started on Rocephin blood cultures drawn.  Transferred to unit for close observation  Patient very hard of hearing.   Patient is awake alert oriented x3.  No urinary complaints.  No more diarrhea   Denies any urinary complaints  Back pain is better today.    Review of Systems   All systems were reviewed and negative except for: Summary interval follow-up    Objective   Objective     Vitals:   Temp:  [98.2 °F (36.8 °C)-103.2 °F (39.6 °C)] 102.9 °F (39.4 °C)  Heart Rate:  [] 128  Resp:  [16-28] 20  BP: ()/() 102/53  Flow (L/min):  [3-6] 3  Physical Exam     Constitutional: Awake and alert, no acute distress, answers simple questions but keeps her eyes closed              Eyes: Patient will not open her eyes but she is verbally responsive to verbal questions              HENT: NCAT, mucous membranes dry              Neck: Supple, no thyromegaly, no lymphadenopathy, trachea midline              Respiratory: Diminished, clear to auscultation bilaterally, nonlabored respirations               Cardiovascular: Tachycardia no murmurs, rubs, or gallops, palpable pedal pulses bilaterally              Gastrointestinal: Positive bowel sounds, soft, nontender, nondistended              Musculoskeletal: No bilateral ankle edema, no clubbing or cyanosis to extremities              Psychiatric: Appropriate affect, cooperative              Neurologic: Awake and alert.  Oriented x3, diminished strength equally in all extremities, Cranial Nerves not able to be evaluated              Skin: No rashes        Result Review    Result Review:  I have personally reviewed the results from the time of this admission to 11/14/2021 16:54 EST and agree with these findings:  [x]  Laboratory  [x]  Microbiology  [x]  Radiology  [x]  EKG/Telemetry   []  Cardiology/Vascular   []  Pathology  [x]  Old records  [x]  Other: Medications     Assessment/Plan   Assessment / Plan     Assessment:  · Acute kidney injury with baseline creatinine around 1.  Likely prerenal.  Improving.  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  Improving   · Moderate to severe right hydronephrosis.  Question distal ureteral stricture.  · Ascites.  Likely malignant.  · Question new pleural-based metastatic disease  · Weakness with functional paraplegia - secondary to #1  · COPD  · Diabetes.  Hemoglobin A1c of 8.7%  · Hypertension  · History of breast cancer with mets to colon, liver, lung and bone.  · History of C. difficile.   · Diarrhea  · Low back pain.  DJD  · Anemia.   Chemotherapy-induced  · Thrombocytopenia.  · Hard of hearing.  · Elevated CEA  · Sepsis due to fever and tachycardia.  Elevated procalcitonin  · S/p right ureteric stent on November 14.  Likely causing above  · Possible UTI causing sepsis           Plan:  Resumed IV Rocephin   IV fluid   Repeat IV bolus second liter   Blood culture  Check troponin  Repeat chest x-ray.  No acute finding  EKG noted sinus tachycardia.  Will try Lopressor IV if tachycardia does not improve after IV fluid boluses  Trend troponin  2D echo  repeat UA and urine culture via straight cath pending  Heme-onc consulted as per family request.  Discussed with heme-onc holding her oral chemotherapy  Discussed with urology.  Appreciate input for cystoscopy and stent   Sliding scale insulin  Appreciate speech therapy input cleared by speech.  Resumed some of the home medications including risperidone  CT of the head and chest x-ray negative.  CT abdomen pelvis noted.  Progression of the disease even with chemotherapy  X-ray L-spine noted.  Will get MRI of the spine  PT OT.  Trend creatinine.  Discussed plan with RN and .  Patient to go to rehab since her chemo pill has been stopped.  Discussed with heme-onc  DVT prophylaxis:  Mechanical DVT prophylaxis orders are present.    CODE STATUS:   Code Status (Patient has no pulse and is not breathing): No CPR (Do Not Attempt to Resuscitate)  Medical Interventions (Patient has pulse or is breathing): Full Support      Part of this note may be an electronic transcription/translation of spoken language to printed text using the Dragon Dictation System.     Critical care time spent 31 minutes  Electronically signed by Cesar Sweet MD, 11/14/21, 4:58 PM EST.

## 2021-11-14 NOTE — OP NOTE
CYSTOSCOPY RETROGRADE PYELOGRAM AND STENT INSERTION  Procedure Report    Patient Name:  Mirna Meredith  YOB: 1945    Date of Surgery:  11/14/2021     Indications: Right hydronephrosis secondary to metastatic disease from breast carcinoma    Pre-op Diagnosis:   Hydronephrosis due to obstruction of ureter [N13.1]       Post-Op Diagnosis Codes:     * Hydronephrosis due to obstruction of ureter [N13.1]           Procedure(s):  CYSTOSCOPY RETROGRADE PYELOGRAM AND STENT INSERTION    Staff:  Surgeon(s):  Allen Rodriguez MD         Anesthesia: Monitored Anesthesia Care    Estimated Blood Loss: 0    Implants:    Implant Name Type Inv. Item Serial No.  Lot No. LRB No. Used Action   STNT URETRL CLASSC DBL PIG 6F 26CM - [id number] Stent STNT URETRL CLASSC DBL PIG 6F 26CM  [facility] [id number] Implanted       Specimen:          None        Findings: Obstruction of right ureter around L3 level    Complications: No complications    Description of Procedure: Patient was placed in a lithotomy position after satisfactory MAC anesthesia.  Thorough scrubbing her lower abdomen external genitalia was performed and then sterile draping was performed.  22.5 rigid cystoscope was inserted urethra was looked at which is normal.  Both ureteral orifices are normal and there is no bladder tumor present.  5 spiral catheter was inserted on the right side retrograde was done and patient does have hydronephrosis on the right side with obstruction around L3 level.  I went ahead with insertion of a 0.38 Super Stiff guidewire into the right renal pelvis and then inserted a 26 cm 6 [place] stent in the kidney and urinary bladder.  Bladder was emptied.  Pelvic examination was done which did not show any pelvic masses.  Patient tolerated procedure well sent to recovery room in good condition.          Allen Rodriguez MD     Date: 11/14/2021  Time: 12:07 EST

## 2021-11-14 NOTE — ANESTHESIA PREPROCEDURE EVALUATION
Anesthesia Evaluation     Patient summary reviewed and Nursing notes reviewed   no history of anesthetic complications:  NPO Solid Status: > 8 hours  NPO Liquid Status: > 2 hours           Airway   Mallampati: IV  TM distance: >3 FB  Neck ROM: full  Difficult intubation highly probable  Dental    (+) edentulous    Pulmonary - normal exam   (+) COPD, shortness of breath,   Cardiovascular - normal exam  Exercise tolerance: good (4-7 METS)    ECG reviewed    (+) hypertension, hyperlipidemia,       Neuro/Psych  (+) psychiatric history Depression and Anxiety,       ROS Comment: Metabolic encephalopathy  GI/Hepatic/Renal/Endo    (+)   renal disease ARF, diabetes mellitus type 2,     ROS Comment: Crohn's disease  hydronephrosis    Musculoskeletal     Abdominal   (+) obese,    Substance History - negative use     OB/GYN negative ob/gyn ROS         Other   arthritis, blood dyscrasia anemia,   history of cancer (breast with mets to bone and colon)    ROS/Med Hx Other: Hx of sepsis    Currently admitted for metabolic encephalopathy and obstructive uropathy.      Phys Exam Other: Hard of hearing              Anesthesia Plan    ASA 3     general and MAC   (Patient understands anesthesia not responsible for dental damage.)  intravenous induction     Anesthetic plan, all risks, benefits, and alternatives have been provided, discussed and informed consent has been obtained with: patient.    Plan discussed with CRNA.

## 2021-11-14 NOTE — THERAPY TREATMENT NOTE
Acute Care - Physical Therapy Progress Note  [facility]     Patient Name: Mirna Meredith  : 1945  MRN: [id number]  Today's Date: 2021      Visit Dx:     ICD-10-CM ICD-9-CM   1. Metabolic encephalopathy  G93.41 348.31   2. Urinary tract infection without hematuria, site unspecified  N39.0 599.0   3. Dysphagia, oropharyngeal  R13.12 787.22   4. Difficulty walking  R26.2 719.7   5. Decreased activities of daily living (ADL)  Z78.9 V49.89   6. Hydronephrosis due to obstruction of ureter  N13.1 591     593.4     Patient Active Problem List   Diagnosis   • Abdominal pain   • Sepsis (HCC)   • Metabolic encephalopathy   • Hydronephrosis due to obstruction of ureter     Past Medical History:   Diagnosis Date   • Cancer (HCC)     Breast with mets to colon and bones   • COPD (chronic obstructive pulmonary disease) (HCC)    • Depression    • Diabetes mellitus (HCC)    • Hyperlipidemia    • Hypertension      Past Surgical History:   Procedure Laterality Date   • CHOLECYSTECTOMY     • HYSTERECTOMY     • JOINT REPLACEMENT Bilateral     Knees   • MASTECTOMY Bilateral      PT Assessment (last 12 hours)     PT Evaluation and Treatment     Row Name 21          Physical Therapy Time and Intention    Subjective Information no complaints  -CS     Document Type therapy note (daily note)  -CS     Mode of Treatment individual therapy; physical therapy  -CS     Patient Effort good  -CS     Symptoms Noted During/After Treatment none  -CS     Row Name 21          Bed Mobility    Supine-Sit [place] (Bed Mobility) verbal cues; nonverbal cues (demo/gesture); minimum assist (75% patient effort); 1 person assist  -CS     Assistive Device (Bed Mobility) bed rails  -CS     Row Name 21          Transfers    Transfers sit-stand transfer; stand-sit transfer  -CS     Sit-Stand [place] (Transfers) contact guard; 1 person assist; verbal cues; nonverbal cues (demo/gesture)  -CS     Stand-Sit  [place] (Transfers) verbal cues; nonverbal cues (demo/gesture); contact guard; 1 person assist  -CS     Row Name 11/14/21 0900          Sit-Stand Transfer    Assistive Device (Sit-Stand Transfers) walker, front-wheeled  -CS     Row Name 11/14/21 0900          Stand-Sit Transfer    Assistive Device (Stand-Sit Transfers) walker, front-wheeled  -CS     Row Name 11/14/21 0900          Gait/Stairs (Locomotion)    Gait/Stairs Locomotion gait/ambulation independence; gait/ambulation assistive device; distance ambulated; gait pattern  -CS     [place] Level (Gait) contact guard; 1 person assist; verbal cues  -CS     Assistive Device (Gait) walker, front-wheeled  -CS     Distance in Feet (Gait) 35  -CS     Pattern (Gait) 4-point; step-through  -CS     Bilateral Gait Deviations forward flexed posture  -CS     Row Name 11/14/21 0900          Progress Summary (PT)    Daily Progress Summary (PT) Pt. presents this a.m. in supine position with bed alarm activated.  Pt. required external assistance today to T/F to EOB.  Pt. performed STS's, gait and T/F's without external assistance. Verbal cuing required for hand placement to increase pt. safety.  Verbal cues also required for navigation through the room.  Pt. T/F'd to chair after ambulation with chair alarm activated.  Call button provided to pt. and pinned to gown and table provided in front of pt.  Pt. is NPO this a.m. for procedure, so PTA ensured that no food or drink was on table or within pt. reach.  -CS           User Key  (r) = Recorded By, (t) = Taken By, (c) = Cosigned By    Initials Name Provider Type    Geovanni Escalante PTA Physical Therapy Assistant              Physical Therapy Education                 Title: PT OT SLP Therapies (Done)     Topic: Physical Therapy (Done)     Point: Mobility training (Done)     Learning Progress Summary           Patient Acceptance, E, VU by AC at 11/13/2021 1017    Acceptance, E,TB, VU by REID at 11/12/2021 1151                    Point: Home exercise program (Done)     Learning Progress Summary           Patient Acceptance, E, VU by  at 11/13/2021 1017    Acceptance, E,TB, VU by  at 11/12/2021 1151                   Point: Body mechanics (Done)     Learning Progress Summary           Patient Acceptance, E, VU by  at 11/13/2021 1017    Acceptance, E,TB, VU by  at 11/12/2021 1151                   Point: Precautions (Done)     Learning Progress Summary           Patient Acceptance, E, VU by  at 11/13/2021 1017    Acceptance, E,TB, VU by  at 11/12/2021 1151                               User Key     Initials Effective Dates Name Provider Type Discipline     06/16/21 -  Nga Sharma OT Occupational Therapist OT    REID 04/25/21 -  Annabella Guo, PT Physical Therapist PT              PT Recommendation and Plan     Progress Summary (PT)  Daily Progress Summary (PT): Pt. presents this a.m. in supine position with bed alarm activated.  Pt. required external assistance today to T/F to EOB.  Pt. performed STS's, gait and T/F's without external assistance. Verbal cuing required for hand placement to increase pt. safety.  Verbal cues also required for navigation through the room.  Pt. T/F'd to chair after ambulation with chair alarm activated.  Call button provided to pt. and pinned to gown and table provided in front of pt.  Pt. is NPO this a.m. for procedure, so PTA ensured that no food or drink was on table or within pt. reach.   Outcome Measures     Row Name 11/14/21 0900 11/12/21 1100          How much help from another person do you currently need...    Turning from your back to your side while in flat bed without using bedrails? 3  -CS 3  -CSA     Moving from lying on back to sitting on the side of a flat bed without bedrails? 3  -CS 3  -CSA     Moving to and from a bed to a chair (including a wheelchair)? 3  -CS 2  -CSA     Standing up from a chair using your arms (e.g., wheelchair, bedside chair)? 3  -CS 2  -CSA      Climbing 3-5 steps with a railing? 2  -CS 1  -CSA     To walk in hospital room? 3  -CS 2  -CSA     AM-PAC 6 Clicks Score (PT) 17  -CS 13  -CSA            Functional Assessment    Outcome Measure Options AM-PAC 6 Clicks Basic Mobility (PT)  -CS AM-PAC 6 Clicks Basic Mobility (PT)  -CSA           User Key  (r) = Recorded By, (t) = Taken By, (c) = Cosigned By    Initials Name Provider Type    CSA Annabella Guo, PT Physical Therapist    CS Geovanni Stark PTA Physical Therapy Assistant                 Time Calculation:    PT Charges     Row Name 11/14/21 0909             Time Calculation    Start Time 0752  -CS      PT Received On 11/14/21  -CS      PT Goal Re-Cert Due Date 11/21/21  -CS              Timed Charges    19203 - Gait Training Minutes  8  -CS      44763 - PT Therapeutic Activity Minutes 7  -CS              Total Minutes    Timed Charges Total Minutes 15  -CS       Total Minutes 15  -CS            User Key  (r) = Recorded By, (t) = Taken By, (c) = Cosigned By    Initials Name Provider Type     Geovanni Stark PTA Physical Therapy Assistant              Therapy Charges for Today     Code Description Service Date Service Provider Modifiers Qty    [id number] HC GAIT TRAINING EA 15 MIN 11/14/2021 Geovanni Stark PTA GP 1          PT G-Codes  Outcome Measure Options: AM-PAC 6 Clicks Basic Mobility (PT)  AM-PAC 6 Clicks Score (PT): 17  AM-PAC 6 Clicks Score (OT): 21    Geovanni Stark PTA  11/14/2021

## 2021-11-15 ENCOUNTER — APPOINTMENT (OUTPATIENT)
Dept: CARDIOLOGY | Facility: HOSPITAL | Age: 76
End: 2021-11-15

## 2021-11-15 ENCOUNTER — APPOINTMENT (OUTPATIENT)
Dept: GENERAL RADIOLOGY | Facility: HOSPITAL | Age: 76
End: 2021-11-15

## 2021-11-15 PROBLEM — C50.919 METASTATIC BREAST CANCER: Status: ACTIVE | Noted: 2021-11-15

## 2021-11-15 PROBLEM — N17.9 ACUTE RENAL FAILURE (ARF) (HCC): Status: ACTIVE | Noted: 2021-11-15

## 2021-11-15 LAB
ALBUMIN SERPL-MCNC: 2.4 G/DL (ref 3.5–5.2)
ALBUMIN/GLOB SERPL: 0.8 G/DL
ALP SERPL-CCNC: 182 U/L (ref 39–117)
ALT SERPL W P-5'-P-CCNC: 21 U/L (ref 1–33)
ANION GAP SERPL CALCULATED.3IONS-SCNC: 10.8 MMOL/L (ref 5–15)
ANISOCYTOSIS BLD QL: ABNORMAL
ANISOCYTOSIS BLD QL: NORMAL
AST SERPL-CCNC: 60 U/L (ref 1–32)
BH CV ECHO MEAS - AO MAX PG: 9 MMHG
BH CV ECHO MEAS - AO MEAN PG: 6 MMHG
BH CV ECHO MEAS - AO ROOT DIAM: 2.7 CM
BH CV ECHO MEAS - AO V2 MAX: 154 CM/SEC
BH CV ECHO MEAS - AVA PLANIMETRY TRACED: 2.1 CM2
BH CV ECHO MEAS - EDV(MOD-SP2): 100 ML
BH CV ECHO MEAS - EDV(MOD-SP4): 89 ML
BH CV ECHO MEAS - EF(MOD-BP): 50 %
BH CV ECHO MEAS - ESV(MOD-SP2): 62 ML
BH CV ECHO MEAS - ESV(MOD-SP4): 39 ML
BH CV ECHO MEAS - IVSD: 0.9 CM
BH CV ECHO MEAS - LA DIMENSION(2D): 3.6 CM
BH CV ECHO MEAS - LAT PEAK E' VEL: 12 CM/SEC
BH CV ECHO MEAS - LV MAX PG: 4 MMHG
BH CV ECHO MEAS - LV MEAN PG: 2 MMHG
BH CV ECHO MEAS - LV V1 MAX: 97 CM/SEC
BH CV ECHO MEAS - LVIDD: 4.3 CM
BH CV ECHO MEAS - LVIDS: 3.2 CM
BH CV ECHO MEAS - LVOT DIAM: 2 CM
BH CV ECHO MEAS - LVPWD: 0.9 CM
BH CV ECHO MEAS - MED PEAK E' VEL: 10.6 CM/SEC
BH CV ECHO MEAS - MV A MAX VEL: 99 CM/SEC
BH CV ECHO MEAS - MV DEC TIME: 210 MSEC
BH CV ECHO MEAS - MV E MAX VEL: 91 CM/SEC
BH CV ECHO MEAS - MV E/A: 0.9
BH CV ECHO MEAS - RAP SYSTOLE: 3 MMHG
BH CV ECHO MEAS - RVDD: 2.6 CM
BH CV ECHO MEAS - RVSP: 42 MMHG
BH CV ECHO MEAS - TR MAX PG: 39 MMHG
BH CV ECHO MEAS - TR MAX VEL: 312 CM/SEC
BH CV ECHO MEASUREMENTS AVERAGE E/E' RATIO: 8.05
BILIRUB SERPL-MCNC: 1.4 MG/DL (ref 0–1.2)
BUN SERPL-MCNC: 17 MG/DL (ref 8–23)
BUN/CREAT SERPL: 8.1 (ref 7–25)
CALCIUM SPEC-SCNC: 8.3 MG/DL (ref 8.6–10.5)
CHLORIDE SERPL-SCNC: 99 MMOL/L (ref 98–107)
CO2 SERPL-SCNC: 23.2 MMOL/L (ref 22–29)
CREAT SERPL-MCNC: 2.1 MG/DL (ref 0.57–1)
CYTO UR: NORMAL
D-LACTATE SERPL-SCNC: 1.6 MMOL/L (ref 0.5–2)
D-LACTATE SERPL-SCNC: 2.2 MMOL/L (ref 0.5–2)
DACRYOCYTES BLD QL SMEAR: ABNORMAL
DACRYOCYTES BLD QL SMEAR: NORMAL
DEPRECATED RDW RBC AUTO: 56.9 FL (ref 37–54)
ERYTHROCYTE [DISTWIDTH] IN BLOOD BY AUTOMATED COUNT: 18 % (ref 12.3–15.4)
GFR SERPL CREATININE-BSD FRML MDRD: 23 ML/MIN/1.73
GLOBULIN UR ELPH-MCNC: 3.1 GM/DL
GLUCOSE BLDC GLUCOMTR-MCNC: 141 MG/DL (ref 70–99)
GLUCOSE BLDC GLUCOMTR-MCNC: 203 MG/DL (ref 70–99)
GLUCOSE BLDC GLUCOMTR-MCNC: 216 MG/DL (ref 70–99)
GLUCOSE SERPL-MCNC: 148 MG/DL (ref 65–99)
HCT VFR BLD AUTO: 25.1 % (ref 34–46.6)
HGB BLD-MCNC: 8.2 G/DL (ref 12–15.9)
IVRT: 87 MSEC
LAB AP CASE REPORT: NORMAL
LAB AP CLINICAL INFORMATION: NORMAL
LEFT ATRIUM VOLUME INDEX: 33.4 ML/M2
LYMPHOCYTES # BLD MANUAL: 0.1 10*3/MM3 (ref 0.7–3.1)
LYMPHOCYTES NFR BLD MANUAL: 4 % (ref 5–12)
LYMPHOCYTES NFR BLD MANUAL: 5 % (ref 19.6–45.3)
MAGNESIUM SERPL-MCNC: 1.1 MG/DL (ref 1.6–2.4)
MAGNESIUM SERPL-MCNC: 2.2 MG/DL (ref 1.6–2.4)
MAXIMAL PREDICTED HEART RATE: 142 BPM
MCH RBC QN AUTO: 28.5 PG (ref 26.6–33)
MCHC RBC AUTO-ENTMCNC: 32.7 G/DL (ref 31.5–35.7)
MCV RBC AUTO: 87.2 FL (ref 79–97)
MONOCYTES # BLD AUTO: 0.08 10*3/MM3 (ref 0.1–0.9)
NEUTROPHILS # BLD AUTO: 1.87 10*3/MM3 (ref 1.7–7)
NEUTROPHILS NFR BLD MANUAL: 83 % (ref 42.7–76)
NEUTS BAND NFR BLD MANUAL: 8 % (ref 0–5)
NRBC BLD AUTO-RTO: 0 /100 WBC (ref 0–0.2)
NT-PROBNP SERPL-MCNC: ABNORMAL PG/ML (ref 0–1800)
OVALOCYTES BLD QL SMEAR: ABNORMAL
OVALOCYTES BLD QL SMEAR: NORMAL
PATH REPORT.FINAL DX SPEC: NORMAL
PATH REPORT.GROSS SPEC: NORMAL
PLATELET # BLD AUTO: 48 10*3/MM3 (ref 140–450)
PMV BLD AUTO: 11.8 FL (ref 6–12)
POTASSIUM SERPL-SCNC: 4.2 MMOL/L (ref 3.5–5.2)
PROT SERPL-MCNC: 5.5 G/DL (ref 6–8.5)
RBC # BLD AUTO: 2.88 10*6/MM3 (ref 3.77–5.28)
SMALL PLATELETS BLD QL SMEAR: ABNORMAL
SMALL PLATELETS BLD QL SMEAR: NORMAL
SODIUM SERPL-SCNC: 133 MMOL/L (ref 136–145)
SODIUM UR-SCNC: 52 MMOL/L
STRESS TARGET HR: 121 BPM
WBC # BLD AUTO: 2.05 10*3/MM3 (ref 3.4–10.8)
WBC MORPH BLD: NORMAL
WBC MORPH BLD: NORMAL

## 2021-11-15 PROCEDURE — 63710000001 INSULIN LISPRO (HUMAN) PER 5 UNITS: Performed by: UROLOGY

## 2021-11-15 PROCEDURE — 83605 ASSAY OF LACTIC ACID: CPT | Performed by: INTERNAL MEDICINE

## 2021-11-15 PROCEDURE — 82962 GLUCOSE BLOOD TEST: CPT

## 2021-11-15 PROCEDURE — 94799 UNLISTED PULMONARY SVC/PX: CPT

## 2021-11-15 PROCEDURE — 99291 CRITICAL CARE FIRST HOUR: CPT | Performed by: INTERNAL MEDICINE

## 2021-11-15 PROCEDURE — 97110 THERAPEUTIC EXERCISES: CPT

## 2021-11-15 PROCEDURE — 92526 ORAL FUNCTION THERAPY: CPT

## 2021-11-15 PROCEDURE — 85007 BL SMEAR W/DIFF WBC COUNT: CPT | Performed by: UROLOGY

## 2021-11-15 PROCEDURE — 97530 THERAPEUTIC ACTIVITIES: CPT

## 2021-11-15 PROCEDURE — 25010000002 CEFTRIAXONE PER 250 MG: Performed by: INTERNAL MEDICINE

## 2021-11-15 PROCEDURE — 85025 COMPLETE CBC W/AUTO DIFF WBC: CPT | Performed by: UROLOGY

## 2021-11-15 PROCEDURE — 71045 X-RAY EXAM CHEST 1 VIEW: CPT

## 2021-11-15 PROCEDURE — 83735 ASSAY OF MAGNESIUM: CPT | Performed by: INTERNAL MEDICINE

## 2021-11-15 PROCEDURE — 94640 AIRWAY INHALATION TREATMENT: CPT

## 2021-11-15 PROCEDURE — 83735 ASSAY OF MAGNESIUM: CPT | Performed by: PHYSICIAN ASSISTANT

## 2021-11-15 PROCEDURE — 80053 COMPREHEN METABOLIC PANEL: CPT | Performed by: INTERNAL MEDICINE

## 2021-11-15 PROCEDURE — 25010000002 MAGNESIUM SULFATE IN D5W 1G/100ML (PREMIX) 1-5 GM/100ML-% SOLUTION: Performed by: INTERNAL MEDICINE

## 2021-11-15 PROCEDURE — 83880 ASSAY OF NATRIURETIC PEPTIDE: CPT | Performed by: INTERNAL MEDICINE

## 2021-11-15 PROCEDURE — 84300 ASSAY OF URINE SODIUM: CPT | Performed by: INTERNAL MEDICINE

## 2021-11-15 PROCEDURE — 93306 TTE W/DOPPLER COMPLETE: CPT

## 2021-11-15 PROCEDURE — 25010000002 HEPARIN (PORCINE) PER 1000 UNITS: Performed by: PHYSICIAN ASSISTANT

## 2021-11-15 PROCEDURE — 25010000002 FUROSEMIDE PER 20 MG: Performed by: INTERNAL MEDICINE

## 2021-11-15 RX ORDER — HEPARIN SODIUM 5000 [USP'U]/ML
5000 INJECTION, SOLUTION INTRAVENOUS; SUBCUTANEOUS EVERY 8 HOURS SCHEDULED
Status: DISCONTINUED | OUTPATIENT
Start: 2021-11-15 | End: 2021-11-19 | Stop reason: HOSPADM

## 2021-11-15 RX ORDER — IPRATROPIUM BROMIDE AND ALBUTEROL SULFATE 2.5; .5 MG/3ML; MG/3ML
3 SOLUTION RESPIRATORY (INHALATION)
Status: DISCONTINUED | OUTPATIENT
Start: 2021-11-15 | End: 2021-11-19 | Stop reason: HOSPADM

## 2021-11-15 RX ORDER — MIDODRINE HYDROCHLORIDE 10 MG/1
10 TABLET ORAL
Status: DISCONTINUED | OUTPATIENT
Start: 2021-11-15 | End: 2021-11-15

## 2021-11-15 RX ORDER — BUDESONIDE 0.5 MG/2ML
0.5 INHALANT ORAL
Status: DISCONTINUED | OUTPATIENT
Start: 2021-11-15 | End: 2021-11-19 | Stop reason: HOSPADM

## 2021-11-15 RX ORDER — ARFORMOTEROL TARTRATE 15 UG/2ML
15 SOLUTION RESPIRATORY (INHALATION)
Status: DISCONTINUED | OUTPATIENT
Start: 2021-11-15 | End: 2021-11-19 | Stop reason: HOSPADM

## 2021-11-15 RX ORDER — FUROSEMIDE 10 MG/ML
20 INJECTION INTRAMUSCULAR; INTRAVENOUS ONCE
Status: COMPLETED | OUTPATIENT
Start: 2021-11-15 | End: 2021-11-15

## 2021-11-15 RX ORDER — IPRATROPIUM BROMIDE AND ALBUTEROL SULFATE 2.5; .5 MG/3ML; MG/3ML
3 SOLUTION RESPIRATORY (INHALATION)
Status: CANCELLED | OUTPATIENT
Start: 2021-11-15

## 2021-11-15 RX ORDER — NOREPINEPHRINE BIT/0.9 % NACL 8 MG/250ML
.02-.3 INFUSION BOTTLE (ML) INTRAVENOUS
Status: DISCONTINUED | OUTPATIENT
Start: 2021-11-15 | End: 2021-11-15

## 2021-11-15 RX ORDER — SODIUM CHLORIDE, SODIUM LACTATE, POTASSIUM CHLORIDE, CALCIUM CHLORIDE 600; 310; 30; 20 MG/100ML; MG/100ML; MG/100ML; MG/100ML
50 INJECTION, SOLUTION INTRAVENOUS CONTINUOUS
Status: DISCONTINUED | OUTPATIENT
Start: 2021-11-15 | End: 2021-11-17

## 2021-11-15 RX ORDER — MAGNESIUM SULFATE 1 G/100ML
1 INJECTION INTRAVENOUS
Status: COMPLETED | OUTPATIENT
Start: 2021-11-15 | End: 2021-11-15

## 2021-11-15 RX ORDER — FAMOTIDINE 20 MG/1
20 TABLET, FILM COATED ORAL DAILY
Status: DISCONTINUED | OUTPATIENT
Start: 2021-11-15 | End: 2021-11-19 | Stop reason: HOSPADM

## 2021-11-15 RX ORDER — MONTELUKAST SODIUM 10 MG/1
10 TABLET ORAL DAILY
Status: DISCONTINUED | OUTPATIENT
Start: 2021-11-15 | End: 2021-11-19 | Stop reason: HOSPADM

## 2021-11-15 RX ADMIN — MONTELUKAST SODIUM 10 MG: 10 TABLET, FILM COATED ORAL at 15:01

## 2021-11-15 RX ADMIN — SENNOSIDES AND DOCUSATE SODIUM 2 TABLET: 50; 8.6 TABLET ORAL at 08:51

## 2021-11-15 RX ADMIN — MIDODRINE HYDROCHLORIDE 10 MG: 10 TABLET ORAL at 11:57

## 2021-11-15 RX ADMIN — CEFTRIAXONE SODIUM 1 G: 1 INJECTION, SOLUTION INTRAVENOUS at 08:02

## 2021-11-15 RX ADMIN — FERROUS SULFATE TAB 325 MG (65 MG ELEMENTAL FE) 325 MG: 325 (65 FE) TAB at 08:51

## 2021-11-15 RX ADMIN — MEMANTINE 10 MG: 10 TABLET ORAL at 20:09

## 2021-11-15 RX ADMIN — MEMANTINE 10 MG: 10 TABLET ORAL at 09:25

## 2021-11-15 RX ADMIN — MAGNESIUM SULFATE HEPTAHYDRATE 1 G: 1 INJECTION, SOLUTION INTRAVENOUS at 15:02

## 2021-11-15 RX ADMIN — ARFORMOTEROL TARTRATE 15 MCG: 15 SOLUTION RESPIRATORY (INHALATION) at 19:18

## 2021-11-15 RX ADMIN — MAGNESIUM SULFATE HEPTAHYDRATE 1 G: 1 INJECTION, SOLUTION INTRAVENOUS at 11:53

## 2021-11-15 RX ADMIN — IPRATROPIUM BROMIDE AND ALBUTEROL SULFATE 3 ML: .5; 2.5 SOLUTION RESPIRATORY (INHALATION) at 12:35

## 2021-11-15 RX ADMIN — FOLIC ACID 1 MG: 1 TABLET ORAL at 08:51

## 2021-11-15 RX ADMIN — TRAMADOL HYDROCHLORIDE 50 MG: 50 TABLET ORAL at 20:08

## 2021-11-15 RX ADMIN — RDII 250 MG CAPSULE 250 MG: at 20:08

## 2021-11-15 RX ADMIN — FAMOTIDINE 20 MG: 20 TABLET, FILM COATED ORAL at 14:10

## 2021-11-15 RX ADMIN — NOREPINEPHRINE BITARTRATE 0.03 MCG/KG/MIN: 1 INJECTION, SOLUTION, CONCENTRATE INTRAVENOUS at 00:54

## 2021-11-15 RX ADMIN — MAGNESIUM SULFATE HEPTAHYDRATE 1 G: 1 INJECTION, SOLUTION INTRAVENOUS at 10:08

## 2021-11-15 RX ADMIN — MAGNESIUM SULFATE HEPTAHYDRATE 1 G: 1 INJECTION, SOLUTION INTRAVENOUS at 13:46

## 2021-11-15 RX ADMIN — RDII 250 MG CAPSULE 250 MG: at 09:25

## 2021-11-15 RX ADMIN — METOPROLOL TARTRATE 25 MG: 25 TABLET, FILM COATED ORAL at 20:08

## 2021-11-15 RX ADMIN — ROSUVASTATIN CALCIUM 10 MG: 5 TABLET, FILM COATED ORAL at 20:08

## 2021-11-15 RX ADMIN — INSULIN LISPRO 3 UNITS: 100 INJECTION, SOLUTION INTRAVENOUS; SUBCUTANEOUS at 17:55

## 2021-11-15 RX ADMIN — SERTRALINE 25 MG: 25 TABLET, FILM COATED ORAL at 08:51

## 2021-11-15 RX ADMIN — INSULIN LISPRO 3 UNITS: 100 INJECTION, SOLUTION INTRAVENOUS; SUBCUTANEOUS at 11:58

## 2021-11-15 RX ADMIN — SODIUM CHLORIDE, POTASSIUM CHLORIDE, SODIUM LACTATE AND CALCIUM CHLORIDE 100 ML/HR: 600; 310; 30; 20 INJECTION, SOLUTION INTRAVENOUS at 04:49

## 2021-11-15 RX ADMIN — IPRATROPIUM BROMIDE AND ALBUTEROL SULFATE 3 ML: 2.5; .5 SOLUTION RESPIRATORY (INHALATION) at 19:18

## 2021-11-15 RX ADMIN — SODIUM CHLORIDE, POTASSIUM CHLORIDE, SODIUM LACTATE AND CALCIUM CHLORIDE 2000 ML: 600; 310; 30; 20 INJECTION, SOLUTION INTRAVENOUS at 09:30

## 2021-11-15 RX ADMIN — SODIUM CHLORIDE, PRESERVATIVE FREE 10 ML: 5 INJECTION INTRAVENOUS at 08:55

## 2021-11-15 RX ADMIN — SODIUM CHLORIDE, PRESERVATIVE FREE 10 ML: 5 INJECTION INTRAVENOUS at 21:00

## 2021-11-15 RX ADMIN — ACETAMINOPHEN 650 MG: 325 TABLET ORAL at 02:25

## 2021-11-15 RX ADMIN — FUROSEMIDE 20 MG: 10 INJECTION INTRAMUSCULAR; INTRAVENOUS at 12:31

## 2021-11-15 RX ADMIN — HEPARIN SODIUM 5000 UNITS: 5000 INJECTION, SOLUTION INTRAVENOUS; SUBCUTANEOUS at 14:10

## 2021-11-15 RX ADMIN — HEPARIN SODIUM 5000 UNITS: 5000 INJECTION, SOLUTION INTRAVENOUS; SUBCUTANEOUS at 22:00

## 2021-11-15 RX ADMIN — Medication 5 MG: at 20:22

## 2021-11-15 RX ADMIN — BUDESONIDE 0.5 MG: 0.5 INHALANT RESPIRATORY (INHALATION) at 19:18

## 2021-11-15 RX ADMIN — RISPERIDONE 3 MG: 3 TABLET, FILM COATED ORAL at 20:08

## 2021-11-15 NOTE — PLAN OF CARE
Goal Outcome Evaluation:            Report called up at 1436 from Aj in PACU. Pt arrived to unit about 1500. Pt placed on Monitor, sinus tachy and fever of 102.2. Pt placed on cooling blanket. Rocephin administered per orders. Pt at some most of dinner and had no acute events. MD Guerrero notified of Troponin level of 0.037 at 1631, orders placed. Will continue to monitor.

## 2021-11-15 NOTE — THERAPY TREATMENT NOTE
Acute Care - Speech Language Pathology   Swallow Treatment Note [facility]     Patient Name: Mirna Meredith  : 1945  MRN: [id number]  Today's Date: 11/15/2021               Admit Date: 2021    Visit Dx:     ICD-10-CM ICD-9-CM   1. Metabolic encephalopathy  G93.41 348.31   2. Urinary tract infection without hematuria, site unspecified  N39.0 599.0   3. Dysphagia, oropharyngeal  R13.12 787.22   4. Difficulty walking  R26.2 719.7   5. Decreased activities of daily living (ADL)  Z78.9 V49.89   6. Hydronephrosis due to obstruction of ureter  N13.1 591     593.4     Patient Active Problem List   Diagnosis   • Abdominal pain   • Sepsis (HCC)   • Metabolic encephalopathy     Past Medical History:   Diagnosis Date   • Cancer (HCC)     Breast with mets to colon and bones   • COPD (chronic obstructive pulmonary disease) (HCC)    • Depression    • Diabetes mellitus (HCC)    • Hyperlipidemia    • Hypertension      Past Surgical History:   Procedure Laterality Date   • CHOLECYSTECTOMY     • HYSTERECTOMY     • JOINT REPLACEMENT Bilateral     Knees   • MASTECTOMY Bilateral    Subjective/Behavioral Observations: Patien solids and thin liquids.seen for dysphagia tx.     Current Diet:Regular diet    Current Strategies:Slow rate, alternate solids/liquids    Treatment received:Patient seen during morning meal. Patient with regular solids and thin liquids. Initial evaluation recommended mechanical soft and thin liquids however this was changed at some point over the weekend. Patient currently unable to tolerate regular solids without placing at risk of aspiration due to inability to effectively masticate regular solids. Patient tolerated soft solids with improved bolus preparation and control. No clinical signs or symptoms of aspiration were noted with thin liquid trials. Patient does need alternating solids and liquids throughout meal. No significant oral residue noted after the swallow. Patient denies globus sensation  after completion of swallow    Results of treatment: Tolerated mechanical soft and thin liquids well    Progress toward goals: As stated    Barriers to Achieving goals: Medical status    Plan of care:/changes in plan: Downgrade diet to mechanical soft, ground meats with sauce/gravy, thin liquids 90 degrees upright for all intake, slow rate     EDUCATION  The patient has been educated in the following areas:   Dysphagia (Swallowing Impairment) Modified Diet Instruction.              Leticia Ceballos, SLP  11/15/2021

## 2021-11-15 NOTE — CONSULTS
[facility]   Consult Note    Patient Name: Mirna Meredith  : 1945  MRN: [id number]  Primary Care Physician:  Pan Pacheco MD  Referring Physician: No Known Provider  Date of admission: 2021    Subjective   Subjective     Reason for Consult/ Chief Complaint: Acute renal failure    HPI:  Mirna Meredith is a 76 y.o. female with past medical history significant for metastatic breast cancer who was admitted because of altered mental status and it was thought that patient has metabolic encephalopathy secondary to UTI. Patient's baseline creatinine is 0.9 and now it was 2.1 and because of that reason I was requested to see the patient. When I see the patient in the ICU she is fully awake alert responsive hemodynamically stable and not in any acute distress. She had right-sided moderately severe hydronephrosis and already had right ureteral stent placement by Dr. Rodriguez. She did receive a dose of Toradol yesterday also.  When I see the patient she is fully awake alert responsive and is not complaining of any particular issue.    Review of Systems  Constitutional:        Weakness tiredness fatigue  Eyes:                       No blurry vision, eye discharge, eye irritation, eye pain  HEENT:                   No acute hair loss, earache and discharge, nasal congestion or discharge, sore throat, postnasal drip  Respiratory:           No shortness of breath coughing sputum production wheezing hemoptysis pleuritic chest pain  Cardiovascular:     No chest pain, orthopnea, PND, dizziness, palpitation, lower extremity edema  Gastrointestinal:   No nausea vomiting diarrhea abdominal pain constipation  Genitourinary:       No urinary incontinence, hesitancy, frequency, urgency, dysuria  Neurological:        No confusion, headache, focal weakness, numbness, dysphasia  Hematologic:         No bruising, bleeding, pallor, lymphadenopathy  Endocrine:            No coldness, hot flashes, polyuria, abnormal hair  growth  Musculoskeletal:  No body pains, aches, arthritic pains, muscle pain ,muscle wasting  Psychiatric:          No low or high mood, anxiety, hallucinations, delusions  Skin.                      No rash, ulcers, bruising, itching    Personal History     Past Medical History:   Diagnosis Date   • Cancer (HCC)     Breast with mets to colon and bones   • COPD (chronic obstructive pulmonary disease) (HCC)    • Depression    • Diabetes mellitus (HCC)    • Hyperlipidemia    • Hypertension        Past Surgical History:   Procedure Laterality Date   • CHOLECYSTECTOMY     • HYSTERECTOMY     • JOINT REPLACEMENT Bilateral     Knees   • MASTECTOMY Bilateral        Family History: family history is not on file. Otherwise pertinent FHx was reviewed and not pertinent to current issue.    Social History:  reports that she quit smoking about 60 years ago. Her smoking use included cigarettes. She does not have any smokeless tobacco history on file. She reports previous alcohol use. She reports that she does not use drugs.    Home Medications:  Palbociclib, Peppermint Oil, SBI/Protein Isolate, anastrozole, atenolol, buPROPion XL, cephalexin, ferrous sulfate, folic acid, lisinopril, memantine, metFORMIN ER, montelukast, ondansetron ODT, risperiDONE, rosuvastatin, valACYclovir, and vitamin E    Allergies:  Allergies   Allergen Reactions   • Penicillins Rash       Objective    Objective     Vitals:   Temp:  [97.3 °F (36.3 °C)-103.2 °F (39.6 °C)] 99.4 °F (37.4 °C)  Heart Rate:  [] 122  Resp:  [15-30] 16  BP: ()/() 156/68  Flow (L/min):  [3-6] 3    Physical Exam:             Constitutional:         Awake, alert responsive, conversant, no obvious distress   Eyes:                       PERRLA, sclerae anicteric, no conjunctival injection   HEENT:                   Moist mucous membranes, no nasal or eye discharge, no throat congestion   Neck:                      Supple, no thyromegaly, no lymphadenopathy, trachea  midline, no elevated JVD   Respiratory:           Clear to auscultation bilaterally, nonlabored respirations    Cardiovascular:     RRR, no murmurs, rubs, or gallops, palpable pedal pulses bilaterally, No bilateral ankle edema   Gastrointestinal:   Positive bowel sounds, soft, nontender, non-distended, no organomegaly   Musculoskeletal:  No clubbing or cyanosis to extremities, muscle wasting, joint swelling, muscle weakness   Psychiatric:              Appropriate affect, cooperative   Neurologic:            Awake alert, oriented x 3, strength symmetric in all extremities, Cranial Nerves grossly intact to confrontation, speech clear   Skin:                      No rashes, bruising, skin ulcers, petechiae or ecchymosis    Result Review    Result Review:  I have personally reviewed the results from the time of this admission to 11/15/2021 11:44 EST and agree with these findings:  []  Laboratory  []  Microbiology  []  Radiology  []  EKG/Telemetry   []  Cardiology/Vascular   []  Pathology  []  Old records  []  Other:      Assessment/Plan   Assessment / Plan     Active Hospital Problems:  Active Hospital Problems    Diagnosis    • Acute renal failure (ARF) (HCC)    • Metastatic breast cancer (HCC)    • Metabolic encephalopathy    Acute renal failure secondary to right-sided hydronephrosis volume contraction and IV Toradol might have contributed to acute kidney injury.    Plan:   Agree with IV fluid and continue for next 24-hour  Patient already have right ureteral stent placement  Avoid any NSAID or any other nephrotoxic medications  Labs tomorrow morning  Thank you for the consultation and we will follow patient with you and make recommendations based on patient's clinical status    Electronically signed by Scarlett Bautista MD, 11/15/21, 11:41 AM EST.

## 2021-11-15 NOTE — PROGRESS NOTES
[facility]     Progress Note    Patient Name: Mirna Meredith  : 1945  MRN: [id number]  Primary Care Physician:  Pan Pacheco MD  Date of admission: 2021    Subjective   Subjective     Chief Complaint: 1 to have metastatic breast cancer with extensive metastases has been transferred to the unit because of elevated troponin levels patient is otherwise stable and doing fine not in acute distress stent has been inserted because of hydronephrosis renal function stable    HPI:  Patient Reports with extensively metastatic cancer of the breast with history of ulcerative colitis    Review of Systems   All systems were reviewed and negative except for: Reviewed    Objective   Objective     Vitals:   Temp:  [97.3 °F (36.3 °C)-103.2 °F (39.6 °C)] 99.4 °F (37.4 °C)  Heart Rate:  [] 112  Resp:  [15-30] 16  BP: ()/() 86/50  Flow (L/min):  [3-6] 3    Physical Exam    Constitutional: Awake, alert   Eyes: PERRLA, sclerae anicteric, no conjunctival injection   HENT: NCAT, mucous membranes moist   Neck: Supple, no thyromegaly, no lymphadenopathy, trachea midline   Respiratory: Clear to auscultation bilaterally, nonlabored respirations    Cardiovascular: RRR, no murmurs, rubs, or gallops, palpable pedal pulses bilaterally   Gastrointestinal: Positive bowel sounds, soft, nontender, nondistended   Musculoskeletal: No bilateral ankle edema, no clubbing or cyanosis to extremities   Psychiatric: Appropriate affect, cooperative   Neurologic: Oriented x 3, strength symmetric in all extremities, Cranial Nerves grossly intact to confrontation, speech clear   Skin: No rashes     Result Review    Result Review:  I have personally reviewed the results from the time of this admission to 11/15/2021 07:59 EST and agree with these findings:  []  Laboratory  []  Microbiology  [x]  Radiology  []  EKG/Telemetry   []  Cardiology/Vascular   []  Pathology  []  Old records  []  Other: Reviewed most notable findings  include: Metastatic disease to the bones retroperitoneal    Assessment/Plan   Assessment / Plan     Brief Patient Summary:  Mirna Meredith is a 76 y.o. female who is to have metastatic cancer has recently been started on CDK 4 inhibitor    Active Hospital Problems:  Active Hospital Problems    Diagnosis    • Metabolic encephalopathy        Plan:   New supportive care    DVT prophylaxis:  Mechanical DVT prophylaxis orders are present.    CODE STATUS:   Code Status (Patient has no pulse and is not breathing): No CPR (Do Not Attempt to Resuscitate)  Medical Interventions (Patient has pulse or is breathing): Full Support    Disposition:  I expect patient to be discharged continue supportive care antibiotics.    Electronically signed by Jose G Grissom MD, 11/15/21, 7:59 AM EST.

## 2021-11-15 NOTE — PLAN OF CARE
Goal Outcome Evaluation:      Patient given 2L LR for hypotension requiring pressors overnight. Levophed was weaned of after fluid resuscitation. Patient became SoA and tachypneic after fluid. Duonebs and IV lasix given with improvement. Multiple liquid diarrhea bowel movements today. MRI delayed due to priority of care. Patient daughter updated on plan of care via phone.     HERMELINDO RUIZ RN

## 2021-11-15 NOTE — PLAN OF CARE
Goal Outcome Evaluation:  Plan of Care Reviewed With: patient        Progress: declining  Outcome Summary: Low b/p overnight, 70's/40's; did not come up with bolus.  Levophed drip started per ICU RN.  Pt with periods of confusion after sleeping.  Very [place].  No c/o pain.  Straight cath completed for urine culture.  Plan for MRI today.

## 2021-11-15 NOTE — SIGNIFICANT NOTE
"   11/15/21 1133   Coping/Psychosocial   Observed Emotional State calm; flat   Verbalized Emotional State acceptance  (\"I know I will die. The cancer has spread to my bones. I am at peace.\")   Trust Relationship/Rapport empathic listening provided   Additional Documentation Spiritual Care (Group)   Spiritual Care   Use of Spiritual Resources spirituality for coping, indicated strong use of   Spiritual Care Source  initiative  (daily rounding)   Spiritual Care Follow-Up will follow closely   Response to Spiritual Care emotion expressed; receptive of support; thanks expressed   Spiritual Care Interventions prayer support provided; supportive conversation provided; theological discussion provided   Spiritual Care Visit Type initial   Spiritual Care Request hospitality support  (introductions made and my role as  explained)   Receptivity to Spiritual Care visit welcomed     At time of visit pt is in ICU. Pt may benefit from Palliative Care Consult.   "

## 2021-11-15 NOTE — PROGRESS NOTES
[facility]   Hospitalist Progress Note  Date: 11/15/2021  Patient Name: Mirna Meredith  : 1945  MRN: [id number]  Date of admission: 2021      Subjective   Subjective     Chief Complaint: Confusion and weakness    Summary:   Mirna Meredith is a 76 y.o. female presents to the hospital due to confusion and weakness.  This seems to be worse over the last 1 to 2 days.  Family brought her in stating she is not herself.  Patient was recently admitted less than 1 month ago for similar symptoms which were found to be secondary to urinary tract infection.  She was treated and seemed to be having improvement but over the last 1 to 2 days had a significant decline in her ability to ambulate and has been more confused and hypersomnolent.  In the emergency department she was not febrile but had a temperature of 100.1.  She was, however tachycardic.  She received IV fluids and Rocephin in the emergency department due to urinary tract infection discovered on urinalysis in the ER.  Due to her inability to ambulate due to weakness and her metabolic encephalopathy we are asked to admit for further care.  On  patient after right ureteric stent developed fever,  tachycardic  up 140 and was put on oxygen 4 L nasal cannula in PACU while recovering.  Patient given Tylenol, IV Toradol, IV fluid bolus 3 L.  Started on Rocephin blood cultures drawn.  Transferred to unit for close observation      Interval Followup:  Patient blood pressure up now.  Overnight she dropped her blood pressure low and was started on Levophed.  Spiking temp 101.7.  Remains tachycardic.  On room air  Patient very hard of hearing.   Patient is awake alert oriented x3.   Complaining of shortness of breath and not able to breathe with good oxygen saturation on room air  No urinary complaints.  Has pure wick  No more diarrhea   Abdominal pain better  Back pain is better today.    Review of Systems   All systems were reviewed and negative  except for: Summary interval follow-up    Objective   Objective     Vitals:   Temp:  [97.3 °F (36.3 °C)-102.9 °F (39.4 °C)] 99.4 °F (37.4 °C)  Heart Rate:  [102-144] 108  Resp:  [15-30] 28  BP: ()/(42-82) 156/68  Flow (L/min):  [3] 3  Physical Exam    Constitutional: Awake and alert, no acute distress, answers simple questions .              Eyes: Patient will not open her eyes but she is verbally responsive to verbal questions              HENT: NCAT, mucous membranes dry              Neck: Supple, no thyromegaly, no lymphadenopathy, trachea midline              Respiratory: Diminished, occasional wheezing bilaterally, positive labored respirations               Cardiovascular: Tachycardia no murmurs, rubs, or gallops, palpable pedal pulses bilaterally              Gastrointestinal: Positive bowel sounds, soft, nontender, nondistended              Musculoskeletal: No bilateral ankle edema, no clubbing or cyanosis to extremities              Psychiatric: Appropriate affect, cooperative              Neurologic: Awake and alert.  Oriented x3, diminished strength equally in all extremities, Cranial Nerves not able to be evaluated              Skin: No rashes        Result Review    Result Review:  I have personally reviewed the results from the time of this admission to 11/15/2021 14:22 EST and agree with these findings:  [x]  Laboratory  [x]  Microbiology  [x]  Radiology  [x]  EKG/Telemetry   []  Cardiology/Vascular   []  Pathology  [x]  Old records  [x]  Other: Medications     Assessment/Plan   Assessment / Plan     Assessment:  · Acute kidney injury with baseline creatinine around 1.  Likely prerenal versus ATN from sepsis.  Worse  · Altered mental status - Metaboic encephalopathy secondary to above.  Resolved  · Moderate to severe right hydronephrosis.  Question distal ureteral stricture.  S/p stenting of right ureter November 14  · Ascites.  Likely malignant.  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  Acute exacerbation  · Diabetes.  Hemoglobin A1c of 8.7%  · Hypertension  · History of breast cancer with mets to colon, liver, lung and bone.  · History of C. difficile.   · Diarrhea.  Resolved C. difficile negative  · Low back pain.  DJD  · Anemia.  Chemotherapy-induced  · Thrombocytopenia.  · Hard of hearing.  · Elevated CEA  · Septic shock not present on admission, as has fever, tachycardia, lactic acidosis and elevated procalcitonin.  · S/p right ureteric stent on November 14.  Likely causing above  · Possible UTI causing sepsis  · Elevated troponin.  Doubt non-STEMI.  Likely from sepsis and tachycardia  · Hypomagnesemia           Plan:  DC Levophed.  Midodrine  Resumed IV Rocephin   IV fluid rate decreased.  IV Lasix x1.  Start nebulizer treatment  Nephrology consulted  Check urine sodium  Blood culture pending  Markedly elevated BNP  Repeat chest x-ray.  No acute finding  Resumed home beta-blocker at a smaller dose  2D echo pending  repeat UA and urine culture via straight cath pending  Heme-onc consulted as per family request.  Discussed with heme-onc holding her oral chemotherapy  Discussed with urology.  Appreciate input for cystoscopy and stent   Sliding scale insulin  Appreciate speech therapy input cleared by speech.  Resumed some of the home medications including risperidone and Singulair  CT of the head and chest x-ray negative.  CT abdomen pelvis noted.  Progression of the disease even with chemotherapy  X-ray L-spine noted.   MRI of the lumbar spine pending  PT OT.  Trend creatinine.   Continue to monitor in ICU tonight.    Discussed plan with RN and .  Patient to go to rehab since her chemo pill has been stopped.  Discussed with heme-onc.    DVT prophylaxis:  Medical and mechanical DVT prophylaxis orders are present.    CODE STATUS:   Code Status (Patient has no pulse and is not breathing): No CPR (Do Not Attempt  to Resuscitate)  Medical Interventions (Patient has pulse or is breathing): Full Support      Part of this note may be an electronic transcription/translation of spoken language to printed text using the Dragon Dictation System.     Critical care time spent 31 minutes    Electronically signed by Cesar Sweet MD, 11/15/21, 2:28 PM EST.

## 2021-11-15 NOTE — THERAPY TREATMENT NOTE
Acute Care - Physical Therapy Treatment Note   [facility]     Patient Name: Mirna Meredith  : 1945  MRN: [id number]  Today's Date: 11/15/2021      Visit Dx:     ICD-10-CM ICD-9-CM   1. Metabolic encephalopathy  G93.41 348.31   2. Urinary tract infection without hematuria, site unspecified  N39.0 599.0   3. Dysphagia, oropharyngeal  R13.12 787.22   4. Difficulty walking  R26.2 719.7   5. Decreased activities of daily living (ADL)  Z78.9 V49.89   6. Hydronephrosis due to obstruction of ureter  N13.1 591     593.4     Patient Active Problem List   Diagnosis   • Abdominal pain   • Sepsis (HCC)   • Metabolic encephalopathy   • Acute renal failure (ARF) (HCC)   • Metastatic breast cancer (HCC)     Past Medical History:   Diagnosis Date   • Cancer (HCC)     Breast with mets to colon and bones   • COPD (chronic obstructive pulmonary disease) (HCC)    • Depression    • Diabetes mellitus (HCC)    • Hyperlipidemia    • Hypertension      Past Surgical History:   Procedure Laterality Date   • CHOLECYSTECTOMY     • HYSTERECTOMY     • JOINT REPLACEMENT Bilateral     Knees   • MASTECTOMY Bilateral      PT Assessment (last 12 hours)     PT Evaluation and Treatment     Row Name 11/15/21 1447          Physical Therapy Time and Intention    Subjective Information complains of; weakness; fatigue  -DK     Document Type therapy note (daily note)  -DK     Mode of Treatment individual therapy; physical therapy  -DK     Patient Effort good  -DK     Symptoms Noted During/After Treatment fatigue  -DK     Row Name 11/15/21 1447          Cognition    Affect/Mental Status (Cognitive) WFL  -DK     Orientation Status (Cognition) oriented to; person; place; situation  -DK     Follows Commands (Cognition) WFL  -DK     Cognitive Function (Cognitive) WFL  -DK     Personal Safety Interventions nonskid shoes/slippers when out of bed; supervised activity  -DK     Row Name 11/15/21 1442          Pain Scale: Word Pre/Post-Treatment    Pain: Word  Scale, Pretreatment 0 - no pain  -DK     Posttreatment Pain Rating 0 - no pain  -     Row Name 11/15/21 1447          Bed Mobility    Bed Mobility rolling left; rolling right  -DK     Rolling Left [place] (Bed Mobility) minimum assist (75% patient effort); moderate assist (50% patient effort); 2 person assist  -DK     Rolling Right [place] (Bed Mobility) minimum assist (75% patient effort); moderate assist (50% patient effort); 2 person assist  -DK     Bed Mobility, Safety Issues decreased use of arms for pushing/pulling; decreased use of legs for bridging/pushing  -     Assistive Device (Bed Mobility) bed rails; draw sheet  -     Row Name 11/15/21 1447          Safety Issues, Functional Mobility    Safety Issues Affecting Function (Mobility) safety precaution awareness; judgment  -     Impairments Affecting Function (Mobility) balance; endurance/activity tolerance; pain; strength; shortness of breath  -     Row Name 11/15/21 1447          Motor Skills    Motor Skills therapeutic exercise  -     Coordination WFL  -     Therapeutic Exercise hip; knee; ankle  -     Additional Documentation --  No transfers beyond bed mobs.  Fatigue, multiple BM's, fever.  -     Row Name 11/15/21 1447          Hip (Therapeutic Exercise)    Hip (Therapeutic Exercise) AAROM (active assistive range of motion)  -     Hip AAROM (Therapeutic Exercise) bilateral; flexion; extension; aBduction; aDduction; supine; 10 repetitions; 2 sets  -     Row Name 11/15/21 1447          Knee (Therapeutic Exercise)    Knee (Therapeutic Exercise) AAROM (active assistive range of motion)  -     Knee AAROM (Therapeutic Exercise) bilateral; flexion; extension; supine; 10 repetitions; 2 sets  -     Row Name 11/15/21 1447          Ankle (Therapeutic Exercise)    Ankle (Therapeutic Exercise) AAROM (active assistive range of motion)  -     Ankle AAROM (Therapeutic Exercise) bilateral; dorsiflexion; plantarflexion; supine; 10  repetitions; 2 sets  -DK     Row Name             Wound 11/14/21 1149 Right vagina    Wound - Properties Group Placement Date: 11/14/21  -LB Placement Time: 1149  -LB Present on Hospital Admission: N  -LB Side: Right  -LB Location: vagina  -LB     Retired Wound - Properties Group Date first assessed: 11/14/21  -LB Time first assessed: 1149  -LB Present on Hospital Admission: N  -LB Side: Right  -LB Location: vagina  -LB     Row Name 11/15/21 1447          Plan of Care Review    Plan of Care Reviewed With patient  -DK     Progress no change  -DK     Row Name 11/15/21 1447          Positioning and Restraints    Pre-Treatment Position in bed  -DK     Post Treatment Position bed  -DK     In Bed supine; call light within reach; encouraged to call for assist; exit alarm on; side rails up x3; legs elevated  -DK     Row Name 11/15/21 1447          Therapy Assessment/Plan (PT)    Rehab Potential (PT) fair, will monitor progress closely  -DK     Criteria for Skilled Interventions Met (PT) skilled treatment is necessary  -DK     Problem List (PT) problems related to; balance; mobility; strength; pain; hearing  -DK     Activity Limitations Related to Problem List (PT) unable to ambulate safely; unable to transfer safely  -DK     Row Name 11/15/21 1447          Progress Summary (PT)    Progress Toward Functional Goals (PT) progress toward functional goals is fair  -DK           User Key  (r) = Recorded By, (t) = Taken By, (c) = Cosigned By    Initials Name Provider Type    Theodore Walker, RN Registered Nurse    Lydia Sandoval PTA Physical Therapy Assistant              Physical Therapy Education                 Title: PT OT SLP Therapies (Done)     Topic: Physical Therapy (Done)     Point: Mobility training (Done)     Learning Progress Summary           Patient Acceptance, E, VU by AC at 11/13/2021 1017    Acceptance, E,TB, VU by CS at 11/12/2021 1151                   Point: Home exercise program (Done)     Learning  Progress Summary           Patient Acceptance, E, VU by  at 11/13/2021 1017    Acceptance, E,TB, VU by  at 11/12/2021 1151                   Point: Body mechanics (Done)     Learning Progress Summary           Patient Acceptance, E, VU by  at 11/13/2021 1017    Acceptance, E,TB, VU by CS at 11/12/2021 1151                   Point: Precautions (Done)     Learning Progress Summary           Patient Acceptance, E, VU by  at 11/13/2021 1017    Acceptance, E,TB, VU by  at 11/12/2021 1151                               User Key     Initials Effective Dates Name Provider Type Discipline     06/16/21 -  Nga Sharma, PHILLIP Occupational Therapist OT     04/25/21 -  Annabella Guo, MARILIA Physical Therapist PT              PT Recommendation and Plan  Planned Therapy Interventions (PT): balance training, bed mobility training, gait training, strengthening, transfer training  Therapy Frequency (PT): daily  Progress Summary (PT)  Progress Toward Functional Goals (PT): progress toward functional goals is fair  Plan of Care Reviewed With: patient  Progress: no change   Outcome Measures     Row Name 11/15/21 1447 11/14/21 0900          How much help from another person do you currently need...    Turning from your back to your side while in flat bed without using bedrails? 2  -DK 3  -CS     Moving from lying on back to sitting on the side of a flat bed without bedrails? 2  -DK 3  -CS     Moving to and from a bed to a chair (including a wheelchair)? 2  -DK 3  -CS     Standing up from a chair using your arms (e.g., wheelchair, bedside chair)? 2  -DK 3  -CS     Climbing 3-5 steps with a railing? 1  -DK 2  -CS     To walk in hospital room? 2  -DK 3  -CS     AM-PAC 6 Clicks Score (PT) 11 -DK 17  -CS            Functional Assessment    Outcome Measure Options AM-PAC 6 Clicks Basic Mobility (PT)  -DK AM-PAC 6 Clicks Basic Mobility (PT)  -CS           User Key  (r) = Recorded By, (t) = Taken By, (c) = Cosigned By    Initials Name  Provider Type    Lydia Sandoval PTA Physical Therapy Assistant    Geovanni Escalante PTA Physical Therapy Assistant                 Time Calculation:    PT Charges     Row Name 11/15/21 1452             Time Calculation    PT Received On 11/15/21  -DK      PT Goal Re-Cert Due Date 11/21/21  -DK              Timed Charges    89173 - PT Therapeutic Exercise Minutes 14  -DK      28729 - PT Therapeutic Activity Minutes 12  -DK              Total Minutes    Timed Charges Total Minutes 26  -DK       Total Minutes 26  -DK            User Key  (r) = Recorded By, (t) = Taken By, (c) = Cosigned By    Initials Name Provider Type    Lydia Sandoval PTA Physical Therapy Assistant              Therapy Charges for Today     Code Description Service Date Service Provider Modifiers Qty    43917125626 HC PT THER PROC EA 15 MIN 11/15/2021 Lydia Freeman PTA GP 1    00519988597 HC PT THERAPEUTIC ACT EA 15 MIN 11/15/2021 Lydia Freeman PTA GP 1          PT G-Codes  Outcome Measure Options: AM-PAC 6 Clicks Basic Mobility (PT)  AM-PAC 6 Clicks Score (PT): 11  AM-PAC 6 Clicks Score (OT): 21    Lydia Freeman PTA  11/15/2021

## 2021-11-16 ENCOUNTER — APPOINTMENT (OUTPATIENT)
Dept: MRI IMAGING | Facility: HOSPITAL | Age: 76
End: 2021-11-16

## 2021-11-16 LAB
ALBUMIN SERPL-MCNC: 2.5 G/DL (ref 3.5–5.2)
ALBUMIN/GLOB SERPL: 0.8 G/DL
ALP SERPL-CCNC: 152 U/L (ref 39–117)
ALT SERPL W P-5'-P-CCNC: 18 U/L (ref 1–33)
ANION GAP SERPL CALCULATED.3IONS-SCNC: 12 MMOL/L (ref 5–15)
ANISOCYTOSIS BLD QL: NORMAL
AST SERPL-CCNC: 51 U/L (ref 1–32)
BACTERIA SPEC AEROBE CULT: NO GROWTH
BACTERIA SPEC AEROBE CULT: NORMAL
BACTERIA SPEC AEROBE CULT: NORMAL
BILIRUB SERPL-MCNC: 0.4 MG/DL (ref 0–1.2)
BUN SERPL-MCNC: 23 MG/DL (ref 8–23)
BUN/CREAT SERPL: 11.5 (ref 7–25)
CALCIUM SPEC-SCNC: 8.6 MG/DL (ref 8.6–10.5)
CHLORIDE SERPL-SCNC: 95 MMOL/L (ref 98–107)
CO2 SERPL-SCNC: 24 MMOL/L (ref 22–29)
CREAT SERPL-MCNC: 2 MG/DL (ref 0.57–1)
DEPRECATED RDW RBC AUTO: 55.3 FL (ref 37–54)
EOSINOPHIL # BLD MANUAL: 0.04 10*3/MM3 (ref 0–0.4)
EOSINOPHIL NFR BLD MANUAL: 1 % (ref 0.3–6.2)
ERYTHROCYTE [DISTWIDTH] IN BLOOD BY AUTOMATED COUNT: 18.1 % (ref 12.3–15.4)
GFR SERPL CREATININE-BSD FRML MDRD: 24 ML/MIN/1.73
GLOBULIN UR ELPH-MCNC: 3.1 GM/DL
GLUCOSE BLDC GLUCOMTR-MCNC: 120 MG/DL (ref 70–99)
GLUCOSE BLDC GLUCOMTR-MCNC: 129 MG/DL (ref 70–99)
GLUCOSE BLDC GLUCOMTR-MCNC: 151 MG/DL (ref 70–99)
GLUCOSE SERPL-MCNC: 122 MG/DL (ref 65–99)
HCT VFR BLD AUTO: 23.9 % (ref 34–46.6)
HGB BLD-MCNC: 8 G/DL (ref 12–15.9)
LYMPHOCYTES # BLD MANUAL: 0.76 10*3/MM3 (ref 0.7–3.1)
LYMPHOCYTES NFR BLD MANUAL: 21 % (ref 19.6–45.3)
LYMPHOCYTES NFR BLD MANUAL: 5 % (ref 5–12)
MAGNESIUM SERPL-MCNC: 1.8 MG/DL (ref 1.6–2.4)
MCH RBC QN AUTO: 28.6 PG (ref 26.6–33)
MCHC RBC AUTO-ENTMCNC: 33.5 G/DL (ref 31.5–35.7)
MCV RBC AUTO: 85.4 FL (ref 79–97)
MONOCYTES # BLD AUTO: 0.18 10*3/MM3 (ref 0.1–0.9)
NEUTROPHILS # BLD AUTO: 2.65 10*3/MM3 (ref 1.7–7)
NEUTROPHILS NFR BLD MANUAL: 70 % (ref 42.7–76)
NEUTS BAND NFR BLD MANUAL: 3 % (ref 0–5)
PLATELET # BLD AUTO: 37 10*3/MM3 (ref 140–450)
POTASSIUM SERPL-SCNC: 3.9 MMOL/L (ref 3.5–5.2)
PROCALCITONIN SERPL-MCNC: 90.16 NG/ML (ref 0–0.25)
PROT SERPL-MCNC: 5.6 G/DL (ref 6–8.5)
RBC # BLD AUTO: 2.8 10*6/MM3 (ref 3.77–5.28)
SCAN SLIDE: NORMAL
SMALL PLATELETS BLD QL SMEAR: NORMAL
SODIUM SERPL-SCNC: 131 MMOL/L (ref 136–145)
WBC # BLD AUTO: 3.63 10*3/MM3 (ref 3.4–10.8)
WBC MORPH BLD: NORMAL

## 2021-11-16 PROCEDURE — 25010000002 HEPARIN (PORCINE) PER 1000 UNITS: Performed by: PHYSICIAN ASSISTANT

## 2021-11-16 PROCEDURE — 72148 MRI LUMBAR SPINE W/O DYE: CPT

## 2021-11-16 PROCEDURE — 84145 PROCALCITONIN (PCT): CPT | Performed by: INTERNAL MEDICINE

## 2021-11-16 PROCEDURE — 25010000002 CEFTRIAXONE PER 250 MG: Performed by: INTERNAL MEDICINE

## 2021-11-16 PROCEDURE — 82962 GLUCOSE BLOOD TEST: CPT

## 2021-11-16 PROCEDURE — 94799 UNLISTED PULMONARY SVC/PX: CPT

## 2021-11-16 PROCEDURE — 99233 SBSQ HOSP IP/OBS HIGH 50: CPT | Performed by: INTERNAL MEDICINE

## 2021-11-16 PROCEDURE — 85007 BL SMEAR W/DIFF WBC COUNT: CPT | Performed by: UROLOGY

## 2021-11-16 PROCEDURE — 63710000001 INSULIN LISPRO (HUMAN) PER 5 UNITS: Performed by: UROLOGY

## 2021-11-16 PROCEDURE — 85025 COMPLETE CBC W/AUTO DIFF WBC: CPT | Performed by: UROLOGY

## 2021-11-16 PROCEDURE — 83735 ASSAY OF MAGNESIUM: CPT | Performed by: INTERNAL MEDICINE

## 2021-11-16 PROCEDURE — 25010000002 ONDANSETRON PER 1 MG: Performed by: UROLOGY

## 2021-11-16 PROCEDURE — 25010000002 HEPARIN (PORCINE) PER 1000 UNITS: Performed by: INTERNAL MEDICINE

## 2021-11-16 PROCEDURE — 80053 COMPREHEN METABOLIC PANEL: CPT | Performed by: INTERNAL MEDICINE

## 2021-11-16 RX ADMIN — FOLIC ACID 1 MG: 1 TABLET ORAL at 08:41

## 2021-11-16 RX ADMIN — ARFORMOTEROL TARTRATE 15 MCG: 15 SOLUTION RESPIRATORY (INHALATION) at 07:33

## 2021-11-16 RX ADMIN — SERTRALINE 25 MG: 25 TABLET, FILM COATED ORAL at 08:41

## 2021-11-16 RX ADMIN — MUPIROCIN: 20 OINTMENT TOPICAL at 00:19

## 2021-11-16 RX ADMIN — SENNOSIDES AND DOCUSATE SODIUM 2 TABLET: 50; 8.6 TABLET ORAL at 20:31

## 2021-11-16 RX ADMIN — ONDANSETRON 4 MG: 2 INJECTION INTRAMUSCULAR; INTRAVENOUS at 09:26

## 2021-11-16 RX ADMIN — SODIUM CHLORIDE, PRESERVATIVE FREE 10 ML: 5 INJECTION INTRAVENOUS at 08:46

## 2021-11-16 RX ADMIN — RISPERIDONE 3 MG: 3 TABLET, FILM COATED ORAL at 20:32

## 2021-11-16 RX ADMIN — IPRATROPIUM BROMIDE AND ALBUTEROL SULFATE 3 ML: 2.5; .5 SOLUTION RESPIRATORY (INHALATION) at 00:49

## 2021-11-16 RX ADMIN — MEMANTINE 10 MG: 10 TABLET ORAL at 20:32

## 2021-11-16 RX ADMIN — RDII 250 MG CAPSULE 250 MG: at 20:32

## 2021-11-16 RX ADMIN — INSULIN LISPRO 2 UNITS: 100 INJECTION, SOLUTION INTRAVENOUS; SUBCUTANEOUS at 12:00

## 2021-11-16 RX ADMIN — FERROUS SULFATE TAB 325 MG (65 MG ELEMENTAL FE) 325 MG: 325 (65 FE) TAB at 08:42

## 2021-11-16 RX ADMIN — RDII 250 MG CAPSULE 250 MG: at 08:40

## 2021-11-16 RX ADMIN — IPRATROPIUM BROMIDE AND ALBUTEROL SULFATE 3 ML: 2.5; .5 SOLUTION RESPIRATORY (INHALATION) at 07:33

## 2021-11-16 RX ADMIN — HEPARIN SODIUM 5000 UNITS: 5000 INJECTION, SOLUTION INTRAVENOUS; SUBCUTANEOUS at 06:48

## 2021-11-16 RX ADMIN — HEPARIN SODIUM 5000 UNITS: 5000 INJECTION, SOLUTION INTRAVENOUS; SUBCUTANEOUS at 13:18

## 2021-11-16 RX ADMIN — HEPARIN SODIUM 5000 UNITS: 5000 INJECTION, SOLUTION INTRAVENOUS; SUBCUTANEOUS at 22:20

## 2021-11-16 RX ADMIN — MONTELUKAST SODIUM 10 MG: 10 TABLET, FILM COATED ORAL at 08:45

## 2021-11-16 RX ADMIN — CEFTRIAXONE SODIUM 1 G: 1 INJECTION, SOLUTION INTRAVENOUS at 08:46

## 2021-11-16 RX ADMIN — BUDESONIDE 0.5 MG: 0.5 INHALANT RESPIRATORY (INHALATION) at 07:33

## 2021-11-16 RX ADMIN — FAMOTIDINE 20 MG: 20 TABLET, FILM COATED ORAL at 08:45

## 2021-11-16 RX ADMIN — TRAMADOL HYDROCHLORIDE 50 MG: 50 TABLET ORAL at 20:32

## 2021-11-16 RX ADMIN — MEMANTINE 10 MG: 10 TABLET ORAL at 08:40

## 2021-11-16 RX ADMIN — METOPROLOL TARTRATE 25 MG: 25 TABLET, FILM COATED ORAL at 20:32

## 2021-11-16 RX ADMIN — ROSUVASTATIN CALCIUM 10 MG: 5 TABLET, FILM COATED ORAL at 20:32

## 2021-11-16 RX ADMIN — METOPROLOL TARTRATE 25 MG: 25 TABLET, FILM COATED ORAL at 08:42

## 2021-11-16 RX ADMIN — SODIUM CHLORIDE, PRESERVATIVE FREE 10 ML: 5 INJECTION INTRAVENOUS at 20:33

## 2021-11-16 NOTE — PROGRESS NOTES
[facility]     Progress Note    Patient Name: Mirna Meredith  : 1945  MRN: [id number]  Primary Care Physician:  Pan Pacheco MD  Date of admission: 2021    Subjective   Patient is fully awake alert responsive.Hemodynamically stable  Review of Systems  Constitutional:        Weakness tiredness fatigue  Eyes:                       No blurry vision, eye discharge, eye irritation, eye pain  HEENT:                   No acute hair loss, earache and discharge, nasal congestion or discharge, sore throat, postnasal drip  Respiratory:           No shortness of breath coughing sputum production wheezing hemoptysis pleuritic chest pain  Cardiovascular:     No chest pain, orthopnea, PND, dizziness, palpitation, lower extremity edema  Gastrointestinal:   No nausea vomiting diarrhea abdominal pain constipation  Genitourinary:       No urinary incontinence, hesitancy, frequency, urgency, dysuria  Hematologic:         No bruising, bleeding, pallor, lymphadenopathy  Endocrine:            No coldness, hot flashes, polyuria, abnormal hair growth  Musculoskeletal:    No body pains, aches, arthritic pains, muscle pain ,muscle wasting  Psychiatric:          No low or high mood, anxiety, hallucinations, delusions  Skin.                      No rash, ulcers, bruising, itching  Neurological:        No confusion, headache, focal weakness, numbness, dysphasia    Objective   Objective     Vitals:   Temp:  [98.3 °F (36.8 °C)-99.2 °F (37.3 °C)] 98.3 °F (36.8 °C)  Heart Rate:  [100-111] 101  Resp:  [18-28] 20  BP: (117-168)/(54-74) 125/54  Flow (L/min):  [3] 3  Physical Exam    Constitutional: Awake, alert responsive, conversant, no obvious distress              Psychiatric:  Appropriate affect, cooperative   Neurologic:  Awake alert ,oriented x 3, strength symmetric in all extremities, Cranial Nerves grossly intact to confrontation, speech clear   Eyes:   PERRLA, sclerae anicteric, no conjunctival  injection   HEENT:  Moist mucous membranes, no nasal or eye discharge, no throat congestion   Neck:   Supple, no thyromegaly, no lymphadenopathy, trachea midline, no elevated JVD   Respiratory:  Clear to auscultation bilaterally, nonlabored respirations    Cardiovascular: RRR, no murmurs, rubs, or gallops, palpable pedal pulses bilaterally, No bilateral ankle edema   Gastrointestinal: Positive bowel sounds, soft, nontender, nondistended, no organomegaly   Musculoskeletal:  No clubbing or cyanosis to extremities,muscle wasting, joint swelling, muscle weakness             Skin:                      No rashes, bruising, skin ulcers, petechiae or ecchymosis    Result Review    Result Review:  I have personally reviewed the results from the time of this admission to 11/16/2021 11:53 EST and agree with these findings:  []  Laboratory  []  Microbiology  []  Radiology  []  EKG/Telemetry   []  Cardiology/Vascular   []  Pathology  []  Old records  []  Other:    Assessment/Plan   Assessment / Plan       Active Hospital Problems:  Active Hospital Problems    Diagnosis    • Acute renal failure (ARF) (HCC)    • Metastatic breast cancer (HCC)    • Metabolic encephalopathy        Plan:   Continue IV fluids  We expect continuous improvement in renal function       Electronically signed by Scarlett Bautsita MD, 11/16/21, 11:53 AM EST.

## 2021-11-16 NOTE — PROGRESS NOTES
[facility]   Hospitalist Progress Note  Date: 2021  Patient Name: Mirna Meredith  : 1945  MRN: [id number]  Date of admission: 2021      Subjective   Subjective     Chief Complaint: Confusion and weakness    Summary:   Mirna Meredith is a 76 y.o. female presents to the hospital due to confusion and weakness.  This seems to be worse over the last 1 to 2 days.  Family brought her in stating she is not herself.  Patient was recently admitted less than 1 month ago for similar symptoms which were found to be secondary to urinary tract infection.  She was treated and seemed to be having improvement but over the last 1 to 2 days had a significant decline in her ability to ambulate and has been more confused and hypersomnolent.  In the emergency department she was not febrile but had a temperature of 100.1.  She was, however tachycardic.  She received IV fluids and Rocephin in the emergency department due to urinary tract infection discovered on urinalysis in the ER.  Due to her inability to ambulate due to weakness and her metabolic encephalopathy we are asked to admit for further care.  On  patient after right ureteric stent developed fever,  tachycardic  up 140 and was put on oxygen 4 L nasal cannula in PACU while recovering.  Patient given Tylenol, IV Toradol, IV fluid bolus 3 L.  Started on Rocephin blood cultures drawn.  Transferred to unit for close observation      Interval Followup:  No acute events overnight, blood pressure remained stable, continues on gentle IV hydration    Review of Systems  Denies nausea nausea, positive diarrhea  No chest pain palpitations  No fever no chills    Objective   Objective     Vitals:   Temp:  [98.3 °F (36.8 °C)-99.2 °F (37.3 °C)] 98.3 °F (36.8 °C)  Heart Rate:  [] 92  Resp:  [18-24] 20  BP: (115-140)/(54-68) 133/63  Flow (L/min):  [3] 3    Physical Exam   General appearance: NAD, conversant, alert  Eyes: anicteric sclerae, moist  conjunctivae; no lid-lag; PERRLA  HENT: Atraumatic; oropharynx clear with moist mucous membranes and no mucosal ulcerations; normal hard and soft palate  Neck: Trachea midline; FROM, supple, no thyromegaly or lymphadenopathy  Lungs: CTA, with normal respiratory effort and no intercostal retractions  CV: Regular Rate and Rhythm, no Murmurs, Rubs, or Gallops   Abdomen: Soft, non-tender; no masses or Heptosplenomegally  Extremities: No peripheral edema or extremity lymphadenopathy  Skin: Normal temperature, turgor and texture; no rash, ulcers or subcutaneous nodules  Psych: Appropriate affect, alert and oriented to person, place and time  Neuro: CN II - XII intact no motor deficits, no sensory defecits, globally weak    Result Review    Result Review:  I have personally reviewed the results from the time of this admission to 11/16/2021 14:56 EST and agree with these findings:  [x]  Laboratory  [x]  Microbiology  [x]  Radiology  [x]  EKG/Telemetry   []  Cardiology/Vascular   []  Pathology  [x]  Old records  [x]  Other: Medications     Assessment/Plan   Assessment / Plan     Assessment:  · Acute kidney injury with baseline creatinine around 1.  Likely prerenal versus ATN from sepsis.  Stable  · Altered mental status - Metaboic encephalopathy secondary to above.  Resolved  · Moderate to severe right hydronephrosis.  Question distal ureteral stricture.  S/p stenting of right ureter November 14  · Ascites.  Likely malignant.  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  Acute exacerbation  · Diabetes.  Hemoglobin A1c of 8.7%  · Hypertension  · History of breast cancer with mets to colon, liver, lung and bone.  · History of C. Difficile, repeat negative  · Diarrhea.  Stable  · Low back pain.  DJD  · Anemia.  Chemotherapy-induced  · Thrombocytopenia.  · Hard of hearing.  · Elevated CEA  · Septic shock not present on admission, as has fever, tachycardia,  lactic acidosis and elevated procalcitonin.  · S/p right ureteric stent on November 14.  Likely causing above  · Possible UTI causing sepsis  · Elevated troponin.  Doubt non-STEMI.  Likely from sepsis and tachycardia  · Hypomagnesemia           Plan:  Off Levophed and midodrine  Resumed IV Rocephin   Continue IV fluid per nephrology  Continue nebulizer treatment  Nephrology consulted, appreciate their recommendations  Follow-up blood culture  Markedly elevated BNP, continue to monitor volume status  Repeat chest x-ray.  No acute finding  Resumed home beta-blocker at a smaller dose  2D echo pending  repeat UA and urine culture via straight cath pending  Heme-onc consulted as per family request.  Discussed with heme-onc holding her oral chemotherapy  Discussed with urology.  Appreciate input for cystoscopy and stent   Continue sliding scale insulin  Appreciate speech therapy input cleared by speech.  Resumed some of the home medications including risperidone and Singulair  CT of the head and chest x-ray negative.  CT abdomen pelvis noted.  Progression of the disease even with chemotherapy  X-ray L-spine noted.   MRI of the spine with osseous metastatic disease, severe stenosis  PT OT.  Trend creatinine.   Continue to monitor in ICU tonight.    Telemetry: Reviewed, sinus    Reviewed patients labs and imaging, and discussed with patient and nurse at bedside.    DVT prophylaxis:  Medical and mechanical DVT prophylaxis orders are present.    CODE STATUS:   Code Status (Patient has no pulse and is not breathing): No CPR (Do Not Attempt to Resuscitate)  Medical Interventions (Patient has pulse or is breathing): Full Support      Part of this note may be an electronic transcription/translation of spoken language to printed text using the Dragon Dictation System.       Electronically signed by Arias Dyer MD, 11/16/21, 2:57 PM EST.

## 2021-11-16 NOTE — PLAN OF CARE
Goal Outcome Evaluation:              Outcome Summary: VSS. Transfer from ICU. No complaints at this time. MRI done today.

## 2021-11-16 NOTE — PROGRESS NOTES
[facility]     Progress Note    Patient Name: Mirna Meredith  : 1945  MRN: [id number]  Primary Care Physician:  Pan Pacheco MD  Date of admission: 2021    Subjective   Subjective     Chief Complaint: Patient was admitted with confusion probably encephalopathy she has had a stent inserted is back to her back to baseline she has had chronic diarrhea because of ulcerative colitis has no other specific new complaints today she probably did not have any cardiac event    HPI:  Patient Reports patient stable alert oriented not in any acute distress is back to her      Review of Systems   All systems were reviewed and negative except for: Reviewed    Objective   Objective     Vitals:   Temp:  [98.5 °F (36.9 °C)-99.2 °F (37.3 °C)] 99 °F (37.2 °C)  Heart Rate:  [100-122] 101  Resp:  [18-28] 20  BP: (116-168)/(51-74) 125/54  Flow (L/min):  [3] 3    Physical Exam    Constitutional: Awake, alert   Eyes: PERRLA, sclerae anicteric, no conjunctival injection   HENT: NCAT, mucous membranes moist   Neck: Supple, no thyromegaly, no lymphadenopathy, trachea midline   Respiratory: Clear to auscultation bilaterally, nonlabored respirations    Cardiovascular: RRR, no murmurs, rubs, or gallops, palpable pedal pulses bilaterally   Gastrointestinal: Positive bowel sounds, soft, nontender, nondistended   Musculoskeletal: No bilateral ankle edema, no clubbing or cyanosis to extremities   Psychiatric: Appropriate affect, cooperative   Neurologic: Oriented x 3, strength symmetric in all extremities, Cranial Nerves grossly intact to confrontation, speech clear   Skin: No rashes     Result Review    Result Review:  I have personally reviewed the results from the time of this admission to 2021 08:22 EST and agree with these findings:  [x]  Laboratory  []  Microbiology  []  Radiology  []  EKG/Telemetry   []  Cardiology/Vascular   []  Pathology  []  Old records  []  Other: Renal failure  Most notable findings  include: Reviewed    Assessment/Plan   Assessment / Plan     Brief Patient Summary:  Mirna Meredith is a 76 y.o. female who knitted because of confusion hepatic encephalopathy but she is back to her normal self she has had a stent inserted because of the hydronephrosis    Active Hospital Problems:  Active Hospital Problems    Diagnosis    • Acute renal failure (ARF) (HCC)    • Metastatic breast cancer (HCC)    • Metabolic encephalopathy        Plan:   As per primary care physician    DVT prophylaxis:  Medical and mechanical DVT prophylaxis orders are present.    CODE STATUS:   Code Status (Patient has no pulse and is not breathing): No CPR (Do Not Attempt to Resuscitate)  Medical Interventions (Patient has pulse or is breathing): Full Support    Disposition:  I expect patient to be discharged patient appears to be back to her normal self.    Electronically signed by Jose G Grissom MD, 11/16/21, 8:22 AM EST.

## 2021-11-16 NOTE — PROGRESS NOTES
Pulmonary / Critical Care Progress Note      Patient Name: Mirna Meredith  : 1945  MRN: [id number]  Attending:  Arias Dyer MD   Date of admission: 2021    Subjective   Subjective   Patient is being followed by critical care due to urosepsis episode hydronephrosis likely result of extrinsic compression of the ureter by renal secondary metastatic breast cancer.    Consulted above report recently treated with antibiotics after stent was deployed by urology.    Overnight events        Objective      Data reviewed at location 0.230 236 PaCO2 is 32%    Chest x-ray shows position of her left subclavian central line with bilateral clips no nodules or consolidations surgical clips are seen 100 in the hemithorax  Objective     Vitals:   Vital signs for last 24 hours:  Temp:  [98.5 °F (36.9 °C)-99.2 °F (37.3 °C)] 98.5 °F (36.9 °C)  Heart Rate:  [100-122] 102  Resp:  [18-28] 20  BP: ()/(51-74) 122/60    Intake/Output last 3 shifts:  I/O last 3 completed shifts:  In: 5199 [I.V.:4099; IV Piggyback:1100]  Out: 1225 [Urine:1225]  Intake/Output this shift:  No intake/output data recorded.    Vent settings for last 24 hours:              Physical Exam               Result Review    Result Review:  I have personally reviewed the results from the time of this admission to 2021 07:42 EST and agree with these findings:  [x]  Laboratory  [x]  Microbiology  [x]  Radiology  []  EKG/Telemetry   [x]  Cardiology/Vascular   []  Pathology  [x]  Old records  []  Other:  Most notable findings include:         Assessment/Plan   Assessment / Plan     Active Hospital Problems:  Active Hospital Problems    Diagnosis    • Acute renal failure (ARF) (HCC)    • Metastatic breast cancer (HCC)    • Metabolic encephalopathy          Assessment and plan by system follows:         DVT prophylaxis:  Medical and mechanical DVT prophylaxis orders are present.    CODE STATUS:   Code Status (Patient has no pulse and is not  breathing): No CPR (Do Not Attempt to Resuscitate)  Medical Interventions (Patient has pulse or is breathing): Full Support          The patient is critically ill. Multidisciplinary bedside critical care rounds were performed with nursing staff, respiratory therapy, pharmacy, nutritional services, social work. I have personally reviewed the chart, labs and any pertinent imaging available.        Kev Monte MD PhD  Board Certified in Critical Care  21, 7:42 AM EST         Pulmonary / Critical Care Progress Note      Patient Name: Mirna Meredith  : 1945  MRN: [id number]  Attending:  Arias Dyer MD   Date of admission: 2021    Subjective   Subjective   Patient is being followed by critical care due to        Overnight events        Objective   Objective     Vitals:   Vital signs for last 24 hours:  Temp:  [98.5 °F (36.9 °C)-99.2 °F (37.3 °C)] 98.5 °F (36.9 °C)  Heart Rate:  [100-122] 102  Resp:  [18-28] 20  BP: ()/(51-74) 122/60    Intake/Output last 3 shifts:  I/O last 3 completed shifts:  In: 5199 [I.V.:4099; IV Piggyback:1100]  Out: 1225 [Urine:1225]  Intake/Output this shift:  No intake/output data recorded.    Vent settings for last 24 hours:              Physical Exam               Result Review    Result Review:  I have personally reviewed the results from the time of this admission to 2021 07:42 EST and agree with these findings:  [x]  Laboratory  [x]  Microbiology  [x]  Radiology  []  EKG/Telemetry   [x]  Cardiology/Vascular   []  Pathology  [x]  Old records  []  Other:  Most notable findings include:         Assessment/Plan   Assessment / Plan     Active Hospital Problems:  Active Hospital Problems    Diagnosis    • Acute renal failure (ARF) (HCC)    • Metastatic breast cancer (HCC)    • Metabolic encephalopathy          Assessment and plan by system follows:         DVT prophylaxis:  Medical and mechanical DVT prophylaxis orders are present.    CODE STATUS:   Code  Status (Patient has no pulse and is not breathing): No CPR (Do Not Attempt to Resuscitate)  Medical Interventions (Patient has pulse or is breathing): Full Support          The patient is critically ill. Multidisciplinary bedside critical care rounds were performed with nursing staff, respiratory therapy, pharmacy, nutritional services, social work. I have personally reviewed the chart, labs and any pertinent imaging available.        Kev Monte MD PhD  Board Certified in Critical Care  21, 7:42 AM EST         Pulmonary / Critical Care Progress Note      Patient Name: Mirna Meredith  : 1945  MRN: [id number]  Attending:  Arias Dyer MD   Date of admission: 2021    Subjective   Subjective   Patient is being followed by critical care due to        Overnight events        Objective   Objective     Vitals:   Vital signs for last 24 hours:  Temp:  [98.5 °F (36.9 °C)-99.2 °F (37.3 °C)] 98.5 °F (36.9 °C)  Heart Rate:  [100-122] 102  Resp:  [18-28] 20  BP: ()/(51-74) 122/60    Intake/Output last 3 shifts:  I/O last 3 completed shifts:  In: 5199 [I.V.:4099; IV Piggyback:1100]  Out: 1225 [Urine:1225]  Intake/Output this shift:  No intake/output data recorded.    Vent settings for last 24 hours:              Physical Exam               Result Review    Result Review:  I have personally reviewed the results from the time of this admission to 2021 07:43 EST and agree with these findings:  [x]  Laboratory  [x]  Microbiology  [x]  Radiology  []  EKG/Telemetry   [x]  Cardiology/Vascular   []  Pathology  [x]  Old records  []  Other:  Most notable findings include:         Assessment/Plan   Assessment / Plan     Active Hospital Problems:  Active Hospital Problems    Diagnosis    • Acute renal failure (ARF) (HCC)    • Metastatic breast cancer (HCC)    • Metabolic encephalopathy          Assessment and plan by system follows:         DVT prophylaxis:  Medical and mechanical DVT prophylaxis orders  are present.    CODE STATUS:   Code Status (Patient has no pulse and is not breathing): No CPR (Do Not Attempt to Resuscitate)  Medical Interventions (Patient has pulse or is breathing): Full Support          The patient is critically ill. Multidisciplinary bedside critical care rounds were performed with nursing staff, respiratory therapy, pharmacy, nutritional services, social work. I have personally reviewed the chart, labs and any pertinent imaging available.        Kev Monte MD PhD  Board Certified in Critical Care  21, 7:43 AM EST         Pulmonary / Critical Care Progress Note      Patient Name: Mirna Meredith  : 1945  MRN: [id number]  Attending:  Arias Dyer MD   Date of admission: 2021    Subjective   Subjective   Patient is being followed by critical care due to        Overnight events        Objective   Objective     Vitals:   Vital signs for last 24 hours:  Temp:  [98.5 °F (36.9 °C)-99.2 °F (37.3 °C)] 98.5 °F (36.9 °C)  Heart Rate:  [100-122] 102  Resp:  [18-28] 20  BP: ()/(51-74) 122/60    Intake/Output last 3 shifts:  I/O last 3 completed shifts:  In: 5199 [I.V.:4099; IV Piggyback:1100]  Out: 1225 [Urine:1225]  Intake/Output this shift:  No intake/output data recorded.    Vent settings for last 24 hours:              Physical Exam               Result Review    Result Review:  I have personally reviewed the results from the time of this admission to 2021 07:43 EST and agree with these findings:  [x]  Laboratory  [x]  Microbiology  [x]  Radiology  []  EKG/Telemetry   [x]  Cardiology/Vascular   []  Pathology  [x]  Old records  []  Other:  Most notable findings include:         Assessment/Plan   Assessment / Plan     Active Hospital Problems:  Active Hospital Problems    Diagnosis    • Acute renal failure (ARF) (HCC)    • Metastatic breast cancer (HCC)    • Metabolic encephalopathy          Assessment and plan by system follows:         DVT prophylaxis:  Medical  and mechanical DVT prophylaxis orders are present.    CODE STATUS:   Code Status (Patient has no pulse and is not breathing): No CPR (Do Not Attempt to Resuscitate)  Medical Interventions (Patient has pulse or is breathing): Full Support          The patient is critically ill. Multidisciplinary bedside critical care rounds were performed with nursing staff, respiratory therapy, pharmacy, nutritional services, social work. I have personally reviewed the chart, labs and any pertinent imaging available.        Kev Monte MD PhD  Board Certified in Critical Care  11/16/21, 7:43 AM EST

## 2021-11-17 PROBLEM — D61.818 PANCYTOPENIA (HCC): Status: ACTIVE | Noted: 2021-11-17

## 2021-11-17 PROBLEM — K52.9 CHRONIC DIARRHEA: Status: ACTIVE | Noted: 2021-11-17

## 2021-11-17 LAB
ALBUMIN SERPL-MCNC: 2.5 G/DL (ref 3.5–5.2)
ALBUMIN SERPL-MCNC: 2.5 G/DL (ref 3.5–5.2)
ALBUMIN/GLOB SERPL: 0.8 G/DL
ALP SERPL-CCNC: 148 U/L (ref 39–117)
ALT SERPL W P-5'-P-CCNC: 17 U/L (ref 1–33)
ANION GAP SERPL CALCULATED.3IONS-SCNC: 11.4 MMOL/L (ref 5–15)
ANION GAP SERPL CALCULATED.3IONS-SCNC: 11.4 MMOL/L (ref 5–15)
AST SERPL-CCNC: 42 U/L (ref 1–32)
BACTERIA UR QL AUTO: ABNORMAL /HPF
BASOPHILS # BLD AUTO: 0.05 10*3/MM3 (ref 0–0.2)
BASOPHILS NFR BLD AUTO: 0.9 % (ref 0–1.5)
BILIRUB SERPL-MCNC: 0.3 MG/DL (ref 0–1.2)
BILIRUB UR QL STRIP: NEGATIVE
BUN SERPL-MCNC: 23 MG/DL (ref 8–23)
BUN SERPL-MCNC: 23 MG/DL (ref 8–23)
BUN/CREAT SERPL: 13 (ref 7–25)
BUN/CREAT SERPL: 13 (ref 7–25)
CALCIUM SPEC-SCNC: 9.2 MG/DL (ref 8.6–10.5)
CALCIUM SPEC-SCNC: 9.2 MG/DL (ref 8.6–10.5)
CHLORIDE SERPL-SCNC: 100 MMOL/L (ref 98–107)
CHLORIDE SERPL-SCNC: 100 MMOL/L (ref 98–107)
CLARITY UR: CLEAR
CO2 SERPL-SCNC: 23.6 MMOL/L (ref 22–29)
CO2 SERPL-SCNC: 23.6 MMOL/L (ref 22–29)
COLOR UR: YELLOW
CREAT SERPL-MCNC: 1.77 MG/DL (ref 0.57–1)
CREAT SERPL-MCNC: 1.77 MG/DL (ref 0.57–1)
DEPRECATED RDW RBC AUTO: 59.2 FL (ref 37–54)
EOSINOPHIL # BLD AUTO: 0.03 10*3/MM3 (ref 0–0.4)
EOSINOPHIL NFR BLD AUTO: 0.5 % (ref 0.3–6.2)
ERYTHROCYTE [DISTWIDTH] IN BLOOD BY AUTOMATED COUNT: 18.4 % (ref 12.3–15.4)
GFR SERPL CREATININE-BSD FRML MDRD: 28 ML/MIN/1.73
GFR SERPL CREATININE-BSD FRML MDRD: 28 ML/MIN/1.73
GLOBULIN UR ELPH-MCNC: 3.3 GM/DL
GLUCOSE BLDC GLUCOMTR-MCNC: 104 MG/DL (ref 70–99)
GLUCOSE BLDC GLUCOMTR-MCNC: 107 MG/DL (ref 70–99)
GLUCOSE BLDC GLUCOMTR-MCNC: 140 MG/DL (ref 70–99)
GLUCOSE BLDC GLUCOMTR-MCNC: 172 MG/DL (ref 70–99)
GLUCOSE SERPL-MCNC: 135 MG/DL (ref 65–99)
GLUCOSE SERPL-MCNC: 135 MG/DL (ref 65–99)
GLUCOSE UR STRIP-MCNC: NEGATIVE MG/DL
HCT VFR BLD AUTO: 27.1 % (ref 34–46.6)
HGB BLD-MCNC: 8.6 G/DL (ref 12–15.9)
HGB UR QL STRIP.AUTO: ABNORMAL
HYALINE CASTS UR QL AUTO: ABNORMAL /LPF
IMM GRANULOCYTES # BLD AUTO: 0.02 10*3/MM3 (ref 0–0.05)
IMM GRANULOCYTES NFR BLD AUTO: 0.4 % (ref 0–0.5)
KETONES UR QL STRIP: NEGATIVE
LARGE PLATELETS: NORMAL
LEUKOCYTE ESTERASE UR QL STRIP.AUTO: ABNORMAL
LYMPHOCYTES # BLD AUTO: 0.7 10*3/MM3 (ref 0.7–3.1)
LYMPHOCYTES NFR BLD AUTO: 12.5 % (ref 19.6–45.3)
MAGNESIUM SERPL-MCNC: 1.7 MG/DL (ref 1.6–2.4)
MCH RBC QN AUTO: 28.2 PG (ref 26.6–33)
MCHC RBC AUTO-ENTMCNC: 31.7 G/DL (ref 31.5–35.7)
MCV RBC AUTO: 88.9 FL (ref 79–97)
MONOCYTES # BLD AUTO: 0.18 10*3/MM3 (ref 0.1–0.9)
MONOCYTES NFR BLD AUTO: 3.2 % (ref 5–12)
NEUTROPHILS NFR BLD AUTO: 4.6 10*3/MM3 (ref 1.7–7)
NEUTROPHILS NFR BLD AUTO: 82.5 % (ref 42.7–76)
NITRITE UR QL STRIP: NEGATIVE
NRBC BLD AUTO-RTO: 0 /100 WBC (ref 0–0.2)
PH UR STRIP.AUTO: 5.5 [PH] (ref 5–8)
PHOSPHATE SERPL-MCNC: 2.2 MG/DL (ref 2.5–4.5)
PHOSPHATE SERPL-MCNC: 2.2 MG/DL (ref 2.5–4.5)
PLATELET # BLD AUTO: 44 10*3/MM3 (ref 140–450)
PMV BLD AUTO: 11.7 FL (ref 6–12)
POTASSIUM SERPL-SCNC: 3.8 MMOL/L (ref 3.5–5.2)
POTASSIUM SERPL-SCNC: 3.8 MMOL/L (ref 3.5–5.2)
PROT SERPL-MCNC: 5.8 G/DL (ref 6–8.5)
PROT UR QL STRIP: ABNORMAL
RBC # BLD AUTO: 3.05 10*6/MM3 (ref 3.77–5.28)
RBC # UR STRIP: ABNORMAL /HPF
RBC MORPH BLD: NORMAL
REF LAB TEST METHOD: ABNORMAL
SMALL PLATELETS BLD QL SMEAR: NORMAL
SODIUM SERPL-SCNC: 135 MMOL/L (ref 136–145)
SODIUM SERPL-SCNC: 135 MMOL/L (ref 136–145)
SP GR UR STRIP: 1.01 (ref 1–1.03)
SQUAMOUS #/AREA URNS HPF: ABNORMAL /HPF
UROBILINOGEN UR QL STRIP: ABNORMAL
WBC # BLD AUTO: 5.58 10*3/MM3 (ref 3.4–10.8)
WBC # UR STRIP: ABNORMAL /HPF
WBC MORPH BLD: NORMAL

## 2021-11-17 PROCEDURE — 92526 ORAL FUNCTION THERAPY: CPT

## 2021-11-17 PROCEDURE — 81001 URINALYSIS AUTO W/SCOPE: CPT | Performed by: NURSE PRACTITIONER

## 2021-11-17 PROCEDURE — 87086 URINE CULTURE/COLONY COUNT: CPT | Performed by: NURSE PRACTITIONER

## 2021-11-17 PROCEDURE — 87205 SMEAR GRAM STAIN: CPT | Performed by: NURSE PRACTITIONER

## 2021-11-17 PROCEDURE — 85007 BL SMEAR W/DIFF WBC COUNT: CPT | Performed by: INTERNAL MEDICINE

## 2021-11-17 PROCEDURE — 97530 THERAPEUTIC ACTIVITIES: CPT

## 2021-11-17 PROCEDURE — 80069 RENAL FUNCTION PANEL: CPT | Performed by: INTERNAL MEDICINE

## 2021-11-17 PROCEDURE — 99232 SBSQ HOSP IP/OBS MODERATE 35: CPT | Performed by: INTERNAL MEDICINE

## 2021-11-17 PROCEDURE — 84100 ASSAY OF PHOSPHORUS: CPT | Performed by: INTERNAL MEDICINE

## 2021-11-17 PROCEDURE — 25010000002 HEPARIN (PORCINE) PER 1000 UNITS: Performed by: INTERNAL MEDICINE

## 2021-11-17 PROCEDURE — 87070 CULTURE OTHR SPECIMN AEROBIC: CPT | Performed by: NURSE PRACTITIONER

## 2021-11-17 PROCEDURE — 99233 SBSQ HOSP IP/OBS HIGH 50: CPT | Performed by: INTERNAL MEDICINE

## 2021-11-17 PROCEDURE — 87040 BLOOD CULTURE FOR BACTERIA: CPT | Performed by: NURSE PRACTITIONER

## 2021-11-17 PROCEDURE — 25010000002 CEFTRIAXONE PER 250 MG: Performed by: INTERNAL MEDICINE

## 2021-11-17 PROCEDURE — 85025 COMPLETE CBC W/AUTO DIFF WBC: CPT | Performed by: INTERNAL MEDICINE

## 2021-11-17 PROCEDURE — 80053 COMPREHEN METABOLIC PANEL: CPT | Performed by: INTERNAL MEDICINE

## 2021-11-17 PROCEDURE — 94799 UNLISTED PULMONARY SVC/PX: CPT

## 2021-11-17 PROCEDURE — 83735 ASSAY OF MAGNESIUM: CPT | Performed by: INTERNAL MEDICINE

## 2021-11-17 PROCEDURE — 82962 GLUCOSE BLOOD TEST: CPT

## 2021-11-17 PROCEDURE — 94760 N-INVAS EAR/PLS OXIMETRY 1: CPT

## 2021-11-17 RX ADMIN — RISPERIDONE 3 MG: 3 TABLET, FILM COATED ORAL at 20:04

## 2021-11-17 RX ADMIN — ROSUVASTATIN CALCIUM 10 MG: 5 TABLET, FILM COATED ORAL at 20:04

## 2021-11-17 RX ADMIN — SODIUM CHLORIDE, PRESERVATIVE FREE 10 ML: 5 INJECTION INTRAVENOUS at 20:04

## 2021-11-17 RX ADMIN — HEPARIN SODIUM 5000 UNITS: 5000 INJECTION, SOLUTION INTRAVENOUS; SUBCUTANEOUS at 21:18

## 2021-11-17 RX ADMIN — SERTRALINE 25 MG: 25 TABLET, FILM COATED ORAL at 08:11

## 2021-11-17 RX ADMIN — FOLIC ACID 1 MG: 1 TABLET ORAL at 08:11

## 2021-11-17 RX ADMIN — HEPARIN SODIUM 5000 UNITS: 5000 INJECTION, SOLUTION INTRAVENOUS; SUBCUTANEOUS at 13:40

## 2021-11-17 RX ADMIN — METOPROLOL TARTRATE 25 MG: 25 TABLET, FILM COATED ORAL at 20:04

## 2021-11-17 RX ADMIN — ARFORMOTEROL TARTRATE 15 MCG: 15 SOLUTION RESPIRATORY (INHALATION) at 07:36

## 2021-11-17 RX ADMIN — METOPROLOL TARTRATE 25 MG: 25 TABLET, FILM COATED ORAL at 08:11

## 2021-11-17 RX ADMIN — IPRATROPIUM BROMIDE AND ALBUTEROL SULFATE 3 ML: 2.5; .5 SOLUTION RESPIRATORY (INHALATION) at 19:32

## 2021-11-17 RX ADMIN — BUDESONIDE 0.5 MG: 0.5 INHALANT RESPIRATORY (INHALATION) at 19:32

## 2021-11-17 RX ADMIN — MEMANTINE 10 MG: 10 TABLET ORAL at 08:11

## 2021-11-17 RX ADMIN — ARFORMOTEROL TARTRATE 15 MCG: 15 SOLUTION RESPIRATORY (INHALATION) at 19:32

## 2021-11-17 RX ADMIN — SENNOSIDES AND DOCUSATE SODIUM 2 TABLET: 50; 8.6 TABLET ORAL at 20:04

## 2021-11-17 RX ADMIN — RDII 250 MG CAPSULE 250 MG: at 20:04

## 2021-11-17 RX ADMIN — SODIUM CHLORIDE, PRESERVATIVE FREE 10 ML: 5 INJECTION INTRAVENOUS at 08:11

## 2021-11-17 RX ADMIN — IPRATROPIUM BROMIDE AND ALBUTEROL SULFATE 3 ML: 2.5; .5 SOLUTION RESPIRATORY (INHALATION) at 07:36

## 2021-11-17 RX ADMIN — HEPARIN SODIUM 5000 UNITS: 5000 INJECTION, SOLUTION INTRAVENOUS; SUBCUTANEOUS at 05:12

## 2021-11-17 RX ADMIN — MUPIROCIN 1 APPLICATION: 20 OINTMENT TOPICAL at 20:05

## 2021-11-17 RX ADMIN — RDII 250 MG CAPSULE 250 MG: at 08:11

## 2021-11-17 RX ADMIN — CEFTRIAXONE SODIUM 1 G: 1 INJECTION, SOLUTION INTRAVENOUS at 08:08

## 2021-11-17 RX ADMIN — FERROUS SULFATE TAB 325 MG (65 MG ELEMENTAL FE) 325 MG: 325 (65 FE) TAB at 08:11

## 2021-11-17 RX ADMIN — IPRATROPIUM BROMIDE AND ALBUTEROL SULFATE 3 ML: 2.5; .5 SOLUTION RESPIRATORY (INHALATION) at 12:32

## 2021-11-17 RX ADMIN — Medication 5 MG: at 22:58

## 2021-11-17 RX ADMIN — IPRATROPIUM BROMIDE AND ALBUTEROL SULFATE 3 ML: 2.5; .5 SOLUTION RESPIRATORY (INHALATION) at 00:52

## 2021-11-17 RX ADMIN — FAMOTIDINE 20 MG: 20 TABLET, FILM COATED ORAL at 08:11

## 2021-11-17 RX ADMIN — MEMANTINE 10 MG: 10 TABLET ORAL at 20:04

## 2021-11-17 RX ADMIN — BUDESONIDE 0.5 MG: 0.5 INHALANT RESPIRATORY (INHALATION) at 07:36

## 2021-11-17 RX ADMIN — MONTELUKAST SODIUM 10 MG: 10 TABLET, FILM COATED ORAL at 08:11

## 2021-11-17 NOTE — CONSULTS
"Purpose of the visit was to evaluate for: support for patient/family. Spoke with RN and patient and discussed palliative care, goals of care, [facility] and advanced care planning.      Assessment: Patient is currently on 4mtu. Patient sitting up in chair, patient appears comfortable at time of visit. Patient is alert to person, place, time and situation. Introduced self and explained role. Discussed patients current condition. Allowed space for patient to discuss all that she has been through. Patient shares she has 4 children and lives with her youngest daughter. Patient states she does not have any advanced directive/living will. Patient states she \"probably should do that\". Patient requests to speak with daughter first. Palliative care offered to call patients daughter for update, patient declined offer at this time. Discussed care options, aggressive treatment versus focusing on comfort. Discussed quality of life versus quantity of life. Discussed and educated on hospice services, provided patient with [facility] pamphlet for education. Patient states she would like to discuss options with her daughter, emotional support provided. Updated primary rn.      Recommendations/Plan: Inpatient rehab referrals made by .    Tasks Completed: Emotional Support.    Palliative care will continue to follow to support and assist.    KASIE Jung, RN  Palliative Care       "

## 2021-11-17 NOTE — PLAN OF CARE
Goal Outcome Evaluation:  Plan of Care Reviewed With: patient        Progress: improving  Outcome Summary: No acute issues. Pure wick applied due to incontinence episodes, and well tolerated.me

## 2021-11-17 NOTE — PROGRESS NOTES
Pulmonary / Critical Care Progress Note      Patient Name: Mirna Meredith  : 1945  MRN: [id number]  Attending:  Arias Dyer MD   Date of admission: 2021    Subjective   Subjective   Follow up with septic shock, urosepsis, s/p ureteral stenting.    Over the past 24 hours, transferred out of ICU.    No acute events overnight.    This morning,  Sitting up on BSC  On room air  No dyspnea  Afebrile  BP stable  Urinating good  No chest pain   No fever or chills  Weak and fatigued    Review of Systems  Constitutional symptoms: Fatigue and weakness, otherwise denied complaints   Ear, nose, throat: Denied complaints  Cardiovascular:  Denied complaints  Respiratory: Denied complaints  Gastrointestinal: Abdominal pain (improved), diarrhea, otherwise denied complaints  Musculoskeletal: Denied complaints  Neurologic: Denied complaints  Skin: Denied complaints    Objective      Objective     Vitals:   Vital signs for last 24 hours:  Temp:  [98 °F (36.7 °C)-98.4 °F (36.9 °C)] 98.1 °F (36.7 °C)  Heart Rate:  [] 95  Resp:  [18-20] 20  BP: (115-153)/(47-67) 153/56    Physical Exam   Vital Signs Reviewed  General: Elderly female, Awake and Alert, NAD on room air  HEENT:  PERRL, EOMI.  OP, nares clear  Neck:  Supple, no JVD, no thyromegaly  Lymph: no cervical, supraclavicular lymphadenopathy noted bilaterally  Chest:  good aeration, clear to auscultation bilaterally, tympanic to percussion bilaterally, no work of breathing noted  CV: RRR, no MGR, pulses 2+, equal  Abd:  Soft, NT, ND, + BS, no HSM  Ext:  no clubbing, no cyanosis, no edema  Neuro:  A&Ox3, CN grossly intact, no focal deficits  Skin: No rashes or lesions noted     Result Review    Result Review:  I have personally reviewed the results from the time of this admission to 2021 07:30 EST and agree with these findings:  [x]  Laboratory  [x]  Microbiology  [x]  Radiology  []  EKG/Telemetry   []  Cardiology/Vascular   []  Pathology  []  Old  records  []  Other:  Most notable findings include: WBC 5.58, Hgb 8.6, PLT 44, Mg 1.7, K 3.8, Cr 1.77    Cdiff negative  Urine culture no growth    Assessment/Plan   Assessment / Plan     Active Hospital Problems:  Active Hospital Problems    Diagnosis    • Acute renal failure (ARF) (HCC)    • Metastatic breast cancer (HCC)    • Metabolic encephalopathy      Impression:  Septic shock  Right hydronephrosis secondary to obstruction related to metastasis of breast cancer, s/p ureteral stent placement   Urinary tract infection/right pyelonephritis from unspecified organism  JOESPH secondary to prerenal etiology versus acute tubular necrosis  Hypomagnesemia  Ascites, likely malignant  New pleural-based metastatic disease  Weakness with functional paraplegia  COPD  Type 2 diabetes with hyperglycemia  HTN  History of breast cancer with mets to colon, liver lung and bone  History of C. difficile  Chronic low back pain  Pancytopenia  Hard of hearing    Plan:  On room air.  Procal trending up 2.18--> 90.16.  Infectious work-up has been negative.  Will panculture again.  Continue Ceftriaxone, day 7.  Urology on board and appreciate assistance.    Oncology on board and appreciate their assistance.  Holding oral chemotherapy for now.  Continue Brovana and Pulmicort.  Glucose control per primary.  Encourage activity.  Up to chair as tolerated.  PT/OT on board.  Would benefit from rehab.    Unless otherwise needed, critical care/pulmonary will sign off at this time. Please call with any questions or concerns.     DVT prophylaxis:  Medical and mechanical DVT prophylaxis orders are present.    CODE STATUS:   Code Status (Patient has no pulse and is not breathing): No CPR (Do Not Attempt to Resuscitate)  Medical Interventions (Patient has pulse or is breathing): Full Support    Labs, microbiology, radiology, medications, and provider notes personally reviewed.  Discussed with primary services and bedside RN.    Electronically signed by  MICHELET Ren, 11/17/21, 1:08 PM EST.  Electronically signed by Delfino Duran MD, 11/17/21, 2:36 PM EST.

## 2021-11-17 NOTE — PROGRESS NOTES
[facility]     Progress Note    Patient Name: Mirna Meredith  : 1945  MRN: [id number]  Primary Care Physician:  Pan Pacheco MD  Date of admission: 2021    Subjective Patient is very hard of hearing hemodynamic stable  Continue to have a severe diarrhea.  He has no other complaints    Review of Systems  Constitutional:        Weakness tiredness fatigue  Eyes:                       No blurry vision, eye discharge, eye irritation, eye pain  HEENT:                   No acute hair loss, earache and discharge, nasal congestion or discharge, sore throat, postnasal drip  Respiratory:           No shortness of breath coughing sputum production wheezing hemoptysis pleuritic chest pain  Cardiovascular:     No chest pain, orthopnea, PND, dizziness, palpitation, lower extremity edema  Gastrointestinal:   Diarrhea   genitourinary:       No urinary incontinence, hesitancy, frequency, urgency, dysuria  Hematologic:         No bruising, bleeding, pallor, lymphadenopathy  Endocrine:            No coldness, hot flashes, polyuria, abnormal hair growth  Musculoskeletal:    No body pains, aches, arthritic pains, muscle pain ,muscle wasting  Psychiatric:          No low or high mood, anxiety, hallucinations, delusions  Skin.                      No rash, ulcers, bruising, itching  Neurological:        No confusion, headache, focal weakness, numbness, dysphasia    Objective   Objective     Vitals:   Temp:  [97.3 °F (36.3 °C)-98.4 °F (36.9 °C)] 97.3 °F (36.3 °C)  Heart Rate:  [] 89  Resp:  [18-20] 18  BP: (115-153)/(47-67) 150/61  Physical Exam    Constitutional: Awake, alert responsive, conversant, no obvious distress              Psychiatric:  Appropriate affect, cooperative   Neurologic:  Awake alert ,oriented x 3, strength symmetric in all extremities, Cranial Nerves grossly intact to confrontation, speech clear   Eyes:   PERRLA, sclerae anicteric, no conjunctival injection   HEENT:  Moist mucous  membranes, no nasal or eye discharge, no throat congestion   Neck:   Supple, no thyromegaly, no lymphadenopathy, trachea midline, no elevated JVD   Respiratory:  Clear to auscultation bilaterally, nonlabored respirations    Cardiovascular: RRR, no murmurs, rubs, or gallops, palpable pedal pulses bilaterally, No bilateral ankle edema   Gastrointestinal: Positive bowel sounds, soft, nontender, nondistended, no organomegaly   Musculoskeletal:  No clubbing or cyanosis to extremities,muscle wasting, joint swelling, muscle weakness             Skin:                      No rashes, bruising, skin ulcers, petechiae or ecchymosis    Result Review    Result Review:  I have personally reviewed the results from the time of this admission to 11/17/2021 08:49 EST and agree with these findings:  []  Laboratory  []  Microbiology  []  Radiology  []  EKG/Telemetry   []  Cardiology/Vascular   []  Pathology  []  Old records  []  Other:    Assessment/Plan   Assessment / Plan       Active Hospital Problems:  Active Hospital Problems    Diagnosis    • Chronic diarrhea      Ulcerative colitis     • Pancytopenia (HCC)    • Acute renal failure (ARF) (HCC)    • Metastatic breast cancer (HCC)    • Metabolic encephalopathy        Plan:   Encourage p.o. intake  Labs tomorrow morning  Control diarrhea       Electronically signed by Scarlett Bautista MD, 11/17/21, 8:48 AM EST.

## 2021-11-17 NOTE — THERAPY TREATMENT NOTE
Acute Care - Physical Therapy Progress Note  [facility]     Patient Name: Mirna Meredith  : 1945  MRN: [id number]  Today's Date: 2021      Visit Dx:     ICD-10-CM ICD-9-CM   1. Metabolic encephalopathy  G93.41 348.31   2. Urinary tract infection without hematuria, site unspecified  N39.0 599.0   3. Dysphagia, oropharyngeal  R13.12 787.22   4. Difficulty walking  R26.2 719.7   5. Decreased activities of daily living (ADL)  Z78.9 V49.89   6. Hydronephrosis due to obstruction of ureter  N13.1 591     593.4     Patient Active Problem List   Diagnosis   • Abdominal pain   • Sepsis (HCC)   • Metabolic encephalopathy   • Acute renal failure (ARF) (HCC)   • Metastatic breast cancer (HCC)   • Chronic diarrhea   • Pancytopenia (HCC)     Past Medical History:   Diagnosis Date   • Cancer (HCC)     Breast with mets to colon and bones   • COPD (chronic obstructive pulmonary disease) (HCC)    • Depression    • Diabetes mellitus (HCC)    • Hyperlipidemia    • Hypertension      Past Surgical History:   Procedure Laterality Date   • CHOLECYSTECTOMY     • HYSTERECTOMY     • JOINT REPLACEMENT Bilateral     Knees   • MASTECTOMY Bilateral      PT Assessment (last 12 hours)     PT Evaluation and Treatment     Row Name 21          Physical Therapy Time and Intention    Subjective Information no complaints  -CS     Document Type therapy note (daily note)  -CS     Mode of Treatment individual therapy; physical therapy  -CS     Patient Effort good  -CS     Symptoms Noted During/After Treatment shortness of breath  -CS     Row Name 21          Bed Mobility    Bed Mobility supine-sit; scooting/bridging  -CS     Scooting/Bridging Childs (Bed Mobility) moderate assist (50% patient effort); 1 person assist; verbal cues; nonverbal cues (demo/gesture)  -CS     Supine-Sit Childs (Bed Mobility) minimum assist (75% patient effort); 1 person assist; verbal cues  -CS     Row Name 21           Transfers    Sit-Stand Barbour (Transfers) verbal cues; contact guard; 1 person assist  -CS     Stand-Sit Barbour (Transfers) verbal cues; contact guard; 1 person assist  -CS     Row Name 11/17/21 0900          Sit-Stand Transfer    Assistive Device (Sit-Stand Transfers) walker, front-wheeled  -CS     Row Name 11/17/21 0900          Stand-Sit Transfer    Assistive Device (Stand-Sit Transfers) walker, front-wheeled  -CS     Row Name 11/17/21 0900          Gait/Stairs (Locomotion)    Gait/Stairs Locomotion gait/ambulation independence; gait/ambulation assistive device; distance ambulated; gait pattern  -CS     Barbour Level (Gait) contact guard; 1 person assist; verbal cues  -CS     Assistive Device (Gait) walker, front-wheeled  -CS     Distance in Feet (Gait) 130  -CS     Pattern (Gait) 4-point; step-through  -CS     Bilateral Gait Deviations forward flexed posture  -CS     Row Name             Wound 11/14/21 1149 Right vagina    Wound - Properties Group Placement Date: 11/14/21  -LB Placement Time: 1149  -LB Present on Hospital Admission: N  -LB Side: Right  -LB Location: vagina  -LB     Retired Wound - Properties Group Date first assessed: 11/14/21  -LB Time first assessed: 1149  -LB Present on Hospital Admission: N  -LB Side: Right  -LB Location: vagina  -LB     Row Name 11/17/21 0900          Progress Summary (PT)    Daily Progress Summary (PT) Pt. presents today in semi jordan position with L port IV in place and pure wick catheter.  Pt. was assisted to EOB and then to standing.  Verbal cues provided for hand placement and sequencing.  Pt. then ambulated into hallway today for increaed distance.  Pt reported feeling short of breath upon returning to room and T/F'd to chair to rest.  Chair alarm was activated and pt. reported breathing was better within 1-2 minutes of sitting in chair.  Pt. provided call button within reach and was left in care of nursing staff at conclusion of PT Tx.  -CS            User Key  (r) = Recorded By, (t) = Taken By, (c) = Cosigned By    Initials Name Provider Type    LB Theodore Turcios, RN Registered Nurse    Geovanni Escalante PTA Physical Therapy Assistant                Physical Therapy Education                 Title: PT OT SLP Therapies (Done)     Topic: Physical Therapy (Done)     Point: Mobility training (Done)     Learning Progress Summary           Patient Acceptance, E, VU by  at 11/13/2021 1017    Acceptance, E,TB, VU by  at 11/12/2021 1151                   Point: Home exercise program (Done)     Learning Progress Summary           Patient Acceptance, E, VU by  at 11/13/2021 1017    Acceptance, E,TB, VU by  at 11/12/2021 1151                   Point: Body mechanics (Done)     Learning Progress Summary           Patient Acceptance, E, VU by  at 11/13/2021 1017    Acceptance, E,TB, VU by  at 11/12/2021 1151                   Point: Precautions (Done)     Learning Progress Summary           Patient Acceptance, E, VU by  at 11/13/2021 1017    Acceptance, E,TB, VU by  at 11/12/2021 1151                               User Key     Initials Effective Dates Name Provider Type Discipline     06/16/21 -  Nga Sharma OT Occupational Therapist OT     04/25/21 -  Annabella Guo, PT Physical Therapist PT              PT Recommendation and Plan     Progress Summary (PT)  Daily Progress Summary (PT): Pt. presents today in semi ojrdan position with L port IV in place and pure wick catheter.  Pt. was assisted to EOB and then to standing.  Verbal cues provided for hand placement and sequencing.  Pt. then ambulated into hallway today for increaed distance.  Pt reported feeling short of breath upon returning to room and T/F'd to chair to rest.  Chair alarm was activated and pt. reported breathing was better within 1-2 minutes of sitting in chair.  Pt. provided call button within reach and was left in care of nursing staff at conclusion of PT Tx.   Outcome Measures      Row Name 11/17/21 0900 11/15/21 1447          How much help from another person do you currently need...    Turning from your back to your side while in flat bed without using bedrails? 3  -CS 2  -DK     Moving from lying on back to sitting on the side of a flat bed without bedrails? 2  -CS 2  -DK     Moving to and from a bed to a chair (including a wheelchair)? 3  -CS 2  -DK     Standing up from a chair using your arms (e.g., wheelchair, bedside chair)? 3  -CS 2  -DK     Climbing 3-5 steps with a railing? 2  -CS 1  -DK     To walk in hospital room? 3  -CS 2  -DK     AM-PAC 6 Clicks Score (PT) 16  -CS 11  -DK            Functional Assessment    Outcome Measure Options AM-PAC 6 Clicks Basic Mobility (PT)  -CS AM-PAC 6 Clicks Basic Mobility (PT)  -DK           User Key  (r) = Recorded By, (t) = Taken By, (c) = Cosigned By    Initials Name Provider Type    Lydia Sandoval PTA Physical Therapy Assistant    Geovanni Escalante PTA Physical Therapy Assistant                 Time Calculation:    PT Charges     Row Name 11/17/21 0924             Time Calculation    Start Time 0808  -CS      PT Received On 11/17/21  -CS      PT Goal Re-Cert Due Date 11/21/21  -CS              Timed Charges    65882 - PT Therapeutic Activity Minutes 23  -CS              Total Minutes    Timed Charges Total Minutes 23  -CS       Total Minutes 23  -CS            User Key  (r) = Recorded By, (t) = Taken By, (c) = Cosigned By    Initials Name Provider Type    Geovanni Escalante PTA Physical Therapy Assistant              Therapy Charges for Today     Code Description Service Date Service Provider Modifiers Qty    05220213590  PT THERAPEUTIC ACT EA 15 MIN 11/17/2021 Geovanni Stark PTA GP 2          PT G-Codes  Outcome Measure Options: AM-PAC 6 Clicks Basic Mobility (PT)  AM-PAC 6 Clicks Score (PT): 16  AM-PAC 6 Clicks Score (OT): 21    Geovanni Stark PTA  11/17/2021

## 2021-11-17 NOTE — THERAPY TREATMENT NOTE
Acute Care - Speech Language Pathology   Swallow Treatment Note  [facility]     Patient Name: Mirna Meredith  : 1945  MRN: [id number]  Today's Date: 2021               Admit Date: 2021    Visit Dx:     ICD-10-CM ICD-9-CM   1. Metabolic encephalopathy  G93.41 348.31   2. Urinary tract infection without hematuria, site unspecified  N39.0 599.0   3. Dysphagia, oropharyngeal  R13.12 787.22   4. Difficulty walking  R26.2 719.7   5. Decreased activities of daily living (ADL)  Z78.9 V49.89   6. Hydronephrosis due to obstruction of ureter  N13.1 591     593.4     Patient Active Problem List   Diagnosis   • Abdominal pain   • Sepsis (HCC)   • Metabolic encephalopathy   • Acute renal failure (ARF) (HCC)   • Metastatic breast cancer (HCC)   • Chronic diarrhea   • Pancytopenia (HCC)     Past Medical History:   Diagnosis Date   • Cancer (HCC)     Breast with mets to colon and bones   • COPD (chronic obstructive pulmonary disease) (HCC)    • Depression    • Diabetes mellitus (HCC)    • Hyperlipidemia    • Hypertension      Past Surgical History:   Procedure Laterality Date   • CHOLECYSTECTOMY     • CYSTOSCOPY, RETROGRADE PYELOGRAM AND STENT INSERTION Right 2021    Procedure: CYSTOSCOPY RETROGRADE PYELOGRAM AND STENT INSERTION;  Surgeon: Allen Rodriguez MD;  Location: [facility];  Service: Urology;  Laterality: Right;   • HYSTERECTOMY     • JOINT REPLACEMENT Bilateral     Knees   • MASTECTOMY Bilateral      Subjective/Behavioral Observations: Patient seen for noon meal    Current Diet:Mechanical soft diet - thin liquids      Treatment received: Patient required soft solids to be fork mashed today.  Still with some difficulty with fully masticating and bolus preparation/control.  Cough noted x 2 with soft solids.  Holding bolus at times.    Tolerates puree consistencies well.  No s/s of aspiration noted with sips of thin liquids.  Scattered oral residue, more pronounced on right    Results of  treatment:As stated    Progress toward goals:Limited    Barriers to Achieving goals: Medical status    Plan of care:/changes in plan: Downgrade to puree diet and single sips of thin liquids.    ST will follow and re-attempt soft solids tomorrow.    Upright for all intake  Slow rate  1-1 supervision with meals.   Oral meds in applesauce  Watch for pocketing on right.     EDUCATION  The patient has been educated in the following areas:   Dysphagia (Swallowing Impairment).              Leticia Ceballos, SLP  11/17/2021

## 2021-11-17 NOTE — PROGRESS NOTES
[facility]   Hospitalist Progress Note  Date: 2021  Patient Name: Mirna Meredith  : 1945  MRN: [id number]  Date of admission: 2021      Subjective   Subjective     Chief Complaint: Confusion and weakness    Summary:   Mirna Meredith is a 76 y.o. female presents to the hospital due to confusion and weakness.  This seems to be worse over the last 1 to 2 days.  Family brought her in stating she is not herself.  Patient was recently admitted less than 1 month ago for similar symptoms which were found to be secondary to urinary tract infection.  She was treated and seemed to be having improvement but over the last 1 to 2 days had a significant decline in her ability to ambulate and has been more confused and hypersomnolent.  In the emergency department she was not febrile but had a temperature of 100.1.  She was, however tachycardic.  She received IV fluids and Rocephin in the emergency department due to urinary tract infection discovered on urinalysis in the ER.  Due to her inability to ambulate due to weakness and her metabolic encephalopathy we are asked to admit for further care.  On  patient after right ureteric stent developed fever,  tachycardic  up 140 and was put on oxygen 4 L nasal cannula in PACU while recovering.  Patient given Tylenol, IV Toradol, IV fluid bolus 3 L.  Started on Rocephin blood cultures drawn.  Transferred to unit for close observation      Interval Followup:  No acute events overnight, patient resting comfortably in chair.    Review of Systems  Denies nausea nausea, positive diarrhea  No chest pain palpitations  No fever no chills    Objective   Objective     Vitals:   Temp:  [97.3 °F (36.3 °C)-98.4 °F (36.9 °C)] 97.9 °F (36.6 °C)  Heart Rate:  [] 77  Resp:  [18-20] 18  BP: (124-153)/(47-97) 130/97    Physical Exam   General appearance: NAD, conversant, alert, sitting up in chair  Eyes: anicteric sclerae, moist conjunctivae; no lid-lag;  PERRLA  HENT: Atraumatic; oropharynx clear with moist mucous membranes and no mucosal ulcerations; normal hard and soft palate  Neck: Trachea midline; FROM, supple, no thyromegaly or lymphadenopathy  Lungs: CTA, with normal respiratory effort and no intercostal retractions  CV: Regular Rate and Rhythm, no Murmurs, Rubs, or Gallops   Abdomen: Soft, non-tender; no masses or Heptosplenomegally  Extremities: No peripheral edema or extremity lymphadenopathy  Skin: Normal temperature, turgor and texture; no rash, ulcers or subcutaneous nodules  Psych: Appropriate affect, alert and oriented to person, place and time  Neuro: CN II - XII intact no motor deficits, no sensory defecits, remains globally weak    Result Review    Result Review:  I have personally reviewed the results from the time of this admission to 11/17/2021 13:44 EST and agree with these findings:  [x]  Laboratory  [x]  Microbiology  [x]  Radiology  [x]  EKG/Telemetry   []  Cardiology/Vascular   []  Pathology  [x]  Old records  [x]  Other: Medications     Assessment/Plan   Assessment / Plan     Assessment:  · Acute kidney injury with baseline creatinine around 1.  Likely prerenal versus ATN from sepsis.  Stable  · Altered mental status - Metaboic encephalopathy secondary to above.  Resolved  · Moderate to severe right hydronephrosis.  Question distal ureteral stricture.  S/p stenting of right ureter November 14  · Ascites.  Likely malignant.  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  Acute exacerbation  · Diabetes.  Hemoglobin A1c of 8.7%  · Hypertension  · History of breast cancer with mets to colon, liver, lung and bone.  · History of C. Difficile, repeat negative  · Diarrhea.  Stable  · Low back pain.  DJD  · Anemia.  Chemotherapy-induced  · Thrombocytopenia.  · Hard of hearing.  · Elevated CEA  · Septic shock not present on admission, as has fever, tachycardia, lactic acidosis and  elevated procalcitonin.  · S/p right ureteric stent on November 14.  Likely causing above  · Possible UTI causing sepsis  · Elevated troponin.  Doubt non-STEMI.  Likely from sepsis and tachycardia  · Hypomagnesemia           Plan:  Off Levophed and midodrine  Continue IV antibiotics  Continue IV fluid per nephrology, renal function improving  Continue nebulizer treatment  Nephrology consulted, appreciate their recommendations  Continue to follow blood cultures  Markedly elevated BNP, continue to monitor volume status  Repeat chest x-ray.  No acute finding  Resumed home beta-blocker at a smaller dose  2D echo with left ventricular diastolic dysfunction, grade 1 impaired relaxation normal EF  repeat UA and urine culture via straight cath pending  Heme-onc consulted as per family request.  Discussed with heme-onc holding her oral chemotherapy  Discussed with urology.  Appreciate input for cystoscopy and stent   Continue with sliding scale insulin  Appreciate speech therapy input cleared by speech.  Resumed some of the home medications including risperidone and Singulair  CT of the head and chest x-ray negative.  CT abdomen pelvis noted.  Progression of the disease even with chemotherapy  X-ray L-spine noted.   MRI of the spine with osseous metastatic disease, severe stenosis  PT OT.  Trend creatinine.   Out of the ICU  Will need rehab placement given hospital-acquired debility    Telemetry: Reviewed by me, sinus rhythm    Reviewed patients labs and imaging, and discussed with patient and nurse at bedside.    DVT prophylaxis:  Medical and mechanical DVT prophylaxis orders are present.    CODE STATUS:   Code Status (Patient has no pulse and is not breathing): No CPR (Do Not Attempt to Resuscitate)  Medical Interventions (Patient has pulse or is breathing): Full Support      Part of this note may be an electronic transcription/translation of spoken language to printed text using the Dragon Dictation System.        Electronically signed by Arias Dyer MD, 11/17/21, 1:54 PM EST.

## 2021-11-17 NOTE — PROGRESS NOTES
[facility]     Progress Note    Patient Name: Mirna Meredith  : 1945  MRN: [id number]  Primary Care Physician:  Pan Pacheco MD  Date of admission: 2021    Subjective   Subjective     Chief Complaint: Stable doing well back to her baseline from oncology point of view patient is ready to be discharged she can be seen in a couple of weeks in the office in a follow-up visit at that time we will make the decisions regarding resuming her chemotherapy medication    HPI:  Patient Reports reviewed patient is feeling much better she does get off-and-on episodes of confusion which is her baseline which is overall stable she is very hard of hearing patient does have chronic diarrhea because of ulcerative colitis and the care with Dr. Chow her bowel movements are base line normal    Review of Systems   All systems were reviewed and negative except for: Reviewed    Objective   Objective     Vitals:   Temp:  [98 °F (36.7 °C)-98.4 °F (36.9 °C)] 98.1 °F (36.7 °C)  Heart Rate:  [] 89  Resp:  [18-20] 18  BP: (115-153)/(47-67) 153/56    Physical Exam    Constitutional: Awake, alert   Eyes: PERRLA, sclerae anicteric, no conjunctival injection   HENT: NCAT, mucous membranes moist   Neck: Supple, no thyromegaly, no lymphadenopathy, trachea midline   Respiratory: Clear to auscultation bilaterally, nonlabored respirations    Cardiovascular: RRR, no murmurs, rubs, or gallops, palpable pedal pulses bilaterally   Gastrointestinal: Positive bowel sounds, soft, nontender, nondistended   Musculoskeletal: No bilateral ankle edema, no clubbing or cyanosis to extremities   Psychiatric: Appropriate affect, cooperative   Neurologic: Oriented x 3, strength symmetric in all extremities, Cranial Nerves grossly intact to confrontation, speech clear   Skin: No rashes     Result Review    Result Review:  I have personally reviewed the results from the time of this admission to 2021 07:47 EST and agree with these  findings:  []  Laboratory  []  Microbiology  [x]  Radiology  []  EKG/Telemetry   []  Cardiology/Vascular   []  Pathology  []  Old records  []  Other: Bony metastases  Most notable findings include: Metastatic breast cancer has been started on new treatment recently    Assessment/Plan   Assessment / Plan     Brief Patient Summary:  Mirna Meredith is a 76 y.o. female who patient is comfortable she was brought in with some confusion possible UTI which has been treated with antibiotics cultures are negative overall situation is better than when she started however she does have extensive disease but a new treatment has been started with CDK 4 inhibitors    Active Hospital Problems:  Active Hospital Problems    Diagnosis    • Acute renal failure (ARF) (HCC)    • Metastatic breast cancer (HCC)    • Metabolic encephalopathy        Plan:   Oncology point of view she is ready to be discharged we will see in the office in 2 weeks    DVT prophylaxis:  Medical and mechanical DVT prophylaxis orders are present.    CODE STATUS:   Code Status (Patient has no pulse and is not breathing): No CPR (Do Not Attempt to Resuscitate)  Medical Interventions (Patient has pulse or is breathing): Full Support    Disposition:  I expect patient to be discharged okay with the primary care physicians.    Electronically signed by Jose G Grissom MD, 11/17/21, 7:47 AM EST.

## 2021-11-17 NOTE — PLAN OF CARE
Goal Outcome Evaluation:           Progress: no change  Outcome Summary: VSS. Patient was up in chair most of the day. Removed peruwick during day shift and patient called out when needed to use bathroom. Patient is alert and oriented. Patient port dressing changed. Patient has no complaints at this time.

## 2021-11-18 ENCOUNTER — APPOINTMENT (OUTPATIENT)
Dept: CARDIOLOGY | Facility: HOSPITAL | Age: 76
End: 2021-11-18

## 2021-11-18 LAB
ALBUMIN SERPL-MCNC: 2.5 G/DL (ref 3.5–5.2)
ALBUMIN/GLOB SERPL: 0.8 G/DL
ALP SERPL-CCNC: 163 U/L (ref 39–117)
ALT SERPL W P-5'-P-CCNC: 15 U/L (ref 1–33)
ANION GAP SERPL CALCULATED.3IONS-SCNC: 8.7 MMOL/L (ref 5–15)
AST SERPL-CCNC: 34 U/L (ref 1–32)
BILIRUB SERPL-MCNC: 0.2 MG/DL (ref 0–1.2)
BUN SERPL-MCNC: 25 MG/DL (ref 8–23)
BUN/CREAT SERPL: 14.5 (ref 7–25)
CALCIUM SPEC-SCNC: 9.4 MG/DL (ref 8.6–10.5)
CHLORIDE SERPL-SCNC: 103 MMOL/L (ref 98–107)
CO2 SERPL-SCNC: 28.3 MMOL/L (ref 22–29)
CREAT SERPL-MCNC: 1.72 MG/DL (ref 0.57–1)
GFR SERPL CREATININE-BSD FRML MDRD: 29 ML/MIN/1.73
GLOBULIN UR ELPH-MCNC: 3 GM/DL
GLUCOSE BLDC GLUCOMTR-MCNC: 131 MG/DL (ref 70–99)
GLUCOSE BLDC GLUCOMTR-MCNC: 182 MG/DL (ref 70–99)
GLUCOSE BLDC GLUCOMTR-MCNC: 213 MG/DL (ref 70–99)
GLUCOSE BLDC GLUCOMTR-MCNC: 283 MG/DL (ref 70–99)
GLUCOSE SERPL-MCNC: 147 MG/DL (ref 65–99)
MAXIMAL PREDICTED HEART RATE: 142 BPM
POTASSIUM SERPL-SCNC: 3.7 MMOL/L (ref 3.5–5.2)
PROCALCITONIN SERPL-MCNC: 25.42 NG/ML (ref 0–0.25)
PROT SERPL-MCNC: 5.5 G/DL (ref 6–8.5)
SODIUM SERPL-SCNC: 140 MMOL/L (ref 136–145)
STRESS TARGET HR: 121 BPM

## 2021-11-18 PROCEDURE — 84145 PROCALCITONIN (PCT): CPT | Performed by: NURSE PRACTITIONER

## 2021-11-18 PROCEDURE — 94799 UNLISTED PULMONARY SVC/PX: CPT

## 2021-11-18 PROCEDURE — 97530 THERAPEUTIC ACTIVITIES: CPT

## 2021-11-18 PROCEDURE — 93970 EXTREMITY STUDY: CPT

## 2021-11-18 PROCEDURE — 25010000002 CEFTRIAXONE PER 250 MG: Performed by: INTERNAL MEDICINE

## 2021-11-18 PROCEDURE — 99233 SBSQ HOSP IP/OBS HIGH 50: CPT | Performed by: INTERNAL MEDICINE

## 2021-11-18 PROCEDURE — 63710000001 INSULIN LISPRO (HUMAN) PER 5 UNITS: Performed by: INTERNAL MEDICINE

## 2021-11-18 PROCEDURE — 82962 GLUCOSE BLOOD TEST: CPT

## 2021-11-18 PROCEDURE — 25010000002 HEPARIN (PORCINE) PER 1000 UNITS: Performed by: INTERNAL MEDICINE

## 2021-11-18 PROCEDURE — 80053 COMPREHEN METABOLIC PANEL: CPT | Performed by: INTERNAL MEDICINE

## 2021-11-18 RX ORDER — BISACODYL 10 MG
10 SUPPOSITORY, RECTAL RECTAL DAILY PRN
Status: DISCONTINUED | OUTPATIENT
Start: 2021-11-18 | End: 2021-11-19 | Stop reason: HOSPADM

## 2021-11-18 RX ORDER — AMOXICILLIN 250 MG
2 CAPSULE ORAL NIGHTLY PRN
Status: DISCONTINUED | OUTPATIENT
Start: 2021-11-18 | End: 2021-11-19 | Stop reason: HOSPADM

## 2021-11-18 RX ORDER — POLYETHYLENE GLYCOL 3350 17 G/17G
17 POWDER, FOR SOLUTION ORAL DAILY PRN
Status: DISCONTINUED | OUTPATIENT
Start: 2021-11-18 | End: 2021-11-19 | Stop reason: HOSPADM

## 2021-11-18 RX ORDER — BISACODYL 5 MG/1
5 TABLET, DELAYED RELEASE ORAL DAILY PRN
Status: DISCONTINUED | OUTPATIENT
Start: 2021-11-18 | End: 2021-11-19 | Stop reason: HOSPADM

## 2021-11-18 RX ADMIN — MUPIROCIN: 20 OINTMENT TOPICAL at 20:44

## 2021-11-18 RX ADMIN — IPRATROPIUM BROMIDE AND ALBUTEROL SULFATE 3 ML: 2.5; .5 SOLUTION RESPIRATORY (INHALATION) at 19:02

## 2021-11-18 RX ADMIN — RDII 250 MG CAPSULE 250 MG: at 20:43

## 2021-11-18 RX ADMIN — INSULIN LISPRO 4 UNITS: 100 INJECTION, SOLUTION INTRAVENOUS; SUBCUTANEOUS at 13:54

## 2021-11-18 RX ADMIN — HEPARIN SODIUM 5000 UNITS: 5000 INJECTION, SOLUTION INTRAVENOUS; SUBCUTANEOUS at 20:44

## 2021-11-18 RX ADMIN — HEPARIN SODIUM 5000 UNITS: 5000 INJECTION, SOLUTION INTRAVENOUS; SUBCUTANEOUS at 13:54

## 2021-11-18 RX ADMIN — FAMOTIDINE 20 MG: 20 TABLET, FILM COATED ORAL at 08:33

## 2021-11-18 RX ADMIN — SERTRALINE 25 MG: 25 TABLET, FILM COATED ORAL at 08:33

## 2021-11-18 RX ADMIN — IPRATROPIUM BROMIDE AND ALBUTEROL SULFATE 3 ML: 2.5; .5 SOLUTION RESPIRATORY (INHALATION) at 11:52

## 2021-11-18 RX ADMIN — IPRATROPIUM BROMIDE AND ALBUTEROL SULFATE 3 ML: 2.5; .5 SOLUTION RESPIRATORY (INHALATION) at 01:22

## 2021-11-18 RX ADMIN — HEPARIN SODIUM 5000 UNITS: 5000 INJECTION, SOLUTION INTRAVENOUS; SUBCUTANEOUS at 05:20

## 2021-11-18 RX ADMIN — BUDESONIDE 0.5 MG: 0.5 INHALANT RESPIRATORY (INHALATION) at 19:02

## 2021-11-18 RX ADMIN — TRAMADOL HYDROCHLORIDE 50 MG: 50 TABLET ORAL at 09:14

## 2021-11-18 RX ADMIN — RISPERIDONE 3 MG: 3 TABLET, FILM COATED ORAL at 20:43

## 2021-11-18 RX ADMIN — CEFTRIAXONE SODIUM 1 G: 1 INJECTION, SOLUTION INTRAVENOUS at 08:32

## 2021-11-18 RX ADMIN — ARFORMOTEROL TARTRATE 15 MCG: 15 SOLUTION RESPIRATORY (INHALATION) at 07:27

## 2021-11-18 RX ADMIN — INSULIN LISPRO 2 UNITS: 100 INJECTION, SOLUTION INTRAVENOUS; SUBCUTANEOUS at 17:52

## 2021-11-18 RX ADMIN — BUDESONIDE 0.5 MG: 0.5 INHALANT RESPIRATORY (INHALATION) at 07:27

## 2021-11-18 RX ADMIN — MEMANTINE 10 MG: 10 TABLET ORAL at 08:32

## 2021-11-18 RX ADMIN — IPRATROPIUM BROMIDE AND ALBUTEROL SULFATE 3 ML: 2.5; .5 SOLUTION RESPIRATORY (INHALATION) at 07:27

## 2021-11-18 RX ADMIN — ARFORMOTEROL TARTRATE 15 MCG: 15 SOLUTION RESPIRATORY (INHALATION) at 19:02

## 2021-11-18 RX ADMIN — RDII 250 MG CAPSULE 250 MG: at 08:32

## 2021-11-18 RX ADMIN — MEMANTINE 10 MG: 10 TABLET ORAL at 20:43

## 2021-11-18 RX ADMIN — MONTELUKAST SODIUM 10 MG: 10 TABLET, FILM COATED ORAL at 08:32

## 2021-11-18 RX ADMIN — FOLIC ACID 1 MG: 1 TABLET ORAL at 08:32

## 2021-11-18 RX ADMIN — SODIUM CHLORIDE, PRESERVATIVE FREE 10 ML: 5 INJECTION INTRAVENOUS at 08:32

## 2021-11-18 RX ADMIN — ROSUVASTATIN CALCIUM 10 MG: 5 TABLET, FILM COATED ORAL at 20:43

## 2021-11-18 RX ADMIN — METOPROLOL TARTRATE 25 MG: 25 TABLET, FILM COATED ORAL at 20:43

## 2021-11-18 RX ADMIN — SODIUM CHLORIDE, PRESERVATIVE FREE 10 ML: 5 INJECTION INTRAVENOUS at 20:43

## 2021-11-18 NOTE — THERAPY TREATMENT NOTE
Acute Care - Physical Therapy Treatment Note   Felice     Patient Name: Mirna Meredith  : 1945  MRN: [id number]  Today's Date: 2021      Visit Dx:     ICD-10-CM ICD-9-CM   1. Metabolic encephalopathy  G93.41 348.31   2. Urinary tract infection without hematuria, site unspecified  N39.0 599.0   3. Dysphagia, oropharyngeal  R13.12 787.22   4. Difficulty walking  R26.2 719.7   5. Decreased activities of daily living (ADL)  Z78.9 V49.89   6. Hydronephrosis due to obstruction of ureter  N13.1 591     593.4     Patient Active Problem List   Diagnosis   • Abdominal pain   • Sepsis (HCC)   • Metabolic encephalopathy   • Acute renal failure (ARF) (HCC)   • Metastatic breast cancer (HCC)   • Chronic diarrhea   • Pancytopenia (HCC)     Past Medical History:   Diagnosis Date   • Cancer (HCC)     Breast with mets to colon and bones   • COPD (chronic obstructive pulmonary disease) (HCC)    • Depression    • Diabetes mellitus (HCC)    • Hyperlipidemia    • Hypertension      Past Surgical History:   Procedure Laterality Date   • CHOLECYSTECTOMY     • CYSTOSCOPY, RETROGRADE PYELOGRAM AND STENT INSERTION Right 2021    Procedure: CYSTOSCOPY RETROGRADE PYELOGRAM AND STENT INSERTION;  Surgeon: Allen Rodriguez MD;  Location: [facility] MAIN OR;  Service: Urology;  Laterality: Right;   • HYSTERECTOMY     • JOINT REPLACEMENT Bilateral     Knees   • MASTECTOMY Bilateral      PT Assessment (last 12 hours)     PT Evaluation and Treatment     Row Name 21 1300          Physical Therapy Time and Intention    Subjective Information no complaints (P)   -SH     Document Type therapy note (daily note) (P)   -SH     Mode of Treatment individual therapy; physical therapy (P)   -SH     Patient Effort good (P)   -SH     Symptoms Noted During/After Treatment fatigue (P)   -SH     Row Name 21 1300          Bed Mobility    Bed Mobility bed mobility (all) activities (P)   -SH     All Activities, [place] (Bed  Mobility) standby assist; contact guard assist (P)   -     Bed Mobility, Safety Issues decreased use of legs for bridging/pushing (P)   -     Assistive Device (Bed Mobility) bed rails; draw sheet (P)   -     Row Name 11/18/21 1300          Transfers    Transfers sit-stand transfer; stand-sit transfer; toilet transfer (P)   -     Sit-Stand [place] (Transfers) minimum assist (75% patient effort) (P)   -     Stand-Sit [place] (Transfers) minimum assist (75% patient effort) (P)   -     [place] Level (Toilet Transfer) minimum assist (75% patient effort) (P)   -     Assistive Device (Toilet Transfer) walker, front-wheeled; commode chair (P)   -     Row Name 11/18/21 1300          Sit-Stand Transfer    Assistive Device (Sit-Stand Transfers) walker, front-wheeled (P)   -     Row Name 11/18/21 1300          Stand-Sit Transfer    Assistive Device (Stand-Sit Transfers) walker, front-wheeled (P)   -     Row Name 11/18/21 1300          Toilet Transfer    Type (Toilet Transfer) sit-stand; stand-sit (P)   -     Row Name 11/18/21 1300          Safety Issues, Functional Mobility    Safety Issues Affecting Function (Mobility) impulsivity; insight into deficits/self-awareness; positioning of assistive device (P)   -     Impairments Affecting Function (Mobility) balance; endurance/activity tolerance; pain; strength; shortness of breath (P)   -     Row Name 11/18/21 1300          Balance    Balance Assessment standing dynamic balance (P)   -     Dynamic Standing Balance mild impairment (P)   -     Row Name             Wound 11/14/21 1149 Right vagina    Wound - Properties Group Placement Date: 11/14/21  -LB Placement Time: 1149  -LB Present on Hospital Admission: N  -LB Side: Right  -LB Location: vagina  -LB     Retired Wound - Properties Group Date first assessed: 11/14/21  -LB Time first assessed: 1149  -LB Present on Hospital Admission: N  -LB Side: Right  -LB Location: vagina  -LB     Row  Name 11/18/21 1300          Progress Summary (PT)    Progress Toward Functional Goals (PT) progress toward functional goals is fair (P)   -SH     Daily Progress Summary (PT) Pt unable to ambulate during this treatment session. Pt demonstrates unsteadiness with transfer to toilet. Skilled PT services are still recommended to continue to progress toward functional mobility goals and improve daily activities. (P)   -SH           User Key  (r) = Recorded By, (t) = Taken By, (c) = Cosigned By    Initials Name Provider Type    Theodore Walker, RN Registered Nurse    Christopher Arora, PT Student PT Student                Physical Therapy Education                 Title: PT OT SLP Therapies (Done)     Topic: Physical Therapy (Done)     Point: Mobility training (Done)     Learning Progress Summary           Patient Acceptance, E, VU by  at 11/13/2021 1017    Acceptance, E,TB, VU by  at 11/12/2021 1151                   Point: Home exercise program (Done)     Learning Progress Summary           Patient Acceptance, E, VU by  at 11/13/2021 1017    Acceptance, E,TB, VU by  at 11/12/2021 1151                   Point: Body mechanics (Done)     Learning Progress Summary           Patient Acceptance, E, VU by  at 11/13/2021 1017    Acceptance, E,TB, VU by  at 11/12/2021 1151                   Point: Precautions (Done)     Learning Progress Summary           Patient Acceptance, E, VU by  at 11/13/2021 1017    Acceptance, E,TB, VU by  at 11/12/2021 1151                               User Key     Initials Effective Dates Name Provider Type Discipline     06/16/21 -  Nga Sharma, OT Occupational Therapist OT     04/25/21 -  Annabella Guo, PT Physical Therapist PT              PT Recommendation and Plan     Progress Summary (PT)  Progress Toward Functional Goals (PT): (P) progress toward functional goals is fair  Daily Progress Summary (PT): (P) Pt unable to ambulate during this treatment session. Pt  demonstrates unsteadiness with transfer to toilet. Skilled PT services are still recommended to continue to progress toward functional mobility goals and improve daily activities.   Outcome Measures     Row Name 11/18/21 1303 11/17/21 0900 11/15/21 1447       How much help from another person do you currently need...    Turning from your back to your side while in flat bed without using bedrails? 4 (P)   - 3  -CS 2  -DK    Moving from lying on back to sitting on the side of a flat bed without bedrails? 3 (P)   - 2  -CS 2  -DK    Moving to and from a bed to a chair (including a wheelchair)? 3 (P)   - 3  -CS 2  -DK    Standing up from a chair using your arms (e.g., wheelchair, bedside chair)? 3 (P)   - 3  -CS 2  -DK    Climbing 3-5 steps with a railing? 2 (P)   - 2  -CS 1  -DK    To walk in hospital room? 2 (P)   - 3  -CS 2  -DK    AM-PAC 6 Clicks Score (PT) 17 (P)   -SH 16  -CS 11  -DK       Functional Assessment    Outcome Measure Options AM-PAC 6 Clicks Basic Mobility (PT) (P)   -SH AM-PAC 6 Clicks Basic Mobility (PT)  -CS AM-PAC 6 Clicks Basic Mobility (PT)  -DK          User Key  (r) = Recorded By, (t) = Taken By, (c) = Cosigned By    Initials Name Provider Type    Lydia Sandoval, PTA Physical Therapy Assistant     Geovanni Stark PTA Physical Therapy Assistant     Christopher Whitmore, PT Student PT Student                 Time Calculation:    PT Charges     Row Name 11/18/21 1300             Time Calculation    PT Received On 11/18/21 (P)   -SH              Timed Charges    73688 - PT Therapeutic Activity Minutes 15 (P)   -SH              Total Minutes    Timed Charges Total Minutes 15 (P)   -SH       Total Minutes 15 (P)   -SH            User Key  (r) = Recorded By, (t) = Taken By, (c) = Cosigned By    Initials Name Provider Type     Christopher Whitmore, PT Student PT Student              Therapy Charges for Today     Code Description Service Date Service Provider Modifiers Qty    [id number]  HC PT THERAPEUTIC ACT EA 15 MIN 11/18/2021 Christopher Whitmore, PT Student GP 1          PT G-Codes  Outcome Measure Options: (P) AM-PAC 6 Clicks Basic Mobility (PT)  AM-PAC 6 Clicks Score (PT): (P) 17  AM-PAC 6 Clicks Score (OT): 21    Christopher Whitmore, PT Student  11/18/2021

## 2021-11-18 NOTE — SIGNIFICANT NOTE
11/18/21 1102   Plan   Plan [facility] can offer, [facility] can offer, [facility] can offer, [facility] can offer, [facility] can offer, [facility] can offer. SW placed call to pts Danica to discuss bed offers. Jessy has accepted bed offer at [facility]. SW notified facility and SW awaiting to determine when bed is available.

## 2021-11-18 NOTE — PROGRESS NOTES
[facility]     Progress Note    Patient Name: Mirna Meredith  : 1945  MRN: [id number]  Primary Care Physician:  Pan Pacheco MD  Date of admission: 2021  5:21 PM       Subjective     She is awake alert and oriented.  She continues to have diarrhea. Cdiff was negative.  No events overnight.  Renal function stabilizing at 1.7.Nursing reports she does have intermittent confusion through the night, but resolves during the day.    Review of Systems:    Review of Systems   Constitutional: Positive for activity change, appetite change and fatigue. Negative for chills and fever.   HENT: Negative for congestion, ear pain, sinus pressure, sore throat and trouble swallowing.    Eyes: Negative for pain, discharge and visual disturbance.   Respiratory: Negative for cough, chest tightness, shortness of breath and wheezing.    Cardiovascular: Negative for chest pain, palpitations and leg swelling.   Gastrointestinal: Negative for abdominal distention, abdominal pain, diarrhea, nausea and vomiting.   Endocrine: Negative for cold intolerance and heat intolerance.   Genitourinary: Negative for difficulty urinating, dysuria, frequency and urgency.   Musculoskeletal: Negative for arthralgias, joint swelling, myalgias and neck pain.   Skin: Negative for color change, rash and wound.   Neurological: Positive for weakness. Negative for dizziness, syncope and headaches.   Hematological: Negative for adenopathy. Does not bruise/bleed easily.   Psychiatric/Behavioral: Negative for agitation, confusion, dysphoric mood and hallucinations. The patient is not nervous/anxious.            Objective   Objective     Vitals:   Vitals:    21 0717 21 0722 21 0727 21 0735   BP:    150/74   BP Location:    Left arm   Patient Position:    Lying   Pulse: 85 90 93 90   Resp:    Temp:    98.6 °F (37 °C)   TempSrc:    Axillary   SpO2: 90% 90% 92% 99%   Weight:       Height:              Physical  [phone] to confirm.    Ifeoma Whitman RN BSN OCN   Exam:  Vitals and nursing note reviewed.     Constitutional:      Alert, cooperative, responsive, and conversant.  No acute distress.     HENT:      Normocephalic and atraumatic, no nasal congestion, discharge, mucous membranes are moist, no OP erythema  Eyes:      PERRLA, no scleral icterus, no conjunctival injection, no eye discharge  Neck:     Supple, no thyromegaly, no lymphadenopathy, trachea midline, no elevated JVD  Cardiovascular:      RRR, no murmurs, rubs or gallops. Palpable pedal pulses bilaterally. No BLE edema.   Pulmonary:      CTAB. Pulmonary effort is normal and unlabored. No respiratory distress.    Abdominal:      Bowel sounds active, soft, nontender, non distended, no organomegaly  Musculoskeletal:         No muscle wasting, tenderness or joint swelling.  Generalized weakness.  Skin:     Warm and dry, cap refill less than 3 seconds. No clubbing, cyanosis, jaundice, erythema, rashes or ecchymosis.   Neurological:      AAOx3, speech clear, no focal deficit, strength equal bilaterally in all extremities, cranial nerves grossly intact  Psychiatric:         Mood and affect normal.  Behavior normal.         Result Review    Result Review:  I have personally reviewed the results from the time of this admission to 9/20/2021 18:13 EDT and agree with these findings:  [x]  Laboratory  []  Microbiology  []  Radiology  []  EKG/Telemetry   []  Cardiology/Vascular   []  Pathology  []  Old records  []  Other:     Assessment/Plan   Assessment / Plan       Active Hospital Problems:  Active Hospital Problems    Diagnosis    • Chronic diarrhea      Ulcerative colitis     • Pancytopenia (HCC)    • Acute renal failure (ARF) (HCC)    • Metastatic breast cancer (HCC)    • Metabolic encephalopathy          Plan: Acute kidney injury improving  Hold all scheduled laxatives.  Give as needed only.  Keep blinds open during the day.  Continue present care.  I have personally reviewed all the information and agree with the  plan  Electronically signed by MICHELET De La Torre, 11/18/21, 11:52 AM EST.

## 2021-11-18 NOTE — SIGNIFICANT NOTE
11/18/21 1118   Plan   Final Discharge Disposition Code 62 - inpatient rehab facility   Final Note Pt will have a bed available tomorrow 11/19 at [facility].

## 2021-11-18 NOTE — PROGRESS NOTES
[facility]   Hospitalist Progress Note  Date: 2021  Patient Name: Mirna Meredith  : 1945  MRN: [id number]  Date of admission: 2021      Subjective   Subjective     Chief Complaint: Confusion and weakness    Summary:   Mirna Meredith is a 76 y.o. female presents to the hospital due to confusion and weakness.  This seems to be worse over the last 1 to 2 days.  Family brought her in stating she is not herself.  Patient was recently admitted less than 1 month ago for similar symptoms which were found to be secondary to urinary tract infection.  She was treated and seemed to be having improvement but over the last 1 to 2 days had a significant decline in her ability to ambulate and has been more confused and hypersomnolent.  In the emergency department she was not febrile but had a temperature of 100.1.  She was, however tachycardic.  She received IV fluids and Rocephin in the emergency department due to urinary tract infection discovered on urinalysis in the ER.  Due to her inability to ambulate due to weakness and her metabolic encephalopathy we are asked to admit for further care.  On  patient after right ureteric stent developed fever,  tachycardic  up 140 and was put on oxygen 4 L nasal cannula in PACU while recovering.  Patient given Tylenol, IV Toradol, IV fluid bolus 3 L.  Started on Rocephin blood cultures drawn.  Transferred to unit for close observation      Interval Followup:  No acute events overnight, patient is complaining of some right upper thigh pain, patient does have some swelling associated with this.  Overall she states she is feeling better,    Review of Systems  Denies nausea nausea, positive diarrhea  No chest pain palpitations  No fever no chills    Objective   Objective     Vitals:   Temp:  [97.7 °F (36.5 °C)-98.6 °F (37 °C)] 98.6 °F (37 °C)  Heart Rate:  [77-98] 90  Resp:  [18-20] 18  BP: ()/(52-74) 150/74    Physical Exam   General appearance:  NAD, conversant, alert, resting comfortably in bed  Eyes: anicteric sclerae, moist conjunctivae; no lid-lag; PERRLA  HENT: Atraumatic; oropharynx clear with moist mucous membranes and no mucosal ulcerations; normal hard and soft palate  Neck: Trachea midline; FROM, supple, no thyromegaly or lymphadenopathy  Lungs: CTA, with normal respiratory effort and no intercostal retractions  CV: Regular Rate and Rhythm, no Murmurs, Rubs, or Gallops   Abdomen: Soft, non-tender; no masses or Heptosplenomegally  Extremities: Trace lower extremity edema right greater than left, no lymphadenopathy   skin: Normal temperature, turgor and texture; no rash, ulcers or subcutaneous nodules  Psych: Appropriate affect, alert and oriented to person, place and time  Neuro: CN II - XII intact no motor deficits, no sensory defecits, globally weak but improving    Result Review    Result Review:  I have personally reviewed the results from the time of this admission to 11/18/2021 11:50 EST and agree with these findings:  [x]  Laboratory  [x]  Microbiology  [x]  Radiology  [x]  EKG/Telemetry   []  Cardiology/Vascular   []  Pathology  [x]  Old records  [x]  Other: Medications     Assessment/Plan   Assessment / Plan     Assessment:  · Acute kidney injury with baseline creatinine around 1.  Likely prerenal versus ATN from sepsis.  Stable  · Altered mental status - Metaboic encephalopathy secondary to above.  Resolved  · Moderate to severe right hydronephrosis.  Question distal ureteral stricture.  S/p stenting of right ureter November 14  · Ascites.  Likely malignant.  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  Acute exacerbation  · Diabetes.  Hemoglobin A1c of 8.7%  · Hypertension  · History of breast cancer with mets to colon, liver, lung and bone.  · History of C. Difficile, repeat negative  · Diarrhea.  Stable  · Low back pain.  DJD  · Anemia.   Chemotherapy-induced  · Thrombocytopenia.  · Hard of hearing.  · Elevated CEA  · Septic shock not present on admission, as has fever, tachycardia, lactic acidosis and elevated procalcitonin.  · S/p right ureteric stent on November 14.  Likely causing above  · Possible UTI causing sepsis  · Elevated troponin.  Doubt non-STEMI.  Likely from sepsis and tachycardia  · Hypomagnesemia           Plan:  Continue IV antibiotics to completion  Continue to monitor renal function, this is improved  Continue nebulizer treatment  Nephrology consulted, appreciate their recommendations  Continue to follow blood cultures  Markedly elevated BNP, continue to monitor volume status  Repeat chest x-ray.  No acute finding  Resumed home beta-blocker at a smaller dose  2D echo with left ventricular diastolic dysfunction, grade 1 impaired relaxation normal EF  repeat UA and urine culture via straight cath pending  Heme-onc consulted as per family request.  Discussed with heme-onc holding her oral chemotherapy  Discussed with urology.  Appreciate input for cystoscopy and stent   Continue with sliding scale insulin, monitor glucose  Appreciate speech therapy input cleared by speech.  Resumed some of the home medications including risperidone and Singulair  CT of the head and chest x-ray negative.  CT abdomen pelvis noted.  Progression of the disease even with chemotherapy  X-ray L-spine noted.   MRI of the spine with osseous metastatic disease, severe stenosis  PT OT.  Trend creatinine.   Will need rehab placement given hospital-acquired debility    Telemetry: Reviewed by me, sinus rhythm    Reviewed, CBC, CMP, mag, Phos and imaging, and discussed with patient and nurse at bedside.    DVT prophylaxis:  Medical and mechanical DVT prophylaxis orders are present.    CODE STATUS:   Code Status (Patient has no pulse and is not breathing): No CPR (Do Not Attempt to Resuscitate)  Medical Interventions (Patient has pulse or is breathing): Full  Support      Part of this note may be an electronic transcription/translation of spoken language to printed text using the Dragon Dictation System.       Electronically signed by Arias Dyer MD, 11/18/21, 11:54 AM EST.

## 2021-11-18 NOTE — CONSULTS
"Nutrition Services    Patient Name: Mirna Meredith  YOB: 1945  MRN: [id number]  Admission date: 11/11/2021      CLINICAL NUTRITION ASSESSMENT      Reason for Assessment  LOS   H&P:    Past Medical History:   Diagnosis Date   • Cancer (HCC)     Breast with mets to colon and bones   • COPD (chronic obstructive pulmonary disease) (HCC)    • Depression    • Diabetes mellitus (HCC)    • Hyperlipidemia    • Hypertension         Current Problems:   Active Hospital Problems    Diagnosis    • Chronic diarrhea      Ulcerative colitis     • Pancytopenia (HCC)    • Acute renal failure (ARF) (HCC)    • Metastatic breast cancer (HCC)    • Metabolic encephalopathy         Nutrition/Diet History         Narrative     RD consult for 7 day LOS.  Per intake records pt consuming % meals.  Pt is being followed via [facility] & diet downgraded from Mechanical Soft to Pureed yesterday.  In reviewing chart pt has lost 16# over the past 5 months, a 7.3% change.  RD attempted to visit with pt but currently with rehab services.     Anthropometrics        Current Height, Weight Height: 167.6 cm (66\")  Weight: 92.7 kg (204 lb 5.9 oz)   Current BMI Body mass index is 32.99 kg/m².  Class I Obesity       Weight Hx  Wt Readings from Last 30 Encounters:   11/11/21 2316 92.7 kg (204 lb 5.9 oz)   10/18/21 0533 92.7 kg (204 lb 5.9 oz)   09/09/21 1952 95.3 kg (210 lb)   06/08/21 0942 100 kg (220 lb 14.4 oz)   06/08/21 0004 118 kg (260 lb)   12/07/20 0000 106 kg (234 lb 4 oz)   12/04/19 0000 109 kg (240 lb)   06/20/19 0000 113 kg (249 lb)   06/10/19 0000 112 kg (248 lb)   06/05/19 0000 112 kg (248 lb)   05/29/19 0000 113 kg (250 lb)   05/15/19 0000 113 kg (250 lb)   04/16/19 0000 113 kg (250 lb)   10/16/18 0000 112 kg (246 lb)   08/16/18 0000 111 kg (244 lb)   05/09/18 0000 112 kg (246 lb 6 oz)   04/18/18 0000 110 kg (242 lb)   01/23/18 0000 113 kg (250 lb)            Wt Change Observation 16# wt loss since June     Estimated/Assessed " Needs       Energy Requirements    EST Needs (kcal/day) 8442-1922 (30-35 kcal)       Protein Requirements    EST Daily Needs (g/day) 59-71 (1-1.2)       Fluid Requirements     Estimated Needs (mL/day) 1779     Labs/Medications         Pertinent Labs Reviewed.   Results from last 7 days   Lab Units 11/18/21  0432 11/17/21  0756 11/16/21  0728   SODIUM mmol/L 140 135*  135* 131*   POTASSIUM mmol/L 3.7 3.8  3.8 3.9   CHLORIDE mmol/L 103 100  100 95*   CO2 mmol/L 28.3 23.6  23.6 24.0   BUN mg/dL 25* 23  23 23   CREATININE mg/dL 1.72* 1.77*  1.77* 2.00*   CALCIUM mg/dL 9.4 9.2  9.2 8.6   BILIRUBIN mg/dL 0.2 0.3 0.4   ALK PHOS U/L 163* 148* 152*   ALT (SGPT) U/L 15 17 18   AST (SGOT) U/L 34* 42* 51*   GLUCOSE mg/dL 147* 135*  135* 122*     Results from last 7 days   Lab Units 11/17/21  0918 11/17/21  0756 11/16/21  0728 11/16/21  0728 11/15/21  1554 11/15/21  0445 11/14/21  0549   MAGNESIUM mg/dL  --  1.7  --  1.8 2.2  --   --    PHOSPHORUS mg/dL  --  2.2*  2.2*  --   --   --   --    < >   HEMOGLOBIN g/dL 8.6*  --    < > 8.0*  --    < >  --    HEMATOCRIT % 27.1*  --    < > 23.9*  --    < >  --     < > = values in this interval not displayed.     Lab Results   Component Value Date    HGBA1C 8.70 (H) 11/12/2021         Pertinent Medications Reviewed.     Current Nutrition Orders & Evaluation of Intake       Oral Nutrition     Current PO Diet Diet Pureed   Supplement Orders Placed This Encounter      Dietary Nutrition Supplements Boost Plus (Ensure Plus)       Nutrition Diagnosis         Nutrition Dx Problem 1 Increased nutrient needs related to increased nutrient needs due to catabolic disease as evidenced by unintended wt change. and Breast CA with chemo     Nutrition Intervention         Continue diet per [facility]  Change Boost Plus to Boost Glucose Control 2' to hx A1C 8.70     Medical Nutrition Therapy/Nutrition Education          Learner     Readiness Patient  Pt currently with therapy     Monitor/Evaluation         Monitor PO intake, Supplement intake, Pertinent labs, Weight     Nutrition Discharge Plan         To be determined     Electronically signed by:  Malorie Goldman RD  11/18/21 10:54 EST

## 2021-11-18 NOTE — PROGRESS NOTES
[facility]     Progress Note    Patient Name: Mirna Meredith  : 1945  MRN: [id number]  Primary Care Physician:  Pan Pacheco MD  Date of admission: 2021    Subjective   Subjective     Chief Complaint: I have discussed in detail with her daughter patient's chemotherapy is on hold for at least 2 weeks she is okay for discharge to the rehab I will see her the week after Thanksgiving that is 2 weeks from now in the office to resume her treatments patient otherwise is stable and doing well most likely confusion was because of UTI which has happened to her before as well at this time she is back to her baseline    HPI:  Patient Reports she admitted with confusion disorientation which was probably because of UTI she had it has resolved I have discussed at length with her daughter regarding her further treatments or chemotherapy will be hold for about 2 weeks    Review of Systems   All systems were reviewed and negative except for: Reviewed    Objective   Objective     Vitals:   Temp:  [97.7 °F (36.5 °C)-98.6 °F (37 °C)] 98.6 °F (37 °C)  Heart Rate:  [76-98] 93  Resp:  [18-20] 18  BP: ()/(52-97) 91/55    Physical Exam    Constitutional: Awake, alert   Eyes: PERRLA, sclerae anicteric, no conjunctival injection   HENT: NCAT, mucous membranes moist   Neck: Supple, no thyromegaly, no lymphadenopathy, trachea midline   Respiratory: Clear to auscultation bilaterally, nonlabored respirations    Cardiovascular: RRR, no murmurs, rubs, or gallops, palpable pedal pulses bilaterally   Gastrointestinal: Positive bowel sounds, soft, nontender, nondistended   Musculoskeletal: No bilateral ankle edema, no clubbing or cyanosis to extremities   Psychiatric: Appropriate affect, cooperative   Neurologic: Oriented x 3, strength symmetric in all extremities, Cranial Nerves grossly intact to confrontation, speech clear   Skin: No rashes     Result Review    Result Review:  I have personally reviewed the results  from the time of this admission to 11/18/2021 08:11 EST and agree with these findings:  [x]  Laboratory  []  Microbiology  []  Radiology  []  EKG/Telemetry   []  Cardiology/Vascular   []  Pathology  []  Old records  []  Other: Thrombocytopenia  Most notable findings include: Pneumonia induced pancytopenia    Assessment/Plan   Assessment / Plan     Brief Patient Summary:  Mirna Meredith is a 76 y.o. female who patient has back to her baseline will probably benefit from a week or 10 days of rehab    Active Hospital Problems:  Active Hospital Problems    Diagnosis    • Chronic diarrhea      Ulcerative colitis     • Pancytopenia (HCC)    • Acute renal failure (ARF) (HCC)    • Metastatic breast cancer (HCC)    • Metabolic encephalopathy        Plan:   Patient can be discharged to the rehab I will see her in 2 weeks in the office    DVT prophylaxis:  Medical and mechanical DVT prophylaxis orders are present.    CODE STATUS:   Code Status (Patient has no pulse and is not breathing): No CPR (Do Not Attempt to Resuscitate)  Medical Interventions (Patient has pulse or is breathing): Full Support    Disposition:  I expect patient to be discharged when okay with the hospitalist and when the rehab bed is available.    Electronically signed by Jose G Grissom MD, 11/18/21, 8:11 AM EST.

## 2021-11-18 NOTE — PLAN OF CARE
Goal Outcome Evaluation:           Progress: no change  Outcome Summary: Patient got up to bedside commode today with assist of 1-2. Patient calls out when needing to use bathroom. Patient was in pain in RLE gave tramadol. Waiting on results of venous duplex. Patient is alert and oriented. Patient has no other complaints at this time.

## 2021-11-19 VITALS
OXYGEN SATURATION: 96 % | SYSTOLIC BLOOD PRESSURE: 157 MMHG | RESPIRATION RATE: 20 BRPM | TEMPERATURE: 97.8 F | DIASTOLIC BLOOD PRESSURE: 70 MMHG | WEIGHT: 204.37 LBS | HEART RATE: 92 BPM | HEIGHT: 66 IN | BODY MASS INDEX: 32.84 KG/M2

## 2021-11-19 LAB
ALBUMIN SERPL-MCNC: 2.7 G/DL (ref 3.5–5.2)
ALBUMIN/GLOB SERPL: 0.8 G/DL
ALP SERPL-CCNC: 165 U/L (ref 39–117)
ALT SERPL W P-5'-P-CCNC: 12 U/L (ref 1–33)
ANION GAP SERPL CALCULATED.3IONS-SCNC: 9.9 MMOL/L (ref 5–15)
AST SERPL-CCNC: 30 U/L (ref 1–32)
BACTERIA SPEC AEROBE CULT: NO GROWTH
BACTERIA SPEC AEROBE CULT: NORMAL
BACTERIA SPEC AEROBE CULT: NORMAL
BACTERIA SPEC RESP CULT: NORMAL
BASOPHILS # BLD AUTO: 0.04 10*3/MM3 (ref 0–0.2)
BASOPHILS NFR BLD AUTO: 0.7 % (ref 0–1.5)
BILIRUB SERPL-MCNC: 0.3 MG/DL (ref 0–1.2)
BUN SERPL-MCNC: 21 MG/DL (ref 8–23)
BUN/CREAT SERPL: 16 (ref 7–25)
CALCIUM SPEC-SCNC: 9.2 MG/DL (ref 8.6–10.5)
CHLORIDE SERPL-SCNC: 102 MMOL/L (ref 98–107)
CO2 SERPL-SCNC: 28.1 MMOL/L (ref 22–29)
CREAT SERPL-MCNC: 1.31 MG/DL (ref 0.57–1)
DEPRECATED RDW RBC AUTO: 59.5 FL (ref 37–54)
EOSINOPHIL # BLD AUTO: 0.03 10*3/MM3 (ref 0–0.4)
EOSINOPHIL NFR BLD AUTO: 0.5 % (ref 0.3–6.2)
ERYTHROCYTE [DISTWIDTH] IN BLOOD BY AUTOMATED COUNT: 18.3 % (ref 12.3–15.4)
GFR SERPL CREATININE-BSD FRML MDRD: 39 ML/MIN/1.73
GLOBULIN UR ELPH-MCNC: 3.2 GM/DL
GLUCOSE BLDC GLUCOMTR-MCNC: 147 MG/DL (ref 70–99)
GLUCOSE BLDC GLUCOMTR-MCNC: 200 MG/DL (ref 70–99)
GLUCOSE SERPL-MCNC: 167 MG/DL (ref 65–99)
GRAM STN SPEC: NORMAL
HCT VFR BLD AUTO: 26.6 % (ref 34–46.6)
HGB BLD-MCNC: 8.5 G/DL (ref 12–15.9)
IMM GRANULOCYTES # BLD AUTO: 0.2 10*3/MM3 (ref 0–0.05)
IMM GRANULOCYTES NFR BLD AUTO: 3.3 % (ref 0–0.5)
LYMPHOCYTES # BLD AUTO: 1.28 10*3/MM3 (ref 0.7–3.1)
LYMPHOCYTES NFR BLD AUTO: 21.1 % (ref 19.6–45.3)
MAGNESIUM SERPL-MCNC: 1.5 MG/DL (ref 1.6–2.4)
MCH RBC QN AUTO: 28.4 PG (ref 26.6–33)
MCHC RBC AUTO-ENTMCNC: 32 G/DL (ref 31.5–35.7)
MCV RBC AUTO: 89 FL (ref 79–97)
MONOCYTES # BLD AUTO: 0.44 10*3/MM3 (ref 0.1–0.9)
MONOCYTES NFR BLD AUTO: 7.3 % (ref 5–12)
NEUTROPHILS NFR BLD AUTO: 4.07 10*3/MM3 (ref 1.7–7)
NEUTROPHILS NFR BLD AUTO: 67.1 % (ref 42.7–76)
NRBC BLD AUTO-RTO: 0 /100 WBC (ref 0–0.2)
PHOSPHATE SERPL-MCNC: 2.5 MG/DL (ref 2.5–4.5)
PLATELET # BLD AUTO: 55 10*3/MM3 (ref 140–450)
PMV BLD AUTO: 11.5 FL (ref 6–12)
POTASSIUM SERPL-SCNC: 3.3 MMOL/L (ref 3.5–5.2)
PROT SERPL-MCNC: 5.9 G/DL (ref 6–8.5)
RBC # BLD AUTO: 2.99 10*6/MM3 (ref 3.77–5.28)
SODIUM SERPL-SCNC: 140 MMOL/L (ref 136–145)
WBC NRBC COR # BLD: 6.06 10*3/MM3 (ref 3.4–10.8)

## 2021-11-19 PROCEDURE — 80053 COMPREHEN METABOLIC PANEL: CPT | Performed by: INTERNAL MEDICINE

## 2021-11-19 PROCEDURE — 94799 UNLISTED PULMONARY SVC/PX: CPT

## 2021-11-19 PROCEDURE — 84100 ASSAY OF PHOSPHORUS: CPT | Performed by: INTERNAL MEDICINE

## 2021-11-19 PROCEDURE — 25010000002 HEPARIN (PORCINE) PER 1000 UNITS: Performed by: INTERNAL MEDICINE

## 2021-11-19 PROCEDURE — 25010000002 MAGNESIUM SULFATE 2 GM/50ML SOLUTION: Performed by: INTERNAL MEDICINE

## 2021-11-19 PROCEDURE — 92526 ORAL FUNCTION THERAPY: CPT

## 2021-11-19 PROCEDURE — 63710000001 INSULIN LISPRO (HUMAN) PER 5 UNITS: Performed by: INTERNAL MEDICINE

## 2021-11-19 PROCEDURE — 83735 ASSAY OF MAGNESIUM: CPT | Performed by: INTERNAL MEDICINE

## 2021-11-19 PROCEDURE — 25010000002 CEFTRIAXONE PER 250 MG: Performed by: INTERNAL MEDICINE

## 2021-11-19 PROCEDURE — 85025 COMPLETE CBC W/AUTO DIFF WBC: CPT | Performed by: INTERNAL MEDICINE

## 2021-11-19 PROCEDURE — 25010000002 HEPARIN LOCK FLUSH PER 10 UNITS

## 2021-11-19 PROCEDURE — 82962 GLUCOSE BLOOD TEST: CPT

## 2021-11-19 PROCEDURE — 99239 HOSP IP/OBS DSCHRG MGMT >30: CPT | Performed by: INTERNAL MEDICINE

## 2021-11-19 RX ORDER — CEFDINIR 300 MG/1
300 CAPSULE ORAL 2 TIMES DAILY
Qty: 14 CAPSULE | Refills: 0 | Status: ON HOLD
Start: 2021-11-19 | End: 2021-12-05

## 2021-11-19 RX ORDER — POTASSIUM CHLORIDE 750 MG/1
40 CAPSULE, EXTENDED RELEASE ORAL ONCE
Status: COMPLETED | OUTPATIENT
Start: 2021-11-19 | End: 2021-11-19

## 2021-11-19 RX ORDER — MAGNESIUM SULFATE HEPTAHYDRATE 40 MG/ML
2 INJECTION, SOLUTION INTRAVENOUS ONCE
Status: COMPLETED | OUTPATIENT
Start: 2021-11-19 | End: 2021-11-19

## 2021-11-19 RX ORDER — HEPARIN SODIUM (PORCINE) LOCK FLUSH IV SOLN 100 UNIT/ML 100 UNIT/ML
SOLUTION INTRAVENOUS
Status: COMPLETED
Start: 2021-11-19 | End: 2021-11-19

## 2021-11-19 RX ORDER — SERTRALINE HYDROCHLORIDE 25 MG/1
25 TABLET, FILM COATED ORAL DAILY
Qty: 30 TABLET | Refills: 0 | Status: SHIPPED | OUTPATIENT
Start: 2021-11-20

## 2021-11-19 RX ADMIN — MONTELUKAST SODIUM 10 MG: 10 TABLET, FILM COATED ORAL at 08:51

## 2021-11-19 RX ADMIN — IPRATROPIUM BROMIDE AND ALBUTEROL SULFATE 3 ML: 2.5; .5 SOLUTION RESPIRATORY (INHALATION) at 00:11

## 2021-11-19 RX ADMIN — HEPARIN SODIUM (PORCINE) LOCK FLUSH IV SOLN 100 UNIT/ML: 100 SOLUTION at 15:16

## 2021-11-19 RX ADMIN — SERTRALINE 25 MG: 25 TABLET, FILM COATED ORAL at 08:51

## 2021-11-19 RX ADMIN — FOLIC ACID 1 MG: 1 TABLET ORAL at 08:51

## 2021-11-19 RX ADMIN — CEFTRIAXONE SODIUM 1 G: 1 INJECTION, SOLUTION INTRAVENOUS at 08:51

## 2021-11-19 RX ADMIN — POTASSIUM CHLORIDE 40 MEQ: 750 CAPSULE, EXTENDED RELEASE ORAL at 08:50

## 2021-11-19 RX ADMIN — IPRATROPIUM BROMIDE AND ALBUTEROL SULFATE 3 ML: 2.5; .5 SOLUTION RESPIRATORY (INHALATION) at 12:15

## 2021-11-19 RX ADMIN — INSULIN LISPRO 2 UNITS: 100 INJECTION, SOLUTION INTRAVENOUS; SUBCUTANEOUS at 13:10

## 2021-11-19 RX ADMIN — ARFORMOTEROL TARTRATE 15 MCG: 15 SOLUTION RESPIRATORY (INHALATION) at 07:29

## 2021-11-19 RX ADMIN — METOPROLOL TARTRATE 25 MG: 25 TABLET, FILM COATED ORAL at 08:51

## 2021-11-19 RX ADMIN — MEMANTINE 10 MG: 10 TABLET ORAL at 08:51

## 2021-11-19 RX ADMIN — FAMOTIDINE 20 MG: 20 TABLET, FILM COATED ORAL at 08:51

## 2021-11-19 RX ADMIN — RDII 250 MG CAPSULE 250 MG: at 08:51

## 2021-11-19 RX ADMIN — HEPARIN SODIUM 5000 UNITS: 5000 INJECTION, SOLUTION INTRAVENOUS; SUBCUTANEOUS at 05:18

## 2021-11-19 RX ADMIN — BUDESONIDE 0.5 MG: 0.5 INHALANT RESPIRATORY (INHALATION) at 07:29

## 2021-11-19 RX ADMIN — SODIUM CHLORIDE, PRESERVATIVE FREE 10 ML: 5 INJECTION INTRAVENOUS at 08:50

## 2021-11-19 RX ADMIN — MAGNESIUM SULFATE 2 G: 2 INJECTION INTRAVENOUS at 09:30

## 2021-11-19 RX ADMIN — IPRATROPIUM BROMIDE AND ALBUTEROL SULFATE 3 ML: 2.5; .5 SOLUTION RESPIRATORY (INHALATION) at 07:29

## 2021-11-19 NOTE — PROGRESS NOTES
[facility]     Progress Note    Patient Name: Mirna Meredith  : 1945  MRN: [id number]  Primary Care Physician:  Pan Pacheco MD  Date of admission: 2021    Subjective Patient is fully awake alert responsive sitting on the side of the bed  She had a big bowel movement  Creatinine is down to 1.3  Hemodynamically stable    Review of Systems  Constitutional:        Weakness tiredness fatigue  Eyes:                       No blurry vision, eye discharge, eye irritation, eye pain  HEENT:                   No acute hair loss, earache and discharge, nasal congestion or discharge, sore throat, postnasal drip  Respiratory:           No shortness of breath coughing sputum production wheezing hemoptysis pleuritic chest pain  Cardiovascular:     No chest pain, orthopnea, PND, dizziness, palpitation, lower extremity edema  Gastrointestinal:   No nausea vomiting diarrhea abdominal pain constipation  Genitourinary:       No urinary incontinence, hesitancy, frequency, urgency, dysuria  Hematologic:         No bruising, bleeding, pallor, lymphadenopathy  Endocrine:            No coldness, hot flashes, polyuria, abnormal hair growth  Musculoskeletal:    No body pains, aches, arthritic pains, muscle pain ,muscle wasting  Psychiatric:          No low or high mood, anxiety, hallucinations, delusions  Skin.                      No rash, ulcers, bruising, itching  Neurological:        No confusion, headache, focal weakness, numbness, dysphasia    Objective   Objective     Vitals:   Temp:  [97.9 °F (36.6 °C)-98.6 °F (37 °C)] 98.4 °F (36.9 °C)  Heart Rate:  [] 79  Resp:  [14-20] 20  BP: (141-161)/(57-89) 149/72  Physical Exam    Constitutional: Awake, alert responsive, conversant, no obvious distress              Psychiatric:  Appropriate affect, cooperative   Neurologic:  Awake alert ,oriented x 3, strength symmetric in all extremities, Cranial Nerves grossly intact to confrontation, speech  clear   Eyes:   PERRLA, sclerae anicteric, no conjunctival injection   HEENT:  Moist mucous membranes, no nasal or eye discharge, no throat congestion   Neck:   Supple, no thyromegaly, no lymphadenopathy, trachea midline, no elevated JVD   Respiratory:  Clear to auscultation bilaterally, nonlabored respirations    Cardiovascular: RRR, no murmurs, rubs, or gallops, palpable pedal pulses bilaterally, No bilateral ankle edema   Gastrointestinal: Positive bowel sounds, soft, nontender, nondistended, no organomegaly   Musculoskeletal:  No clubbing or cyanosis to extremities,muscle wasting, joint swelling, muscle weakness             Skin:                      No rashes, bruising, skin ulcers, petechiae or ecchymosis    Result Review    Result Review:  I have personally reviewed the results from the time of this admission to 11/19/2021 08:45 EST and agree with these findings:  []  Laboratory  []  Microbiology  []  Radiology  []  EKG/Telemetry   []  Cardiology/Vascular   []  Pathology  []  Old records  []  Other:    Assessment/Plan   Assessment / Plan       Active Hospital Problems:  Active Hospital Problems    Diagnosis    • Chronic diarrhea      Ulcerative colitis     • Pancytopenia (HCC)    • Acute renal failure (ARF) (HCC)    • Metastatic breast cancer (HCC)    • Metabolic encephalopathy        Plan:   Continue present care       Electronically signed by Scarlett Bautista MD, 11/19/21, 8:45 AM EST.

## 2021-11-19 NOTE — THERAPY TREATMENT NOTE
Acute Care - Speech Language Pathology   Swallow Treatment Note  Felice     Patient Name: Mirna Meredith  : 1945  MRN: [id number]  Today's Date: 2021               Admit Date: 2021    Visit Dx:     ICD-10-CM ICD-9-CM   1. Metabolic encephalopathy  G93.41 348.31   2. Urinary tract infection without hematuria, site unspecified  N39.0 599.0   3. Dysphagia, oropharyngeal  R13.12 787.22   4. Difficulty walking  R26.2 719.7   5. Decreased activities of daily living (ADL)  Z78.9 V49.89   6. Hydronephrosis due to obstruction of ureter  N13.1 591     593.4     Patient Active Problem List   Diagnosis   • Abdominal pain   • Sepsis (HCC)   • Metabolic encephalopathy   • Acute renal failure (ARF) (HCC)   • Metastatic breast cancer (HCC)   • Chronic diarrhea   • Pancytopenia (HCC)     Past Medical History:   Diagnosis Date   • Cancer (HCC)     Breast with mets to colon and bones   • COPD (chronic obstructive pulmonary disease) (HCC)    • Depression    • Diabetes mellitus (HCC)    • Hyperlipidemia    • Hypertension      Past Surgical History:   Procedure Laterality Date   • CHOLECYSTECTOMY     • CYSTOSCOPY, RETROGRADE PYELOGRAM AND STENT INSERTION Right 2021    Procedure: CYSTOSCOPY RETROGRADE PYELOGRAM AND STENT INSERTION;  Surgeon: Allen Rodriguez MD;  Location: [facility];  Service: Urology;  Laterality: Right;   • HYSTERECTOMY     • JOINT REPLACEMENT Bilateral     Knees   • MASTECTOMY Bilateral      SPEECH PATHOLOGY DYSPHAGIA TREATMENT    Subjective/Behavioral Observations: Patient seen for dysphagia tx.     Current Diet: Puree - thin liquids    Treatment received:Patient seen for dysphagia tx.  Sitting up on side of bed.  Attempted therapeutic trials of soft solids.  Tolerated much better today.  Mildly prolonged mastication but no oral residue.  Denies globus sensation after swallow.  No s/s of aspiration noted.      Results of treatment:Progress noted        Progress toward  goals:Progressing        Barriers to Achieving goals: medical status        Plan of care:/changes in plan: Upgrade to mechanical soft with thin liquids.   Fork mash solids as needed.    Upright for all intake.       EDUCATION  The patient has been educated in the following areas:   Dysphagia (Swallowing Impairment).          Leticia Ceballos, SLP  11/19/2021

## 2021-11-19 NOTE — PLAN OF CARE
Goal Outcome Evaluation:           Progress: no change  Outcome Summary: pt has had 2 large loose bm's, pt denied any pain, pt getting up to bedside commode with 1 assist and walker, pt has been a&o the whole shift, will continue to monitor.

## 2021-11-19 NOTE — DISCHARGE SUMMARY
[facility]         HOSPITALIST  DISCHARGE SUMMARY    Patient Name: Mirna Meredith  : 1945  MRN: [id number]    Date of Admission: 2021  Date of Discharge:  2021  Primary Care Physician: Pan Pacheco MD    Consults     Date and Time Order Name Status Description    11/15/2021 10:33 AM Inpatient Nephrology Consult      2021  6:32 PM Inpatient Pulmonology Consult Completed     2021  9:45 AM Inpatient Urology Consult      2021 11:18 AM Hematology & Oncology Inpatient Consult      2021  8:47 PM IP General Consult (Use specialty-specific consult if known)      10/18/2021  9:06 AM Hospitalist (on-call MD unless specified) Completed           Active and Resolved Hospital Problems:  · Acute kidney injury with baseline creatinine around 1.  Likely prerenal versus ATN from sepsis.  Stable  · Altered mental status - Metaboic encephalopathy secondary to above.  Resolved  · Moderate to severe right hydronephrosis.  Question distal ureteral stricture.  S/p stenting of right ureter   · Ascites.  Likely malignant.  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  Acute exacerbation  · Diabetes.  Hemoglobin A1c of 8.7%  · Hypertension  · History of breast cancer with mets to colon, liver, lung and bone.  · History of C. Difficile, repeat negative  · Diarrhea.  Stable  · Low back pain.  DJD  · Anemia.  Chemotherapy-induced  · Thrombocytopenia.  · Hard of hearing.  · Elevated CEA  · Septic shock not present on admission, as has fever, tachycardia, lactic acidosis and elevated procalcitonin.  · S/p right ureteric stent on .  Likely causing above  · Possible UTI causing sepsis  · Elevated troponin.  Doubt non-STEMI.  Likely from sepsis and tachycardia  · Hypomagnesemia      Hospital Course     Hospital Course:  Mirna Meredith is a 76 y.o. female presents to the hospital due to  confusion and weakness.  This seems to be worse over the last 1 to 2 days.  Family brought her in stating she is not herself.  Patient was recently admitted less than 1 month ago for similar symptoms which were found to be secondary to urinary tract infection.  She was treated and seemed to be having improvement but over the last 1 to 2 days had a significant decline in her ability to ambulate and has been more confused and hypersomnolent.  In the emergency department she was not febrile but had a temperature of 100.1.  She was, however tachycardic.  She received IV fluids and Rocephin in the emergency department due to urinary tract infection discovered on urinalysis in the ER.  Due to her inability to ambulate due to weakness and her metabolic encephalopathy we are asked to admit for further care.  On November 14 patient after right ureteric stent developed fever,  tachycardic  up 140 and was put on oxygen 4 L nasal cannula in PACU while recovering.  Patient given Tylenol, IV Toradol, IV fluid bolus 3 L.  Started on Rocephin blood cultures drawn.  Transferred to unit for close observation    Patient improved with IV fluids, antibiotics, patient's cultures were negative.  Patient did have ureteral stenting on November 14, it will be important for patient to follow-up with urology for removal of this.  Patient underwent MRI of the spine which was consistent with worsening metastatic disease, patient's oncologist discussed this with the patient, she will undergo a few weeks of physical therapy and then will be started back on treatment.  She is discharged to rehab today in stable condition.       Day of Discharge     Vital Signs:  Temp:  [97.8 °F (36.6 °C)-98.6 °F (37 °C)] 97.8 °F (36.6 °C)  Heart Rate:  [] 92  Resp:  [14-20] 20  BP: (141-161)/(57-89) 157/70    Physical Exam:   General appearance: NAD, conversant, alert, resting comfortably in bed  Eyes: anicteric sclerae, moist conjunctivae; no lid-lag;  PERRLA  HENT: Atraumatic; oropharynx clear with moist mucous membranes and no mucosal ulcerations; normal hard and soft palate  Neck: Trachea midline; FROM, supple, no thyromegaly or lymphadenopathy  Lungs: CTA, with normal respiratory effort and no intercostal retractions  CV: Regular Rate and Rhythm, no Murmurs, Rubs, or Gallops   Abdomen: Soft, non-tender; no masses or Heptosplenomegally  Extremities: Trace lower extremity edema right greater than left, no lymphadenopathy   skin: Normal temperature, turgor and texture; no rash, ulcers or subcutaneous nodules  Psych: Appropriate affect, alert and oriented to person, place and time  Neuro: CN II - XII intact no motor deficits, no sensory defecits, globally weak but improving    Discharge Details        Discharge Medications      New Medications      Instructions Start Date   cefdinir 300 MG capsule  Commonly known as: OMNICEF   300 mg, Oral, 2 Times Daily      metoprolol tartrate 25 MG tablet  Commonly known as: LOPRESSOR   25 mg, Oral, 2 Times Daily      sertraline 25 MG tablet  Commonly known as: ZOLOFT   25 mg, Oral, Daily   Start Date: November 20, 2021        Continue These Medications      Instructions Start Date   anastrozole 1 MG tablet  Commonly known as: ARIMIDEX   1 mg, Oral, Daily      atenolol 50 MG tablet  Commonly known as: TENORMIN   50 mg, Oral, Daily      buPROPion  MG 24 hr tablet  Commonly known as: WELLBUTRIN XL   300 mg, Oral, Daily      EnteraGam 5 g pack  Generic drug: SBI/Protein Isolate   1 packet, Oral, Daily      ferrous sulfate 325 (65 FE) MG tablet   325 mg, Oral, Daily With Breakfast      folic acid 1 MG tablet  Commonly known as: FOLVITE   1 mg, Oral, Daily      IBGARD PO   Oral      memantine 10 MG tablet  Commonly known as: NAMENDA   10 mg, Oral, 2 Times Daily      metFORMIN  MG 24 hr tablet  Commonly known as: GLUCOPHAGE-XR   1,000 mg, Oral, Daily With Breakfast      montelukast 10 MG tablet  Commonly known as:  SINGULAIR   10 mg, Oral, Daily      ondansetron ODT 4 MG disintegrating tablet  Commonly known as: ZOFRAN-ODT   4 mg, Translingual, Every 6 Hours PRN      risperiDONE 3 MG tablet  Commonly known as: risperDAL   3 mg, Oral, Nightly      rosuvastatin 10 MG tablet  Commonly known as: CRESTOR   10 mg, Oral, Nightly      valACYclovir 1000 MG tablet  Commonly known as: VALTREX   1,000 mg, Oral, 3 Times Daily      vitamin E 400 UNIT capsule   1 capsule, Oral, Daily         Stop These Medications    cephalexin 500 MG capsule  Commonly known as: KEFLEX     Ibrance 125 MG capsule capsule  Generic drug: Palbociclib     lisinopril 10 MG tablet  Commonly known as: PRINIVIL,ZESTRIL            Allergies   Allergen Reactions   • Penicillins Rash       Discharge Disposition:  Rehab Facility or Unit (DC - External)    Diet:  Hospital:  Diet Order   Procedures   • Diet Soft Texture; Ground, Chopped; Thin       Discharge Activity:       CODE STATUS:  Code Status and Medical Interventions:   Ordered at: 11/12/21 0246     Code Status (Patient has no pulse and is not breathing):    No CPR (Do Not Attempt to Resuscitate)     Medical Interventions (Patient has pulse or is breathing):    Full Support       No future appointments.    Additional Instructions for the Follow-ups that You Need to Schedule     Discharge Follow-up with PCP   As directed       Currently Documented PCP:    Pan Pacheco MD    PCP Phone Number:    [phone]     Follow Up Details: 3 to 7 days         Discharge Follow-up with Specified Provider: oncology 1-2 weeks   As directed      To: oncology 1-2 weeks         Discharge Follow-up with Specified Provider: urology 1 week; 1 Week   As directed      To: urology 1 week    Follow Up: 1 Week               Pertinent  and/or Most Recent Results     IMAGING:  Adult Transthoracic Echo Complete W/ Cont if Necessary Per Protocol    Result Date: 11/15/2021  · Estimated right ventricular systolic pressure from tricuspid  regurgitation is mildly elevated (35-45 mmHg). · Left ventricular diastolic function is consistent with (grade I) impaired relaxation. · Normal left ventricular systolic function, the estimated ejection fraction is 55% · Estimated left ventricular EF was in disagreement with the calculated left ventricular EF. Left ventricular ejection fraction appears to be 51 - 55%.      CT Abdomen Pelvis Without Contrast    Result Date: 11/13/2021  PROCEDURE: CT ABDOMEN PELVIS WO CONTRAST  COMPARISONS: [facility], CT, ABDOMEN W/ CONTRAST, 10/27/2005, 10:31.  [facility], CT, CT ABD-PELVIS W CONTRAST, 7/30/2021, 11:43.   [facility], CT, CT ABD-PELVIS WO CONTRAST, 6/08/2021, 3:46.   [facility], CT, CT CHEST W CONTRAST, 7/30/2021, 11:43.  INDICATIONS: ABDOMINAL PAIN, UNSPECIFIED; BLEEDING, UNSPECIFIED, UNKNOWN SOURCE; H/O LEFT BREAST CANCER.  TECHNIQUE: 679 CT images were created without intravenous or oral contrast.  The lack of intravenous and oral contrast agent administration limits assessment on the exam.   PROTOCOL:   Standard imaging protocol performed    RADIATION:   DLP: 463 mGy*cm   Automated exposure control was utilized to minimize radiation dose.  FINDINGS: There is motion artifact on the study.  A small left pleural effusion is identified, seen previously.  Diffuse osseous metastatic disease is seen, as before, on recent CT studies.  There may be pleural-based metastatic foci within the partially imaged lower thorax, especially seen posteriorly and larger on the left, such as seen on image 27 of series 2, measuring approximately 5.4 x 2.3 x 5.2 cm in long-axis axial, short-axis axial, and craniocaudal extent, respectively.  The CT number for this finding is between 3 and 8 Hounsfield units.  In the differential diagnosis would be loculated pleural fluid.  There is a trace amount of pericardial effusion.  Probably no cardiac enlargement.  Again, the lack of  intravenous contrast limits assessment of the liver for metastases.  Diffuse indistinctness of the splenic margins is noted, which may be related to motion.  There is ascites, which is probably small to moderate in degree, with CT numbers ranging between 2 and 6 Hounsfield units.  Please note that the ascites CT number readings do not suggest acute hemoperitoneum.  Please correlate with the patient's hemoglobin and hematocrit levels, as an anemic patient may present with low density pneumoperitoneum.  Right-sided hydronephrosis and hydroureter persist without an obstructing ureteral calculus.  A benign or malignant ureteral stricture (especially distally) cannot be excluded.  No definite left-sided hydronephrosis or hydroureter.  No nonobstructing renal calyceal stones are seen.  No definite adrenal mass.  The adrenal glands are obscured by motion artifact.  It is suspected that the patient has undergone cholecystectomy, appendectomy, and hysterectomy.  No acute pancreatitis is suspected.  The pancreas is obscured by motion artifact.  There may be mild anasarca.  There is a bowel loop protruding toward a tiny umbilical hernia, as seen on image 117 of series 2.  This finding is new since the prior CT exam from 7/30/2021.  Colonic diverticula are seen without acute diverticulitis.  The previously seen diffuse mural thickening involving the colon is thought to have resolved, allowing for differences in technique.  No mechanical bowel obstruction is seen.  No pneumoperitoneum or pneumatosis.  The urinary bladder is underdistended which limits its assessment.  The stomach is diffusely underdistended, limiting its evaluation.  CONCLUSION:  1. The study is obscured by motion artifact.  2. There is a small left pleural effusion, seen previously.  No definite hemothorax.  No definite right pleural effusion.  3. A small to moderate amount of ascites is suspected.  No definite hemoperitoneum is suggested.  No acute  retroperitoneal hemorrhage is seen.  4. There is diffuse osseous metastatic disease.  No definite acute fracture is seen.  No definite pathologic fractures are identified.  5. New pleural-based metastatic disease (at least two foci are suggested) in the lower thorax cannot be excluded.  Chest wall invasion associated with these findings is possible.  6. There is moderate to severe right-sided hydronephrosis and right hydroureter without an obstructing ureteral calculus identified.  A benign or malignant distal right ureteral stricture cannot be excluded.  No left-sided hydronephrosis.  No nonobstructing nephrolithiasis.  7. The previously seen diffuse mural thickening of the colon has resolved. 8. No mechanical bowel obstruction.  No pneumoperitoneum.  9. There is diffuse colonic diverticulosis without definite acute diverticulitis.  10. The patient has undergone cholecystectomy, appendectomy, and hysterectomy.  11. The spleen is particularly obscured by motion artifact and is not well evaluated on the study.   12. No acute pancreatitis is suggested.  13. Please see above comments for further detail.   1.  ELIJAH DOUGLAS JR, MD       Electronically Signed and Approved By: ELIJAH DOUGLAS JR, MD on 11/13/2021 at 1:33             XR Spine Lumbar Complete With Flex & Ext    Result Date: 11/12/2021  PROCEDURE: XR SPINE LUMBAR COMPLETE W FLEX EXT  COMPARISON: None  INDICATIONS: LOWER BACK PAIN.  FINDINGS:  The relationship of the vertebra between flexion-extension seems relatively preserved.  There is a slight levoscoliosis of the thoracolumbar spine.  There is osseous demineralization.  There is facet arthropathy greater in the lower lumbar area.  There is marked narrowing of the disc spaces at L4-5 and L5-S1 suggesting disc desiccation.  CONCLUSION:  1. Multilevel degenerative change.      ANTONINO FRIEND MD       Electronically Signed and Approved By: ANTONINO FRIEND MD on 11/12/2021 at 16:23             CT Head Without  Contrast    Result Date: 11/11/2021  PROCEDURE: CT HEAD WO CONTRAST  COMPARISON:  [facility], CT, CT HEAD WO CONTRAST, 10/18/2021, 7:13. INDICATIONS: Mental status change, unknown cause  PROTOCOL:   Standard imaging protocol performed    RADIATION:   DLP: 1082.2mGy*cm   MA and/or KV was adjusted to minimize radiation dose.     TECHNIQUE: After obtaining the patient's consent, CT images were obtained without non-ionic intravenous contrast material.  FINDINGS:  There is underlying cerebral atrophy.  There are white matter changes in the periventricular areas which could reflect more chronic small vessel ischemic change.  There is no underlying hemorrhage.  There is no midline shift.  There is no definite orbital abnormality.  It looks like there is probably a mucous retention cyst or polyp in the right maxillary sinus as well some mucosal thickening.  There is deviation nasal septum to the left.  There are some opacified mastoid air cells bilaterally.  CONCLUSION:  1. There is some underlying cerebral atrophy. 2. There are white matter changes which while nonspecific could reflect more chronic small vessel ischemic change. 3. There are some opacified mastoid air cells bilaterally.  There is some mucosal thickening what looks like a polyp per retention cyst in the right maxillary sinus.  Depending on clinical findings at some point follow-up MR may be warranted in further workup.    ANTONINO FRIEND MD       Electronically Signed and Approved By: ANTONINO FRIEND MD on 11/11/2021 at 20:12             MRI Lumbar Spine Without Contrast    Result Date: 11/16/2021  PROCEDURE: MRI LUMBAR SPINE WO CONTRAST  COMPARISON: [facility], CT, CT ABDOMEN PELVIS WO CONTRAST, 11/12/2021, 21:31.  INDICATIONS: METASTATIC BREAST CA. SEVERE LOWER BACK PAIN.  TECHNIQUE: A variety of imaging planes and parameters were utilized for visualization of suspected pathology.  FINDINGS:  PARASPINAL AREA: Subcutaneous edema in the  fat of the lower back is nonspecific. BONES: Diffuse low signal throughout the lower thoracic spine, lumbar spine, included upper sacrum, consistent with diffuse osseous metastatic disease.  There are Modic type 2 degenerative endplate changes at L4-5 and L5-S1.  There is increased STIR signal in the T12 superior endplate on the left.  Lumbar vertebral bodies demonstrate adequate height. CORD/CAUDA EQUINA: Tip of the conus appears to terminate at the T12-L1 level.  There is tangling of cauda equina nerve roots from spinal canal stenosis.  LUMBAR DISC LEVELS: T12-L1: No significant disc/facet abnormality, spinal stenosis, or foraminal stenosis.  L1-L2: Posterior disc endplate complex causes mild-to-moderate bilateral neural foraminal narrowing without significant spinal canal stenosis. L2-L3: Posterior disc endplate complex, mild bilateral facet arthropathy, and ligamentum flavum hypertrophy cause minimal spinal canal narrowing, moderate to severe bilateral neural foraminal narrowing. L3-L4: Diffuse disc bulge, ligamentum flavum hypertrophy, severe right facet arthropathy, and moderate left facet arthropathy cause moderate to severe spinal canal stenosis and moderate bilateral neural foraminal narrowing. L4-L5: Loss of disc height, posterior disc endplate complex, ligamentum flavum hypertrophy, and moderate bilateral facet arthropathy cause severe spinal canal stenosis, moderate to severe right neural foraminal narrowing, and severe left neural foraminal narrowing. L5-S1: Loss of disc height, posterior disc endplate complex, and moderate bilateral facet arthropathy cause mild-to-moderate spinal canal stenosis, bilateral lateral recess narrowing, severe right neural foraminal narrowing, and moderate to severe left neural foraminal narrowing.  CONCLUSION:  1. Diffusely abnormal bone marrow signal, consistent with known osseous metastatic disease. 2. Small area of increased STIR signal in the left side of the T12  superior endplate may be due to a nondisplaced fracture.  No significant vertebral body height loss. 3. Multilevel severe degenerative changes causing multilevel neural foraminal and spinal canal stenosis, as detailed above.  Spinal canal stenosis is severe at L4-5.      DEONNA GONZALEZ MD       Electronically Signed and Approved By: DEONNA GONZALEZ MD on 11/16/2021 at 14:34             XR Chest 1 View    Result Date: 11/15/2021  PROCEDURE: XR CHEST 1 VW  COMPARISON: [facility], CR, XR CHEST 1 VW, 11/14/2021, 14:14.  INDICATIONS: Hypoxia  FINDINGS:  No acute infiltrate or effusion is identified.  The cardiac and mediastinal silhouettes appear normal.  Axillary node dissections have been performed bilaterally.  There is a chronic, incompletely healed fracture of the distal right clavicle.  CONCLUSION:  1. No acute cardiopulmonary disease       Freddy Garzon M.D.       Electronically Signed and Approved By: Freddy Garzon M.D. on 11/15/2021 at 14:13             XR Chest 1 View    Result Date: 11/14/2021  PROCEDURE: XR CHEST 1 VW  COMPARISON: [facility], CR, XR CHEST 1 VW, 11/11/2021, 18:10.  INDICATIONS: TACHYCARDIA  FINDINGS:  There is a left-sided subclavian Gmpuyt-E-Fgfe catheter in place with the tip in the superior vena cava.  The heart is enlarged.  There are no focal infiltrates.  CONCLUSION: No active disease       DAVID VARGAS MD       Electronically Signed and Approved By: DAVID VARGAS MD on 11/14/2021 at 14:38             XR Chest 1 View    Result Date: 11/11/2021  PROCEDURE: XR CHEST 1 VW  COMPARISON: [facility], CT, CT CHEST W CONTRAST DIAGNOSTIC, 7/30/2021, 11:43.  [facility], CR, XR CHEST 1 VW, 10/18/2021, 6:41.  INDICATIONS: sepsis .  History of breast cancer  FINDINGS:  There is a port catheter noted with its tip at the junction of the brachiocephalic vein with the superior vena cava.  The heart is not definitely enlarged.  There is no duane  pulmonary consolidation or definite pleural effusions.  There are surgical clips about the chest wall.  There is a sclerotic appearance to the anterior aspect of the left 6th rib that could reflect metastatic disease.  Patient has known metastatic disease.  CONCLUSION:  1. No definite acute pulmonary process. 2. There is an appearance to the left 6th rib that could relate to metastatic disease.       ANTONINO FRIEND MD       Electronically Signed and Approved By: ANTONINO FRIEND MD on 11/11/2021 at 18:36             FL Retrograde Pyelogram In OR    Result Date: 11/14/2021  PROCEDURE: FL RETROGRADE PYELOGRAM IN OR  COMPARISON: None  INDICATIONS: pyelogram/fluro time 20seconds/4.27mGy  FINDINGS: Retrograde pyelography was performed with stent placement into the right renal collecting system and right aspect of the bladder.  CONCLUSION: Normal appearance after right-sided nephroureteral stent placement.      DAVID VARGAS MD       Electronically Signed and Approved By: DAVID VARGAS MD on 11/14/2021 at 12:27               LAB RESULTS:      Lab 11/19/21  0518 11/18/21  0432 11/17/21  0918 11/16/21  0728 11/15/21  0801 11/15/21  0445 11/14/21  1440 11/14/21  0424 11/14/21  0424 11/13/21  1938 11/13/21 1938   WBC 6.06  --  5.58 3.63  --  2.05* 0.15*   < > 2.11*   < > 2.52*   HEMOGLOBIN 8.5*  --  8.6* 8.0*  --  8.2* 9.8*   < > 9.5*   < > 9.4*   HEMATOCRIT 26.6*  --  27.1* 23.9*  --  25.1* 30.3*   < > 29.5*   < > 29.1*   PLATELETS 55*  --  44* 37*  --  48* 56*   < > 83*   < > 81*   NEUTROS ABS 4.07  --  4.60 2.65  --  1.87 0.04*   < > 1.31*   < > 1.60*   IMMATURE GRANS (ABS) 0.20*  --  0.02  --   --   --  0.03  --  0.03  --  0.03   LYMPHS ABS 1.28  --  0.70  --   --   --  0.08*  --  0.57*  --  0.63*   MONOS ABS 0.44  --  0.18  --   --   --  0.00*  --  0.17  --  0.23   EOS ABS 0.03  --  0.03 0.04  --   --  0.00  --  0.02   < > 0.01   MCV 89.0  --  88.9 85.4  --  87.2 87.8   < > 88.6   < > 88.2   PROCALCITONIN  --  25.42*  --   90.16*  --   --  2.18*  --   --   --   --    LACTATE  --   --   --   --  1.6 2.2* 1.8  --   --   --   --     < > = values in this interval not displayed.         Lab 11/19/21 0518 11/18/21 0432 11/17/21 0756 11/16/21  0728 11/15/21  1554 11/15/21  0445 11/14/21  1440 11/14/21  0549 11/13/21  1938 11/13/21  0534   SODIUM 140 140 135*  135* 131*  --  133*   < > 132*   < > 133*   POTASSIUM 3.3* 3.7 3.8  3.8 3.9  --  4.2   < > 4.0   < > 4.0   CHLORIDE 102 103 100  100 95*  --  99   < > 96*   < > 97*   CO2 28.1 28.3 23.6  23.6 24.0  --  23.2   < > 27.3   < > 27.3   ANION GAP 9.9 8.7 11.4  11.4 12.0  --  10.8   < > 8.7   < > 8.7   BUN 21 25* 23  23 23  --  17   < > 9   < > 11   CREATININE 1.31* 1.72* 1.77*  1.77* 2.00*  --  2.10*   < > 1.21*   < > 1.37*   GLUCOSE 167* 147* 135*  135* 122*  --  148*   < > 133*   < > 157*   CALCIUM 9.2 9.4 9.2  9.2 8.6  --  8.3*   < > 8.9   < > 8.9   MAGNESIUM 1.5*  --  1.7 1.8 2.2 1.1*  --   --   --   --    PHOSPHORUS 2.5  --  2.2*  2.2*  --   --   --   --  2.4*  --  2.4*    < > = values in this interval not displayed.         Lab 11/19/21 0518 11/18/21 0432 11/17/21 0756 11/16/21  0728 11/15/21  0445   TOTAL PROTEIN 5.9* 5.5* 5.8* 5.6* 5.5*   ALBUMIN 2.70* 2.50* 2.50*  2.50* 2.50* 2.40*   GLOBULIN 3.2 3.0 3.3 3.1 3.1   ALT (SGPT) 12 15 17 18 21   AST (SGOT) 30 34* 42* 51* 60*   BILIRUBIN 0.3 0.2 0.3 0.4 1.4*   ALK PHOS 165* 163* 148* 152* 182*         Lab 11/15/21  0445 11/14/21  2141 11/14/21  1440   PROBNP 32,011.0*  --   --    TROPONIN T  --  0.051* 0.037*                 Brief Urine Lab Results  (Last result in the past 365 days)      Color   Clarity   Blood   Leuk Est   Nitrite   Protein   CREAT   Urine HCG        11/17/21 2245 Yellow   Clear   Small (1+)   Trace   Negative   30 mg/dL (1+)               Microbiology Results (last 10 days)     Procedure Component Value - Date/Time    Urine Culture - Urine, Urine, Clean Catch [[id number]]  (Normal) Collected: 11/17/21  2245    Lab Status: Final result Specimen: Urine, Clean Catch Updated: 11/19/21 1101     Urine Culture No growth    Respiratory Culture - Sputum, Cough [[id number]] Collected: 11/17/21 1744    Lab Status: Final result Specimen: Sputum from Cough Updated: 11/19/21 1232     Respiratory Culture Rare Normal Respiratory Altagracia: NO S.aureus/MRSA or Pseudomonas aeruginosa     Gram Stain Occasional Gram positive cocci in clusters      Greater than 10 Epithelial cells per low power field      Occasional WBCs observed - predominantly neutrophils    Blood Culture - Blood, Arm, Left [[id number]]  (Normal) Collected: 11/17/21 1354    Lab Status: Preliminary result Specimen: Blood from Arm, Left Updated: 11/18/21 1402     Blood Culture No growth at 24 hours    Blood Culture - Blood, Arm, Left [[id number]]  (Normal) Collected: 11/17/21 1335    Lab Status: Preliminary result Specimen: Blood from Arm, Left Updated: 11/18/21 1402     Blood Culture No growth at 24 hours    Urine Culture - Urine, Urine, Catheter In/Out [[id number]]  (Normal) Collected: 11/14/21 2021    Lab Status: Final result Specimen: Urine, Catheter In/Out Updated: 11/16/21 0815     Urine Culture No growth    Clostridium Difficile Toxin - Stool, Per Rectum [[id number]] Collected: 11/14/21 1515    Lab Status: Final result Specimen: Stool from Per Rectum Updated: 11/14/21 1620    Narrative:      The following orders were created for panel order Clostridium Difficile Toxin - Stool, Per Rectum.  Procedure                               Abnormality         Status                     ---------                               -----------         ------                     Clostridium Difficile To...[[id number]]                      Final result                 Please view results for these tests on the individual orders.    Clostridium Difficile Toxin, PCR - Stool, Per Rectum [[id number]] Collected: 11/14/21 1515    Lab Status: Final result Specimen: Stool from Per Rectum Updated:  11/14/21 1620     C. Difficile Toxins by PCR Negative     027 Toxin Presumptive Negative    Blood Culture - Blood, Arm, Right [[id number]]  (Normal) Collected: 11/14/21 1450    Lab Status: Preliminary result Specimen: Blood from Arm, Right Updated: 11/18/21 1502     Blood Culture No growth at 4 days    Blood Culture - Blood, Arm, Left [[id number]]  (Normal) Collected: 11/14/21 1440    Lab Status: Preliminary result Specimen: Blood from Arm, Left Updated: 11/18/21 1502     Blood Culture No growth at 4 days    Urine Culture - Urine, Urine, Catheter In/Out [[id number]]  (Normal) Collected: 11/13/21 1504    Lab Status: Final result Specimen: Urine, Catheter In/Out Updated: 11/14/21 1624     Urine Culture No growth    Urine Culture - Urine, Urine, Catheter [[id number]] Collected: 11/11/21 1809    Lab Status: Final result Specimen: Urine, Catheter Updated: 11/12/21 1203     Urine Culture 50,000 CFU/mL Mixed Penny Isolated    Narrative:      Specimen contains mixed organisms of questionable pathogenicity which indicates contamination with commensal penny.  Further identification is unlikely to provide clinically useful information.  Suggest recollection.    Blood Culture - Blood, Arm, Left [[id number]]  (Normal) Collected: 11/11/21 1758    Lab Status: Final result Specimen: Blood from Arm, Left Updated: 11/16/21 1815     Blood Culture No growth at 5 days    Blood Culture - Blood, Arm, Right [[id number]]  (Normal) Collected: 11/11/21 1758    Lab Status: Final result Specimen: Blood from Arm, Right Updated: 11/16/21 1815     Blood Culture No growth at 5 days            Results for orders placed during the hospital encounter of 11/11/21    Adult Transthoracic Echo Complete W/ Cont if Necessary Per Protocol    Interpretation Summary  · Estimated right ventricular systolic pressure from tricuspid regurgitation is mildly elevated (35-45 mmHg).  · Left ventricular diastolic function is consistent with (grade I) impaired relaxation.  ·  Normal left ventricular systolic function, the estimated ejection fraction is 55%  · Estimated left ventricular EF was in disagreement with the calculated left ventricular EF. Left ventricular ejection fraction appears to be 51 - 55%.      Time spent on Discharge including face to face service: Greater than 30 minutes      Electronically signed by Arias Dyer MD, 11/19/21, 1:20 PM EST.

## 2021-11-22 LAB
BACTERIA SPEC AEROBE CULT: NORMAL
BACTERIA SPEC AEROBE CULT: NORMAL

## 2021-12-04 ENCOUNTER — APPOINTMENT (OUTPATIENT)
Dept: CT IMAGING | Facility: HOSPITAL | Age: 76
End: 2021-12-04

## 2021-12-04 ENCOUNTER — APPOINTMENT (OUTPATIENT)
Dept: GENERAL RADIOLOGY | Facility: HOSPITAL | Age: 76
End: 2021-12-04

## 2021-12-04 ENCOUNTER — HOSPITAL ENCOUNTER (INPATIENT)
Facility: HOSPITAL | Age: 76
LOS: 11 days | Discharge: HOME HEALTH | End: 2021-12-16
Attending: EMERGENCY MEDICINE | Admitting: FAMILY MEDICINE

## 2021-12-04 DIAGNOSIS — R41.82 ALTERED MENTAL STATUS, UNSPECIFIED ALTERED MENTAL STATUS TYPE: ICD-10-CM

## 2021-12-04 DIAGNOSIS — R53.1 WEAKNESS: Primary | ICD-10-CM

## 2021-12-04 DIAGNOSIS — C50.919 METASTATIC BREAST CANCER: ICD-10-CM

## 2021-12-04 DIAGNOSIS — R13.12 OROPHARYNGEAL DYSPHAGIA: ICD-10-CM

## 2021-12-04 LAB
ALBUMIN SERPL-MCNC: 3.3 G/DL (ref 3.5–5.2)
ALBUMIN/GLOB SERPL: 0.8 G/DL
ALP SERPL-CCNC: 161 U/L (ref 39–117)
ALT SERPL W P-5'-P-CCNC: 15 U/L (ref 1–33)
AMPHET+METHAMPHET UR QL: NEGATIVE
ANION GAP SERPL CALCULATED.3IONS-SCNC: 10.2 MMOL/L (ref 5–15)
AST SERPL-CCNC: 51 U/L (ref 1–32)
BARBITURATES UR QL SCN: NEGATIVE
BASOPHILS # BLD AUTO: 0.12 10*3/MM3 (ref 0–0.2)
BASOPHILS NFR BLD AUTO: 1.9 % (ref 0–1.5)
BENZODIAZ UR QL SCN: NEGATIVE
BILIRUB SERPL-MCNC: 0.3 MG/DL (ref 0–1.2)
BUN SERPL-MCNC: 13 MG/DL (ref 8–23)
BUN/CREAT SERPL: 9.9 (ref 7–25)
CALCIUM SPEC-SCNC: 10.4 MG/DL (ref 8.6–10.5)
CANNABINOIDS SERPL QL: NEGATIVE
CHLORIDE SERPL-SCNC: 94 MMOL/L (ref 98–107)
CK SERPL-CCNC: 84 U/L (ref 20–180)
CO2 SERPL-SCNC: 29.8 MMOL/L (ref 22–29)
COCAINE UR QL: NEGATIVE
CREAT SERPL-MCNC: 1.31 MG/DL (ref 0.57–1)
D-LACTATE SERPL-SCNC: 0.8 MMOL/L (ref 0.5–2)
DEPRECATED RDW RBC AUTO: 61.1 FL (ref 37–54)
EOSINOPHIL # BLD AUTO: 0.04 10*3/MM3 (ref 0–0.4)
EOSINOPHIL NFR BLD AUTO: 0.6 % (ref 0.3–6.2)
ERYTHROCYTE [DISTWIDTH] IN BLOOD BY AUTOMATED COUNT: 18.7 % (ref 12.3–15.4)
GFR SERPL CREATININE-BSD FRML MDRD: 39 ML/MIN/1.73
GLOBULIN UR ELPH-MCNC: 4 GM/DL
GLUCOSE BLDC GLUCOMTR-MCNC: 97 MG/DL (ref 70–99)
GLUCOSE SERPL-MCNC: 114 MG/DL (ref 65–99)
HCT VFR BLD AUTO: 28.9 % (ref 34–46.6)
HGB BLD-MCNC: 9.3 G/DL (ref 12–15.9)
HOLD SPECIMEN: NORMAL
HOLD SPECIMEN: NORMAL
IMM GRANULOCYTES # BLD AUTO: 0.12 10*3/MM3 (ref 0–0.05)
IMM GRANULOCYTES NFR BLD AUTO: 1.9 % (ref 0–0.5)
INR PPP: 0.99 (ref 2–3)
LYMPHOCYTES # BLD AUTO: 1.23 10*3/MM3 (ref 0.7–3.1)
LYMPHOCYTES NFR BLD AUTO: 19.5 % (ref 19.6–45.3)
MAGNESIUM SERPL-MCNC: 1.6 MG/DL (ref 1.6–2.4)
MCH RBC QN AUTO: 28.7 PG (ref 26.6–33)
MCHC RBC AUTO-ENTMCNC: 32.2 G/DL (ref 31.5–35.7)
MCV RBC AUTO: 89.2 FL (ref 79–97)
METHADONE UR QL SCN: NEGATIVE
MONOCYTES # BLD AUTO: 0.72 10*3/MM3 (ref 0.1–0.9)
MONOCYTES NFR BLD AUTO: 11.4 % (ref 5–12)
NEUTROPHILS NFR BLD AUTO: 4.07 10*3/MM3 (ref 1.7–7)
NEUTROPHILS NFR BLD AUTO: 64.7 % (ref 42.7–76)
NRBC BLD AUTO-RTO: 0.6 /100 WBC (ref 0–0.2)
OPIATES UR QL: NEGATIVE
OXYCODONE UR QL SCN: NEGATIVE
PLATELET # BLD AUTO: 212 10*3/MM3 (ref 140–450)
PMV BLD AUTO: 10 FL (ref 6–12)
POTASSIUM SERPL-SCNC: 4.3 MMOL/L (ref 3.5–5.2)
PROT SERPL-MCNC: 7.3 G/DL (ref 6–8.5)
PROTHROMBIN TIME: 10.4 SECONDS (ref 9.4–12)
QT INTERVAL: 343 MS
RBC # BLD AUTO: 3.24 10*6/MM3 (ref 3.77–5.28)
SODIUM SERPL-SCNC: 134 MMOL/L (ref 136–145)
T4 FREE SERPL-MCNC: 1 NG/DL (ref 0.93–1.7)
TROPONIN T SERPL-MCNC: 0.04 NG/ML (ref 0–0.03)
TSH SERPL DL<=0.05 MIU/L-ACNC: 3.07 UIU/ML (ref 0.27–4.2)
WBC NRBC COR # BLD: 6.3 10*3/MM3 (ref 3.4–10.8)
WHOLE BLOOD HOLD SPECIMEN: NORMAL
WHOLE BLOOD HOLD SPECIMEN: NORMAL

## 2021-12-04 PROCEDURE — 80307 DRUG TEST PRSMV CHEM ANLYZR: CPT | Performed by: EMERGENCY MEDICINE

## 2021-12-04 PROCEDURE — 99285 EMERGENCY DEPT VISIT HI MDM: CPT

## 2021-12-04 PROCEDURE — 85025 COMPLETE CBC W/AUTO DIFF WBC: CPT

## 2021-12-04 PROCEDURE — 82550 ASSAY OF CK (CPK): CPT | Performed by: EMERGENCY MEDICINE

## 2021-12-04 PROCEDURE — 83735 ASSAY OF MAGNESIUM: CPT

## 2021-12-04 PROCEDURE — 93005 ELECTROCARDIOGRAM TRACING: CPT | Performed by: EMERGENCY MEDICINE

## 2021-12-04 PROCEDURE — 82962 GLUCOSE BLOOD TEST: CPT

## 2021-12-04 PROCEDURE — 80053 COMPREHEN METABOLIC PANEL: CPT

## 2021-12-04 PROCEDURE — 84484 ASSAY OF TROPONIN QUANT: CPT

## 2021-12-04 PROCEDURE — 87086 URINE CULTURE/COLONY COUNT: CPT | Performed by: EMERGENCY MEDICINE

## 2021-12-04 PROCEDURE — 93005 ELECTROCARDIOGRAM TRACING: CPT

## 2021-12-04 PROCEDURE — 93010 ELECTROCARDIOGRAM REPORT: CPT | Performed by: INTERNAL MEDICINE

## 2021-12-04 PROCEDURE — 84439 ASSAY OF FREE THYROXINE: CPT | Performed by: EMERGENCY MEDICINE

## 2021-12-04 PROCEDURE — 87040 BLOOD CULTURE FOR BACTERIA: CPT | Performed by: EMERGENCY MEDICINE

## 2021-12-04 PROCEDURE — 85610 PROTHROMBIN TIME: CPT

## 2021-12-04 PROCEDURE — 81001 URINALYSIS AUTO W/SCOPE: CPT | Performed by: EMERGENCY MEDICINE

## 2021-12-04 PROCEDURE — 71250 CT THORAX DX C-: CPT

## 2021-12-04 PROCEDURE — 84443 ASSAY THYROID STIM HORMONE: CPT | Performed by: EMERGENCY MEDICINE

## 2021-12-04 PROCEDURE — 71045 X-RAY EXAM CHEST 1 VIEW: CPT

## 2021-12-04 PROCEDURE — 83605 ASSAY OF LACTIC ACID: CPT | Performed by: EMERGENCY MEDICINE

## 2021-12-04 PROCEDURE — 36415 COLL VENOUS BLD VENIPUNCTURE: CPT

## 2021-12-04 PROCEDURE — 70450 CT HEAD/BRAIN W/O DYE: CPT

## 2021-12-04 RX ORDER — SODIUM CHLORIDE 0.9 % (FLUSH) 0.9 %
10 SYRINGE (ML) INJECTION AS NEEDED
Status: DISCONTINUED | OUTPATIENT
Start: 2021-12-04 | End: 2021-12-05

## 2021-12-05 ENCOUNTER — APPOINTMENT (OUTPATIENT)
Dept: CT IMAGING | Facility: HOSPITAL | Age: 76
End: 2021-12-05

## 2021-12-05 PROBLEM — R53.1 WEAKNESS: Status: ACTIVE | Noted: 2021-12-05

## 2021-12-05 LAB
AMMONIA BLD-SCNC: 21 UMOL/L (ref 11–51)
BACTERIA UR QL AUTO: ABNORMAL /HPF
BILIRUB UR QL STRIP: NEGATIVE
CLARITY UR: ABNORMAL
COD CRY URNS QL: ABNORMAL /HPF
COLOR UR: YELLOW
GLUCOSE BLDC GLUCOMTR-MCNC: 111 MG/DL (ref 70–99)
GLUCOSE BLDC GLUCOMTR-MCNC: 126 MG/DL (ref 70–99)
GLUCOSE BLDC GLUCOMTR-MCNC: 143 MG/DL (ref 70–99)
GLUCOSE UR STRIP-MCNC: NEGATIVE MG/DL
HGB UR QL STRIP.AUTO: ABNORMAL
HYALINE CASTS UR QL AUTO: ABNORMAL /LPF
KETONES UR QL STRIP: NEGATIVE
LEUKOCYTE ESTERASE UR QL STRIP.AUTO: ABNORMAL
MAGNESIUM SERPL-MCNC: 1.5 MG/DL (ref 1.6–2.4)
MAGNESIUM SERPL-MCNC: 2.4 MG/DL (ref 1.6–2.4)
NITRITE UR QL STRIP: NEGATIVE
PH UR STRIP.AUTO: 6 [PH] (ref 5–8)
PHOSPHATE SERPL-MCNC: 3.4 MG/DL (ref 2.5–4.5)
PHOSPHATE SERPL-MCNC: 3.6 MG/DL (ref 2.5–4.5)
PROT UR QL STRIP: ABNORMAL
RBC # UR STRIP: ABNORMAL /HPF
REF LAB TEST METHOD: ABNORMAL
SP GR UR STRIP: 1.02 (ref 1–1.03)
SQUAMOUS #/AREA URNS HPF: ABNORMAL /HPF
UROBILINOGEN UR QL STRIP: ABNORMAL
WBC # UR STRIP: ABNORMAL /HPF

## 2021-12-05 PROCEDURE — 94799 UNLISTED PULMONARY SVC/PX: CPT

## 2021-12-05 PROCEDURE — 99223 1ST HOSP IP/OBS HIGH 75: CPT | Performed by: HOSPITALIST

## 2021-12-05 PROCEDURE — 25010000002 ENOXAPARIN PER 10 MG: Performed by: HOSPITALIST

## 2021-12-05 PROCEDURE — 83735 ASSAY OF MAGNESIUM: CPT | Performed by: HOSPITALIST

## 2021-12-05 PROCEDURE — 82140 ASSAY OF AMMONIA: CPT | Performed by: INTERNAL MEDICINE

## 2021-12-05 PROCEDURE — 94640 AIRWAY INHALATION TREATMENT: CPT

## 2021-12-05 PROCEDURE — 84100 ASSAY OF PHOSPHORUS: CPT | Performed by: HOSPITALIST

## 2021-12-05 PROCEDURE — 74176 CT ABD & PELVIS W/O CONTRAST: CPT

## 2021-12-05 PROCEDURE — 25010000002 MAGNESIUM SULFATE IN D5W 1G/100ML (PREMIX) 1-5 GM/100ML-% SOLUTION: Performed by: HOSPITALIST

## 2021-12-05 PROCEDURE — 25010000002 MEROPENEM PER 100 MG: Performed by: HOSPITALIST

## 2021-12-05 PROCEDURE — 82962 GLUCOSE BLOOD TEST: CPT

## 2021-12-05 RX ORDER — IPRATROPIUM BROMIDE AND ALBUTEROL SULFATE 2.5; .5 MG/3ML; MG/3ML
3 SOLUTION RESPIRATORY (INHALATION)
Status: DISCONTINUED | OUTPATIENT
Start: 2021-12-05 | End: 2021-12-11

## 2021-12-05 RX ORDER — MAGNESIUM SULFATE 1 G/100ML
1 INJECTION INTRAVENOUS
Status: COMPLETED | OUTPATIENT
Start: 2021-12-05 | End: 2021-12-05

## 2021-12-05 RX ORDER — FERROUS SULFATE 325(65) MG
325 TABLET ORAL
Status: DISCONTINUED | OUTPATIENT
Start: 2021-12-05 | End: 2021-12-16 | Stop reason: HOSPADM

## 2021-12-05 RX ORDER — ACETAMINOPHEN 160 MG/5ML
650 SOLUTION ORAL EVERY 4 HOURS PRN
Status: DISCONTINUED | OUTPATIENT
Start: 2021-12-05 | End: 2021-12-16 | Stop reason: HOSPADM

## 2021-12-05 RX ORDER — ANASTROZOLE 1 MG/1
1 TABLET ORAL DAILY
Status: DISCONTINUED | OUTPATIENT
Start: 2021-12-05 | End: 2021-12-16 | Stop reason: HOSPADM

## 2021-12-05 RX ORDER — POLYETHYLENE GLYCOL 3350 17 G/17G
17 POWDER, FOR SOLUTION ORAL DAILY PRN
Status: DISCONTINUED | OUTPATIENT
Start: 2021-12-05 | End: 2021-12-16 | Stop reason: HOSPADM

## 2021-12-05 RX ORDER — LEVOFLOXACIN 5 MG/ML
750 INJECTION, SOLUTION INTRAVENOUS
Status: DISCONTINUED | OUTPATIENT
Start: 2021-12-05 | End: 2021-12-05

## 2021-12-05 RX ORDER — ARFORMOTEROL TARTRATE 15 UG/2ML
15 SOLUTION RESPIRATORY (INHALATION)
Status: DISCONTINUED | OUTPATIENT
Start: 2021-12-05 | End: 2021-12-12

## 2021-12-05 RX ORDER — MEMANTINE HYDROCHLORIDE 10 MG/1
10 TABLET ORAL 2 TIMES DAILY
Status: DISCONTINUED | OUTPATIENT
Start: 2021-12-05 | End: 2021-12-16 | Stop reason: HOSPADM

## 2021-12-05 RX ORDER — FOLIC ACID 1 MG/1
1 TABLET ORAL DAILY
Status: DISCONTINUED | OUTPATIENT
Start: 2021-12-05 | End: 2021-12-16 | Stop reason: HOSPADM

## 2021-12-05 RX ORDER — BISACODYL 5 MG/1
5 TABLET, DELAYED RELEASE ORAL DAILY PRN
Status: DISCONTINUED | OUTPATIENT
Start: 2021-12-05 | End: 2021-12-16 | Stop reason: HOSPADM

## 2021-12-05 RX ORDER — ACETAMINOPHEN 650 MG/1
650 SUPPOSITORY RECTAL EVERY 4 HOURS PRN
Status: DISCONTINUED | OUTPATIENT
Start: 2021-12-05 | End: 2021-12-16 | Stop reason: HOSPADM

## 2021-12-05 RX ORDER — ACETAMINOPHEN 325 MG/1
650 TABLET ORAL EVERY 4 HOURS PRN
Status: DISCONTINUED | OUTPATIENT
Start: 2021-12-05 | End: 2021-12-16 | Stop reason: HOSPADM

## 2021-12-05 RX ORDER — BUDESONIDE 0.5 MG/2ML
0.5 INHALANT ORAL
Status: DISCONTINUED | OUTPATIENT
Start: 2021-12-05 | End: 2021-12-12

## 2021-12-05 RX ORDER — AMOXICILLIN 250 MG
2 CAPSULE ORAL 2 TIMES DAILY
Status: DISCONTINUED | OUTPATIENT
Start: 2021-12-05 | End: 2021-12-16 | Stop reason: HOSPADM

## 2021-12-05 RX ORDER — CHOLECALCIFEROL (VITAMIN D3) 125 MCG
5 CAPSULE ORAL NIGHTLY PRN
Status: DISCONTINUED | OUTPATIENT
Start: 2021-12-05 | End: 2021-12-16 | Stop reason: HOSPADM

## 2021-12-05 RX ORDER — LEVOFLOXACIN 5 MG/ML
750 INJECTION, SOLUTION INTRAVENOUS ONCE
Status: CANCELLED | OUTPATIENT
Start: 2021-12-05 | End: 2021-12-05

## 2021-12-05 RX ORDER — NITROGLYCERIN 0.4 MG/1
0.4 TABLET SUBLINGUAL
Status: DISCONTINUED | OUTPATIENT
Start: 2021-12-05 | End: 2021-12-16 | Stop reason: HOSPADM

## 2021-12-05 RX ORDER — LANOLIN ALCOHOL/MO/W.PET/CERES
325 CREAM (GRAM) TOPICAL DAILY
COMMUNITY
Start: 2021-11-19

## 2021-12-05 RX ORDER — ONDANSETRON 2 MG/ML
4 INJECTION INTRAMUSCULAR; INTRAVENOUS EVERY 6 HOURS PRN
Status: DISCONTINUED | OUTPATIENT
Start: 2021-12-05 | End: 2021-12-16 | Stop reason: HOSPADM

## 2021-12-05 RX ORDER — BISACODYL 10 MG
10 SUPPOSITORY, RECTAL RECTAL DAILY PRN
Status: DISCONTINUED | OUTPATIENT
Start: 2021-12-05 | End: 2021-12-16 | Stop reason: HOSPADM

## 2021-12-05 RX ORDER — SODIUM CHLORIDE 0.9 % (FLUSH) 0.9 %
10 SYRINGE (ML) INJECTION EVERY 12 HOURS SCHEDULED
Status: DISCONTINUED | OUTPATIENT
Start: 2021-12-05 | End: 2021-12-05

## 2021-12-05 RX ORDER — ONDANSETRON 4 MG/1
4 TABLET, FILM COATED ORAL EVERY 6 HOURS PRN
Status: DISCONTINUED | OUTPATIENT
Start: 2021-12-05 | End: 2021-12-16 | Stop reason: HOSPADM

## 2021-12-05 RX ORDER — MONTELUKAST SODIUM 10 MG/1
10 TABLET ORAL DAILY
Status: DISCONTINUED | OUTPATIENT
Start: 2021-12-05 | End: 2021-12-16 | Stop reason: HOSPADM

## 2021-12-05 RX ORDER — DEXTROSE MONOHYDRATE 100 MG/ML
25 INJECTION, SOLUTION INTRAVENOUS
Status: DISCONTINUED | OUTPATIENT
Start: 2021-12-05 | End: 2021-12-16 | Stop reason: HOSPADM

## 2021-12-05 RX ORDER — METFORMIN HYDROCHLORIDE 500 MG/1
500 TABLET, EXTENDED RELEASE ORAL
COMMUNITY

## 2021-12-05 RX ORDER — SERTRALINE HYDROCHLORIDE 25 MG/1
25 TABLET, FILM COATED ORAL DAILY
Status: DISCONTINUED | OUTPATIENT
Start: 2021-12-05 | End: 2021-12-16 | Stop reason: HOSPADM

## 2021-12-05 RX ORDER — ROSUVASTATIN CALCIUM 5 MG/1
10 TABLET, COATED ORAL NIGHTLY
Status: DISCONTINUED | OUTPATIENT
Start: 2021-12-05 | End: 2021-12-16 | Stop reason: HOSPADM

## 2021-12-05 RX ORDER — SODIUM CHLORIDE 0.9 % (FLUSH) 0.9 %
10 SYRINGE (ML) INJECTION AS NEEDED
Status: DISCONTINUED | OUTPATIENT
Start: 2021-12-05 | End: 2021-12-05

## 2021-12-05 RX ORDER — SODIUM CHLORIDE 9 MG/ML
50 INJECTION, SOLUTION INTRAVENOUS CONTINUOUS
Status: DISCONTINUED | OUTPATIENT
Start: 2021-12-05 | End: 2021-12-16 | Stop reason: HOSPADM

## 2021-12-05 RX ORDER — BUPROPION HYDROCHLORIDE 150 MG/1
300 TABLET ORAL DAILY
Status: DISCONTINUED | OUTPATIENT
Start: 2021-12-05 | End: 2021-12-16 | Stop reason: HOSPADM

## 2021-12-05 RX ORDER — NICOTINE POLACRILEX 4 MG
15 LOZENGE BUCCAL
Status: DISCONTINUED | OUTPATIENT
Start: 2021-12-05 | End: 2021-12-16 | Stop reason: HOSPADM

## 2021-12-05 RX ADMIN — MONTELUKAST SODIUM 10 MG: 10 TABLET, FILM COATED ORAL at 09:03

## 2021-12-05 RX ADMIN — SODIUM CHLORIDE 75 ML/HR: 9 INJECTION, SOLUTION INTRAVENOUS at 02:56

## 2021-12-05 RX ADMIN — ANASTROZOLE 1 MG: 1 TABLET ORAL at 09:02

## 2021-12-05 RX ADMIN — ROSUVASTATIN CALCIUM 10 MG: 5 TABLET, FILM COATED ORAL at 20:07

## 2021-12-05 RX ADMIN — ARFORMOTEROL TARTRATE 15 MCG: 15 SOLUTION RESPIRATORY (INHALATION) at 06:54

## 2021-12-05 RX ADMIN — IPRATROPIUM BROMIDE AND ALBUTEROL SULFATE 3 ML: .5; 2.5 SOLUTION RESPIRATORY (INHALATION) at 13:20

## 2021-12-05 RX ADMIN — SENNOSIDES AND DOCUSATE SODIUM 2 TABLET: 50; 8.6 TABLET ORAL at 09:03

## 2021-12-05 RX ADMIN — ROSUVASTATIN CALCIUM 10 MG: 5 TABLET, FILM COATED ORAL at 03:57

## 2021-12-05 RX ADMIN — BUDESONIDE 0.5 MG: 0.5 SUSPENSION RESPIRATORY (INHALATION) at 19:46

## 2021-12-05 RX ADMIN — SENNOSIDES AND DOCUSATE SODIUM 2 TABLET: 50; 8.6 TABLET ORAL at 03:58

## 2021-12-05 RX ADMIN — SENNOSIDES AND DOCUSATE SODIUM 2 TABLET: 50; 8.6 TABLET ORAL at 20:07

## 2021-12-05 RX ADMIN — MAGNESIUM SULFATE HEPTAHYDRATE 1 G: 1 INJECTION, SOLUTION INTRAVENOUS at 04:31

## 2021-12-05 RX ADMIN — MEROPENEM 1 G: 1 INJECTION, POWDER, FOR SOLUTION INTRAVENOUS at 12:56

## 2021-12-05 RX ADMIN — SERTRALINE 25 MG: 25 TABLET, FILM COATED ORAL at 09:03

## 2021-12-05 RX ADMIN — IPRATROPIUM BROMIDE AND ALBUTEROL SULFATE 3 ML: .5; 2.5 SOLUTION RESPIRATORY (INHALATION) at 19:46

## 2021-12-05 RX ADMIN — MAGNESIUM SULFATE HEPTAHYDRATE 1 G: 1 INJECTION, SOLUTION INTRAVENOUS at 04:59

## 2021-12-05 RX ADMIN — IPRATROPIUM BROMIDE AND ALBUTEROL SULFATE 3 ML: .5; 2.5 SOLUTION RESPIRATORY (INHALATION) at 06:54

## 2021-12-05 RX ADMIN — MEROPENEM 1 G: 1 INJECTION, POWDER, FOR SOLUTION INTRAVENOUS at 03:57

## 2021-12-05 RX ADMIN — MEMANTINE 10 MG: 10 TABLET ORAL at 09:03

## 2021-12-05 RX ADMIN — ARFORMOTEROL TARTRATE 15 MCG: 15 SOLUTION RESPIRATORY (INHALATION) at 19:46

## 2021-12-05 RX ADMIN — MEMANTINE 10 MG: 10 TABLET ORAL at 03:58

## 2021-12-05 RX ADMIN — MEMANTINE 10 MG: 10 TABLET ORAL at 20:07

## 2021-12-05 RX ADMIN — FERROUS SULFATE TAB 325 MG (65 MG ELEMENTAL FE) 325 MG: 325 (65 FE) TAB at 09:03

## 2021-12-05 RX ADMIN — FOLIC ACID 1 MG: 1 TABLET ORAL at 09:02

## 2021-12-05 RX ADMIN — BUPROPION HYDROCHLORIDE 300 MG: 150 TABLET, EXTENDED RELEASE ORAL at 09:02

## 2021-12-05 RX ADMIN — BUDESONIDE 0.5 MG: 0.5 SUSPENSION RESPIRATORY (INHALATION) at 06:54

## 2021-12-05 RX ADMIN — MAGNESIUM SULFATE HEPTAHYDRATE 1 G: 1 INJECTION, SOLUTION INTRAVENOUS at 07:15

## 2021-12-05 RX ADMIN — ENOXAPARIN SODIUM 40 MG: 40 INJECTION SUBCUTANEOUS at 09:02

## 2021-12-05 RX ADMIN — MAGNESIUM SULFATE HEPTAHYDRATE 1 G: 1 INJECTION, SOLUTION INTRAVENOUS at 06:13

## 2021-12-05 RX ADMIN — SODIUM CHLORIDE 75 ML/HR: 9 INJECTION, SOLUTION INTRAVENOUS at 23:38

## 2021-12-05 NOTE — CASE MANAGEMENT/SOCIAL WORK
Discharge Planning Assessment   Felice     Patient Name: Mirna Meredith  MRN: [id number]  Today's Date: 12/5/2021    Admit Date: 12/4/2021     Discharge Needs Assessment     Row Name 12/05/21 1119       Living Environment    Lives With child(katerin), adult    Current Living Arrangements home/apartment/condo    Primary Care Provided by child(katerin)    Family Caregiver if Needed none    Quality of Family Relationships involved; supportive; helpful       Resource/Environmental Concerns    Resource/Environmental Concerns none    Transportation Concerns car, none       Transition Planning    Patient/Family Anticipates Transition to home with help/services    Patient/Family Anticipated Services at Transition ; durable medical equipment    Transportation Anticipated family or friend will provide       Discharge Needs Assessment    Readmission Within the Last 30 Days unable to assess    Current Outpatient/Agency/Support Group homecare agency    Equipment Currently Used at Home walker, rolling; cane, straight; commode; walker, standard    Concerns to be Addressed discharge planning    Anticipated Changes Related to Illness inability to care for self    Equipment Needed After Discharge hospital bed               Discharge Plan     Row Name 12/05/21 112       Plan    Plan Pt has a history of brest cancer 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 Pt sees Dr Grissom and is requesting he is made aware she is in the hospital with concern that her frequent readmissions have been due to the cancer and the side effects to IBRANZ. Pt family would like for pt to return home with HH- pt current with [facility].  placed referral. Pt family is requesting hospital Bed at d/c.              Continued Care and Services - Admitted Since 12/4/2021     Home Medical Care     Service Provider Request Status Selected Services Address Phone Fax Patient Preferred    [facility] [place]  Pending - Request Sent  N/A [address] [place] [address] [phone] --       Internal Comment last updated by Karis Hayden RN 12/5/2021 1134    Pt is current with you and wanted to let you know she is in the hospital                            Demographic Summary     Row Name 12/05/21 1112       General Information    Admission Type inpatient    Arrived From emergency department    Referral Source admission list    Preferred Language English     Used During This Interaction no       Contact Information    Permission Granted to Share Info With family/designee               Functional Status     Row Name 12/05/21 1113       Functional Status    Usual Activity Tolerance fair    Current Activity Tolerance other (see comments)    Functional Status Comments Pt has had steady decline over the last 3 weeks due to sepsis/uti       Functional Status, IADL    Medications completely dependent    Meal Preparation completely dependent    Housekeeping completely dependent    Laundry completely dependent    Shopping completely dependent    IADL Comments Jessy provides most care and transportation               Psychosocial    No documentation.                Abuse/Neglect    No documentation.                Legal     Row Name 12/05/21 1114       Financial/Legal    Source of Income social security       Legal    Criminal Activity/Legal Involvement none               Substance Abuse    No documentation.                Patient Forms    No documentation.                   Karis Hayden, RN

## 2021-12-05 NOTE — PLAN OF CARE
Goal Outcome Evaluation:  Plan of Care Reviewed With: patient        Progress: no change  Outcome Summary: pt pleasantly confused, alert to self but cannot answer admission questions and daughter left from ER prior to arrival on unit, pt states she lives with daughter and has been falling but confused with other questions, will continue to monitor

## 2021-12-05 NOTE — H&P
[facility] HISTORY AND PHYSICAL  Date: 2021   Patient Name: Mirna Meredith  : 1945  MRN: [id number]  Primary Care Physician:  Pan Pacheco MD  Date of admission: 2021    Subjective Altered mental status  Subjective     Chief Complaint: Altered mental status    HPI: Patient is a 76-year-old female who presents with altered mental status.  She was just in the hospital normal 2021 to 2021.  At that time she had Moderate to severe right hydronephrosis.  Question distal ureteral stricture.  S/p stenting of right ureter .  She improved with IV fluids antibiotics and her blood cultures are negative.  I am uncertain if she had the stent removed.    After the hospitalization, patient [facility]. She just returned home started getting confused again.     Urinalysis is positive for UTI.  Additionally, her chest x-ray shows:Worsening progression is suggested radiographically with new opacification of the left lung   base, especially in the retrocardiac region.      Personal History     Past Medical History:  Past Medical History:   Diagnosis Date   • Cancer (HCC)     Breast with mets to colon and bones   • COPD (chronic obstructive pulmonary disease) (HCC)    • Depression    • Diabetes mellitus (HCC)    • Hyperlipidemia    • Hypertension          Past Surgical History:  Past Surgical History:   Procedure Laterality Date   • CHOLECYSTECTOMY     • CYSTOSCOPY, RETROGRADE PYELOGRAM AND STENT INSERTION Right 2021    Procedure: CYSTOSCOPY RETROGRADE PYELOGRAM AND STENT INSERTION;  Surgeon: Allen Rodriguez MD;  [facility]: [facility];  Service: Urology;  Laterality: Right;   • HYSTERECTOMY     • JOINT REPLACEMENT Bilateral     Knees   • MASTECTOMY Bilateral        Family History:   History reviewed. No pertinent family history.    Social History:   Social History     Socioeconomic History   • Marital status:     Tobacco Use   • Smoking status: Former Smoker     Types: Cigarettes     Quit date: 1961     Years since quittin.5   Substance and Sexual Activity   • Alcohol use: Not Currently   • Drug use: Never   • Sexual activity: Defer       Home Medications:  Peppermint Oil, SBI/Protein Isolate, anastrozole, atenolol, buPROPion XL, cefdinir, ferrous sulfate, folic acid, memantine, metFORMIN ER, metoprolol tartrate, montelukast, ondansetron ODT, risperiDONE, rosuvastatin, sertraline, valACYclovir, and vitamin E    Allergies:  Allergies   Allergen Reactions   • Penicillins Rash       Review of Systems   Unable to get because patient is too altered    Objective   Objective     Vitals:   Temp:  [99.4 °F (37.4 °C)] 99.4 °F (37.4 °C)  Heart Rate:  [86-97] 97  Resp:  [18] 18  BP: (127-156)/() 156/79    Physical Exam    Constitutional: Lethargic, confused   Eyes: Pupils equal, sclerae anicteric, no conjunctival injection   HENT: NCAT, mucous membranes moist   Neck: Supple, no thyromegaly, no lymphadenopathy, trachea midline   Respiratory: Clear to auscultation bilaterally, nonlabored respirations    Cardiovascular: RRR, no murmurs, rubs, or gallops, palpable pedal pulses bilaterally   Gastrointestinal: Positive bowel sounds, soft, nontender, nondistended   Musculoskeletal: No bilateral ankle edema, no clubbing or cyanosis to extremities   Psychiatric: Very confused   Neurologic:  strength symmetric in all extremities, Cranial Nerves grossly intact to confrontation, speech clear   Skin: No rashes     Result Review    Result Review:  I have personally reviewed the results from the time of this admission to 2021 02:51 EST and agree with these findings:  [x]  Laboratory  []  Microbiology  [x]  Radiology  []  EKG/Telemetry   []  Cardiology/Vascular   []  Pathology  [x]  Old records  []  Other:    Please see HPI for radiology and lab results.    Assessment/Plan   Assessment / Plan     Assessment/Plan:   #1 CAP  with left-sided pleural effusion  -concern for possible aspiriation.    -we will treat her with meropenem because of PCN allergy and national storage of flagyl.  -will get speech eval.      #2 COPD not exacerbated   -continue duoneb, brovana, pulmicort    #3 UTI with stent  -Treat with meropenem  -We will get CT of abdomen. There has been interval placement of a right ureteral stent since the prior 11/12/2021     #4 JOESPH  -Baseline creatinine close to 1.  -IV fluids.    #5 elevated troponin  -We will trend.  Most likely secondary to #4.    #6 patient has metastatic breast cancer  -Continue home arimidex    #7 metabolic encephalopathy secondary to #1 and #3    #8 dementia-continue Namenda    #9 depression-continue Zoloft and Wellbutrin    DVT prophylaxis:  Medical DVT prophylaxis orders are present.    CODE STATUS:    Level Of Support Discussed With: Patient  Code Status (Patient has no pulse and is not breathing): CPR (Attempt to Resuscitate)  Medical Interventions (Patient has pulse or is breathing): Full Support      Admission Status:  I believe this patient meets inpatient status.    Electronically signed by Dimitri Anders DO, 12/05/21, 2:51 AM EST.

## 2021-12-05 NOTE — PLAN OF CARE
Problem: Fall Injury Risk  Goal: Absence of Fall and Fall-Related Injury  Outcome: Ongoing, Progressing  Intervention: Identify and Manage Contributors to Fall Injury Risk  Recent Flowsheet Documentation  Taken 12/5/2021 1145 by La Moyer RN  Medication Review/Management: medications reviewed  Taken 12/5/2021 0900 by La Moyer RN  Medication Review/Management: medications reviewed  Taken 12/5/2021 0752 by La Moyer RN  Medication Review/Management: medications reviewed  Intervention: Promote Injury-Free Environment  Recent Flowsheet Documentation  Taken 12/5/2021 1145 by La Moyer RN  Safety Promotion/Fall Prevention:   assistive device/personal items within reach   clutter free environment maintained   safety round/check completed   room organization consistent  Taken 12/5/2021 0900 by La Moyer RN  Safety Promotion/Fall Prevention:   assistive device/personal items within reach   clutter free environment maintained   safety round/check completed   room organization consistent  Taken 12/5/2021 0752 by La Moyer RN  Safety Promotion/Fall Prevention:   assistive device/personal items within reach   clutter free environment maintained   safety round/check completed   room organization consistent     Problem: Adult Inpatient Plan of Care  Goal: Plan of Care Review  Outcome: Ongoing, Progressing  Flowsheets (Taken 12/5/2021 1421)  Progress: no change  Plan of Care Reviewed With: patient  Outcome Summary: patient still very confused. she is recieving fluids and antibiotics for uti.speech consulted due to patient having trouble eating with no dentures. consulted casey per family request no other significant changes at this time. will continue to monitor patient.  Goal: Patient-Specific Goal (Individualized)  Outcome: Ongoing, Progressing  Goal: Absence of Hospital-Acquired Illness or Injury  Outcome: Ongoing, Progressing  Intervention: Identify and Manage Fall Risk  Recent Flowsheet  Documentation  Taken 12/5/2021 1145 by La Moyer RN  Safety Promotion/Fall Prevention:   assistive device/personal items within reach   clutter free environment maintained   safety round/check completed   room organization consistent  Taken 12/5/2021 0900 by La Moyer RN  Safety Promotion/Fall Prevention:   assistive device/personal items within reach   clutter free environment maintained   safety round/check completed   room organization consistent  Taken 12/5/2021 0752 by La Moyer RN  Safety Promotion/Fall Prevention:   assistive device/personal items within reach   clutter free environment maintained   safety round/check completed   room organization consistent  Intervention: Prevent Infection  Recent Flowsheet Documentation  Taken 12/5/2021 1145 by La Moyer RN  Infection Prevention:   single patient room provided   rest/sleep promoted   environmental surveillance performed   equipment surfaces disinfected  Taken 12/5/2021 0900 by La Moyer RN  Infection Prevention:   single patient room provided   rest/sleep promoted   environmental surveillance performed   equipment surfaces disinfected  Taken 12/5/2021 0752 by La Moyer RN  Infection Prevention:   single patient room provided   rest/sleep promoted   environmental surveillance performed   equipment surfaces disinfected  Goal: Optimal Comfort and Wellbeing  Outcome: Ongoing, Progressing  Intervention: Provide Person-Centered Care  Recent Flowsheet Documentation  Taken 12/5/2021 0752 by aL Moyer RN  Trust Relationship/Rapport:   care explained   choices provided   emotional support provided   empathic listening provided   questions answered   questions encouraged   reassurance provided   thoughts/feelings acknowledged  Goal: Readiness for Transition of Care  Outcome: Ongoing, Progressing     Problem: Skin Injury Risk Increased  Goal: Skin Health and Integrity  Outcome: Ongoing, Progressing     Problem: Activity Intolerance  Goal: Enhanced  Capacity and Energy  Outcome: Ongoing, Progressing     Problem: Confusion Acute  Goal: Optimal Cognitive Function  Outcome: Ongoing, Progressing   Goal Outcome Evaluation:  Plan of Care Reviewed With: patient        Progress: no change  Outcome Summary: patient still very confused. she is recieving fluids and antibiotics for uti.speech consulted due to patient having trouble eating with no dentures. consulted casey per family request no other significant changes at this time. will continue to monitor patient.

## 2021-12-05 NOTE — PROGRESS NOTES
Patient seen and evaluated on this morning.  Chart has been reviewed.  Continue antibiotic therapy as initiated.  Follow-up cultures and adjust antibiotic therapies accordingly.  Appears the patient does still have her Ben stent in place.  We will consult Dr. Rodriguez in the a.m. for further assistance in management regarding this.  Dr. Grissom has been consulted for further assistance and management as well.

## 2021-12-05 NOTE — CONSULTS
[facility]   Consult Note    Patient Name: Mirna Meredith  : 1945  MRN: [id number]  Primary Care Physician:  Pan Pacheco MD  Date of admission: 2021    Subjective   Subjective     Reason for Consult: I have been asked to see the patient because of her history of metastatic breast cancer I have been taking care of her for several years now initially she presented with right-sided breast cancer was treated for more than 5 years with how hormonal therapy subsequently she developed a cancer on the left side again she was again in the process of getting hormonal therapy as an adjuvant treatment with anastrozole really there was some signs of failure of this and patient has been started on CDK inhibitor Ibrance however she could not get her treatment because of neutropenia recently admitted with UTI as well as found to have hydronephrosis she had a stent inserted and was supposed to see me next week in the office to initiate her hormonal treatment again however in the meantime she has become more confused she is also known to have history of ulcerative colitis has been under care of Dr. Chow she has involvement of the liver as well as gastrointestinal tract as well as her lungs and bones with metastatic disease she has not received enough for treatment for CDK inhibitors probably used only for few weeks I will discuss again the case with her daughter who keeps up with her I will follow her during thel hospitalization patient has been somewhat confused especially with hospital she gets worse but we will check the ammonia levels with involvement of the liver has had history of diarrhea because of colitis as well as because of the bowl metastases, will examine her today but I know very well her conditions so I am going to order a few days done and will examine as well I am getting the physical findings from the emergency room physician's note as well as from the primary care physician we will also  check her for C. difficile  HPI: Because of confusion which is not unusual for her she does have extensively metastatic disease has not been able to get the treatments because before we could start she ends up being in the hospital with infections I agree with the plan of antibiotics which has been started cultures have been obtained    Mirna Merediht is a 76 y.o. female patient is being admitted with confusion probably sepsis or possible urinary tract infection she does have a stent recently and stent  inserted she does have colitis as well as history of liver lungs and bowel by bowel  metastases  We will also check C. difficile colitis and will check for ammonia level  Review of Systems   All systems were reviewed and negative except for: Has been reviewed very well known to me for many many years    Personal History      Past Medical History:   Diagnosis Date   • Cancer (HCC)     Breast with mets to colon and bones   • COPD (chronic obstructive pulmonary disease) (HCC)    • Depression    • Diabetes mellitus (HCC)    • Hyperlipidemia    • Hypertension        Past Surgical History:   Procedure Laterality Date   • CHOLECYSTECTOMY     • CYSTOSCOPY, RETROGRADE PYELOGRAM AND STENT INSERTION Right 11/14/2021    Procedure: CYSTOSCOPY RETROGRADE PYELOGRAM AND STENT INSERTION;  Surgeon: Allen Rodriguez MD;  Location: [facility];  Service: Urology;  Laterality: Right;   • HYSTERECTOMY     • JOINT REPLACEMENT Bilateral     Knees   • MASTECTOMY Bilateral        Family History: family history is not on file. Otherwise pertinent FHx was reviewed and not pertinent to current issue.    Social History:  reports that she quit smoking about 60 years ago. Her smoking use included cigarettes. She does not have any smokeless tobacco history on file. She reports previous alcohol use. She reports that she does not use drugs.    Home Medications:  Peppermint Oil, SBI/Protein Isolate, anastrozole, atenolol, buPROPion XL,  ferrous sulfate, folic acid, memantine, metFORMIN ER, metoprolol tartrate, montelukast, ondansetron ODT, risperiDONE, rosuvastatin, sertraline, valACYclovir, and vitamin E      Allergies:  Allergies   Allergen Reactions   • Penicillins Rash       Objective   Objective     Vitals:   Temp:  [96.4 °F (35.8 °C)-99.4 °F (37.4 °C)] 98 °F (36.7 °C)  Heart Rate:  [] 107  Resp:  [18-22] 22  BP: (127-156)/() 143/70  Physical Exam    Constitutional: Awake, alert   Eyes: PERRLA, sclerae anicteric, no conjunctival injection   HENT: NCAT, mucous membranes moist   Neck: Supple, no thyromegaly, no lymphadenopathy, trachea midline   Respiratory: Clear to auscultation bilaterally, nonlabored respirations    Cardiovascular: RRR, no murmurs, rubs, or gallops, palpable pedal pulses bilaterally   Gastrointestinal: Positive bowel sounds, soft, nontender, nondistended   Musculoskeletal: No bilateral ankle edema, no clubbing or cyanosis to extremities   Psychiatric: Appropriate affect, cooperative   Neurologic: Oriented x 3, strength symmetric in all extremities, Cranial Nerves grossly intact to confrontation, speech clear   Skin: No rashes     Result Review    Result Review:  I have personally reviewed the results from the time of this admission to 12/5/2021 12:48 EST and agree with these findings:  [x]  Laboratory  []  Microbiology  [x]  Radiology  []  EKG/Telemetry   []  Cardiology/Vascular   []  Pathology  []  Old records  [x]  Other: Reviewed  Most notable findings include: X-rays scans have been reviewed lab work was also reviewed we will get some further lab work continue the current management    Assessment/Plan   Assessment / Plan     Brief Patient Summary:  Mirna Meredith is a 76 y.o. female who has been admitted probable sepsis C. difficile colitis will be checked ammonia levels also will be checked most likely metabolic encephalopathy    Active Hospital Problems:  Active Hospital Problems    Diagnosis    • Weakness         Plan:   Continue as ordered with antibiotics I will follow her during her hospital stay outpatient to start her CDK 4 treatment        Electronically signed by Jose G Grissom MD, 12/05/21, 12:48 PM EST.

## 2021-12-05 NOTE — ED PROVIDER NOTES
Subjective   This patient presents to the emergency department complaining of altered mental status.  Patient had a recent admission to the hospital under the hospitalist service from 11/11 to 11/19/2021.  At that time the patient was admitted for acute kidney injury, altered mental status, metabolic encephalopathy, generalized weakness, and COPD exacerbation.  It was felt that she also had a urinary tract infection during that time.  Patient went to [facility] rehabilitation center after discharge and she was just discharged home several days ago.  The patient's daughter states that the patient has been increasingly confused over the last 3 days well beyond her normal baseline confusion and she also has been very lethargic to the point where she cannot even move the patient from bed to commode.  The patient is unable to ambulate.    The patient has had no recent fever chills or cough she has had no fall she has had no vomiting or diarrhea.          Review of Systems   Constitutional: Positive for activity change, appetite change and fatigue.   HENT: Negative.    Eyes: Negative.    Respiratory: Negative.    Cardiovascular: Negative.    Gastrointestinal: Negative.    Endocrine: Negative.    Genitourinary: Negative.    Musculoskeletal: Negative.    Skin: Negative.    Allergic/Immunologic: Negative.    Neurological: Positive for dizziness, speech difficulty, weakness and light-headedness.   Hematological: Negative.    Psychiatric/Behavioral: Positive for behavioral problems, confusion, decreased concentration and sleep disturbance. Negative for hallucinations and suicidal ideas. The patient is not nervous/anxious and is not hyperactive.        Past Medical History:   Diagnosis Date   • Cancer (HCC)     Breast with mets to colon and bones   • COPD (chronic obstructive pulmonary disease) (HCC)    • Depression    • Diabetes mellitus (HCC)    • Hyperlipidemia    • Hypertension        Allergies   Allergen Reactions   •  Penicillins Rash       Past Surgical History:   Procedure Laterality Date   • CHOLECYSTECTOMY     • CYSTOSCOPY, RETROGRADE PYELOGRAM AND STENT INSERTION Right 2021    Procedure: CYSTOSCOPY RETROGRADE PYELOGRAM AND STENT INSERTION;  Surgeon: Allen Rodriguez MD;  Location: [facility] OR;  Service: Urology;  Laterality: Right;   • HYSTERECTOMY     • JOINT REPLACEMENT Bilateral     Knees   • MASTECTOMY Bilateral        History reviewed. No pertinent family history.    Social History     Socioeconomic History   • Marital status:    Tobacco Use   • Smoking status: Former Smoker     Types: Cigarettes     Quit date: 1961     Years since quittin.5   Substance and Sexual Activity   • Alcohol use: Not Currently   • Drug use: Never   • Sexual activity: Defer           Objective   Physical Exam  Vitals and nursing note reviewed.   Constitutional:       General: She is not in acute distress.     Comments: The patient is lethargic, disoriented, and she has significant generalized weakness.   HENT:      Head: Normocephalic and atraumatic.      Mouth/Throat:      Mouth: Mucous membranes are moist.      Pharynx: Oropharynx is clear.   Eyes:      General: No visual field deficit.     Extraocular Movements: Extraocular movements intact.      Pupils: Pupils are equal, round, and reactive to light.   Cardiovascular:      Rate and Rhythm: Normal rate and regular rhythm.      Heart sounds: Normal heart sounds.   Pulmonary:      Effort: Pulmonary effort is normal.      Breath sounds: Normal breath sounds.   Abdominal:      General: Bowel sounds are normal.      Palpations: Abdomen is soft.   Musculoskeletal:         General: Normal range of motion.      Cervical back: Normal range of motion and neck supple.   Skin:     General: Skin is warm and dry.   Neurological:      Mental Status: She is lethargic and confused.      Cranial Nerves: No cranial nerve deficit, dysarthria or facial asymmetry.      Sensory: No  sensory deficit.      Motor: Weakness present.      Coordination: Romberg sign negative.      Gait: Gait abnormal.   Psychiatric:         Mood and Affect: Mood is not anxious or depressed.         Behavior: Behavior is not agitated.         Cognition and Memory: Cognition is impaired. Memory is impaired.      Comments: Patient is disoriented and lethargic.         Procedures           ED Course    Labs Reviewed   COMPREHENSIVE METABOLIC PANEL - Abnormal; Notable for the following components:       Result Value    Glucose 114 (*)     Creatinine 1.31 (*)     Sodium 134 (*)     Chloride 94 (*)     CO2 29.8 (*)     Albumin 3.30 (*)     AST (SGOT) 51 (*)     Alkaline Phosphatase 161 (*)     eGFR Non  Amer 39 (*)     All other components within normal limits    Narrative:     GFR Normal >60  Chronic Kidney Disease <60  Kidney Failure <15     TROPONIN (IN-HOUSE) - Abnormal; Notable for the following components:    Troponin T 0.036 (*)     All other components within normal limits    Narrative:     Troponin T Reference Range:  <= 0.03 ng/mL-   Negative for AMI  >0.03 ng/mL-     Abnormal for myocardial necrosis.  Clinicians would have to utilize clinical acumen, EKG, Troponin and serial changes to determine if it is an Acute Myocardial Infarction or myocardial injury due to an underlying chronic condition.       Results may be falsely decreased if patient taking Biotin.     URINALYSIS W/ CULTURE IF INDICATED - Abnormal; Notable for the following components:    Appearance, UA Cloudy (*)     Blood, UA Trace (*)     Protein, UA 30 mg/dL (1+) (*)     Leuk Esterase, UA Large (3+) (*)     All other components within normal limits   PROTIME-INR - Abnormal; Notable for the following components:    INR 0.99 (*)     All other components within normal limits    Narrative:     Suggested Therapeutic Ranges For Oral Anticoagulant Therapy:  Level of Therapy                      INR Target Range  Standard Dose                             2.0-3.0  High Dose                                2.5-3.5  Patients not receiving anticoagulant  Therapy Normal Range                     0.6-1.2   CBC WITH AUTO DIFFERENTIAL - Abnormal; Notable for the following components:    RBC 3.24 (*)     Hemoglobin 9.3 (*)     Hematocrit 28.9 (*)     RDW 18.7 (*)     RDW-SD 61.1 (*)     Lymphocyte % 19.5 (*)     Basophil % 1.9 (*)     Immature Grans % 1.9 (*)     Immature Grans, Absolute 0.12 (*)     nRBC 0.6 (*)     All other components within normal limits   URINALYSIS, MICROSCOPIC ONLY - Abnormal; Notable for the following components:    RBC, UA 6-12 (*)     WBC, UA 31-50 (*)     Bacteria, UA 2+ (*)     Squamous Epithelial Cells, UA 7-12 (*)     All other components within normal limits   MAGNESIUM - Abnormal; Notable for the following components:    Magnesium 1.5 (*)     All other components within normal limits   POCT GLUCOSE FINGERSTICK - Abnormal; Notable for the following components:    Glucose 126 (*)     All other components within normal limits   POCT GLUCOSE FINGERSTICK - Abnormal; Notable for the following components:    Glucose 143 (*)     All other components within normal limits   MAGNESIUM - Normal   CK - Normal   TSH - Normal   T4, FREE - Normal    Narrative:     Results may be falsely increased if patient taking Biotin.     URINE DRUG SCREEN - Normal    Narrative:     Negative Thresholds Per Drugs Screened:    Amphetamines                 500 ng/ml  Barbiturates                 200 ng/ml  Benzodiazepines              100 ng/ml  Cocaine                      300 ng/ml  Methadone                    300 ng/ml  Opiates                      300 ng/ml  Oxycodone                    100 ng/ml  THC                           50 ng/ml    The Normal Value for all drugs tested is negative. This report includes final unconfirmed screening results to be used for medical treatment purposes only. Unconfirmed results must not be used for non-medical purposes such as  employment or legal testing. Clinical consideration should be applied to any drug of abuse test, particularly when unconfirmed results are used.           LACTIC ACID, PLASMA - Normal   PHOSPHORUS - Normal   PHOSPHORUS - Normal   MAGNESIUM - Normal   AMMONIA - Normal   POCT GLUCOSE FINGERSTICK - Normal   BLOOD CULTURE   BLOOD CULTURE   URINE CULTURE   RAINBOW DRAW    Narrative:     The following orders were created for panel order [place] Draw.  Procedure                               Abnormality         Status                     ---------                               -----------         ------                     Green Top (Gel)[[id number]]                                  Final result               Lavender Top[[id number]]                                     Final result               Gold Top - SST[[id number]]                                   Final result               Light Blue Top[[id number]]                                   Final result                 Please view results for these tests on the individual orders.   POCT GLUCOSE FINGERSTICK   POCT GLUCOSE FINGERSTICK   POCT GLUCOSE FINGERSTICK   POCT GLUCOSE FINGERSTICK   POCT GLUCOSE FINGERSTICK   POCT GLUCOSE FINGERSTICK   POCT GLUCOSE FINGERSTICK   CBC AND DIFFERENTIAL    Narrative:     The following orders were created for panel order CBC & Differential.  Procedure                               Abnormality         Status                     ---------                               -----------         ------                     CBC Auto Differential[[id number]]        Abnormal            Final result                 Please view results for these tests on the individual orders.   GREEN TOP   LAVENDER TOP   GOLD TOP - SST   LIGHT BLUE TOP       ED Course as of 12/05/21 1531   Sun Dec 05, 2021   0040 Monocytes Absolute: 0.72 [PP]   0045 Basophils Absolute: 0.12 [PP]      ED Course User Index  [PP] Dalton Villasenor, DO          Adult Transthoracic Echo Complete  W/ Cont if Necessary Per Protocol    Result Date: 11/15/2021  Narrative: · Estimated right ventricular systolic pressure from tricuspid regurgitation is mildly elevated (35-45 mmHg). · Left ventricular diastolic function is consistent with (grade I) impaired relaxation. · Normal left ventricular systolic function, the estimated ejection fraction is 55% · Estimated left ventricular EF was in disagreement with the calculated left ventricular EF. Left ventricular ejection fraction appears to be 51 - 55%.      CT Abdomen Pelvis Without Contrast    Result Date: 12/5/2021  Narrative: PROCEDURE: CT ABDOMEN PELVIS WO CONTRAST  COMPARISONS: [facility], CR, [place] RETROGRADE PYELOGRAM IN OR, 11/14/2021, 11:54.  [facility], CT, CT ABD-PELVIS WO CONTRAST, 11/12/2021, 21:31.   [facility], CT, CT CHEST WO CONTRAST, 12/04/2021, 22:05.  INDICATIONS: right hydronephrosis w/ right ureteral stent; h/o breast ca  TECHNIQUE: 747 CT images were created without intravenous contrast.   PROTOCOL:   Standard imaging protocol performed    RADIATION:   Automated exposure control was utilized to minimize radiation dose.  FINDINGS: Motion artifact obscures detail.  There is a small amount of ascites, probably similar to the 11/12/2021 study.  Hydronephrosis is present on the right, as before.  A right ureteral stent is in place.  It appears to be in proper position and similar to prior recent exams.  No left-sided hydronephrosis.  No definite nephrolithiasis or ureterolithiasis.  There is greater distention of the urinary bladder.  No urinary bladder calculi are identified.  There is possible hepatosplenomegaly.  No definite nonenhanced CT evidence of hepatic metastases.  A small hiatal hernia may be present.  Extensive osseous metastatic disease is seen as on prior exams.  No definite pathologic fracture is suggested.  The left-sided pleural effusion has increased in size since the prior 11/12/2021 CT  study.  The previously suggested possible pleural-based metastatic disease within the lower posterior thorax is less well appreciated on the current CT study.  It may be obscured by motion artifact.  There may be new atelectasis and/or pneumonia in the left lung base when compared with the prior 11/12/2021 study.  Formed stool is seen throughout the colon.  There may be fecal stasis as with constipation.  No mechanical bowel obstruction is suspected.  No pneumoperitoneum is seen.  The appendix is not clearly identified.  It may be obscured by the ascites.  The patient has undergone hysterectomy and cholecystectomy.  No definite acute pancreatitis.  No other acute findings are appreciated.  CONCLUSION: Increased left-sided pleural effusion is noted since 11/12/2021.  There has been interval placement of a right ureteral stent since the prior 11/12/2021 CT study.  Otherwise, little interval change is seen since 11/12/2021 CT study with findings as detailed above.     ELIJAH DOUGLAS JR, MD       Electronically Signed and Approved By: ELIJAH DOUGLAS JR, MD on 12/05/2021 at 3:07             CT Abdomen Pelvis Without Contrast    Result Date: 11/13/2021  Narrative: PROCEDURE: CT ABDOMEN PELVIS WO CONTRAST  COMPARISONS: [facility], CT, ABDOMEN W/ CONTRAST, 10/27/2005, 10:31.  [facility], CT, CT ABD-PELVIS W CONTRAST, 7/30/2021, 11:43.   [facility], CT, CT ABD-PELVIS WO CONTRAST, 6/08/2021, 3:46.   [facility], CT, CT CHEST W CONTRAST, 7/30/2021, 11:43.  INDICATIONS: ABDOMINAL PAIN, UNSPECIFIED; BLEEDING, UNSPECIFIED, UNKNOWN SOURCE; H/O LEFT BREAST CANCER.  TECHNIQUE: 679 CT images were created without intravenous or oral contrast.  The lack of intravenous and oral contrast agent administration limits assessment on the exam.   PROTOCOL:   Standard imaging protocol performed    RADIATION:   DLP: 463 mGy*cm   Automated exposure control was utilized to minimize radiation  dose.  FINDINGS: There is motion artifact on the study.  A small left pleural effusion is identified, seen previously.  Diffuse osseous metastatic disease is seen, as before, on recent CT studies.  There may be pleural-based metastatic foci within the partially imaged lower thorax, especially seen posteriorly and larger on the left, such as seen on image 27 of series 2, measuring approximately 5.4 x 2.3 x 5.2 cm in long-axis axial, short-axis axial, and craniocaudal extent, respectively.  The CT number for this finding is between 3 and 8 Hounsfield units.  In the differential diagnosis would be loculated pleural fluid.  There is a trace amount of pericardial effusion.  Probably no cardiac enlargement.  Again, the lack of intravenous contrast limits assessment of the liver for metastases.  Diffuse indistinctness of the splenic margins is noted, which may be related to motion.  There is ascites, which is probably small to moderate in degree, with CT numbers ranging between 2 and 6 Hounsfield units.  Please note that the ascites CT number readings do not suggest acute hemoperitoneum.  Please correlate with the patient's hemoglobin and hematocrit levels, as an anemic patient may present with low density pneumoperitoneum.  Right-sided hydronephrosis and hydroureter persist without an obstructing ureteral calculus.  A benign or malignant ureteral stricture (especially distally) cannot be excluded.  No definite left-sided hydronephrosis or hydroureter.  No nonobstructing renal calyceal stones are seen.  No definite adrenal mass.  The adrenal glands are obscured by motion artifact.  It is suspected that the patient has undergone cholecystectomy, appendectomy, and hysterectomy.  No acute pancreatitis is suspected.  The pancreas is obscured by motion artifact.  There may be mild anasarca.  There is a bowel loop protruding toward a tiny umbilical hernia, as seen on image 117 of series 2.  This finding is new since the prior CT  exam from 7/30/2021.  Colonic diverticula are seen without acute diverticulitis.  The previously seen diffuse mural thickening involving the colon is thought to have resolved, allowing for differences in technique.  No mechanical bowel obstruction is seen.  No pneumoperitoneum or pneumatosis.  The urinary bladder is underdistended which limits its assessment.  The stomach is diffusely underdistended, limiting its evaluation.  CONCLUSION:  1. The study is obscured by motion artifact.  2. There is a small left pleural effusion, seen previously.  No definite hemothorax.  No definite right pleural effusion.  3. A small to moderate amount of ascites is suspected.  No definite hemoperitoneum is suggested.  No acute retroperitoneal hemorrhage is seen.  4. There is diffuse osseous metastatic disease.  No definite acute fracture is seen.  No definite pathologic fractures are identified.  5. New pleural-based metastatic disease (at least two foci are suggested) in the lower thorax cannot be excluded.  Chest wall invasion associated with these findings is possible.  6. There is moderate to severe right-sided hydronephrosis and right hydroureter without an obstructing ureteral calculus identified.  A benign or malignant distal right ureteral stricture cannot be excluded.  No left-sided hydronephrosis.  No nonobstructing nephrolithiasis.  7. The previously seen diffuse mural thickening of the colon has resolved. 8. No mechanical bowel obstruction.  No pneumoperitoneum.  9. There is diffuse colonic diverticulosis without definite acute diverticulitis.  10. The patient has undergone cholecystectomy, appendectomy, and hysterectomy.  11. The spleen is particularly obscured by motion artifact and is not well evaluated on the study.   12. No acute pancreatitis is suggested.  13. Please see above comments for further detail.   1.  ELIJAH DOUGLAS JR, MD       Electronically Signed and Approved By: ELIJAH DOUGLAS JR, MD on 11/13/2021 at  1:33             XR Spine Lumbar Complete With Flex & Ext    Result Date: 11/12/2021  Narrative: PROCEDURE: XR SPINE LUMBAR COMPLETE W FLEX EXT  COMPARISON: None  INDICATIONS: LOWER BACK PAIN.  FINDINGS:  The relationship of the vertebra between flexion-extension seems relatively preserved.  There is a slight levoscoliosis of the thoracolumbar spine.  There is osseous demineralization.  There is facet arthropathy greater in the lower lumbar area.  There is marked narrowing of the disc spaces at L4-5 and L5-S1 suggesting disc desiccation.  CONCLUSION:  1. Multilevel degenerative change.      ANTONINO FRIEND MD       Electronically Signed and Approved By: ANTONINO FRIEND MD on 11/12/2021 at 16:23             CT Head Without Contrast    Result Date: 12/4/2021  Narrative: PROCEDURE: CT HEAD WO CONTRAST  COMPARISON: [facility], CT, CT HEAD WO CONTRAST, 11/11/2021, 19:57.  INDICATIONS: CONFUSION TODAY.  PROTOCOL:   Standard imaging protocol performed    RADIATION:   MA and/or KV was adjusted to minimize radiation dose.    TECHNIQUE: After obtaining the patient's consent, 130 CT images were obtained without non-ionic intravenous contrast material.  DISCUSSION:  A routine nonenhanced head CT was performed. No acute brain abnormality is identified. No acute intracranial hemorrhage. No acute infarction. No acute skull fracture. No midline shift or acute intracranial mass effect is seen.  Mild to moderate chronic small vessel ischemia/infarction is suspected. There are arterial calcifications. The extra-axial spaces and the ventricular system are prominent.  Age-indeterminate congestive and/or inflammatory changes involve the bilateral middle ear clefts, especially the mastoid air cell complexes, seen previously.  There may be mild age-indeterminate sinus disease, also seen previously.  No air-fluid interfaces are appreciated within the imaged paranasal sinuses.  Chronic leftward nasal septal deviation is present with  an associated nasal spur.  Degenerative changes involve the cervical spine.  CONCLUSION:  No acute brain abnormality is seen.    ELIJAH DOUGLAS JR, MD       Electronically Signed and Approved By: ELIJAH DOUGLAS JR, MD on 12/04/2021 at 22:47             CT Head Without Contrast    Result Date: 11/11/2021  Narrative: PROCEDURE: CT HEAD WO CONTRAST  COMPARISON:  [facility], CT, CT HEAD WO CONTRAST, 10/18/2021, 7:13. INDICATIONS: Mental status change, unknown cause  PROTOCOL:   Standard imaging protocol performed    RADIATION:   DLP: 1082.2mGy*cm   MA and/or KV was adjusted to minimize radiation dose.     TECHNIQUE: After obtaining the patient's consent, CT images were obtained without non-ionic intravenous contrast material.  FINDINGS:  There is underlying cerebral atrophy.  There are white matter changes in the periventricular areas which could reflect more chronic small vessel ischemic change.  There is no underlying hemorrhage.  There is no midline shift.  There is no definite orbital abnormality.  It looks like there is probably a mucous retention cyst or polyp in the right maxillary sinus as well some mucosal thickening.  There is deviation nasal septum to the left.  There are some opacified mastoid air cells bilaterally.  CONCLUSION:  1. There is some underlying cerebral atrophy. 2. There are white matter changes which while nonspecific could reflect more chronic small vessel ischemic change. 3. There are some opacified mastoid air cells bilaterally.  There is some mucosal thickening what looks like a polyp per retention cyst in the right maxillary sinus.  Depending on clinical findings at some point follow-up MR may be warranted in further workup.    ANTONINO FRIEND MD       Electronically Signed and Approved By: ANTONINO FRIEND MD on 11/11/2021 at 20:12             CT Chest Without Contrast Diagnostic    Result Date: 12/4/2021  Narrative: PROCEDURE: CT CHEST WO CONTRAST DIAGNOSTIC  COMPARISONS: [facility], CT, CT CHEST W CONTRAST DIAGNOSTIC, 7/30/2021, 11:43.   [facility], CR, XR CHEST 1 VW, 12/04/2021, 18:43.   [facility], CT, CT ABDOMEN PELVIS WO CONTRAST, 11/12/2021, 21:31.  INDICATIONS: SHORTNESS OF BREATH TODAY; HISTORY OF BREAST CARCINOMA.  TECHNIQUE: 564 CT images were created without the administration of contrast material.   PROTOCOL:   Standard imaging protocol performed    RADIATION:   Automated exposure control was utilized to minimize radiation dose.  FINDINGS: Motion artifact obscures detail.  The patient's upper extremities in the scan field of view obscure detail.  The performing CT technologist noted the patient was not able to cooperate for the study.  There is a moderate sized left pleural effusion.  It has increased since the 7/30/2021 CT exam.  Atelectasis and/or pneumonia may involve(s) the lower lobe of the left lung.  It is thought to be new since the prior 7/30/2021 study.  Minimal if any acute infiltrate(s) is (are) seen elsewhere.  There is a left-sided subclavian central venous line in place with its tip in the upper superior vena cava (SVC); its tip is not parallel to the wall of the superior vena cava (SVC).  It forms an angle, estimated at about 45 degrees.  Please correlate with its desired location.  The central tracheobronchial tree is well aerated without filling defect.  There is an air-fluid level in the thoracic esophagus, which may represent esophageal dysmotility and/or gastroesophageal reflux disease.  Small scattered noncalcified non-cavitating pulmonary nodules are seen, which measure about 7 mm in greatest diameter.  They are nonspecific.  Similar findings were seen on the prior 7/30/2021 study.  Consider low-dose chest CT examination follow-up if clinically warranted.  It appears that the patient has undergone bilateral mastectomies.  There is a partially visualized right ureteral stent in place.  It is new since the 7/30/2021  exam.  A small pericardial effusion is suspected with a CT number 32 Hounsfield units or less.  No right pleural effusion.  A small amount of ascites is seen in the upper abdomen.  Diffuse osseous metastatic disease is seen, as before.  The patient has undergone cholecystectomy.  Extensive ossification of the anterior longitudinal ligament is seen and may represent diffuse idiopathic skeletal hyperostosis (DISH).  There are degenerative changes throughout the imaged spine.  There may be small prevascular lymph nodes within the mediastinum.  Residual thymic tissue is possible.  CONCLUSION:  1. There is a moderate sized left pleural effusion.  It has increased in size since the 7/30/2021 CT study.  2. Atelectasis and/or pneumonia is suspected in the left lung base.  Aspiration pneumonia would be in the differential diagnosis.  Consider close interval clinical and imaging follow-up to ensure a benign progression.  Bronchoscopy may be helpful in further assessment if clinically indicated.  3. Motion artifact obscures detail on the study.  4. The patient has undergone bilateral mastectomies and bilateral axillary lymph node dissection.  5. There is a left-sided central venous line in place with its tip in the upper superior vena cava (SVC), as discussed.  6. No pneumothorax is seen.  No pneumomediastinum.  7. No definite right-sided pleural effusion.  8. There may be borderline cardiac enlargement.  A trace amount of pericardial effusion is seen, new since the prior study.  9. There is diffuse osseous metastatic disease, as seen previously.  No definite acute pathologic fractures are appreciated. 10. Please see above comments for further detail.      ELIJAH DOUGLAS JR, MD       Electronically Signed and Approved By: ELIJAH DOUGLAS JR, MD on 12/04/2021 at 22:58             MRI Lumbar Spine Without Contrast    Result Date: 11/16/2021  Narrative: PROCEDURE: MRI LUMBAR SPINE WO CONTRAST  COMPARISON: [facility],  CT, CT ABDOMEN PELVIS WO CONTRAST, 11/12/2021, 21:31.  INDICATIONS: METASTATIC BREAST CA. SEVERE LOWER BACK PAIN.  TECHNIQUE: A variety of imaging planes and parameters were utilized for visualization of suspected pathology.  FINDINGS:  PARASPINAL AREA: Subcutaneous edema in the fat of the lower back is nonspecific. BONES: Diffuse low signal throughout the lower thoracic spine, lumbar spine, included upper sacrum, consistent with diffuse osseous metastatic disease.  There are Modic type 2 degenerative endplate changes at L4-5 and L5-S1.  There is increased STIR signal in the T12 superior endplate on the left.  Lumbar vertebral bodies demonstrate adequate height. CORD/CAUDA EQUINA: Tip of the conus appears to terminate at the T12-L1 level.  There is tangling of cauda equina nerve roots from spinal canal stenosis.  LUMBAR DISC LEVELS: T12-L1: No significant disc/facet abnormality, spinal stenosis, or foraminal stenosis.  L1-L2: Posterior disc endplate complex causes mild-to-moderate bilateral neural foraminal narrowing without significant spinal canal stenosis. L2-L3: Posterior disc endplate complex, mild bilateral facet arthropathy, and ligamentum flavum hypertrophy cause minimal spinal canal narrowing, moderate to severe bilateral neural foraminal narrowing. L3-L4: Diffuse disc bulge, ligamentum flavum hypertrophy, severe right facet arthropathy, and moderate left facet arthropathy cause moderate to severe spinal canal stenosis and moderate bilateral neural foraminal narrowing. L4-L5: Loss of disc height, posterior disc endplate complex, ligamentum flavum hypertrophy, and moderate bilateral facet arthropathy cause severe spinal canal stenosis, moderate to severe right neural foraminal narrowing, and severe left neural foraminal narrowing. L5-S1: Loss of disc height, posterior disc endplate complex, and moderate bilateral facet arthropathy cause mild-to-moderate spinal canal stenosis, bilateral lateral recess  narrowing, severe right neural foraminal narrowing, and moderate to severe left neural foraminal narrowing.  CONCLUSION:  1. Diffusely abnormal bone marrow signal, consistent with known osseous metastatic disease. 2. Small area of increased STIR signal in the left side of the T12 superior endplate may be due to a nondisplaced fracture.  No significant vertebral body height loss. 3. Multilevel severe degenerative changes causing multilevel neural foraminal and spinal canal stenosis, as detailed above.  Spinal canal stenosis is severe at L4-5.      DEONNA GONZALEZ MD       Electronically Signed and Approved By: DEONNA GONZALEZ MD on 11/16/2021 at 14:34             XR Chest 1 View    Result Date: 12/4/2021  Narrative: PROCEDURE: XR CHEST 1 VW  COMPARISONS: [facility], CR, XR CHEST 1 VW, 11/14/2021, 14:14.  [facility], CR, XR CHEST 1 VW, 11/11/2021, 18:10.  [facility], CR, XR CHEST 1 VW, 11/15/2021, 13:41.  INDICATIONS: WEAK/DIZZY/AMS TRIAGE PROTOCOL.  HISTORY OF BREAST CARCINOMA.  FINDINGS: A single AP upright portable chest radiograph was performed.  Asymmetric opacification involves the left lung base, especially medially, and may represent pneumonia, including aspiration pneumonia.  Probably minimal, if any, acute infiltrate is seen elsewhere.  No cardiomediastinal enlargement is suspected.  The thoracic aorta is atherosclerotic.  External artifacts obscure detail.  Chronic calcified granulomatous disease involves the chest.  Surgical clips are projected over the bilateral axillary regions and the bilateral chest wall.  It is suspected that the patient has undergone bilateral mastectomies.  Degenerative changes involve the shoulders.  There may be chronic posttraumatic change of the distal right clavicle.  No pneumothorax is seen.  Minimal left pleural effusion is possible.  There is a left subclavian central venous line in place with its tip in the expected location of  the upper superior vena cava (SVC) versus the central left brachiocephalic vein, as before.  Its distal portion is oriented at an angle (46 degrees) relative to the expected long-axis of the superior vena cava (SVC).  Please correlate clinically with regard to its desired position.  CONCLUSION:   1. Worsening progression is suggested radiographically with new opacification of the left lung base, especially in the retrocardiac region.  The finding may be artifactual.  However, pneumonia or atelectasis is possible.  Aspiration pneumonia would be in the differential diagnosis.  2. Please see above comments for further detail.     ELIJAH DOUGLAS JR, MD       Electronically Signed and Approved By: ELIJAH DOUGLAS JR, MD on 12/04/2021 at 19:18             XR Chest 1 View    Result Date: 11/15/2021  Narrative: PROCEDURE: XR CHEST 1 VW  COMPARISON: [facility], , XR CHEST 1 VW, 11/14/2021, 14:14.  INDICATIONS: Hypoxia  FINDINGS:  No acute infiltrate or effusion is identified.  The cardiac and mediastinal silhouettes appear normal.  Axillary node dissections have been performed bilaterally.  There is a chronic, incompletely healed fracture of the distal right clavicle.  CONCLUSION:  1. No acute cardiopulmonary disease       Freddy Garzon M.D.       Electronically Signed and Approved By: Freddy Garzon M.D. on 11/15/2021 at 14:13             XR Chest 1 View    Result Date: 11/14/2021  Narrative: PROCEDURE: XR CHEST 1 VW  COMPARISON: [facility], , XR CHEST 1 VW, 11/11/2021, 18:10.  INDICATIONS: TACHYCARDIA  FINDINGS:  There is a left-sided subclavian Ldflpo-M-Wbto catheter in place with the tip in the superior vena cava.  The heart is enlarged.  There are no focal infiltrates.  CONCLUSION: No active disease       DAVID VARGAS MD       Electronically Signed and Approved By: DAVID VARGAS MD on 11/14/2021 at 14:38             XR Chest 1 View    Result Date: 11/11/2021  Narrative: PROCEDURE: XR CHEST 1  VW  COMPARISON: [facility], CT, CT CHEST W CONTRAST DIAGNOSTIC, 7/30/2021, 11:43.  [facility], CR, XR CHEST 1 VW, 10/18/2021, 6:41.  INDICATIONS: sepsis .  History of breast cancer  FINDINGS:  There is a port catheter noted with its tip at the junction of the brachiocephalic vein with the superior vena cava.  The heart is not definitely enlarged.  There is no duane pulmonary consolidation or definite pleural effusions.  There are surgical clips about the chest wall.  There is a sclerotic appearance to the anterior aspect of the left 6th rib that could reflect metastatic disease.  Patient has known metastatic disease.  CONCLUSION:  1. No definite acute pulmonary process. 2. There is an appearance to the left 6th rib that could relate to metastatic disease.       ANTONINO FRIEND MD       Electronically Signed and Approved By: ANTONINO FRIEND MD on 11/11/2021 at 18:36             FL Retrograde Pyelogram In OR    Result Date: 11/14/2021  Narrative: PROCEDURE: FL RETROGRADE PYELOGRAM IN OR  COMPARISON: None  INDICATIONS: pyelogram/fluro time 20seconds/4.27mGy  FINDINGS: Retrograde pyelography was performed with stent placement into the right renal collecting system and right aspect of the bladder.  CONCLUSION: Normal appearance after right-sided nephroureteral stent placement.      DAVID VARGAS MD       Electronically Signed and Approved By: DAVID VARGAS MD on 11/14/2021 at 12:27                                                     MDM    Final diagnoses:   Weakness   Altered mental status, unspecified altered mental status type       ED Disposition  ED Disposition     ED Disposition Condition Comment    Decision to Admit  Level of Care: Progressive Care [20]   Diagnosis: Weakness [[id number]]   Admitting Physician: MARINA MUNOZ [[id number]]   Attending Physician: MARINA MUNOZ [[id number]]   Isolate for COVID?: No [0]   Certification: I Certify That Inpatient Hospital Services Are Medically Necessary For  Greater Than 2 Midnights            No follow-up provider specified.       Medication List      ASK your doctor about these medications    ferrous sulfate 325 (65 FE) MG EC tablet  Ask about: Which instructions should I use?     metFORMIN  MG 24 hr tablet  Commonly known as: GLUCOPHAGE-XR  Ask about: Which instructions should I use?             Dalton Villasenor, DO  12/05/21 1534

## 2021-12-06 LAB
ALBUMIN SERPL-MCNC: 3 G/DL (ref 3.5–5.2)
ALBUMIN/GLOB SERPL: 0.7 G/DL
ALP SERPL-CCNC: 146 U/L (ref 39–117)
ALT SERPL W P-5'-P-CCNC: 14 U/L (ref 1–33)
ANION GAP SERPL CALCULATED.3IONS-SCNC: 11.6 MMOL/L (ref 5–15)
ANISOCYTOSIS BLD QL: NORMAL
AST SERPL-CCNC: 47 U/L (ref 1–32)
BACTERIA SPEC AEROBE CULT: NORMAL
BASOPHILS # BLD AUTO: 0.07 10*3/MM3 (ref 0–0.2)
BASOPHILS NFR BLD AUTO: 1.1 % (ref 0–1.5)
BILIRUB SERPL-MCNC: 0.4 MG/DL (ref 0–1.2)
BUN SERPL-MCNC: 11 MG/DL (ref 8–23)
BUN/CREAT SERPL: 9.4 (ref 7–25)
CALCIUM SPEC-SCNC: 9.9 MG/DL (ref 8.6–10.5)
CHLORIDE SERPL-SCNC: 92 MMOL/L (ref 98–107)
CO2 SERPL-SCNC: 26.4 MMOL/L (ref 22–29)
CREAT SERPL-MCNC: 1.17 MG/DL (ref 0.57–1)
DEPRECATED RDW RBC AUTO: 63 FL (ref 37–54)
EOSINOPHIL # BLD AUTO: 0.02 10*3/MM3 (ref 0–0.4)
EOSINOPHIL NFR BLD AUTO: 0.3 % (ref 0.3–6.2)
ERYTHROCYTE [DISTWIDTH] IN BLOOD BY AUTOMATED COUNT: 18.8 % (ref 12.3–15.4)
GFR SERPL CREATININE-BSD FRML MDRD: 45 ML/MIN/1.73
GLOBULIN UR ELPH-MCNC: 4.1 GM/DL
GLUCOSE BLDC GLUCOMTR-MCNC: 129 MG/DL (ref 70–99)
GLUCOSE BLDC GLUCOMTR-MCNC: 142 MG/DL (ref 70–99)
GLUCOSE SERPL-MCNC: 107 MG/DL (ref 65–99)
HCT VFR BLD AUTO: 28.6 % (ref 34–46.6)
HGB BLD-MCNC: 8.9 G/DL (ref 12–15.9)
HYPOCHROMIA BLD QL: NORMAL
IMM GRANULOCYTES # BLD AUTO: 0.13 10*3/MM3 (ref 0–0.05)
IMM GRANULOCYTES NFR BLD AUTO: 2.1 % (ref 0–0.5)
LARGE PLATELETS: NORMAL
LYMPHOCYTES # BLD AUTO: 0.87 10*3/MM3 (ref 0.7–3.1)
LYMPHOCYTES NFR BLD AUTO: 14.2 % (ref 19.6–45.3)
MACROCYTES BLD QL SMEAR: NORMAL
MAGNESIUM SERPL-MCNC: 1.9 MG/DL (ref 1.6–2.4)
MCH RBC QN AUTO: 28.3 PG (ref 26.6–33)
MCHC RBC AUTO-ENTMCNC: 31.1 G/DL (ref 31.5–35.7)
MCV RBC AUTO: 91.1 FL (ref 79–97)
MICROCYTES BLD QL: NORMAL
MONOCYTES # BLD AUTO: 0.65 10*3/MM3 (ref 0.1–0.9)
MONOCYTES NFR BLD AUTO: 10.6 % (ref 5–12)
NEUTROPHILS NFR BLD AUTO: 4.37 10*3/MM3 (ref 1.7–7)
NEUTROPHILS NFR BLD AUTO: 71.7 % (ref 42.7–76)
NRBC BLD AUTO-RTO: 0 /100 WBC (ref 0–0.2)
OVALOCYTES BLD QL SMEAR: NORMAL
PHOSPHATE SERPL-MCNC: 3.4 MG/DL (ref 2.5–4.5)
PLATELET # BLD AUTO: 146 10*3/MM3 (ref 140–450)
PMV BLD AUTO: 11.3 FL (ref 6–12)
POTASSIUM SERPL-SCNC: 4.2 MMOL/L (ref 3.5–5.2)
PROCALCITONIN SERPL-MCNC: 0.28 NG/ML (ref 0–0.25)
PROT SERPL-MCNC: 7.1 G/DL (ref 6–8.5)
RBC # BLD AUTO: 3.14 10*6/MM3 (ref 3.77–5.28)
SMALL PLATELETS BLD QL SMEAR: ADEQUATE
SODIUM SERPL-SCNC: 130 MMOL/L (ref 136–145)
WBC MORPH BLD: NORMAL
WBC NRBC COR # BLD: 6.11 10*3/MM3 (ref 3.4–10.8)

## 2021-12-06 PROCEDURE — 94799 UNLISTED PULMONARY SVC/PX: CPT

## 2021-12-06 PROCEDURE — 84145 PROCALCITONIN (PCT): CPT | Performed by: INTERNAL MEDICINE

## 2021-12-06 PROCEDURE — 92610 EVALUATE SWALLOWING FUNCTION: CPT

## 2021-12-06 PROCEDURE — 86738 MYCOPLASMA ANTIBODY: CPT | Performed by: INTERNAL MEDICINE

## 2021-12-06 PROCEDURE — 84100 ASSAY OF PHOSPHORUS: CPT | Performed by: HOSPITALIST

## 2021-12-06 PROCEDURE — 99233 SBSQ HOSP IP/OBS HIGH 50: CPT | Performed by: INTERNAL MEDICINE

## 2021-12-06 PROCEDURE — 85025 COMPLETE CBC W/AUTO DIFF WBC: CPT | Performed by: HOSPITALIST

## 2021-12-06 PROCEDURE — 85007 BL SMEAR W/DIFF WBC COUNT: CPT | Performed by: HOSPITALIST

## 2021-12-06 PROCEDURE — 82962 GLUCOSE BLOOD TEST: CPT

## 2021-12-06 PROCEDURE — 25010000002 ENOXAPARIN PER 10 MG: Performed by: HOSPITALIST

## 2021-12-06 PROCEDURE — 36415 COLL VENOUS BLD VENIPUNCTURE: CPT | Performed by: INTERNAL MEDICINE

## 2021-12-06 PROCEDURE — 25010000002 MEROPENEM PER 100 MG: Performed by: HOSPITALIST

## 2021-12-06 PROCEDURE — 80053 COMPREHEN METABOLIC PANEL: CPT | Performed by: INTERNAL MEDICINE

## 2021-12-06 PROCEDURE — 83735 ASSAY OF MAGNESIUM: CPT | Performed by: HOSPITALIST

## 2021-12-06 RX ADMIN — ANASTROZOLE 1 MG: 1 TABLET ORAL at 08:55

## 2021-12-06 RX ADMIN — MONTELUKAST SODIUM 10 MG: 10 TABLET, FILM COATED ORAL at 08:56

## 2021-12-06 RX ADMIN — BUDESONIDE 0.5 MG: 0.5 SUSPENSION RESPIRATORY (INHALATION) at 07:37

## 2021-12-06 RX ADMIN — MEROPENEM 1 G: 1 INJECTION, POWDER, FOR SOLUTION INTRAVENOUS at 01:06

## 2021-12-06 RX ADMIN — IPRATROPIUM BROMIDE AND ALBUTEROL SULFATE 3 ML: .5; 2.5 SOLUTION RESPIRATORY (INHALATION) at 19:52

## 2021-12-06 RX ADMIN — ARFORMOTEROL TARTRATE 15 MCG: 15 SOLUTION RESPIRATORY (INHALATION) at 07:36

## 2021-12-06 RX ADMIN — MICONAZOLE NITRATE: 2 POWDER TOPICAL at 01:06

## 2021-12-06 RX ADMIN — MEMANTINE 10 MG: 10 TABLET ORAL at 08:56

## 2021-12-06 RX ADMIN — ENOXAPARIN SODIUM 40 MG: 40 INJECTION SUBCUTANEOUS at 08:55

## 2021-12-06 RX ADMIN — ROSUVASTATIN CALCIUM 10 MG: 5 TABLET, FILM COATED ORAL at 21:35

## 2021-12-06 RX ADMIN — SENNOSIDES AND DOCUSATE SODIUM 2 TABLET: 50; 8.6 TABLET ORAL at 21:35

## 2021-12-06 RX ADMIN — IPRATROPIUM BROMIDE AND ALBUTEROL SULFATE 3 ML: .5; 2.5 SOLUTION RESPIRATORY (INHALATION) at 07:37

## 2021-12-06 RX ADMIN — SODIUM CHLORIDE 75 ML/HR: 9 INJECTION, SOLUTION INTRAVENOUS at 18:31

## 2021-12-06 RX ADMIN — BUDESONIDE 0.5 MG: 0.5 SUSPENSION RESPIRATORY (INHALATION) at 19:52

## 2021-12-06 RX ADMIN — IPRATROPIUM BROMIDE AND ALBUTEROL SULFATE 3 ML: .5; 2.5 SOLUTION RESPIRATORY (INHALATION) at 13:13

## 2021-12-06 RX ADMIN — BUPROPION HYDROCHLORIDE 300 MG: 150 TABLET, EXTENDED RELEASE ORAL at 08:56

## 2021-12-06 RX ADMIN — SERTRALINE 25 MG: 25 TABLET, FILM COATED ORAL at 08:56

## 2021-12-06 RX ADMIN — SENNOSIDES AND DOCUSATE SODIUM 2 TABLET: 50; 8.6 TABLET ORAL at 08:56

## 2021-12-06 RX ADMIN — IPRATROPIUM BROMIDE AND ALBUTEROL SULFATE 3 ML: .5; 2.5 SOLUTION RESPIRATORY (INHALATION) at 00:48

## 2021-12-06 RX ADMIN — Medication 5 MG: at 21:35

## 2021-12-06 RX ADMIN — MEMANTINE 10 MG: 10 TABLET ORAL at 21:35

## 2021-12-06 RX ADMIN — ARFORMOTEROL TARTRATE 15 MCG: 15 SOLUTION RESPIRATORY (INHALATION) at 19:52

## 2021-12-06 RX ADMIN — FERROUS SULFATE TAB 325 MG (65 MG ELEMENTAL FE) 325 MG: 325 (65 FE) TAB at 08:55

## 2021-12-06 RX ADMIN — MICONAZOLE NITRATE: 2 POWDER TOPICAL at 08:55

## 2021-12-06 RX ADMIN — MEROPENEM 1 G: 1 INJECTION, POWDER, FOR SOLUTION INTRAVENOUS at 13:52

## 2021-12-06 RX ADMIN — MICONAZOLE NITRATE: 2 POWDER TOPICAL at 21:35

## 2021-12-06 RX ADMIN — FOLIC ACID 1 MG: 1 TABLET ORAL at 08:56

## 2021-12-06 NOTE — PLAN OF CARE
Problem: Fall Injury Risk  Goal: Absence of Fall and Fall-Related Injury  Intervention: Promote Injury-Free Environment  Recent Flowsheet Documentation  Taken 12/6/2021 1600 by Sana Ba RN  Safety Promotion/Fall Prevention: safety round/check completed  Taken 12/6/2021 1400 by Sana Ba RN  Safety Promotion/Fall Prevention: safety round/check completed  Taken 12/6/2021 1100 by Sana Ba RN  Safety Promotion/Fall Prevention: safety round/check completed  Taken 12/6/2021 1000 by Sana Ba RN  Safety Promotion/Fall Prevention: safety round/check completed  Taken 12/6/2021 0830 by Sana Ba RN  Safety Promotion/Fall Prevention:   activity supervised   assistive device/personal items within reach   safety round/check completed     Problem: Fall Injury Risk  Goal: Absence of Fall and Fall-Related Injury  Outcome: Ongoing, Progressing  Intervention: Promote Injury-Free Environment  Recent Flowsheet Documentation  Taken 12/6/2021 1600 by Sana Ba RN  Safety Promotion/Fall Prevention: safety round/check completed  Taken 12/6/2021 1400 by Sana Ba RN  Safety Promotion/Fall Prevention: safety round/check completed  Taken 12/6/2021 1100 by Sana Ba RN  Safety Promotion/Fall Prevention: safety round/check completed  Taken 12/6/2021 1000 by Sana Ba RN  Safety Promotion/Fall Prevention: safety round/check completed  Taken 12/6/2021 0830 by Sana Ba RN  Safety Promotion/Fall Prevention:   activity supervised   assistive device/personal items within reach   safety round/check completed     Problem: Adult Inpatient Plan of Care  Goal: Absence of Hospital-Acquired Illness or Injury  Outcome: Ongoing, Progressing  Intervention: Identify and Manage Fall Risk  Recent Flowsheet Documentation  Taken 12/6/2021 1600 by Sana Ba RN  Safety Promotion/Fall Prevention: safety round/check completed  Taken 12/6/2021 1400 by Sana Ba RN  Safety Promotion/Fall Prevention: safety  round/check completed  Taken 12/6/2021 1100 by Sana Ba RN  Safety Promotion/Fall Prevention: safety round/check completed  Taken 12/6/2021 1000 by Sana Ba RN  Safety Promotion/Fall Prevention: safety round/check completed  Taken 12/6/2021 0830 by Sana Ba RN  Safety Promotion/Fall Prevention:   activity supervised   assistive device/personal items within reach   safety round/check completed  Intervention: Prevent Skin Injury  Recent Flowsheet Documentation  Taken 12/6/2021 1100 by Sana Ba RN  Body Position: semi-prone, right     Problem: Adult Inpatient Plan of Care  Goal: Absence of Hospital-Acquired Illness or Injury  Intervention: Identify and Manage Fall Risk  Recent Flowsheet Documentation  Taken 12/6/2021 1600 by Sana Ba RN  Safety Promotion/Fall Prevention: safety round/check completed  Taken 12/6/2021 1400 by Sana Ba RN  Safety Promotion/Fall Prevention: safety round/check completed  Taken 12/6/2021 1100 by Sana Ba RN  Safety Promotion/Fall Prevention: safety round/check completed  Taken 12/6/2021 1000 by Sana Ba RN  Safety Promotion/Fall Prevention: safety round/check completed  Taken 12/6/2021 0830 by Sana Ba RN  Safety Promotion/Fall Prevention:   activity supervised   assistive device/personal items within reach   safety round/check completed   Goal Outcome Evaluation:  Plan of Care Reviewed With: patient

## 2021-12-06 NOTE — PLAN OF CARE
Goal Outcome Evaluation:  Plan of Care Reviewed With: patient    Dysphagia evaluation completed at bedside.  Patient exhibiting signs and symptoms of aspiration with trials of thin liquids.  Recommend diet of nectar thickened liquids, purée solids.  Medications crushed in applesauce, one-on-one assist for feeding.  Speech therapy to follow secondary to dysphagia and aspiration risk.

## 2021-12-06 NOTE — PROGRESS NOTES
[facility]     Progress Note    Patient Name: Mirna Meredith  : 1945  MRN: [id number]  Primary Care Physician:  Pan Pacheco MD  Date of admission: 2021    Subjective   Subjective     Chief Complaint: Alert and oriented not in acute distress she appears to be to her baseline as far as her mental status is concerned she has been getting episodes of disorientation off and on which is probably because of early dementia needs to be started on her oral CDK for treatment as well as to give her Faslodex will be started as an outpatient when she is discharged from here    HPI:  Patient Reports came in with confusion disorientation but has reverted back to normal it was probably metabolic encephalopathy she is back to her baseline    Review of Systems   All systems were reviewed and negative except for: Reviewed    Objective   Objective     Vitals:   Temp:  [97.6 °F (36.4 °C)-98.8 °F (37.1 °C)] 98.8 °F (37.1 °C)  Heart Rate:  [0-109] 105  Resp:  [18-22] 18  BP: (138-152)/(57-82) 152/57    Physical Exam    Constitutional: Awake, alert   Eyes: PERRLA, sclerae anicteric, no conjunctival injection   HENT: NCAT, mucous membranes moist   Neck: Supple, no thyromegaly, no lymphadenopathy, trachea midline   Respiratory: Clear to auscultation bilaterally, nonlabored respirations    Cardiovascular: RRR, no murmurs, rubs, or gallops, palpable pedal pulses bilaterally   Gastrointestinal: Positive bowel sounds, soft, nontender, nondistended   Musculoskeletal: No bilateral ankle edema, no clubbing or cyanosis to extremities   Psychiatric: Appropriate affect, cooperative   Neurologic: Oriented x 3, strength symmetric in all extremities, Cranial Nerves grossly intact to confrontation, speech clear   Skin: No rashes     Result Review    Result Review:  I have personally reviewed the results from the time of this admission to 2021 08:15 EST and agree with these findings:  [x]  Laboratory  []  Microbiology  []   Radiology  []  EKG/Telemetry   []  Cardiology/Vascular   []  Pathology  []  Old records  []  Other: Reviewed  Most notable findings include: Anemia    Assessment/Plan   Assessment / Plan     Brief Patient Summary:  Mirna Meredith is a 76 y.o. female who admitted because of confusion possible infection ammonia levels were normal    Active Hospital Problems:  Active Hospital Problems    Diagnosis    • Weakness        Plan:   We will start her treatments when she is able to be discharged and seen in the office    DVT prophylaxis:  Medical DVT prophylaxis orders are present.    CODE STATUS:   Level Of Support Discussed With: Patient  Code Status (Patient has no pulse and is not breathing): CPR (Attempt to Resuscitate)  Medical Interventions (Patient has pulse or is breathing): Full Support    Disposition:  I expect patient to be discharged when stabilized and okay with the primary care physician.    Electronically signed by Jose G Grissom MD, 12/06/21, 8:15 AM EST.

## 2021-12-06 NOTE — THERAPY EVALUATION
Acute Care - Speech Language Pathology   Swallow Initial Evaluation  [facility]     Patient Name: Mirna Meredith  : 1945  MRN: [id number]  Today's Date: 2021               Admit Date: 2021    Visit Dx:     ICD-10-CM ICD-9-CM   1. Weakness  R53.1 780.79   2. Altered mental status, unspecified altered mental status type  R41.82 780.97   3. Oropharyngeal dysphagia  R13.12 787.22     Patient Active Problem List   Diagnosis   • Abdominal pain   • Sepsis (HCC)   • Metabolic encephalopathy   • Acute renal failure (ARF) (HCC)   • Metastatic breast cancer (HCC)   • Chronic diarrhea   • Pancytopenia (HCC)   • Weakness     Past Medical History:   Diagnosis Date   • Cancer (HCC)     Breast with mets to colon and bones   • COPD (chronic obstructive pulmonary disease) (HCC)    • Depression    • Diabetes mellitus (HCC)    • Hyperlipidemia    • Hypertension      Past Surgical History:   Procedure Laterality Date   • CHOLECYSTECTOMY     • CYSTOSCOPY, RETROGRADE PYELOGRAM AND STENT INSERTION Right 2021    Procedure: CYSTOSCOPY RETROGRADE PYELOGRAM AND STENT INSERTION;  Surgeon: Allen Rodriguez MD;  Location: [facility] MAIN OR;  Service: Urology;  Laterality: Right;   • HYSTERECTOMY     • JOINT REPLACEMENT Bilateral     Knees   • MASTECTOMY Bilateral          Inpatient Speech Pathology Dysphagia Evaluation        PAIN SCALE: None indicated    PRECAUTIONS/CONTRAINDICATIONS: Standard, fall    SUSPECTED ABUSE/NEGLECT/EXPLOITATION: None identified    SOCIAL/PSYCHOLOGICAL NEEDS/BARRIERS: None identified    PAST SOCIAL HISTORY: 76-year-old female, reportedly living at home with family    PRIOR FUNCTION: Currently on a soft diet however concern for swallowing difficulty/aspiration    PATIENT GOALS/EXPECTATIONS: Did not indicate any goals    HISTORY: Patient is a 76-year-old female referred for speech pathology dysphagia evaluation secondary to concern of dysphagia/aspiration.  Patient with new opacification in  left lung concerning for aspiration.  Per nursing documentation, patient difficulty tolerating soft solids.  No prior speech pathology services.    CURRENT DIET LEVEL: Soft solids, thin liquids    OBJECTIVE:    TEST ADMINISTERED: Clinical dysphagia evaluation    COGNITION/SAFETY AWARENESS: Likely impaired    BEHAVIORAL OBSERVATIONS: Patient sleeping however awakens easily.  Does require consistent cues to maintain adequate alertness for participation.    ORAL MOTOR EXAM: Without dentition.  Decreased oral motor strength/range of motion 3+/5.    VOICE QUALITY: Clear however weak vocal quality    REFLEX EXAM: Weak    POSTURE: Total assist    FEEDING/SWALLOWING FUNCTION: Assessed with thin liquids, nectar thick liquids, purée solids, soft solids    CLINICAL OBSERVATIONS: Nectar thick liquids by spoon with patient holding oral cavity.  Delayed initiation of swallow.  Laryngeal sounds clear to auscultation.  Hand over hand assist for cup drink of nectar thick liquids.  Holding consistency in mouth with delayed initiation of swallow 4 to 5 seconds.  Thin liquids by spoon with holding consistency in mouth.  Delayed swallow completed with throat clear.  Thin liquids by cup with delayed swallow completed.  Throat clearing observed.  Delayed cough.  Purée solids with adequate organization around spoon.  Holding consistency in mouth.  Lingual pumping with swallow completed.  Double swallow observed.  Laryngeal sounds clear to auscultation.  Soft solids with holding consistency in mouth.  Decreased mastication and bolus transit.  Patient requiring moderate cues for manipulation.  Swallow completed with moderate oral residue observed.  Patient requiring eventual assist for clearance.  Nectar thickened liquid by straw with mild decreased suck to straw.  Holding consistency in mouth with delayed initiation of swallow.  Laryngeal sounds clear to auscultation.  Patient exhibiting signs symptoms of aspiration with thin liquids.  Also  decreased ability to clear oral residue with soft solids.  Delayed initiation of swallow/swallow delay with all consistencies.  Silent aspiration cannot be ruled out.    DYSPHAGIA CRITERIA: Risk of aspiration, swallow delay, delayed initiation of swallow    FUNCTIONAL ASSESSMENT INSTRUMENT: Patient currently scored a level 4 of 7 on Functional Communication Measures for swallowing indicating a 40-59% limitation in function.    ASSESSMENT/ PLAN OF CARE:  Pt presents with limitations, noted below, that impede patient's ability to tolerate least restrictive diet safely and independently. The skills of a therapist will be required to safely and effectively implement the following treatment plan to restore maximal level of function.    PROBLEMS:  1.  Risk of aspiration, swallow delay, delayed initiation of swallow   LTG 1: 30 days.  Patient will increase functional communication measures for swallowing to level 5 of 7, indicating a 20-39% limitation in function.   STG 1a: 14 days.  Patient will tolerate least restrictive diet of puréed solids, nectar thick liquids with minimal to no signs or symptoms of aspiration.   STG 1b: 14 days.  Patient will tolerate least restrictive diet of puréed solids, nectar thick liquids utilizing strategies with moderate cues.   STG 1c: 14 days.  Patient will tolerate trials of mechanical soft solids, utilizing strategies with moderate cues.   TREATMENT: Speech therapy for dysphagia, education of strategies and tolerance of least restrictive diet.      FREQUENCY/DURATION: Twice daily, 5 days a week    REHAB POTENTIAL:  Pt has good rehab potential.  The following limitations may influence improvement/ length of tx medical status.    RECOMMENDATIONS:   1.   DIET:.  Puréed solids, nectar thick liquids    2.  POSITION: Fully upright for all p.o. intake, 30 minutes following    3.  COMPENSATORY STRATEGIES: One-on-one feed, small bites and sips, controlled drink, meds crushed in  sandy    Pt/responsible party agrees with plan of care and has been informed of all alternatives, risks and benefits.  SLP Recommendation and Plan                          Anticipated Discharge Disposition (SLP): anticipate therapy at next level of care (12/06/21 1012)                                  Plan of Care Reviewed With: patient          EDUCATION  The patient has been educated in the following areas:   Dysphagia (Swallowing Impairment).              Time Calculation:    Time Calculation- SLP     Row Name 12/06/21 1013             Time Calculation- SLP    SLP Start Time 0900  -SN      SLP Stop Time 1000  -SN      SLP Time Calculation (min) 60 min  -SN      SLP Received On 12/06/21  -SN              Untimed Charges    [id number] Eval Oral Pharyng Swallow Minutes 60  -SN              Total Minutes    Untimed Charges Total Minutes 60  -SN       Total Minutes 60  -SN            User Key  (r) = Recorded By, (t) = Taken By, (c) = Cosigned By    Initials Name Provider Type    SN Monica Sweeney SLP Speech and Language Pathologist                Therapy Charges for Today     Code Description Service Date Service Provider Modifiers Qty    [id number] HC ST EVAL ORAL PHARYNG SWALLOW 4 12/6/2021 Monica Sweeney SLP GN 1               GABY Kruse  12/6/2021

## 2021-12-06 NOTE — PROGRESS NOTES
[facility]   Hospitalist Progress Note  Date: 2021  Patient Name: Mirna Meredith  : 1945  MRN: [id number]  Date of admission: 2021      Subjective   Subjective     Chief Complaint: Altered mental status    Summary: 76-year-old female with metastatic breast cancer, COPD, diabetes.  She had been admitted to the hospital in mid November with hydronephrosis and questionable ureteral stricture, underwent stenting of the right ureter on .  She was discharged to [facility] rehab after that.  Upon returning home she began getting confused again.  Urinalysis was positive for UTI and her chest x-ray showed possible pneumonia.  She is admitted to the medicine service.  She was started on IV antibiotics.  Hematology/oncology was consulted as well.    Interval Followup: Oxygen has been borderline around 90 to 91% on room air.  Complaints of back pain    Review of Systems   All systems were reviewed and negative except for: back pain    Objective   Objective     Vitals:   Temp:  [97.6 °F (36.4 °C)-98.8 °F (37.1 °C)] 98.6 °F (37 °C)  Heart Rate:  [0-109] 105  Resp:  [18-22] 18  BP: (136-152)/(57-82) 136/58  Physical Exam    Constitutional: awake and alert; nad   Eyes: Pupils equal, sclerae anicteric, no conjunctival injection   HENT: NCAT, mucous membranes moist   Neck: Supple, no lad   Respiratory: dim at bases; no w   Cardiovascular: RRR, no m   Gastrointestinal: Positive bowel sounds, soft, nontender, nondistended   Musculoskeletal: No bilateral ankle edema, no clubbing or cyanosis to extremities   Psychiatric: Appropriate affect, cooperative   Neurologic: speech clear; no gross focal deficits   Skin: No rashes     Result Review    Result Review:  I have personally reviewed the results from the time of this admission to 2021 10:26 EST and agree with these findings:  [x]  Laboratory  [x]  Microbiology  [x]  Radiology  []  EKG/Telemetry   []  Cardiology/Vascular   []  Pathology  []  Old  records  []  Other:    Assessment/Plan   Assessment / Plan     Assessment:  • Urinary tract infection, recurrent  • Metastatic breast cancer  • Recent ureteral stent placement  • Acute metabolic encephalopathy  • JOESPH   • Possible CAP  • Pleural effusion, left-sided    Plan:  · Continue antibiotics.  Cultures unyielding thus far.  · Check procalcitonin.  · Attempted to view CAT scan to assess pleural effusion, but the EHR is not allowing me to view actual images.  May consider pulmonology consult.  · Bowel regimen as needed  · May need to consider palliative care given metastatic cancer and recurrent admissions.  Apparently patient has been unable to pursue treatment due to multiple admissions and infections.  · Labs in am     Discussed plan with RN.    DVT prophylaxis:  Medical DVT prophylaxis orders are present.    CODE STATUS:   Level Of Support Discussed With: Patient  Code Status (Patient has no pulse and is not breathing): CPR (Attempt to Resuscitate)  Medical Interventions (Patient has pulse or is breathing): Full Support      Electronically signed by Tremayne Fitzpatrick MD, 12/06/21, 2:53 PM EST.

## 2021-12-07 ENCOUNTER — APPOINTMENT (OUTPATIENT)
Dept: MRI IMAGING | Facility: HOSPITAL | Age: 76
End: 2021-12-07

## 2021-12-07 LAB
ALBUMIN SERPL-MCNC: 2.9 G/DL (ref 3.5–5.2)
ANION GAP SERPL CALCULATED.3IONS-SCNC: 10.6 MMOL/L (ref 5–15)
BASOPHILS # BLD AUTO: 0.07 10*3/MM3 (ref 0–0.2)
BASOPHILS NFR BLD AUTO: 0.9 % (ref 0–1.5)
BUN SERPL-MCNC: 14 MG/DL (ref 8–23)
BUN/CREAT SERPL: 13.1 (ref 7–25)
CALCIUM SPEC-SCNC: 10.1 MG/DL (ref 8.6–10.5)
CHLORIDE SERPL-SCNC: 94 MMOL/L (ref 98–107)
CO2 SERPL-SCNC: 26.4 MMOL/L (ref 22–29)
CREAT SERPL-MCNC: 1.07 MG/DL (ref 0.57–1)
DEPRECATED RDW RBC AUTO: 60.4 FL (ref 37–54)
EOSINOPHIL # BLD AUTO: 0.02 10*3/MM3 (ref 0–0.4)
EOSINOPHIL NFR BLD AUTO: 0.3 % (ref 0.3–6.2)
ERYTHROCYTE [DISTWIDTH] IN BLOOD BY AUTOMATED COUNT: 18.5 % (ref 12.3–15.4)
GFR SERPL CREATININE-BSD FRML MDRD: 50 ML/MIN/1.73
GLUCOSE BLDC GLUCOMTR-MCNC: 113 MG/DL (ref 70–99)
GLUCOSE BLDC GLUCOMTR-MCNC: 126 MG/DL (ref 70–99)
GLUCOSE BLDC GLUCOMTR-MCNC: 149 MG/DL (ref 70–99)
GLUCOSE SERPL-MCNC: 127 MG/DL (ref 65–99)
HCT VFR BLD AUTO: 27.7 % (ref 34–46.6)
HGB BLD-MCNC: 9 G/DL (ref 12–15.9)
IMM GRANULOCYTES # BLD AUTO: 0.25 10*3/MM3 (ref 0–0.05)
IMM GRANULOCYTES NFR BLD AUTO: 3.2 % (ref 0–0.5)
LYMPHOCYTES # BLD AUTO: 0.86 10*3/MM3 (ref 0.7–3.1)
LYMPHOCYTES NFR BLD AUTO: 11.1 % (ref 19.6–45.3)
M PNEUMO IGG SER IA-ACNC: 786 U/ML (ref 0–99)
M PNEUMO IGM SER IA-ACNC: <770 U/ML (ref 0–769)
MCH RBC QN AUTO: 28.6 PG (ref 26.6–33)
MCHC RBC AUTO-ENTMCNC: 32.5 G/DL (ref 31.5–35.7)
MCV RBC AUTO: 87.9 FL (ref 79–97)
MONOCYTES # BLD AUTO: 0.78 10*3/MM3 (ref 0.1–0.9)
MONOCYTES NFR BLD AUTO: 10 % (ref 5–12)
NEUTROPHILS NFR BLD AUTO: 5.79 10*3/MM3 (ref 1.7–7)
NEUTROPHILS NFR BLD AUTO: 74.5 % (ref 42.7–76)
NRBC BLD AUTO-RTO: 0 /100 WBC (ref 0–0.2)
PHOSPHATE SERPL-MCNC: 3.3 MG/DL (ref 2.5–4.5)
PLATELET # BLD AUTO: 185 10*3/MM3 (ref 140–450)
PMV BLD AUTO: 10.6 FL (ref 6–12)
POTASSIUM SERPL-SCNC: 4.2 MMOL/L (ref 3.5–5.2)
RBC # BLD AUTO: 3.15 10*6/MM3 (ref 3.77–5.28)
SODIUM SERPL-SCNC: 131 MMOL/L (ref 136–145)
WBC NRBC COR # BLD: 7.77 10*3/MM3 (ref 3.4–10.8)

## 2021-12-07 PROCEDURE — 70551 MRI BRAIN STEM W/O DYE: CPT

## 2021-12-07 PROCEDURE — 80069 RENAL FUNCTION PANEL: CPT | Performed by: INTERNAL MEDICINE

## 2021-12-07 PROCEDURE — 85025 COMPLETE CBC W/AUTO DIFF WBC: CPT | Performed by: HOSPITALIST

## 2021-12-07 PROCEDURE — 99233 SBSQ HOSP IP/OBS HIGH 50: CPT | Performed by: INTERNAL MEDICINE

## 2021-12-07 PROCEDURE — 94799 UNLISTED PULMONARY SVC/PX: CPT

## 2021-12-07 PROCEDURE — 92526 ORAL FUNCTION THERAPY: CPT

## 2021-12-07 PROCEDURE — 82962 GLUCOSE BLOOD TEST: CPT

## 2021-12-07 PROCEDURE — 25010000002 MEROPENEM PER 100 MG: Performed by: HOSPITALIST

## 2021-12-07 PROCEDURE — 25010000002 ENOXAPARIN PER 10 MG: Performed by: HOSPITALIST

## 2021-12-07 RX ADMIN — IPRATROPIUM BROMIDE AND ALBUTEROL SULFATE 3 ML: .5; 2.5 SOLUTION RESPIRATORY (INHALATION) at 19:02

## 2021-12-07 RX ADMIN — SENNOSIDES AND DOCUSATE SODIUM 2 TABLET: 50; 8.6 TABLET ORAL at 08:52

## 2021-12-07 RX ADMIN — MONTELUKAST SODIUM 10 MG: 10 TABLET, FILM COATED ORAL at 08:52

## 2021-12-07 RX ADMIN — IPRATROPIUM BROMIDE AND ALBUTEROL SULFATE 3 ML: .5; 2.5 SOLUTION RESPIRATORY (INHALATION) at 00:31

## 2021-12-07 RX ADMIN — ARFORMOTEROL TARTRATE 15 MCG: 15 SOLUTION RESPIRATORY (INHALATION) at 19:02

## 2021-12-07 RX ADMIN — FERROUS SULFATE TAB 325 MG (65 MG ELEMENTAL FE) 325 MG: 325 (65 FE) TAB at 08:51

## 2021-12-07 RX ADMIN — MEMANTINE 10 MG: 10 TABLET ORAL at 08:51

## 2021-12-07 RX ADMIN — IPRATROPIUM BROMIDE AND ALBUTEROL SULFATE 3 ML: .5; 2.5 SOLUTION RESPIRATORY (INHALATION) at 07:13

## 2021-12-07 RX ADMIN — MICONAZOLE NITRATE: 2 POWDER TOPICAL at 08:53

## 2021-12-07 RX ADMIN — ANASTROZOLE 1 MG: 1 TABLET ORAL at 08:52

## 2021-12-07 RX ADMIN — BUDESONIDE 0.5 MG: 0.5 SUSPENSION RESPIRATORY (INHALATION) at 19:02

## 2021-12-07 RX ADMIN — IPRATROPIUM BROMIDE AND ALBUTEROL SULFATE 3 ML: .5; 2.5 SOLUTION RESPIRATORY (INHALATION) at 12:08

## 2021-12-07 RX ADMIN — ARFORMOTEROL TARTRATE 15 MCG: 15 SOLUTION RESPIRATORY (INHALATION) at 07:13

## 2021-12-07 RX ADMIN — FOLIC ACID 1 MG: 1 TABLET ORAL at 08:51

## 2021-12-07 RX ADMIN — ENOXAPARIN SODIUM 40 MG: 40 INJECTION SUBCUTANEOUS at 08:53

## 2021-12-07 RX ADMIN — SODIUM CHLORIDE 75 ML/HR: 9 INJECTION, SOLUTION INTRAVENOUS at 09:46

## 2021-12-07 RX ADMIN — BUDESONIDE 0.5 MG: 0.5 SUSPENSION RESPIRATORY (INHALATION) at 07:13

## 2021-12-07 RX ADMIN — MEROPENEM 1 G: 1 INJECTION, POWDER, FOR SOLUTION INTRAVENOUS at 13:55

## 2021-12-07 RX ADMIN — METOROPROLOL TARTRATE 5 MG: 5 INJECTION, SOLUTION INTRAVENOUS at 21:19

## 2021-12-07 RX ADMIN — SERTRALINE 25 MG: 25 TABLET, FILM COATED ORAL at 08:52

## 2021-12-07 RX ADMIN — BUPROPION HYDROCHLORIDE 300 MG: 150 TABLET, EXTENDED RELEASE ORAL at 08:52

## 2021-12-07 RX ADMIN — MICONAZOLE NITRATE: 2 POWDER TOPICAL at 21:20

## 2021-12-07 RX ADMIN — MEROPENEM 1 G: 1 INJECTION, POWDER, FOR SOLUTION INTRAVENOUS at 01:48

## 2021-12-07 NOTE — SIGNIFICANT NOTE
12/07/21 1025   Coping/Psychosocial   Observed Emotional State quiet   Verbalized Emotional State other (see comments)  (The Pt Is asleep and she did not say anything.)   Family/Support Persons daughter; family   Involvement in Care no interaction with patient   Additional Documentation Spiritual Care (Group)   Spiritual Care   Use of Spiritual Resources spirituality for coping, indicated strong use of; prayer   Spiritual Care Source  initiative   Spiritual Care Follow-Up follow-up, none required as presently assessed   Response to Spiritual Care receptive of support   Spiritual Care Interventions supportive conversation provided; prayer support provided   Spiritual Care Visit Type initial   Receptivity to Spiritual Care visit welcomed

## 2021-12-07 NOTE — PROGRESS NOTES
[facility]   Hospitalist Progress Note  Date: 2021  Patient Name: Mirna Meredith  : 1945  MRN: [id number]  Date of admission: 2021      Subjective   Subjective     Chief Complaint: Altered mental status    Summary: 76-year-old female with metastatic breast cancer, COPD, diabetes.  She had been admitted to the hospital in mid November with hydronephrosis and questionable ureteral stricture, underwent stenting of the right ureter on .  She was discharged to [facility] rehab after that.  Upon returning home she began getting confused again.  Urinalysis was positive for UTI and her chest x-ray showed possible pneumonia.  She is admitted to the medicine service.  She was started on IV antibiotics.  Hematology/oncology was consulted as well.    Interval Followup:   Patient seen and examined is confused unable to interact in the conversation only repeats what I asked her.  Seen by speech therapy made n.p.o. due to her worsening mental status.  Mildly tachycardic beta-blocker resumed but given n.p.o. status may need to start IV Lopressor.  Overall patient's prognosis appears to be poor will have palliative care see the patient and patient's family to discuss goals of care  Review of Systems   Unable to obtain due to mental status  Objective   Objective     Vitals:   Temp:  [98.1 °F (36.7 °C)-99 °F (37.2 °C)] 98.6 °F (37 °C)  Heart Rate:  [] 94  Resp:  [16-28] 18  BP: (119-167)/(55-88) 149/86  Physical Exam    Constitutional: Awake confused   Eyes: Pupils equal, sclerae anicteric, no conjunctival injection   HENT: NCAT, mucous membranes moist   Neck: Supple, no lad   Respiratory: dim at bases; no w   Cardiovascular: RRR, no m   Gastrointestinal: Positive bowel sounds, soft, nontender, nondistended   Musculoskeletal: No bilateral ankle edema, no clubbing or cyanosis to extremities   Psychiatric: Unable to assess   neurologic: Confused only repeating questions I ask   skin: No rashes      Result Review    Result Review:  I have personally reviewed the results from the time of this admission to 12/7/2021 13:24 EST and agree with these findings:  [x]  Laboratory  [x]  Microbiology  [x]  Radiology  []  EKG/Telemetry   []  Cardiology/Vascular   []  Pathology  []  Old records  []  Other:    Assessment/Plan   Assessment / Plan     Assessment:  • Urinary tract infection, recurrent  • Metastatic breast cancer  • Recent ureteral stent placement  • Acute metabolic encephalopathy  • JOESPH   • Possible CAP  • Pleural effusion, left-sided  • Recent right ureteral stent placement    Plan:  · Continue antibiotics.  Cultures unyielding thus far.  · Continue nebulizer treatments  · Resume Lopressor  · Consider MRI of the brain  · Palliative care consultation to discuss goals of care long-term prognosis appears very poor  · Per speech cannot currently recommend safe oral intake may need to place NG for medications and nutrition  · Oncology consultation appreciate continue care per the recommendation  · PT OT speech therapy consultations  · Lovenox for DVT prophylaxis  · Discussed with RN         DVT prophylaxis:  Medical DVT prophylaxis orders are present.    CODE STATUS:   Level Of Support Discussed With: Patient  Code Status (Patient has no pulse and is not breathing): CPR (Attempt to Resuscitate)  Medical Interventions (Patient has pulse or is breathing): Full Support    Electronically signed by KARLI Edwards, 12/07/21, 1:45 PM EST.      Attending Note:  I have seen and examined the patient and agree with the above information from Sarthak Martinez PA-C and have outlined plan of care.  76-year-old with metastatic cancer, UTI, recurrent.  Continue antibiotics.  Will get MRI of the brain.  Speech/confusion appear perhaps a bit worse today.  All things considered given her age and metastatic cancer having is appropriate to consult palliative care to discuss goals of care.  CODE STATUS can be considered as well  as overall goals of care.  On exam today she is nontoxic, no acute distress, but again having some difficulty communicating.   Electronically signed by Tremayne Fitzpatrick MD, 12/07/21, 7:45 PM EST.

## 2021-12-07 NOTE — PROGRESS NOTES
[facility]     Progress Note    Patient Name: Mirna Meredith  : 1945  MRN: [id number]  Primary Care Physician:  Pan Pacheco MD  Date of admission: 2021    Subjective   Subjective     Chief Complaint: Patient was admitted with confusion disorientation however it appears to be at her baseline at this time she is on minimal treatments with 1 pill a day she will be started on her medications her counts are okay now she does have a low-grade anemia most likely anemia of chronic disease however she also has had history of ulcerative colitis and chronic blood loss will be checked iron studies no specific new complaints today I will discuss with her daughter regarding whether she wants to take her home or she wants her to go to rehab again because we will not be able to start her oral medications if she goes to rehab    HPI:  Patient Reports patient admitted because of possible UTI is being treated with antibiotics    Review of Systems   All systems were reviewed and negative except for: Reviewed    Objective   Objective     Vitals:   Temp:  [98.2 °F (36.8 °C)-99 °F (37.2 °C)] 98.9 °F (37.2 °C)  Heart Rate:  [] 99  Resp:  [16-22] 20  BP: (119-167)/(55-88) 161/68    Physical Exam    Constitutional: Awake, alert   Eyes: PERRLA, sclerae anicteric, no conjunctival injection   HENT: NCAT, mucous membranes moist   Neck: Supple, no thyromegaly, no lymphadenopathy, trachea midline   Respiratory: Clear to auscultation bilaterally, nonlabored respirations    Cardiovascular: RRR, no murmurs, rubs, or gallops, palpable pedal pulses bilaterally   Gastrointestinal: Positive bowel sounds, soft, nontender, nondistended   Musculoskeletal: No bilateral ankle edema, no clubbing or cyanosis to extremities   Psychiatric: Appropriate affect, cooperative   Neurologic: Oriented x 3, strength symmetric in all extremities, Cranial Nerves grossly intact to confrontation, speech clear   Skin: No rashes     Result  Review    Result Review:  I have personally reviewed the results from the time of this admission to 12/7/2021 08:18 EST and agree with these findings:  [x]  Laboratory  []  Microbiology  []  Radiology  []  EKG/Telemetry   []  Cardiology/Vascular   []  Pathology  []  Old records  []  Other: Anemia  Most notable findings include: Reviewed anemia and possible pneumonia    Assessment/Plan   Assessment / Plan     Brief Patient Summary:  Mirna Meredith is a 76 y.o. female who admitted with confusion and weakness    Active Hospital Problems:  Active Hospital Problems    Diagnosis    • Weakness        Plan:   Continue as per hospitalist she can be started on oral medications however not be able to take it if she is in the rehab or may be she will be able to take it if she can use the home medications I will discuss with  as well as with her daughter    DVT prophylaxis:  Medical DVT prophylaxis orders are present.    CODE STATUS:   Level Of Support Discussed With: Patient  Code Status (Patient has no pulse and is not breathing): CPR (Attempt to Resuscitate)  Medical Interventions (Patient has pulse or is breathing): Full Support    Disposition:  I expect patient to be discharged when okay with the hospitalist.    Electronically signed by Jose G Grissom MD, 12/07/21, 8:18 AM EST.

## 2021-12-07 NOTE — PLAN OF CARE
Goal Outcome Evaluation:  Plan of Care Reviewed With: patient    Decline in overall status and swallow function. Not safe for po intake. Discussed with nursing. Recommend NPO.

## 2021-12-07 NOTE — THERAPY TREATMENT NOTE
Acute Care - Speech Language Pathology   Swallow Treatment Note  Felice     Patient Name: Mirna Meredith  : 1945  MRN: [id number]  Today's Date: 2021               Admit Date: 2021    Visit Dx:     ICD-10-CM ICD-9-CM   1. Weakness  R53.1 780.79   2. Altered mental status, unspecified altered mental status type  R41.82 780.97   3. Oropharyngeal dysphagia  R13.12 787.22     Patient Active Problem List   Diagnosis   • Abdominal pain   • Sepsis (HCC)   • Metabolic encephalopathy   • Acute renal failure (ARF) (HCC)   • Metastatic breast cancer (HCC)   • Chronic diarrhea   • Pancytopenia (HCC)   • Weakness     Past Medical History:   Diagnosis Date   • Cancer (HCC)     Breast with mets to colon and bones   • COPD (chronic obstructive pulmonary disease) (HCC)    • Depression    • Diabetes mellitus (HCC)    • Hyperlipidemia    • Hypertension      Past Surgical History:   Procedure Laterality Date   • CHOLECYSTECTOMY     • CYSTOSCOPY, RETROGRADE PYELOGRAM AND STENT INSERTION Right 2021    Procedure: CYSTOSCOPY RETROGRADE PYELOGRAM AND STENT INSERTION;  Surgeon: Allen Rodriguez MD;  Location: [facility] MAIN OR;  Service: Urology;  Laterality: Right;   • HYSTERECTOMY     • JOINT REPLACEMENT Bilateral     Knees   • MASTECTOMY Bilateral      SPEECH PATHOLOGY DYSPHAGIA TREATMENT    Subjective/Behavioral Observations: decreased alertness, opens eyes intermittently        Day/time of Treatment:21        Current Diet:pureed, nectar thick liquids        Treatment received:Treatment focused on swallow function due to decline in status/mental status. Nursing contacted therapist with concern of aspiration. Reports holding food/ liquid in mouth and requiring suction this am.         Results of treatment:Patient opens eyes and attempts to verbalize. Holds trial of nectar thick liquid in oral cavity. Falling from oral cavity. Swallows secretions with moderate delay. Laryngeal wetness to auscultation.          Progress toward goals:decline in status        Barriers to Achieving goals: medical status        Plan of care:/changes in plan: recommend npo. Due to decline in mental status patient is not safe for po intake.                 SLP Recommendation and Plan                                                             Plan of Care Reviewed With: patient          EDUCATION  The patient has been educated in the following areas:   Dysphagia (Swallowing Impairment).              Time Calculation:    Time Calculation- SLP     Row Name 12/07/21 1207             Time Calculation- SLP    SLP Stop Time 1200  -SN      SLP Received On 12/07/21  -SN              Untimed Charges    [id number] Treatment Swallow Minutes 45  -SN              Total Minutes    Untimed Charges Total Minutes 45  -SN       Total Minutes 45  -SN            User Key  (r) = Recorded By, (t) = Taken By, (c) = Cosigned By    Initials Name Provider Type    Monica Orozco SLP Speech and Language Pathologist                Therapy Charges for Today     Code Description Service Date Service Provider Modifiers Qty    85138823303 HC ST EVAL ORAL PHARYNG SWALLOW 4 12/6/2021 Monica Sweeney SLP GN 1    43565348288 HC ST TREATMENT SWALLOW 3 12/7/2021 Monica Sweeney SLP GN 1               GABY Kruse  12/7/2021

## 2021-12-07 NOTE — CONSULTS
Patient is currently on 4mtu. Updated by primary rn regarding patients current condition. Patient is laying in bed with eyes closed, minimally responsive at time of visit. Call placed to both daughters listed as point of contact, no answer- left messages requesting call back. Palliative care will continue to follow to support and assist.    KASIE Jung, RN  Palliative Care

## 2021-12-07 NOTE — PLAN OF CARE
Problem: Fall Injury Risk  Goal: Absence of Fall and Fall-Related Injury  Outcome: Ongoing, Progressing  Intervention: Identify and Manage Contributors to Fall Injury Risk  Recent Flowsheet Documentation  Taken 12/7/2021 0200 by Thorn, Erinne, RN  Medication Review/Management: medications reviewed  Taken 12/7/2021 0000 by Thorn, Erinne, RN  Medication Review/Management: medications reviewed  Taken 12/6/2021 2200 by Thorn, Erinne, RN  Medication Review/Management: medications reviewed  Taken 12/6/2021 2135 by Thorn, Erinne, RN  Self-Care Promotion: independence encouraged  Taken 12/6/2021 2000 by Thorn, Erinne, RN  Medication Review/Management: medications reviewed  Intervention: Promote Injury-Free Environment  Recent Flowsheet Documentation  Taken 12/7/2021 0200 by Thorn, Erinne, RN  Safety Promotion/Fall Prevention: safety round/check completed  Taken 12/7/2021 0000 by Thorn, Erinne, RN  Safety Promotion/Fall Prevention: safety round/check completed  Taken 12/6/2021 2200 by Thorn, Erinne, RN  Safety Promotion/Fall Prevention: safety round/check completed  Taken 12/6/2021 2000 by Thorn, Erinne, RN  Safety Promotion/Fall Prevention: safety round/check completed     Problem: Adult Inpatient Plan of Care  Goal: Plan of Care Review  Outcome: Ongoing, Progressing  Flowsheets (Taken 12/7/2021 0300)  Progress: no change  Plan of Care Reviewed With: patient  Outcome Summary: Pt is confused this shift, will respond to questions but answers inappropriately. Resting quietly with eyes shut this shift, has purewick on. No acute distress noted this shift, will continue to monitor.  Goal: Patient-Specific Goal (Individualized)  Outcome: Ongoing, Progressing  Goal: Absence of Hospital-Acquired Illness or Injury  Outcome: Ongoing, Progressing  Intervention: Identify and Manage Fall Risk  Recent Flowsheet Documentation  Taken 12/7/2021 0200 by Thorn, Erinne, RN  Safety Promotion/Fall Prevention: safety round/check completed  Taken  12/7/2021 0000 by Thorn, Erinne, RN  Safety Promotion/Fall Prevention: safety round/check completed  Taken 12/6/2021 2200 by Thorn, Erinne, RN  Safety Promotion/Fall Prevention: safety round/check completed  Taken 12/6/2021 2000 by Thorn, Erinne, RN  Safety Promotion/Fall Prevention: safety round/check completed  Intervention: Prevent Skin Injury  Recent Flowsheet Documentation  Taken 12/6/2021 2135 by Thorn, Erinne, RN  Body Position:   turned   side-lying, left  Skin Protection:   electrode sites changed   incontinence pads utilized   skin sealant/moisture barrier applied  Intervention: Prevent and Manage VTE (venous thromboembolism) Risk  Recent Flowsheet Documentation  Taken 12/6/2021 2135 by Thorn, Erinne, RN  VTE Prevention/Management: compression stockings on  Intervention: Prevent Infection  Recent Flowsheet Documentation  Taken 12/7/2021 0200 by Thorn, Erinne, RN  Infection Prevention:   single patient room provided   rest/sleep promoted   hand hygiene promoted  Taken 12/7/2021 0000 by Thorn, Erinne, RN  Infection Prevention:   single patient room provided   rest/sleep promoted   hand hygiene promoted  Taken 12/6/2021 2200 by Thorn, Erinne, RN  Infection Prevention:   single patient room provided   rest/sleep promoted   hand hygiene promoted  Taken 12/6/2021 2000 by Thorn, Erinne, RN  Infection Prevention:   single patient room provided   rest/sleep promoted   hand hygiene promoted  Goal: Optimal Comfort and Wellbeing  Outcome: Ongoing, Progressing  Intervention: Provide Person-Centered Care  Recent Flowsheet Documentation  Taken 12/6/2021 2135 by Thorn, Erinne, RN  Trust Relationship/Rapport:   care explained   choices provided   questions encouraged   questions answered  Goal: Readiness for Transition of Care  Outcome: Ongoing, Progressing     Problem: Skin Injury Risk Increased  Goal: Skin Health and Integrity  Outcome: Ongoing, Progressing  Intervention: Optimize Skin Protection  Recent Flowsheet  Documentation  Taken 12/6/2021 2135 by Thorn, Erinne, RN  Pressure Reduction Techniques:   weight shift assistance provided   frequent weight shift encouraged   heels elevated off bed  Head of Bed (HOB): HOB elevated  Pressure Reduction Devices: positioning supports utilized  Skin Protection:   electrode sites changed   incontinence pads utilized   skin sealant/moisture barrier applied     Problem: Activity Intolerance  Goal: Enhanced Capacity and Energy  Outcome: Ongoing, Progressing  Intervention: Optimize Activity Tolerance  Recent Flowsheet Documentation  Taken 12/6/2021 2135 by Thorn, Erinne, RN  Activity Management: bedrest  Environmental Support:   calm environment promoted   rest periods encouraged  Self-Care Promotion: independence encouraged     Problem: Confusion Acute  Goal: Optimal Cognitive Function  Outcome: Ongoing, Progressing  Intervention: Minimize Factors Contributing to Confusion  Recent Flowsheet Documentation  Taken 12/6/2021 2135 by Thorn, Erinne, RN  Sensory Stimulation Regulation: television on  Reorientation Measures:   clock in view   hearing device use encouraged   Goal Outcome Evaluation:  Plan of Care Reviewed With: patient        Progress: no change  Outcome Summary: Pt is confused this shift, will respond to questions but answers inappropriately. Resting quietly with eyes shut this shift, has purewick on. No acute distress noted this shift, will continue to monitor.

## 2021-12-08 LAB
ALBUMIN SERPL-MCNC: 2.9 G/DL (ref 3.5–5.2)
ANION GAP SERPL CALCULATED.3IONS-SCNC: 11.2 MMOL/L (ref 5–15)
BASOPHILS # BLD AUTO: 0.07 10*3/MM3 (ref 0–0.2)
BASOPHILS NFR BLD AUTO: 0.8 % (ref 0–1.5)
BUN SERPL-MCNC: 15 MG/DL (ref 8–23)
BUN/CREAT SERPL: 15.8 (ref 7–25)
CALCIUM SPEC-SCNC: 10.3 MG/DL (ref 8.6–10.5)
CHLORIDE SERPL-SCNC: 92 MMOL/L (ref 98–107)
CO2 SERPL-SCNC: 26.8 MMOL/L (ref 22–29)
CREAT SERPL-MCNC: 0.95 MG/DL (ref 0.57–1)
DEPRECATED RDW RBC AUTO: 59 FL (ref 37–54)
EOSINOPHIL # BLD AUTO: 0.01 10*3/MM3 (ref 0–0.4)
EOSINOPHIL NFR BLD AUTO: 0.1 % (ref 0.3–6.2)
ERYTHROCYTE [DISTWIDTH] IN BLOOD BY AUTOMATED COUNT: 18.6 % (ref 12.3–15.4)
FERRITIN SERPL-MCNC: 1120 NG/ML (ref 13–150)
FOLATE SERPL-MCNC: 14 NG/ML (ref 4.78–24.2)
GFR SERPL CREATININE-BSD FRML MDRD: 57 ML/MIN/1.73
GLUCOSE BLDC GLUCOMTR-MCNC: 91 MG/DL (ref 70–99)
GLUCOSE BLDC GLUCOMTR-MCNC: 92 MG/DL (ref 70–99)
GLUCOSE BLDC GLUCOMTR-MCNC: 95 MG/DL (ref 70–99)
GLUCOSE SERPL-MCNC: 107 MG/DL (ref 65–99)
HCT VFR BLD AUTO: 27 % (ref 34–46.6)
HGB BLD-MCNC: 8.7 G/DL (ref 12–15.9)
IMM GRANULOCYTES # BLD AUTO: 0.24 10*3/MM3 (ref 0–0.05)
IMM GRANULOCYTES NFR BLD AUTO: 2.6 % (ref 0–0.5)
IRON 24H UR-MRATE: 27 MCG/DL (ref 37–145)
IRON SATN MFR SERPL: 11 % (ref 20–50)
LYMPHOCYTES # BLD AUTO: 0.94 10*3/MM3 (ref 0.7–3.1)
LYMPHOCYTES NFR BLD AUTO: 10.3 % (ref 19.6–45.3)
MAGNESIUM SERPL-MCNC: 1.7 MG/DL (ref 1.6–2.4)
MCH RBC QN AUTO: 27.6 PG (ref 26.6–33)
MCHC RBC AUTO-ENTMCNC: 32.2 G/DL (ref 31.5–35.7)
MCV RBC AUTO: 85.7 FL (ref 79–97)
MONOCYTES # BLD AUTO: 0.96 10*3/MM3 (ref 0.1–0.9)
MONOCYTES NFR BLD AUTO: 10.5 % (ref 5–12)
NEUTROPHILS NFR BLD AUTO: 6.88 10*3/MM3 (ref 1.7–7)
NEUTROPHILS NFR BLD AUTO: 75.7 % (ref 42.7–76)
NRBC BLD AUTO-RTO: 0 /100 WBC (ref 0–0.2)
PHOSPHATE SERPL-MCNC: 3.3 MG/DL (ref 2.5–4.5)
PLATELET # BLD AUTO: 174 10*3/MM3 (ref 140–450)
PMV BLD AUTO: 10.1 FL (ref 6–12)
POTASSIUM SERPL-SCNC: 4.2 MMOL/L (ref 3.5–5.2)
RBC # BLD AUTO: 3.15 10*6/MM3 (ref 3.77–5.28)
SODIUM SERPL-SCNC: 130 MMOL/L (ref 136–145)
TIBC SERPL-MCNC: 240 MCG/DL (ref 298–536)
TRANSFERRIN SERPL-MCNC: 161 MG/DL (ref 200–360)
VIT B12 BLD-MCNC: 849 PG/ML (ref 211–946)
WBC NRBC COR # BLD: 9.1 10*3/MM3 (ref 3.4–10.8)

## 2021-12-08 PROCEDURE — 84466 ASSAY OF TRANSFERRIN: CPT | Performed by: INTERNAL MEDICINE

## 2021-12-08 PROCEDURE — 99232 SBSQ HOSP IP/OBS MODERATE 35: CPT | Performed by: UROLOGY

## 2021-12-08 PROCEDURE — 25010000002 ENOXAPARIN PER 10 MG: Performed by: HOSPITALIST

## 2021-12-08 PROCEDURE — 25010000002 MAGNESIUM SULFATE IN D5W 1G/100ML (PREMIX) 1-5 GM/100ML-% SOLUTION: Performed by: PHYSICIAN ASSISTANT

## 2021-12-08 PROCEDURE — 99233 SBSQ HOSP IP/OBS HIGH 50: CPT | Performed by: INTERNAL MEDICINE

## 2021-12-08 PROCEDURE — 85025 COMPLETE CBC W/AUTO DIFF WBC: CPT | Performed by: HOSPITALIST

## 2021-12-08 PROCEDURE — 94799 UNLISTED PULMONARY SVC/PX: CPT

## 2021-12-08 PROCEDURE — 80069 RENAL FUNCTION PANEL: CPT | Performed by: PHYSICIAN ASSISTANT

## 2021-12-08 PROCEDURE — 83735 ASSAY OF MAGNESIUM: CPT | Performed by: PHYSICIAN ASSISTANT

## 2021-12-08 PROCEDURE — 83540 ASSAY OF IRON: CPT | Performed by: INTERNAL MEDICINE

## 2021-12-08 PROCEDURE — 25010000002 IRON SUCROSE PER 1 MG: Performed by: INTERNAL MEDICINE

## 2021-12-08 PROCEDURE — 25010000002 MEROPENEM PER 100 MG: Performed by: HOSPITALIST

## 2021-12-08 PROCEDURE — 82746 ASSAY OF FOLIC ACID SERUM: CPT | Performed by: INTERNAL MEDICINE

## 2021-12-08 PROCEDURE — 82607 VITAMIN B-12: CPT | Performed by: INTERNAL MEDICINE

## 2021-12-08 PROCEDURE — 82962 GLUCOSE BLOOD TEST: CPT

## 2021-12-08 PROCEDURE — 82728 ASSAY OF FERRITIN: CPT | Performed by: INTERNAL MEDICINE

## 2021-12-08 RX ORDER — MAGNESIUM SULFATE 1 G/100ML
1 INJECTION INTRAVENOUS ONCE
Status: COMPLETED | OUTPATIENT
Start: 2021-12-08 | End: 2021-12-08

## 2021-12-08 RX ADMIN — IPRATROPIUM BROMIDE AND ALBUTEROL SULFATE 3 ML: .5; 2.5 SOLUTION RESPIRATORY (INHALATION) at 19:49

## 2021-12-08 RX ADMIN — MEROPENEM 1 G: 1 INJECTION, POWDER, FOR SOLUTION INTRAVENOUS at 13:18

## 2021-12-08 RX ADMIN — IPRATROPIUM BROMIDE AND ALBUTEROL SULFATE 3 ML: .5; 2.5 SOLUTION RESPIRATORY (INHALATION) at 13:26

## 2021-12-08 RX ADMIN — ARFORMOTEROL TARTRATE 15 MCG: 15 SOLUTION RESPIRATORY (INHALATION) at 19:49

## 2021-12-08 RX ADMIN — METOROPROLOL TARTRATE 5 MG: 5 INJECTION, SOLUTION INTRAVENOUS at 08:53

## 2021-12-08 RX ADMIN — IRON SUCROSE 400 MG: 20 INJECTION, SOLUTION INTRAVENOUS at 08:53

## 2021-12-08 RX ADMIN — MAGNESIUM SULFATE 1 G: 1 INJECTION INTRAVENOUS at 11:17

## 2021-12-08 RX ADMIN — ENOXAPARIN SODIUM 40 MG: 40 INJECTION SUBCUTANEOUS at 08:53

## 2021-12-08 RX ADMIN — MICONAZOLE NITRATE: 2 POWDER TOPICAL at 22:24

## 2021-12-08 RX ADMIN — MICONAZOLE NITRATE: 2 POWDER TOPICAL at 08:53

## 2021-12-08 RX ADMIN — IPRATROPIUM BROMIDE AND ALBUTEROL SULFATE 3 ML: .5; 2.5 SOLUTION RESPIRATORY (INHALATION) at 00:21

## 2021-12-08 RX ADMIN — IPRATROPIUM BROMIDE AND ALBUTEROL SULFATE 3 ML: .5; 2.5 SOLUTION RESPIRATORY (INHALATION) at 07:40

## 2021-12-08 RX ADMIN — BUDESONIDE 0.5 MG: 0.5 SUSPENSION RESPIRATORY (INHALATION) at 19:49

## 2021-12-08 RX ADMIN — ARFORMOTEROL TARTRATE 15 MCG: 15 SOLUTION RESPIRATORY (INHALATION) at 07:40

## 2021-12-08 RX ADMIN — BUDESONIDE 0.5 MG: 0.5 SUSPENSION RESPIRATORY (INHALATION) at 07:40

## 2021-12-08 RX ADMIN — MEROPENEM 1 G: 1 INJECTION, POWDER, FOR SOLUTION INTRAVENOUS at 01:49

## 2021-12-08 RX ADMIN — SODIUM CHLORIDE 50 ML/HR: 9 INJECTION, SOLUTION INTRAVENOUS at 11:17

## 2021-12-08 NOTE — PROGRESS NOTES
[facility]     Progress Note    Patient Name: Mirna Meredith  : 1945  MRN: [id number]  Primary Care Physician:  Pan Pacheco MD  Date of admission: 2021    Subjective   Subjective     Chief Complaint: Patient more confused appears to be part of dementia we will try to get in touch with the family again have not been able to reach her.    HPI:  Patient Reports patient known to have metastatic cancer she does have extensive disease but still treatable and have not had adequate trial of oral medications but mentally patient seems to be getting more confused disoriented which is probably more because of dementia rather than the disease    Review of Systems   All systems were reviewed and negative except for: Reviewed    Objective   Objective     Vitals:   Temp:  [98.1 °F (36.7 °C)-99 °F (37.2 °C)] 98.6 °F (37 °C)  Heart Rate:  [] 120  Resp:  [18-28] 18  BP: (127-149)/(55-87) 127/55    Physical Exam    Constitutional: Awake, alert   Eyes: PERRLA, sclerae anicteric, no conjunctival injection   HENT: NCAT, mucous membranes moist   Neck: Supple, no thyromegaly, no lymphadenopathy, trachea midline   Respiratory: Clear to auscultation bilaterally, nonlabored respirations    Cardiovascular: RRR, no murmurs, rubs, or gallops, palpable pedal pulses bilaterally   Gastrointestinal: Positive bowel sounds, soft, nontender, nondistended   Musculoskeletal: No bilateral ankle edema, no clubbing or cyanosis to extremities   Psychiatric: Appropriate affect, cooperative   Neurologic: Oriented x 3, strength symmetric in all extremities, Cranial Nerves grossly intact to confrontation, speech clear   Skin: No rashes   Patient appears to be more confused today  Result Review    Result Review:  I have personally reviewed the results from the time of this admission to 2021 08:17 EST and agree with these findings:  []  Laboratory  []  Microbiology  []  Radiology  []  EKG/Telemetry   []  Cardiology/Vascular    []  Pathology  []  Old records  []  Other: Anemia  Most notable findings include: Anemia iron deficiency    Assessment/Plan   Assessment / Plan     Brief Patient Summary:  Mirna Meredith is a 76 y.o. female who patient has iron deficiency anemia metastatic breast cancer but her problems are more because of dementia at this time    Active Hospital Problems:  Active Hospital Problems    Diagnosis    • Weakness        Plan:   We will give her IV iron but will discuss with the family regarding disposition    DVT prophylaxis:  Medical DVT prophylaxis orders are present.    CODE STATUS:   Level Of Support Discussed With: Patient  Code Status (Patient has no pulse and is not breathing): CPR (Attempt to Resuscitate)  Medical Interventions (Patient has pulse or is breathing): Full Support    Disposition:  I expect patient to be discharged will have to discuss with the family.    Electronically signed by Jose G Grissom MD, 12/08/21, 8:17 AM EST.

## 2021-12-08 NOTE — CONSULTS
"Nutrition Services    Patient Name: Mirna Meredith  YOB: 1945  MRN: [id number]  Admission date: 12/4/2021      CLINICAL NUTRITION ASSESSMENT      Reason for Assessment  Admission assessment, Physician consult     H&P:    Past Medical History:   Diagnosis Date   • Cancer (HCC)     Breast with mets to colon and bones   • COPD (chronic obstructive pulmonary disease) (HCC)    • Depression    • Diabetes mellitus (HCC)    • Hyperlipidemia    • Hypertension         Current Problems:   Active Hospital Problems    Diagnosis    • Weakness         Nutrition/Diet History         Narrative     Pt admitted for altered mental status with metastatic cancer. SLP has seen and states pt is not safe for oral diet, Cortrak order placed per MD. Will provide recommendations for initiation of tube feeds. Goals of care discussion currently being had, will follow for decision on placement of Cortrak. On admission, pt noted to have difficulty swallowing with reduced oral intake over the past months. According to nurse intake documentation, pt had been consuming % prior to being deemed unfit for oral diet. Per wt history, pt has lost 10# (4.9%) over last month.      Anthropometrics        Current Height, Weight Height: 165.1 cm (65\")  Weight: 88 kg (194 lb 0.1 oz)   Current BMI Body mass index is 32.28 kg/m².       Weight Hx  Wt Readings from Last 30 Encounters:   12/04/21 1916 88 kg (194 lb 0.1 oz)   11/11/21 2316 92.7 kg (204 lb 5.9 oz)   10/18/21 0533 92.7 kg (204 lb 5.9 oz)   09/09/21 1952 95.3 kg (210 lb)   06/08/21 0942 100 kg (220 lb 14.4 oz)   06/08/21 0004 118 kg (260 lb)   12/07/20 0000 106 kg (234 lb 4 oz)   12/04/19 0000 109 kg (240 lb)   06/20/19 0000 113 kg (249 lb)   06/10/19 0000 112 kg (248 lb)   06/05/19 0000 112 kg (248 lb)   05/29/19 0000 113 kg (250 lb)   05/15/19 0000 113 kg (250 lb)   04/16/19 0000 113 kg (250 lb)   10/16/18 0000 112 kg (246 lb)   08/16/18 0000 111 kg (244 lb)   05/09/18 0000 112 " kg (246 lb 6 oz)   04/18/18 0000 110 kg (242 lb)   01/23/18 0000 113 kg (250 lb)            Wt Change Observation 10# (4.9%) loss over last month.      Estimated/Assessed Needs       Energy Requirements 20-25 kcal/kg   EST Needs (kcal/day) 7558-4076       Protein Requirements 1-1.4 g/kg   EST Daily Needs (g/day)        Fluid Requirements 1 ml/kcal    Estimated Needs (mL/day) 1760     Labs/Medications         Pertinent Labs Reviewed.   Results from last 7 days   Lab Units 12/08/21 0444 12/07/21  0503 12/06/21  0522 12/04/21  1813 12/04/21  1813   SODIUM mmol/L 130* 131* 130*   < > 134*   POTASSIUM mmol/L 4.2 4.2 4.2   < > 4.3   CHLORIDE mmol/L 92* 94* 92*   < > 94*   CO2 mmol/L 26.8 26.4 26.4   < > 29.8*   BUN mg/dL 15 14 11   < > 13   CREATININE mg/dL 0.95 1.07* 1.17*   < > 1.31*   CALCIUM mg/dL 10.3 10.1 9.9   < > 10.4   BILIRUBIN mg/dL  --   --  0.4  --  0.3   ALK PHOS U/L  --   --  146*  --  161*   ALT (SGPT) U/L  --   --  14  --  15   AST (SGOT) U/L  --   --  47*  --  51*   GLUCOSE mg/dL 107* 127* 107*   < > 114*    < > = values in this interval not displayed.     Results from last 7 days   Lab Units 12/08/21  0444 12/07/21  0503 12/06/21  0522 12/05/21  1014 12/05/21  1014   MAGNESIUM mg/dL 1.7  --  1.9  --  2.4   PHOSPHORUS mg/dL 3.3   < > 3.4   < > 3.4   HEMOGLOBIN g/dL 8.7*   < > 8.9*  --   --    HEMATOCRIT % 27.0*   < > 28.6*  --   --     < > = values in this interval not displayed.     Lab Results   Component Value Date    HGBA1C 8.70 (H) 11/12/2021         Pertinent Medications Reviewed.     Current Nutrition Orders & Evaluation of Intake       Oral Nutrition     Current PO Diet NPO Diet   Supplement Orders Placed This Encounter      Place Feeding Tube Per Cardiac Dimensions System       Nutrition Diagnosis         Nutrition Dx Problem 1 Inadequate energy Intake related to inadequate oral Intake as evidenced by NPO       Nutrition Intervention         If tube feeds initiated after goals of care  conversation:    Diabetisource @ 65 ml/hr with 30 ml free water flush Q3H  Provides 1872 kcal, 94 g pro, and 1519 ml total fluids     Medical Nutrition Therapy/Nutrition Education          Learner     Readiness Patient  Education not appropriate at this time     Monitor/Evaluation        Monitor Per protocol, Pertinent labs, EN delivery/tolerance, GI status, Symptoms, POC/GOC, Swallow function, Diet advancement       Nutrition Discharge Plan         To be determined       Electronically signed by:  Angelina Webb RD  12/08/21 07:38 EST

## 2021-12-08 NOTE — PLAN OF CARE
Goal Outcome Evaluation:              Outcome Summary: Patient unable to answer subjective questions, very lethargic, and unable to participate in functional evaluation for physical therapy.  Nursing excused patient as well stating she was not appropriate at this time.  Patient will be discharged from skilled physical therapy services.  Please reorder once patient able to appropriately and functionally participate in care.

## 2021-12-08 NOTE — CONSULTS
[facility]   Urology Consult Note    Patient Name: Mirna Meredith  : 1945  MRN: [id number]  Primary Care Physician:  Pan Pacheco MD  Date of admission: 2021    Subjective   Subjective     Chief Complaint: Right hydronephrosis secondary to metastatic disease from breast cancer    Patient has a right ureteral stent    HPI:    Mirna Meredith is a 76 y.o. female had metastatic breast cancer and right hydronephrosis.  Right ureteral stent was inserted on  and following that the patient had sepsis.  She is back with feeling of weakness    Review of Systems    Please see past medical and surgical history.  Rest of the system is okay    Personal History     Past Medical History:   Diagnosis Date   • Cancer (HCC)     Breast with mets to colon and bones   • COPD (chronic obstructive pulmonary disease) (HCC)    • Depression    • Diabetes mellitus (HCC)    • Hyperlipidemia    • Hypertension        Past Surgical History:   Procedure Laterality Date   • CHOLECYSTECTOMY     • CYSTOSCOPY, RETROGRADE PYELOGRAM AND STENT INSERTION Right 2021    Procedure: CYSTOSCOPY RETROGRADE PYELOGRAM AND STENT INSERTION;  Surgeon: Allen Rodriguez MD;  Location: [facility];  Service: Urology;  Laterality: Right;   • HYSTERECTOMY     • JOINT REPLACEMENT Bilateral     Knees   • MASTECTOMY Bilateral        Family History: family history is not on file. Otherwise pertinent FHx was reviewed and not pertinent to current issue.    Social History:  reports that she quit smoking about 60 years ago. Her smoking use included cigarettes. She does not have any smokeless tobacco history on file. She reports previous alcohol use. She reports that she does not use drugs.    Home Medications:  Peppermint Oil, SBI/Protein Isolate, anastrozole, buPROPion XL, ferrous sulfate, folic acid, memantine, metFORMIN ER, metoprolol tartrate, montelukast, ondansetron ODT, risperiDONE, rosuvastatin, sertraline, and vitamin  E      Allergies:  Allergies   Allergen Reactions   • Penicillins Rash       Objective   Objective     Vitals:   Temp:  [98.1 °F (36.7 °C)-99 °F (37.2 °C)] 98.6 °F (37 °C)  Heart Rate:  [] 120  Resp:  [18-28] 18  BP: (127-149)/(55-87) 127/55  Physical Exam     Physical Exam  Constitutional:       Appearance: She is obese. She is ill-appearing.      Comments: Patient is confused.  She does not know where she is at this time.    Patient is having some involuntary movements of fingers   HENT:      Head: Normocephalic and atraumatic.   Cardiovascular:      Rate and Rhythm: Tachycardia present.      Pulses: Normal pulses.      Heart sounds: No murmur heard.      Pulmonary:      Comments: Having some difficulty with breathing  Abdominal:      Palpations: Abdomen is soft.      Tenderness: There is no abdominal tenderness. There is no right CVA tenderness or left CVA tenderness.   Skin:     General: Skin is warm.      Coloration: Skin is not jaundiced.   Neurological:      Mental Status: She is disoriented.      Motor: Weakness present.      Comments: Patient is bedbound   Psychiatric:      Comments: Patient is confused at this time and is not aware of time and place         Result Review    Result Review:  I have personally reviewed the results from the time of this admission to 12/8/2021 08:44 EST and agree with these findings:  []  Laboratory  []  Microbiology  []  Radiology  []  EKG/Telemetry   []  Cardiology/Vascular   []  Pathology  []  Old records  []  Other:  Most notable findings include: Patient is confused, weak and not very alert    Assessment/Plan   Assessment / Plan     Brief Patient Summary:  Mirna Meredith is a 76 y.o. female who has metastatic breast cancer and has a stent on the right side for hydronephrosis    Active Hospital Problems:  Active Hospital Problems    Diagnosis    • Weakness        Plan: At this time patient is not presenting any urological problem.  We will be happy to see her if she  develops any problem with urinary tract      DVT prophylaxis:  Medical DVT prophylaxis orders are present.    CODE STATUS:    Level Of Support Discussed With: Patient  Code Status (Patient has no pulse and is not breathing): CPR (Attempt to Resuscitate)  Medical Interventions (Patient has pulse or is breathing): Full Support        Electronically signed by Allen Rodriguez MD, 12/08/21, 8:44 AM EST.

## 2021-12-08 NOTE — NURSING NOTE
"Patient is currently on 4mtu. Updated by primary rn and provider regarding patients current condition. Patients 2 daughters at bedside at time of visit. Patients daughters report patient has 2 sons as well. Discussed patients current condition and goals of care. Patients daughters state \"we dont want her to suffer.\" Discussed comfort measures and end of life care. Discussed discharge options. Discussed code status, full code versus DNR. At this time, patients daughters want opinion of patients sons before making further decisions. Approval for 2 extra visitors today to assist in goals of care discussions by . Updated primary rn and provider. Palliative care will continue to follow to support and assist.    KASIE Jung, RN  Palliative Care     "

## 2021-12-08 NOTE — PROGRESS NOTES
[facility]   Hospitalist Progress Note  Date: 2021  Patient Name: Mirna Meredith  : 1945  MRN: [id number]  Date of admission: 2021      Subjective   Subjective     Chief Complaint: Altered mental status    Summary: 76-year-old female with metastatic breast cancer, COPD, diabetes.  She had been admitted to the hospital in mid November with hydronephrosis and questionable ureteral stricture, underwent stenting of the right ureter on .  She was discharged to [facility] rehab after that.  Upon returning home she began getting confused again.  Urinalysis was positive for UTI and her chest x-ray showed possible pneumonia.  She is admitted to the medicine service.  She was started on IV antibiotics.  Hematology/oncology was consulted as well.    Interval Followup:   Patient seen and examined resting comfortably in bed appears more awake today but mentation remains poor.  Long discussion with the patient's family at the bedside today discussed that the patient had diffuse bony metastasis and suspected pleural-based lesion discussed that patient's overall prognosis was quite poor told him that she was unsafe to have oral intake at this time discussed the possibility of placing core track also discussed CODE STATUS it appears family members leaning more towards the comfort approach which is very appropriate but patient's daughters want to discuss with other family members first.  Palliative care has been consulted.      MRI negative for any acute intracranial changes      Review of Systems   Unable to obtain due to mental status  Objective   Objective     Vitals:   Temp:  [97.9 °F (36.6 °C)-99 °F (37.2 °C)] 97.9 °F (36.6 °C)  Heart Rate:  [] 112  Resp:  [18-22] 18  BP: (121-148)/() 121/101  Physical Exam    Constitutional: Awake confused   Eyes: Pupils equal, sclerae anicteric, no conjunctival injection   HENT: NCAT, mucous membranes moist   Neck: Supple, no lad   Respiratory:  dim at bases; no w   Cardiovascular: RRR, no m   Gastrointestinal: Positive bowel sounds, soft, nontender, nondistended   Musculoskeletal: No bilateral ankle edema, no clubbing or cyanosis to extremities   Psychiatric: Unable to assess   neurologic: Confused only repeating questions I ask   skin: No rashes     Result Review    Result Review:  I have personally reviewed the results from the time of this admission to 12/8/2021 12:05 EST and agree with these findings:  [x]  Laboratory  [x]  Microbiology  [x]  Radiology  []  EKG/Telemetry   []  Cardiology/Vascular   []  Pathology  []  Old records  []  Other:    Assessment/Plan   Assessment / Plan     Assessment:  • Urinary tract infection, recurrent  • Metastatic breast cancer  • Recent ureteral stent placement  • Acute metabolic encephalopathy  • JOESPH   • Possible CAP  • Pleural effusion, left-sided  • Recent right ureteral stent placement    Plan:  · Continue antibiotics.  Cultures unyielding thus far.  · Continue nebulizer treatments  · Resumed Lopressor  · MRI obtained reviewed no acute changes noted  · Long discussion with the patient's daughters at the bedside discussed her overall poor prognosis per extensive bony metastasis and probable pleural-based lesion advised them to discuss the patient's CODE STATUS and long-term wishes overall her prognosis appears very poor Would be very appropriate for palliative care at this poi  · Palliative care consultation  · Speech unable to recommend safe oral intake  · Core track ordered but will await family decision before placing  · Oncology consultation appreciate continue care per the recommendation  · PT OT speech therapy consultations  · Lovenox for DVT prophylaxis  · Discussed with RN         DVT prophylaxis:  Medical DVT prophylaxis orders are present.    CODE STATUS:   Level Of Support Discussed With: Patient  Code Status (Patient has no pulse and is not breathing): CPR (Attempt to Resuscitate)  Medical Interventions  (Patient has pulse or is breathing): Full Support    Electronically signed by KARLI Edwards, 12/08/21, 12:09 PM EST.        Attending Note:  I have seen and examined the patient and agree with the above information from Sarthak Martinze PA-C and have outlined plan of care.  76-year-old with metastatic cancer, UTI, recurrent.  Continue antibiotics.  MRI brain without acute stroke or metastasis.  No significant clinical change.  Appreciate urology consultation.  Recommend palliative care.  Given her age, comorbidities, widely metastatic cancer, I would think she would be appropriate to consider hospice.  PA discussed with family today.  Electronically signed by Tremayne Fitzpatrick MD, 12/08/21, 5:20 PM EST.

## 2021-12-08 NOTE — PLAN OF CARE
Problem: Fall Injury Risk  Goal: Absence of Fall and Fall-Related Injury  Outcome: Ongoing, Progressing  Intervention: Identify and Manage Contributors to Fall Injury Risk  Recent Flowsheet Documentation  Taken 12/8/2021 1318 by La Moyer RN  Medication Review/Management: medications reviewed  Taken 12/8/2021 1015 by La Moyer RN  Medication Review/Management: medications reviewed  Taken 12/8/2021 0735 by La Moyer RN  Medication Review/Management: medications reviewed  Intervention: Promote Injury-Free Environment  Recent Flowsheet Documentation  Taken 12/8/2021 1318 by La Moyer RN  Safety Promotion/Fall Prevention:   safety round/check completed   room organization consistent   assistive device/personal items within reach   clutter free environment maintained  Taken 12/8/2021 1015 by La Moyer RN  Safety Promotion/Fall Prevention:   assistive device/personal items within reach   clutter free environment maintained   safety round/check completed   room organization consistent  Taken 12/8/2021 0735 by La Moyer RN  Safety Promotion/Fall Prevention:   assistive device/personal items within reach   clutter free environment maintained   safety round/check completed   room organization consistent     Problem: Adult Inpatient Plan of Care  Goal: Plan of Care Review  Outcome: Ongoing, Progressing  Flowsheets (Taken 12/8/2021 1431)  Progress: improving  Plan of Care Reviewed With: patient  Outcome Summary: family speaking with palliative care at this time. no other changes will continue to monitor patient.  Goal: Patient-Specific Goal (Individualized)  Outcome: Ongoing, Progressing  Goal: Absence of Hospital-Acquired Illness or Injury  Outcome: Ongoing, Progressing  Intervention: Identify and Manage Fall Risk  Recent Flowsheet Documentation  Taken 12/8/2021 1318 by La Moyer RN  Safety Promotion/Fall Prevention:   safety round/check completed   room organization consistent   assistive  device/personal items within reach   clutter free environment maintained  Taken 12/8/2021 1015 by La Moyer RN  Safety Promotion/Fall Prevention:   assistive device/personal items within reach   clutter free environment maintained   safety round/check completed   room organization consistent  Taken 12/8/2021 0735 by La Moyer RN  Safety Promotion/Fall Prevention:   assistive device/personal items within reach   clutter free environment maintained   safety round/check completed   room organization consistent  Intervention: Prevent Infection  Recent Flowsheet Documentation  Taken 12/8/2021 1318 by La Moyer RN  Infection Prevention:   environmental surveillance performed   equipment surfaces disinfected   single patient room provided   rest/sleep promoted  Taken 12/8/2021 1015 by La Moyer RN  Infection Prevention:   single patient room provided   rest/sleep promoted   environmental surveillance performed   equipment surfaces disinfected  Taken 12/8/2021 0735 by La Moyer RN  Infection Prevention:   environmental surveillance performed   equipment surfaces disinfected   single patient room provided   rest/sleep promoted  Goal: Optimal Comfort and Wellbeing  Outcome: Ongoing, Progressing  Intervention: Provide Person-Centered Care  Recent Flowsheet Documentation  Taken 12/8/2021 0735 by La Moyer RN  Trust Relationship/Rapport:   care explained   choices provided   emotional support provided   empathic listening provided   questions answered   questions encouraged   reassurance provided   thoughts/feelings acknowledged  Goal: Readiness for Transition of Care  Outcome: Ongoing, Progressing     Problem: Skin Injury Risk Increased  Goal: Skin Health and Integrity  Outcome: Ongoing, Progressing     Problem: Activity Intolerance  Goal: Enhanced Capacity and Energy  Outcome: Ongoing, Progressing     Problem: Confusion Acute  Goal: Optimal Cognitive Function  Outcome: Ongoing, Progressing  Goal: Optimal  Cognitive Function  Outcome: Ongoing, Progressing     Problem: UTI (Urinary Tract Infection)  Goal: Improved Infection Symptoms  Outcome: Ongoing, Progressing     Problem: Functional Ability Impaired (Hospitalized Older Adult)  Goal: Optimal Functional Ability  Outcome: Ongoing, Progressing     Problem: Oral Intake Inadequate (Hospitalized Older Adult)  Goal: Optimal Nutrition Intake  Outcome: Ongoing, Progressing     Problem: Urinary Incontinence (Hospitalized Older Adult)  Goal: Urinary Incontinence Managed  Outcome: Ongoing, Progressing     Problem: Swallowing Impairment  Goal: Improved Swallowing Without Aspiration  Outcome: Ongoing, Progressing     Problem: Palliative Care  Goal: Enhanced Quality of Life  Outcome: Ongoing, Progressing  Intervention: Maximize Comfort  Recent Flowsheet Documentation  Taken 12/8/2021 0735 by La Moyer RN  Pain Management Interventions:   quiet environment facilitated   relaxation techniques promoted  Intervention: Optimize Psychosocial Wellbeing  Recent Flowsheet Documentation  Taken 12/8/2021 0735 by La Moyer RN  Family/Support System Care:   support provided   self-care encouraged   Goal Outcome Evaluation:  Plan of Care Reviewed With: patient        Progress: improving  Outcome Summary: family speaking with palliative care at this time. no other changes will continue to monitor patient.

## 2021-12-08 NOTE — PLAN OF CARE
Problem: Fall Injury Risk  Goal: Absence of Fall and Fall-Related Injury  Outcome: Ongoing, Progressing  Intervention: Identify and Manage Contributors to Fall Injury Risk  Recent Flowsheet Documentation  Taken 12/8/2021 0000 by Thorn, Erinne, RN  Medication Review/Management: medications reviewed  Taken 12/7/2021 2200 by Thorn, Erinne, RN  Medication Review/Management: medications reviewed  Taken 12/7/2021 2000 by Thorn, Erinne, RN  Medication Review/Management: medications reviewed  Taken 12/7/2021 1945 by Thorn, Erinne, RN  Self-Care Promotion: independence encouraged  Intervention: Promote Injury-Free Environment  Recent Flowsheet Documentation  Taken 12/8/2021 0000 by Thorn, Erinne, RN  Safety Promotion/Fall Prevention: safety round/check completed  Taken 12/7/2021 2200 by Thorn, Erinne, RN  Safety Promotion/Fall Prevention: safety round/check completed  Taken 12/7/2021 2000 by Thorn, Erinne, RN  Safety Promotion/Fall Prevention: safety round/check completed     Problem: Adult Inpatient Plan of Care  Goal: Plan of Care Review  Outcome: Ongoing, Progressing  Flowsheets (Taken 12/8/2021 0548)  Progress: no change  Plan of Care Reviewed With: patient  Outcome Summary: Pt had MRI this shift, negative for acute cerebral infarction. Continues to be confused and is resting quietly this shift, purewick in place and on RA. Denies pain at this time. Will continue to monitor.  Goal: Patient-Specific Goal (Individualized)  Outcome: Ongoing, Progressing  Goal: Absence of Hospital-Acquired Illness or Injury  Outcome: Ongoing, Progressing  Intervention: Identify and Manage Fall Risk  Recent Flowsheet Documentation  Taken 12/8/2021 0000 by Thorn, Erinne, RN  Safety Promotion/Fall Prevention: safety round/check completed  Taken 12/7/2021 2200 by Thorn, Erinne, RN  Safety Promotion/Fall Prevention: safety round/check completed  Taken 12/7/2021 2000 by Thorn, Erinne, RN  Safety Promotion/Fall Prevention: safety round/check  completed  Intervention: Prevent Skin Injury  Recent Flowsheet Documentation  Taken 12/7/2021 1945 by Thorn, Erinne, RN  Body Position: turned  Skin Protection: electrode sites changed  Intervention: Prevent and Manage VTE (venous thromboembolism) Risk  Recent Flowsheet Documentation  Taken 12/7/2021 2102 by Thorn, Erinne, RN  VTE Prevention/Management: compression stockings off  Taken 12/7/2021 1945 by Thorn, Erinne, RN  VTE Prevention/Management: compression stockings on  Intervention: Prevent Infection  Recent Flowsheet Documentation  Taken 12/8/2021 0000 by Thorn, Erinne, RN  Infection Prevention:   single patient room provided   rest/sleep promoted   hand hygiene promoted  Taken 12/7/2021 2200 by Thorn, Erinne, RN  Infection Prevention:   single patient room provided   rest/sleep promoted   hand hygiene promoted  Taken 12/7/2021 2000 by Thorn, Erinne, RN  Infection Prevention:   single patient room provided   rest/sleep promoted   hand hygiene promoted  Goal: Optimal Comfort and Wellbeing  Outcome: Ongoing, Progressing  Intervention: Provide Person-Centered Care  Recent Flowsheet Documentation  Taken 12/7/2021 1945 by Thorn, Erinne, RN  Trust Relationship/Rapport:   care explained   reassurance provided   questions encouraged  Goal: Readiness for Transition of Care  Outcome: Ongoing, Progressing     Problem: Skin Injury Risk Increased  Goal: Skin Health and Integrity  Outcome: Ongoing, Progressing  Intervention: Optimize Skin Protection  Recent Flowsheet Documentation  Taken 12/7/2021 1945 by Thorn, Erinne, RN  Pressure Reduction Techniques: weight shift assistance provided  Head of Bed (HOB): HOB elevated  Pressure Reduction Devices: positioning supports utilized  Skin Protection: electrode sites changed     Problem: Activity Intolerance  Goal: Enhanced Capacity and Energy  Outcome: Ongoing, Progressing  Intervention: Optimize Activity Tolerance  Recent Flowsheet Documentation  Taken 12/7/2021 1945 by Renu  Erinne, RN  Activity Management: bedrest  Environmental Support: calm environment promoted  Self-Care Promotion: independence encouraged     Problem: Confusion Acute  Goal: Optimal Cognitive Function  Outcome: Ongoing, Progressing  Intervention: Minimize Factors Contributing to Confusion  Recent Flowsheet Documentation  Taken 12/7/2021 1945 by Thorn, Erinne, RN  Sensory Stimulation Regulation: television on  Reorientation Measures:   clock in view   familiar social contact encouraged   hearing device use encouraged  Goal: Optimal Cognitive Function  Outcome: Ongoing, Progressing  Intervention: Minimize Factors Contributing to Confusion  Recent Flowsheet Documentation  Taken 12/7/2021 1945 by Thorn, Erinne, RN  Sensory Stimulation Regulation: television on  Reorientation Measures:   clock in view   familiar social contact encouraged   hearing device use encouraged     Problem: UTI (Urinary Tract Infection)  Goal: Improved Infection Symptoms  Outcome: Ongoing, Progressing  Intervention: Prevent and Manage Infection Progression  Recent Flowsheet Documentation  Taken 12/7/2021 1945 by Thorn, Erinne, RN  Bladder Spasm Management: catheter secured to abdomen/leg     Problem: Functional Ability Impaired (Hospitalized Older Adult)  Goal: Optimal Functional Ability  Outcome: Ongoing, Progressing  Intervention: Promote Functional Ability  Recent Flowsheet Documentation  Taken 12/7/2021 1945 by Thorn, Erinne, RN  Activity Assistance Provided: assistance, 2 people  Self-Care Promotion: independence encouraged     Problem: Oral Intake Inadequate (Hospitalized Older Adult)  Goal: Optimal Nutrition Intake  Outcome: Ongoing, Progressing     Problem: Urinary Incontinence (Hospitalized Older Adult)  Goal: Urinary Incontinence Managed  Outcome: Ongoing, Progressing  Intervention: Promote Urinary Continence  Recent Flowsheet Documentation  Taken 12/7/2021 1945 by Thorn, Erinne, RN  Perineal Care:   perineum cleansed   catheter care  provided     Problem: Swallowing Impairment  Goal: Improved Swallowing Without Aspiration  Outcome: Ongoing, Progressing     Problem: Palliative Care  Goal: Enhanced Quality of Life  Outcome: Ongoing, Progressing  Intervention: Maximize Comfort  Recent Flowsheet Documentation  Taken 12/7/2021 1945 by Thorn, Erinne, RN  Pain Management Interventions:   see MAR   quiet environment facilitated  Oral Care:   mouth wash rinse   lip lubricant applied  Intervention: Optimize Function  Recent Flowsheet Documentation  Taken 12/7/2021 1945 by Thorn, Erinne, RN  Sensory Stimulation Regulation: television on  Intervention: Optimize Psychosocial Wellbeing  Recent Flowsheet Documentation  Taken 12/7/2021 1945 by Thorn, Erinne, RN  Supportive Measures: decision-making supported  Family/Support System Care: self-care encouraged   Goal Outcome Evaluation:  Plan of Care Reviewed With: patient        Progress: no change  Outcome Summary: Pt had MRI this shift, negative for acute cerebral infarction. Continues to be confused and is resting quietly this shift, purewick in place and on RA. Denies pain at this time. Will continue to monitor.

## 2021-12-09 LAB
ALBUMIN SERPL-MCNC: 2.8 G/DL (ref 3.5–5.2)
ANION GAP SERPL CALCULATED.3IONS-SCNC: 10.9 MMOL/L (ref 5–15)
BACTERIA SPEC AEROBE CULT: NORMAL
BACTERIA SPEC AEROBE CULT: NORMAL
BASOPHILS # BLD AUTO: 0.09 10*3/MM3 (ref 0–0.2)
BASOPHILS NFR BLD AUTO: 1.1 % (ref 0–1.5)
BUN SERPL-MCNC: 18 MG/DL (ref 8–23)
BUN/CREAT SERPL: 19.1 (ref 7–25)
CALCIUM SPEC-SCNC: 10.6 MG/DL (ref 8.6–10.5)
CHLORIDE SERPL-SCNC: 93 MMOL/L (ref 98–107)
CO2 SERPL-SCNC: 28.1 MMOL/L (ref 22–29)
CREAT SERPL-MCNC: 0.94 MG/DL (ref 0.57–1)
DEPRECATED RDW RBC AUTO: 63.7 FL (ref 37–54)
EOSINOPHIL # BLD AUTO: 0.04 10*3/MM3 (ref 0–0.4)
EOSINOPHIL NFR BLD AUTO: 0.5 % (ref 0.3–6.2)
ERYTHROCYTE [DISTWIDTH] IN BLOOD BY AUTOMATED COUNT: 18.7 % (ref 12.3–15.4)
GFR SERPL CREATININE-BSD FRML MDRD: 58 ML/MIN/1.73
GLUCOSE BLDC GLUCOMTR-MCNC: 191 MG/DL (ref 70–99)
GLUCOSE BLDC GLUCOMTR-MCNC: 67 MG/DL (ref 70–99)
GLUCOSE BLDC GLUCOMTR-MCNC: 72 MG/DL (ref 70–99)
GLUCOSE BLDC GLUCOMTR-MCNC: 79 MG/DL (ref 70–99)
GLUCOSE BLDC GLUCOMTR-MCNC: 90 MG/DL (ref 70–99)
GLUCOSE SERPL-MCNC: 74 MG/DL (ref 65–99)
HCT VFR BLD AUTO: 30.5 % (ref 34–46.6)
HGB BLD-MCNC: 9.6 G/DL (ref 12–15.9)
IMM GRANULOCYTES # BLD AUTO: 0.31 10*3/MM3 (ref 0–0.05)
IMM GRANULOCYTES NFR BLD AUTO: 3.9 % (ref 0–0.5)
LYMPHOCYTES # BLD AUTO: 1.11 10*3/MM3 (ref 0.7–3.1)
LYMPHOCYTES NFR BLD AUTO: 13.9 % (ref 19.6–45.3)
MAGNESIUM SERPL-MCNC: 2 MG/DL (ref 1.6–2.4)
MCH RBC QN AUTO: 29 PG (ref 26.6–33)
MCHC RBC AUTO-ENTMCNC: 31.5 G/DL (ref 31.5–35.7)
MCV RBC AUTO: 92.1 FL (ref 79–97)
MONOCYTES # BLD AUTO: 0.81 10*3/MM3 (ref 0.1–0.9)
MONOCYTES NFR BLD AUTO: 10.1 % (ref 5–12)
NEUTROPHILS NFR BLD AUTO: 5.64 10*3/MM3 (ref 1.7–7)
NEUTROPHILS NFR BLD AUTO: 70.5 % (ref 42.7–76)
NRBC BLD AUTO-RTO: 0 /100 WBC (ref 0–0.2)
PHOSPHATE SERPL-MCNC: 3.4 MG/DL (ref 2.5–4.5)
PLATELET # BLD AUTO: 151 10*3/MM3 (ref 140–450)
PMV BLD AUTO: 11.3 FL (ref 6–12)
POTASSIUM SERPL-SCNC: 4.3 MMOL/L (ref 3.5–5.2)
RBC # BLD AUTO: 3.31 10*6/MM3 (ref 3.77–5.28)
SODIUM SERPL-SCNC: 132 MMOL/L (ref 136–145)
WBC NRBC COR # BLD: 8 10*3/MM3 (ref 3.4–10.8)

## 2021-12-09 PROCEDURE — 25010000002 ENOXAPARIN PER 10 MG: Performed by: HOSPITALIST

## 2021-12-09 PROCEDURE — 99232 SBSQ HOSP IP/OBS MODERATE 35: CPT | Performed by: INTERNAL MEDICINE

## 2021-12-09 PROCEDURE — 25010000002 MEROPENEM PER 100 MG: Performed by: PHYSICIAN ASSISTANT

## 2021-12-09 PROCEDURE — 94799 UNLISTED PULMONARY SVC/PX: CPT

## 2021-12-09 PROCEDURE — 82962 GLUCOSE BLOOD TEST: CPT

## 2021-12-09 PROCEDURE — 80069 RENAL FUNCTION PANEL: CPT | Performed by: PHYSICIAN ASSISTANT

## 2021-12-09 PROCEDURE — 25010000002 IRON SUCROSE PER 1 MG: Performed by: INTERNAL MEDICINE

## 2021-12-09 PROCEDURE — 85025 COMPLETE CBC W/AUTO DIFF WBC: CPT | Performed by: PHYSICIAN ASSISTANT

## 2021-12-09 PROCEDURE — 83735 ASSAY OF MAGNESIUM: CPT | Performed by: PHYSICIAN ASSISTANT

## 2021-12-09 PROCEDURE — 25010000002 MEROPENEM PER 100 MG: Performed by: HOSPITALIST

## 2021-12-09 PROCEDURE — 92526 ORAL FUNCTION THERAPY: CPT

## 2021-12-09 RX ADMIN — IPRATROPIUM BROMIDE AND ALBUTEROL SULFATE 3 ML: .5; 2.5 SOLUTION RESPIRATORY (INHALATION) at 00:41

## 2021-12-09 RX ADMIN — ENOXAPARIN SODIUM 40 MG: 40 INJECTION SUBCUTANEOUS at 08:39

## 2021-12-09 RX ADMIN — MEROPENEM 1 G: 1 INJECTION, POWDER, FOR SOLUTION INTRAVENOUS at 12:22

## 2021-12-09 RX ADMIN — ARFORMOTEROL TARTRATE 15 MCG: 15 SOLUTION RESPIRATORY (INHALATION) at 08:23

## 2021-12-09 RX ADMIN — IRON SUCROSE 400 MG: 20 INJECTION, SOLUTION INTRAVENOUS at 08:40

## 2021-12-09 RX ADMIN — MEROPENEM 1 G: 1 INJECTION, POWDER, FOR SOLUTION INTRAVENOUS at 01:23

## 2021-12-09 RX ADMIN — DEXTROSE MONOHYDRATE 25 G: 100 INJECTION, SOLUTION INTRAVENOUS at 08:15

## 2021-12-09 RX ADMIN — MICONAZOLE NITRATE: 2 POWDER TOPICAL at 08:40

## 2021-12-09 RX ADMIN — ARFORMOTEROL TARTRATE 15 MCG: 15 SOLUTION RESPIRATORY (INHALATION) at 21:46

## 2021-12-09 RX ADMIN — BUDESONIDE 0.5 MG: 0.5 SUSPENSION RESPIRATORY (INHALATION) at 21:46

## 2021-12-09 RX ADMIN — IPRATROPIUM BROMIDE AND ALBUTEROL SULFATE 3 ML: .5; 2.5 SOLUTION RESPIRATORY (INHALATION) at 08:23

## 2021-12-09 RX ADMIN — SODIUM CHLORIDE 50 ML/HR: 9 INJECTION, SOLUTION INTRAVENOUS at 20:57

## 2021-12-09 RX ADMIN — IPRATROPIUM BROMIDE AND ALBUTEROL SULFATE 3 ML: .5; 2.5 SOLUTION RESPIRATORY (INHALATION) at 12:06

## 2021-12-09 RX ADMIN — MICONAZOLE NITRATE 1 APPLICATION: 2 POWDER TOPICAL at 21:16

## 2021-12-09 RX ADMIN — BUDESONIDE 0.5 MG: 0.5 SUSPENSION RESPIRATORY (INHALATION) at 08:23

## 2021-12-09 NOTE — PLAN OF CARE
Problem: Adult Inpatient Plan of Care  Goal: Plan of Care Review  Outcome: Ongoing, Progressing  Goal: Patient-Specific Goal (Individualized)  Outcome: Ongoing, Progressing  Goal: Absence of Hospital-Acquired Illness or Injury  Outcome: Ongoing, Progressing  Intervention: Identify and Manage Fall Risk  Recent Flowsheet Documentation  Taken 12/9/2021 0000 by Elsi Valencia RN  Safety Promotion/Fall Prevention: safety round/check completed  Taken 12/8/2021 2200 by Elsi Valencia RN  Safety Promotion/Fall Prevention: safety round/check completed  Taken 12/8/2021 2017 by Elsi Valencia RN  Safety Promotion/Fall Prevention: safety round/check completed  Intervention: Prevent and Manage VTE (venous thromboembolism) Risk  Recent Flowsheet Documentation  Taken 12/8/2021 2017 by Elsi Valencia RN  VTE Prevention/Management: compression stockings off  Intervention: Prevent Infection  Recent Flowsheet Documentation  Taken 12/8/2021 2017 by Elsi Valencia RN  Infection Prevention:   hand hygiene promoted   personal protective equipment utilized   rest/sleep promoted  Goal: Optimal Comfort and Wellbeing  Outcome: Ongoing, Progressing  Intervention: Provide Person-Centered Care  Recent Flowsheet Documentation  Taken 12/8/2021 2017 by Elsi Valencia RN  Trust Relationship/Rapport: care explained  Goal: Readiness for Transition of Care  Outcome: Ongoing, Progressing     Problem: Skin Injury Risk Increased  Goal: Skin Health and Integrity  Outcome: Ongoing, Progressing  Intervention: Optimize Skin Protection  Recent Flowsheet Documentation  Taken 12/8/2021 2017 by Elsi Valencia RN  Pressure Reduction Techniques:   heels elevated off bed   frequent weight shift encouraged     Problem: Activity Intolerance  Goal: Enhanced Capacity and Energy  Outcome: Ongoing, Progressing  Intervention: Optimize Activity Tolerance  Recent Flowsheet Documentation  Taken 12/8/2021 2017 by Erik  CARLOS Calzada  Activity Management: bedrest  Environmental Support: calm environment promoted  Self-Care Promotion: independence encouraged     Problem: Confusion Acute  Goal: Optimal Cognitive Function  Outcome: Ongoing, Progressing  Intervention: Minimize Factors Contributing to Confusion  Recent Flowsheet Documentation  Taken 12/8/2021 2017 by Elsi Valencia RN  Reorientation Measures: clock in view  Goal: Optimal Cognitive Function  Outcome: Ongoing, Progressing  Intervention: Minimize Factors Contributing to Confusion  Recent Flowsheet Documentation  Taken 12/8/2021 2017 by Elsi Valencia RN  Reorientation Measures: clock in view     Problem: UTI (Urinary Tract Infection)  Goal: Improved Infection Symptoms  Outcome: Ongoing, Progressing  Intervention: Prevent and Manage Infection Progression  Recent Flowsheet Documentation  Taken 12/8/2021 2017 by Elsi Vaelncia RN  Bladder Spasm Management: catheter secured to abdomen/leg     Problem: Functional Ability Impaired (Hospitalized Older Adult)  Goal: Optimal Functional Ability  Outcome: Ongoing, Progressing  Intervention: Promote Functional Ability  Recent Flowsheet Documentation  Taken 12/8/2021 2017 by Elsi Valencia RN  Activity Assistance Provided: assistance, 2 people  Self-Care Promotion: independence encouraged     Problem: Oral Intake Inadequate (Hospitalized Older Adult)  Goal: Optimal Nutrition Intake  Outcome: Ongoing, Progressing     Problem: Urinary Incontinence (Hospitalized Older Adult)  Goal: Urinary Incontinence Managed  Outcome: Ongoing, Progressing     Problem: Swallowing Impairment  Goal: Improved Swallowing Without Aspiration  Outcome: Ongoing, Progressing  Intervention: Optimize Eating and Swallowing  Recent Flowsheet Documentation  Taken 12/8/2021 2017 by Elsi Valencia RN  Aspiration Precautions: (NPO) other (see comments)     Problem: Palliative Care  Goal: Enhanced Quality of Life  Outcome: Ongoing,  Progressing  Intervention: Maximize Comfort  Recent Flowsheet Documentation  Taken 12/8/2021 2017 by Elsi Valencia RN  Pain Management Interventions:   position adjusted   pillow support provided  Intervention: Optimize Psychosocial Wellbeing  Recent Flowsheet Documentation  Taken 12/8/2021 2017 by Elsi Valencia RN  Supportive Measures: active listening utilized  Family/Support System Care: support provided     Problem: Fall Injury Risk  Goal: Absence of Fall and Fall-Related Injury  Outcome: Ongoing, Progressing  Intervention: Identify and Manage Contributors to Fall Injury Risk  Recent Flowsheet Documentation  Taken 12/8/2021 2017 by Elsi Valencia RN  Self-Care Promotion: independence encouraged  Intervention: Promote Injury-Free Environment  Recent Flowsheet Documentation  Taken 12/9/2021 0000 by Elsi Valencia RN  Safety Promotion/Fall Prevention: safety round/check completed  Taken 12/8/2021 2200 by Elsi Valencia RN  Safety Promotion/Fall Prevention: safety round/check completed  Taken 12/8/2021 2017 by Elsi Valencia RN  Safety Promotion/Fall Prevention: safety round/check completed   Goal Outcome Evaluation:   Patient seemed some anxious at beginning of shift but seemed to settle down and rested quietly tonight.  Patient continues to have her cough with unknown type of sputum but does seem to be productive but patient swallows it.  Patient turned every two hours, and heels floated.  Patient tolerated well.  Family (two sons) visited at beginning of shift and was advised to have family gathering between the siblings and discuss patient's code status and other healthcare options.  The sons verbalized understanding.       Elsi Valencia RN

## 2021-12-09 NOTE — NURSING NOTE
Patient is currently on 4mtu. Patient laying in bed with eyes closed at time of visit. No family at bedside, will allow patient to rest at this time. Call placed to patients daughters to discuss goals of care and decision making. No answer, left message requesting call back. Notified primary rn to notify palliative care when family visits today. Palliative care will continue to follow to support and assist.    KASIE Jung, RN  Palliative Care

## 2021-12-09 NOTE — NURSING NOTE
"Call received from patients Sophia izaguirre. Further discussed goals of care and decision making. Patient does not have advanced directive/living will documents, so all 4 children are involved in decision making. Patient has 2 daughters and 2 sons. Sons were able to visit with patient yesterday. Patients Sophia izaguirre states they discussed decision making and agree to DNR and no feeding tube. Addressed questions regarding hospice care. Patients daughter Sophia state they discussed taking patient back to patients house to care for her there. Sophia states her and her sister are trying to get everything ready at that house and getting things lined up with their job to be off work. Sophia verbalizes understanding that all 4 children are involved in decision making. Palliative care requested patients sons numbers as they are not currently listed. Patients daughter states \"well I'm on the phone and cant get them right now\" Sophia states she thinks her 2 brothers will be coming up this evening with them. Informed Sohpia family could let primary rn know of their wishes regarding code status. Updated primary rn. Discussed comfort measures only. Sophia states her and her sister will be visiting tomorrow and request palliative care come visit tomorrow to further discuss comfort measures and make hospice referral. Updated provider. Palliative care will continue to follow to support and assist.    KASIE Jung, RN  Palliative Care     "

## 2021-12-09 NOTE — THERAPY TREATMENT NOTE
Acute Care - Speech Language Pathology   Swallow Treatment Note  Felice     Patient Name: Mirna Meredith  : 1945  MRN: [id number]  Today's Date: 2021               Admit Date: 2021    Visit Dx:     ICD-10-CM ICD-9-CM   1. Weakness  R53.1 780.79   2. Altered mental status, unspecified altered mental status type  R41.82 780.97   3. Oropharyngeal dysphagia  R13.12 787.22     Patient Active Problem List   Diagnosis   • Abdominal pain   • Sepsis (HCC)   • Metabolic encephalopathy   • Acute renal failure (ARF) (HCC)   • Metastatic breast cancer (HCC)   • Chronic diarrhea   • Pancytopenia (HCC)   • Weakness     Past Medical History:   Diagnosis Date   • Cancer (HCC)     Breast with mets to colon and bones   • COPD (chronic obstructive pulmonary disease) (HCC)    • Depression    • Diabetes mellitus (HCC)    • Hyperlipidemia    • Hypertension      Past Surgical History:   Procedure Laterality Date   • CHOLECYSTECTOMY     • CYSTOSCOPY, RETROGRADE PYELOGRAM AND STENT INSERTION Right 2021    Procedure: CYSTOSCOPY RETROGRADE PYELOGRAM AND STENT INSERTION;  Surgeon: Allen Rodriguez MD;  [facility];  Service: Urology;  Laterality: Right;   • HYSTERECTOMY     • JOINT REPLACEMENT Bilateral     Knees   • MASTECTOMY Bilateral    SPEECH PATHOLOGY DYSPHAGIA TREATMENT    Subjective/Behavioral Observations: Patient sleeping upon arrival however does awaken with verbal cues.  Opens eyes, smiling, verbalizes short sentences.  Able to maintain adequate alertness for treatment.        Day/time of Treatment: 2021        Current Diet: N.p.o.        Treatment received: Treatment focused on tolerance of diet consistency secondary to improvement in mental status.  Patient previously made n.p.o. secondary to decreased alertness.  Per nursing report, patient's family had been trying to decide on palliative care versus core track placement.        Results of treatment: Patient alert during  treatment.  Tolerated trials of nectar thickened liquids by spoon and control cup drink.  Tolerated approximately 120 mL with delayed swallows each trial.  The swallow delay 2 to 3 seconds.  Laryngeal sounds remained clear to auscultation and vocal quality remained clear.  Also tolerated 1 cup of purée by spoon with 2 to 3-second swallow delay.        Progress toward goals: Improvement in mental status.  Tolerating diet trials of puréed solids and nectar thickened liquids.        Barriers to Achieving goals: Overall medical status, age-related.  Inconsistent mental status follow predisposing patient to risk of aspiration.        Plan of care:/changes in plan: Discussed with nursing.  As long as patient able to maintain adequate alertness, recommend reinitiate previous diet recommendations of puréed solids and nectar thickened liquids.                  SLP Recommendation and Plan                                                             Plan of Care Reviewed With: patient          EDUCATION  The patient has been educated in the following areas:   Dysphagia (Swallowing Impairment).              Time Calculation:    Time Calculation- SLP     Row Name 12/09/21 1219             Time Calculation- SLP    SLP Stop Time 1130  -SN      SLP Received On 12/09/21  -SN              Untimed Charges    09859-MQ Treatment Swallow Minutes 45  -SN              Total Minutes    Untimed Charges Total Minutes 45  -SN       Total Minutes 45  -SN            User Key  (r) = Recorded By, (t) = Taken By, (c) = Cosigned By    Initials Name Provider Type    Monica Orozco SLP Speech and Language Pathologist                Therapy Charges for Today     Code Description Service Date Service Provider Modifiers Qty    [id number]  ST TREATMENT SWALLOW 3 12/9/2021 Monica Sweeney SLP GN 1               GABY Kruse  12/9/2021

## 2021-12-09 NOTE — PLAN OF CARE
Problem: Fall Injury Risk  Goal: Absence of Fall and Fall-Related Injury  Outcome: Ongoing, Progressing  Intervention: Identify and Manage Contributors to Fall Injury Risk  Recent Flowsheet Documentation  Taken 12/9/2021 0800 by La Moyer RN  Medication Review/Management: medications reviewed  Intervention: Promote Injury-Free Environment  Recent Flowsheet Documentation  Taken 12/9/2021 0800 by La Moyer RN  Safety Promotion/Fall Prevention:   assistive device/personal items within reach   clutter free environment maintained   safety round/check completed   room organization consistent     Problem: Adult Inpatient Plan of Care  Goal: Plan of Care Review  Outcome: Ongoing, Progressing  Flowsheets (Taken 12/9/2021 1523)  Progress: improving  Plan of Care Reviewed With: patient  Outcome Summary: waiting on ng placement and code status update from patient's family. no other changes will continue to monitor patient.  Goal: Patient-Specific Goal (Individualized)  Outcome: Ongoing, Progressing  Goal: Absence of Hospital-Acquired Illness or Injury  Outcome: Ongoing, Progressing  Intervention: Identify and Manage Fall Risk  Recent Flowsheet Documentation  Taken 12/9/2021 0800 by La Moyer RN  Safety Promotion/Fall Prevention:   assistive device/personal items within reach   clutter free environment maintained   safety round/check completed   room organization consistent  Intervention: Prevent Infection  Recent Flowsheet Documentation  Taken 12/9/2021 0800 by La Moyer RN  Infection Prevention:   single patient room provided   rest/sleep promoted   environmental surveillance performed   equipment surfaces disinfected  Goal: Optimal Comfort and Wellbeing  Outcome: Ongoing, Progressing  Intervention: Provide Person-Centered Care  Recent Flowsheet Documentation  Taken 12/9/2021 0800 by La Moyer RN  Trust Relationship/Rapport:   care explained   choices provided   emotional support provided   empathic listening  provided   questions answered   questions encouraged   reassurance provided   thoughts/feelings acknowledged  Goal: Readiness for Transition of Care  Outcome: Ongoing, Progressing     Problem: Skin Injury Risk Increased  Goal: Skin Health and Integrity  Outcome: Ongoing, Progressing     Problem: Activity Intolerance  Goal: Enhanced Capacity and Energy  Outcome: Ongoing, Progressing     Problem: Confusion Acute  Goal: Optimal Cognitive Function  Outcome: Ongoing, Progressing  Goal: Optimal Cognitive Function  Outcome: Ongoing, Progressing     Problem: UTI (Urinary Tract Infection)  Goal: Improved Infection Symptoms  Outcome: Ongoing, Progressing     Problem: Functional Ability Impaired (Hospitalized Older Adult)  Goal: Optimal Functional Ability  Outcome: Ongoing, Progressing     Problem: Oral Intake Inadequate (Hospitalized Older Adult)  Goal: Optimal Nutrition Intake  Outcome: Ongoing, Progressing     Problem: Urinary Incontinence (Hospitalized Older Adult)  Goal: Urinary Incontinence Managed  Outcome: Ongoing, Progressing     Problem: Swallowing Impairment  Goal: Improved Swallowing Without Aspiration  Outcome: Ongoing, Progressing     Problem: Palliative Care  Goal: Enhanced Quality of Life  Outcome: Ongoing, Progressing  Intervention: Maximize Comfort  Recent Flowsheet Documentation  Taken 12/9/2021 0800 by La Moyer RN  Pain Management Interventions:   quiet environment facilitated   relaxation techniques promoted  Intervention: Optimize Psychosocial Wellbeing  Recent Flowsheet Documentation  Taken 12/9/2021 0800 by La Moyer RN  Family/Support System Care: support provided   Goal Outcome Evaluation:  Plan of Care Reviewed With: patient        Progress: improving  Outcome Summary: waiting on ng placement and code status update from patient's family. no other changes will continue to monitor patient.

## 2021-12-09 NOTE — PROGRESS NOTES
[facility]   Hospitalist Progress Note  Date: 2021  Patient Name: Mirna Meredith  : 1945  MRN: [id number]  Date of admission: 2021      Subjective   Subjective     Chief Complaint: Altered mental status    Summary: 76-year-old female with metastatic breast cancer, COPD, diabetes.  She had been admitted to the hospital in mid November with hydronephrosis and questionable ureteral stricture, underwent stenting of the right ureter on .  She was discharged to [facility] rehab after that.  Upon returning home she began getting confused again.  Urinalysis was positive for UTI and her chest x-ray showed possible pneumonia.  She is admitted to the medicine service.  She was started on IV antibiotics.  Hematology/oncology was consulted as well.    Interval Followup: Patient seen and examined no real change noted.  Remains on antibiotics remains on little bit IV fluids.  Safe to take pills by mouth but not safe for diet.  Patient's 2 daughters agree that patient should be a DNR and do not want tube feeds which is very appropriate given the patient's overall condition and poor prognosis.  But evidently need the patient's 2 sons to also agree with this decision.  Continue treatment as ordered prognosis appears very poor patient would be a good candidate for hospice care.   MRI negative for any acute intracranial changes      Review of Systems   Unable to obtain due to mental status  Objective   Objective     Vitals:   Temp:  [97.2 °F (36.2 °C)-97.9 °F (36.6 °C)] 97.5 °F (36.4 °C)  Heart Rate:  [103-109] 105  Resp:  [16-20] 16  BP: (124-140)/(62-82) 131/62  Physical Exam    Constitutional: Awake confused attempts to answer questions but does not make any sense   Eyes: Pupils equal, sclerae anicteric, no conjunctival injection   HENT: NCAT, mucous membranes moist   Neck: Supple, no lad   Respiratory: Diminished poor inspiratory effort no increased work of breathing noted   cardiovascular: RRR,  no murmurs   gastrointestinal: Positive bowel sounds, soft, nontender, nondistended   Musculoskeletal: No bilateral ankle edema, no clubbing or cyanosis to extremities   Psychiatric: Unable to assess   neurologic: No focal deficits but difficult to assess given mental status   skin: No rashes     Result Review    Result Review:  I have personally reviewed the results from the time of this admission to 12/9/2021 15:44 EST and agree with these findings:  [x]  Laboratory  [x]  Microbiology  [x]  Radiology  []  EKG/Telemetry   []  Cardiology/Vascular   []  Pathology  []  Old records  []  Other:    Assessment/Plan   Assessment / Plan     Assessment:  • Urinary tract infection, recurrent  • Metastatic breast cancer  • Recent ureteral stent placement  • Acute metabolic encephalopathy  • JOESPH   • Possible CAP  • Pleural effusion, left-sided  • Recent right ureteral stent placement    Plan:  · Continue antibiotics.  Cultures unyielding thus far.  · Continue nebulizer treatments  · Resumed Lopressor  · MRI obtained reviewed no acute changes noted  · Long discussion with the patient's daughters at the bedside discussed her overall poor prognosis per extensive bony metastasis and probable pleural-based lesion advised them to discuss the patient's CODE STATUS and long-term wishes amongst themselves and her 2 sons currently daughters want DNR CODE STATUS and no tube feeds but need the patient's sons to agree.  Overall her prognosis appears very poor Would be very appropriate for palliative care at this point   · Palliative care consultation consultation appreciated  · Speech unable to recommend safe oral intake okay to take oral meds  · Core track ordered but will await family decision before placing  · Oncology consultation appreciate continue care per the recommendation  · PT OT speech therapy consultations  · Lovenox for DVT prophylaxis  · Discussed with RN         DVT prophylaxis:  Medical DVT prophylaxis orders are  present.    CODE STATUS:   Level Of Support Discussed With: Patient  Code Status (Patient has no pulse and is not breathing): CPR (Attempt to Resuscitate)  Medical Interventions (Patient has pulse or is breathing): Full Support    Electronically signed by KARLI Edwards, 12/09/21, 3:47 PM EST.          Attending Note:  I have seen and examined the patient and agree with the above information from Sarthak Martinez PA-C and have outlined plan of care.  76-year-old with metastatic cancer, UTI, recurrent.  Continue antibiotics.  MRI brain without acute stroke or metastasis.  No significant clinical change.  Recommend palliative care.  Family is discussing goals.  On exam she is in no distress, seems to be comfortable but confused, cannot really participate in any conversation.  Electronically signed by Tremayne Fitzpatrick MD, 12/09/21, 4:43 PM EST.

## 2021-12-09 NOTE — PROGRESS NOTES
[facility]     Progress Note    Patient Name: Mirna Meredith  : 1945  MRN: [id number]  Primary Care Physician:  Pan Pacheco MD  Date of admission: 2021    Subjective   Subjective     Chief Complaint: Patient with metastatic breast cancer as well as history of ulcerative colitis patient is confused disoriented has early dementia family to decide regarding comfort care it has been discussed with them by  and by palliative care nurses I have tried to reach the, Rao the daughter who normally brings her to my office but have not been able to reach her recently we will try to reach them again but apparently there are some siblings and they have to have get together and decide about what to do next    HPI:  Patient Reports patient has been admitted because of confusion disorientation episodes probably part of dementia and hospital-itis as well    Review of Systems   All systems were reviewed and negative except for: Reviewed    Objective   Objective     Vitals:   Temp:  [97.2 °F (36.2 °C)-98.6 °F (37 °C)] 97.5 °F (36.4 °C)  Heart Rate:  [105-112] 107  Resp:  [18-20] 20  BP: (121-150)/() 140/82    Physical Exam    Constitutional: Awake, alert   Eyes: PERRLA, sclerae anicteric, no conjunctival injection   HENT: NCAT, mucous membranes moist   Neck: Supple, no thyromegaly, no lymphadenopathy, trachea midline   Respiratory: Clear to auscultation bilaterally, nonlabored respirations    Cardiovascular: RRR, no murmurs, rubs, or gallops, palpable pedal pulses bilaterally   Gastrointestinal: Positive bowel sounds, soft, nontender, nondistended   Musculoskeletal: No bilateral ankle edema, no clubbing or cyanosis to extremities   Psychiatric: Appropriate affect, cooperative   Neurologic: Oriented x 3, strength symmetric in all extremities, Cranial Nerves grossly intact to confrontation, speech clear   Skin: No rashes     Result Review    Result Review:  I have personally reviewed the  results from the time of this admission to 12/9/2021 08:02 EST and agree with these findings:  []  Laboratory  []  Microbiology  []  Radiology  []  EKG/Telemetry   []  Cardiology/Vascular   []  Pathology  []  Old records  []  Other: Anemia  Most notable findings include: Confusion disorientation with metastatic breast cancer    Assessment/Plan   Assessment / Plan     Brief Patient Summary:  Mirna Meredith is a 76 y.o. female who patient has metastatic breast cancer has been confused disoriented probably progressive dementia    Active Hospital Problems:  Active Hospital Problems    Diagnosis    • Weakness        Plan:   Family to decide regarding possible palliative care    DVT prophylaxis:  Medical DVT prophylaxis orders are present.    CODE STATUS:   Level Of Support Discussed With: Patient  Code Status (Patient has no pulse and is not breathing): CPR (Attempt to Resuscitate)  Medical Interventions (Patient has pulse or is breathing): Full Support    Disposition:  I expect patient to be discharged when the decisions are made regarding further management.    Electronically signed by Jose G Grissom MD, 12/09/21, 8:02 AM EST.

## 2021-12-10 LAB
ALBUMIN SERPL-MCNC: 2.7 G/DL (ref 3.5–5.2)
ALP SERPL-CCNC: 148 U/L (ref 39–117)
ALT SERPL W P-5'-P-CCNC: 16 U/L (ref 1–33)
ANION GAP SERPL CALCULATED.3IONS-SCNC: 13.2 MMOL/L (ref 5–15)
AST SERPL-CCNC: 61 U/L (ref 1–32)
BASOPHILS # BLD AUTO: 0.1 10*3/MM3 (ref 0–0.2)
BASOPHILS NFR BLD AUTO: 1.1 % (ref 0–1.5)
BILIRUB CONJ SERPL-MCNC: <0.2 MG/DL (ref 0–0.3)
BILIRUB INDIRECT SERPL-MCNC: ABNORMAL MG/DL
BILIRUB SERPL-MCNC: 0.3 MG/DL (ref 0–1.2)
BUN SERPL-MCNC: 20 MG/DL (ref 8–23)
BUN/CREAT SERPL: 21.3 (ref 7–25)
CALCIUM SPEC-SCNC: 10.8 MG/DL (ref 8.6–10.5)
CHLORIDE SERPL-SCNC: 94 MMOL/L (ref 98–107)
CO2 SERPL-SCNC: 26.8 MMOL/L (ref 22–29)
CREAT SERPL-MCNC: 0.94 MG/DL (ref 0.57–1)
DEPRECATED RDW RBC AUTO: 59.3 FL (ref 37–54)
EOSINOPHIL # BLD AUTO: 0.11 10*3/MM3 (ref 0–0.4)
EOSINOPHIL NFR BLD AUTO: 1.2 % (ref 0.3–6.2)
ERYTHROCYTE [DISTWIDTH] IN BLOOD BY AUTOMATED COUNT: 18.3 % (ref 12.3–15.4)
GFR SERPL CREATININE-BSD FRML MDRD: 58 ML/MIN/1.73
GLUCOSE BLDC GLUCOMTR-MCNC: 85 MG/DL (ref 70–99)
GLUCOSE BLDC GLUCOMTR-MCNC: 88 MG/DL (ref 70–99)
GLUCOSE BLDC GLUCOMTR-MCNC: 97 MG/DL (ref 70–99)
GLUCOSE SERPL-MCNC: 79 MG/DL (ref 65–99)
HCT VFR BLD AUTO: 28 % (ref 34–46.6)
HGB BLD-MCNC: 8.9 G/DL (ref 12–15.9)
IMM GRANULOCYTES # BLD AUTO: 0.63 10*3/MM3 (ref 0–0.05)
IMM GRANULOCYTES NFR BLD AUTO: 7 % (ref 0–0.5)
LYMPHOCYTES # BLD AUTO: 1.19 10*3/MM3 (ref 0.7–3.1)
LYMPHOCYTES NFR BLD AUTO: 13.2 % (ref 19.6–45.3)
MAGNESIUM SERPL-MCNC: 1.8 MG/DL (ref 1.6–2.4)
MCH RBC QN AUTO: 28.1 PG (ref 26.6–33)
MCHC RBC AUTO-ENTMCNC: 31.8 G/DL (ref 31.5–35.7)
MCV RBC AUTO: 88.3 FL (ref 79–97)
MONOCYTES # BLD AUTO: 0.81 10*3/MM3 (ref 0.1–0.9)
MONOCYTES NFR BLD AUTO: 9 % (ref 5–12)
NEUTROPHILS NFR BLD AUTO: 6.16 10*3/MM3 (ref 1.7–7)
NEUTROPHILS NFR BLD AUTO: 68.5 % (ref 42.7–76)
NRBC BLD AUTO-RTO: 0.3 /100 WBC (ref 0–0.2)
PHOSPHATE SERPL-MCNC: 3.3 MG/DL (ref 2.5–4.5)
PLAT MORPH BLD: NORMAL
PLATELET # BLD AUTO: 212 10*3/MM3 (ref 140–450)
PMV BLD AUTO: 10.3 FL (ref 6–12)
POTASSIUM SERPL-SCNC: 4.2 MMOL/L (ref 3.5–5.2)
PROT SERPL-MCNC: 7 G/DL (ref 6–8.5)
RBC # BLD AUTO: 3.17 10*6/MM3 (ref 3.77–5.28)
RBC MORPH BLD: NORMAL
SODIUM SERPL-SCNC: 134 MMOL/L (ref 136–145)
WBC MORPH BLD: NORMAL
WBC NRBC COR # BLD: 9 10*3/MM3 (ref 3.4–10.8)

## 2021-12-10 PROCEDURE — 82962 GLUCOSE BLOOD TEST: CPT

## 2021-12-10 PROCEDURE — 80048 BASIC METABOLIC PNL TOTAL CA: CPT | Performed by: PHYSICIAN ASSISTANT

## 2021-12-10 PROCEDURE — 85025 COMPLETE CBC W/AUTO DIFF WBC: CPT | Performed by: PHYSICIAN ASSISTANT

## 2021-12-10 PROCEDURE — 25010000002 ENOXAPARIN PER 10 MG: Performed by: HOSPITALIST

## 2021-12-10 PROCEDURE — 94799 UNLISTED PULMONARY SVC/PX: CPT

## 2021-12-10 PROCEDURE — 99231 SBSQ HOSP IP/OBS SF/LOW 25: CPT | Performed by: UROLOGY

## 2021-12-10 PROCEDURE — 85007 BL SMEAR W/DIFF WBC COUNT: CPT | Performed by: PHYSICIAN ASSISTANT

## 2021-12-10 PROCEDURE — 25010000002 MEROPENEM PER 100 MG: Performed by: PHYSICIAN ASSISTANT

## 2021-12-10 PROCEDURE — 92526 ORAL FUNCTION THERAPY: CPT

## 2021-12-10 PROCEDURE — 84100 ASSAY OF PHOSPHORUS: CPT | Performed by: PHYSICIAN ASSISTANT

## 2021-12-10 PROCEDURE — 80076 HEPATIC FUNCTION PANEL: CPT | Performed by: PHYSICIAN ASSISTANT

## 2021-12-10 PROCEDURE — 83735 ASSAY OF MAGNESIUM: CPT | Performed by: PHYSICIAN ASSISTANT

## 2021-12-10 PROCEDURE — 25010000002 IRON SUCROSE PER 1 MG: Performed by: INTERNAL MEDICINE

## 2021-12-10 PROCEDURE — 99232 SBSQ HOSP IP/OBS MODERATE 35: CPT | Performed by: INTERNAL MEDICINE

## 2021-12-10 RX ADMIN — METOPROLOL TARTRATE 25 MG: 25 TABLET, FILM COATED ORAL at 20:30

## 2021-12-10 RX ADMIN — IPRATROPIUM BROMIDE AND ALBUTEROL SULFATE 3 ML: .5; 2.5 SOLUTION RESPIRATORY (INHALATION) at 19:40

## 2021-12-10 RX ADMIN — MICONAZOLE NITRATE: 2 POWDER TOPICAL at 20:30

## 2021-12-10 RX ADMIN — IPRATROPIUM BROMIDE AND ALBUTEROL SULFATE 3 ML: .5; 2.5 SOLUTION RESPIRATORY (INHALATION) at 12:23

## 2021-12-10 RX ADMIN — ARFORMOTEROL TARTRATE 15 MCG: 15 SOLUTION RESPIRATORY (INHALATION) at 19:40

## 2021-12-10 RX ADMIN — IPRATROPIUM BROMIDE AND ALBUTEROL SULFATE 3 ML: .5; 2.5 SOLUTION RESPIRATORY (INHALATION) at 01:08

## 2021-12-10 RX ADMIN — ROSUVASTATIN CALCIUM 10 MG: 5 TABLET, FILM COATED ORAL at 20:29

## 2021-12-10 RX ADMIN — SENNOSIDES AND DOCUSATE SODIUM 2 TABLET: 50; 8.6 TABLET ORAL at 20:29

## 2021-12-10 RX ADMIN — ARFORMOTEROL TARTRATE 15 MCG: 15 SOLUTION RESPIRATORY (INHALATION) at 07:41

## 2021-12-10 RX ADMIN — BUDESONIDE 0.5 MG: 0.5 SUSPENSION RESPIRATORY (INHALATION) at 07:41

## 2021-12-10 RX ADMIN — IPRATROPIUM BROMIDE AND ALBUTEROL SULFATE 3 ML: .5; 2.5 SOLUTION RESPIRATORY (INHALATION) at 07:41

## 2021-12-10 RX ADMIN — IRON SUCROSE 400 MG: 20 INJECTION, SOLUTION INTRAVENOUS at 12:17

## 2021-12-10 RX ADMIN — BUDESONIDE 0.5 MG: 0.5 SUSPENSION RESPIRATORY (INHALATION) at 19:40

## 2021-12-10 RX ADMIN — MEMANTINE 10 MG: 10 TABLET ORAL at 20:30

## 2021-12-10 RX ADMIN — MEROPENEM 1 G: 1 INJECTION, POWDER, FOR SOLUTION INTRAVENOUS at 00:19

## 2021-12-10 RX ADMIN — MICONAZOLE NITRATE: 2 POWDER TOPICAL at 09:01

## 2021-12-10 RX ADMIN — ENOXAPARIN SODIUM 40 MG: 40 INJECTION SUBCUTANEOUS at 08:59

## 2021-12-10 RX ADMIN — MEROPENEM 1 G: 1 INJECTION, POWDER, FOR SOLUTION INTRAVENOUS at 14:12

## 2021-12-10 NOTE — PROGRESS NOTES
[facility]     Progress Note    Patient Name: Mirna Meredith  : 1945  MRN: [id number]  Primary Care Physician:  Pan Pacheco MD  Date of admission: 2021    Subjective   Subjective     Chief Complaint: Metastatic breast cancer    Generalized weakness    Retained stent right ureter    HPI:  Patient Reports better than her last seen about 2 days ago.  Still having some minor abdominal pain    Review of Systems    Review of Systems    No change from before    Objective   Objective     Vitals:   Temp:  [97.5 °F (36.4 °C)-99.3 °F (37.4 °C)] 98 °F (36.7 °C)  Heart Rate:  [105-140] 119  Resp:  [14-16] 16  BP: (131-160)/(62-86) 160/86    Physical Exam     Physical Exam  Constitutional:       General: She is not in acute distress.     Appearance: She is obese. She is ill-appearing.      Comments: Patient still feeling weak   HENT:      Head: Normocephalic and atraumatic.   Cardiovascular:      Rate and Rhythm: Regular rhythm. Tachycardia present.      Pulses: Normal pulses.      Heart sounds: Normal heart sounds. No murmur heard.      Pulmonary:      Effort: Pulmonary effort is normal. No respiratory distress.      Breath sounds: Normal breath sounds. No rhonchi or rales.   Abdominal:      Palpations: Abdomen is soft.      Comments: Whenever I press her abdomen she says is tender but she is not showing any expression of pain on palpation of abdomen   Musculoskeletal:      Cervical back: Normal range of motion and neck supple. No rigidity or tenderness.   Lymphadenopathy:      Cervical: No cervical adenopathy.   Neurological:      General: No focal deficit present.      Mental Status: She is alert and oriented to person, place, and time.      Motor: Weakness present.   Psychiatric:         Mood and Affect: Mood normal.      Comments: Patient is not answering questions very briskly         Result Review    Result Review:  I have personally reviewed the results from the time of this admission to  12/10/2021 08:34 EST and agree with these findings:  []  Laboratory  []  Microbiology  []  Radiology  []  EKG/Telemetry   []  Cardiology/Vascular   []  Pathology  []  Old records  []  Other:  Most notable findings include: Retained right ureteral stent for hydronephrosis from metastatic breast cancer    Assessment/Plan   Assessment / Plan     Brief Patient Summary:  Mirna Meredith is a 76 y.o. female who has metastatic breast cancer    Active Hospital Problems:  Active Hospital Problems    Diagnosis    • Weakness      Plan: Continue present treatment       DVT prophylaxis:  Medical DVT prophylaxis orders are present.    CODE STATUS:   Level Of Support Discussed With: Patient  Code Status (Patient has no pulse and is not breathing): CPR (Attempt to Resuscitate)  Medical Interventions (Patient has pulse or is breathing): Full Support        Electronically signed by Allen Rodriguez MD, 12/10/21, 8:34 AM EST.

## 2021-12-10 NOTE — CONSULTS
"Nutrition Services    Patient Name: Mirna Meredith  YOB: 1945  MRN: [id number]  Admission date: 12/4/2021      CLINICAL NUTRITION ASSESSMENT      Reason for Assessment  Admission assessment, Physician consult     H&P:    Past Medical History:   Diagnosis Date   • Cancer (HCC)     Breast with mets to colon and bones   • COPD (chronic obstructive pulmonary disease) (HCC)    • Depression    • Diabetes mellitus (HCC)    • Hyperlipidemia    • Hypertension         Current Problems:   Active Hospital Problems    Diagnosis    • Weakness         Nutrition/Diet History         Narrative     § Pt admitted for altered mental status with metastatic cancer. On admission, pt noted to have difficulty swallowing with reduced oral intake over the past months. According to nurse intake documentation, pt had been consuming % prior to being deemed unfit for oral diet. Per wt history, pt has lost 10# (4.9%) over last month.  SLP has seen and states pt is not safe for oral diet, Cortrak order placed per MD. Will provide recommendations for initiation of tube feeds. Goals of care discussion currently being had, will follow for decision on placement of Cortrak. At this time,  \"daughters want DNR CODE STATUS and no tube feeds but need the patient's sons to agree.\" Will continue to follow and monitor for goals of care decision and  nutrition interventions at this time.     Anthropometrics        Current Height, Weight Height: 165.1 cm (65\")  Weight: 88 kg (194 lb 0.1 oz)   Current BMI Body mass index is 32.28 kg/m².       Weight Hx  Wt Readings from Last 30 Encounters:   12/04/21 1916 88 kg (194 lb 0.1 oz)   11/11/21 2316 92.7 kg (204 lb 5.9 oz)   10/18/21 0533 92.7 kg (204 lb 5.9 oz)   09/09/21 1952 95.3 kg (210 lb)   06/08/21 0942 100 kg (220 lb 14.4 oz)   06/08/21 0004 118 kg (260 lb)   12/07/20 0000 106 kg (234 lb 4 oz)   12/04/19 0000 109 kg (240 lb)   06/20/19 0000 113 kg (249 lb)   06/10/19 0000 112 kg (248 lb) "   06/05/19 0000 112 kg (248 lb)   05/29/19 0000 113 kg (250 lb)   05/15/19 0000 113 kg (250 lb)   04/16/19 0000 113 kg (250 lb)   10/16/18 0000 112 kg (246 lb)   08/16/18 0000 111 kg (244 lb)   05/09/18 0000 112 kg (246 lb 6 oz)   04/18/18 0000 110 kg (242 lb)   01/23/18 0000 113 kg (250 lb)            Wt Change Observation 10# (4.9%) loss over last month.      Estimated/Assessed Needs       Energy Requirements 20-25 kcal/kg   EST Needs (kcal/day) 5373-5154       Protein Requirements 1-1.4 g/kg   EST Daily Needs (g/day)        Fluid Requirements 1 ml/kcal    Estimated Needs (mL/day) 1760     Labs/Medications         Pertinent Labs Reviewed.   Results from last 7 days   Lab Units 12/10/21  0431 12/09/21  0451 12/08/21  0444 12/07/21  0503 12/06/21  0522 12/04/21  1813 12/04/21  1813   SODIUM mmol/L 134* 132* 130*   < > 130*   < > 134*   POTASSIUM mmol/L 4.2 4.3 4.2   < > 4.2   < > 4.3   CHLORIDE mmol/L 94* 93* 92*   < > 92*   < > 94*   CO2 mmol/L 26.8 28.1 26.8   < > 26.4   < > 29.8*   BUN mg/dL 20 18 15   < > 11   < > 13   CREATININE mg/dL 0.94 0.94 0.95   < > 1.17*   < > 1.31*   CALCIUM mg/dL 10.8* 10.6* 10.3   < > 9.9   < > 10.4   BILIRUBIN mg/dL 0.3  --   --   --  0.4  --  0.3   ALK PHOS U/L 148*  --   --   --  146*  --  161*   ALT (SGPT) U/L 16  --   --   --  14  --  15   AST (SGOT) U/L 61*  --   --   --  47*  --  51*   GLUCOSE mg/dL 79 74 107*   < > 107*   < > 114*    < > = values in this interval not displayed.     Results from last 7 days   Lab Units 12/10/21  0431 12/09/21  0451 12/09/21  0451 12/08/21  0444 12/08/21  0444   MAGNESIUM mg/dL 1.8  --  2.0  --  1.7   PHOSPHORUS mg/dL 3.3   < > 3.4   < > 3.3   HEMOGLOBIN g/dL 8.9*   < > 9.6*   < > 8.7*   HEMATOCRIT % 28.0*   < > 30.5*   < > 27.0*    < > = values in this interval not displayed.     Lab Results   Component Value Date    HGBA1C 8.70 (H) 11/12/2021         Pertinent Medications Reviewed.     Current Nutrition Orders & Evaluation of Intake        Oral Nutrition     Current PO Diet NPO Diet   Supplement Orders Placed This Encounter      Place Feeding Tube Per Blekko System       Nutrition Diagnosis         Nutrition Dx Problem 1 Inadequate energy Intake related to inadequate oral Intake as evidenced by NPO       Nutrition Intervention         If tube feeds initiated after goals of care conversation:    Diabetisource @ 65 ml/hr with 30 ml free water flush Q3H  Provides 1872 kcal, 94 g pro, and 1519 ml total fluids     Medical Nutrition Therapy/Nutrition Education          Learner     Readiness Patient  Education not appropriate at this time     Monitor/Evaluation        Monitor Per protocol, Pertinent labs, EN delivery/tolerance, GI status, Symptoms, POC/GOC, Swallow function, Diet advancement       Nutrition Discharge Plan         To be determined       Electronically signed by:  Angelina Webb, CHRISTIANO  12/10/21 08:27 EST

## 2021-12-10 NOTE — PROGRESS NOTES
[facility]   Hospitalist Progress Note  Date: 12/10/2021  Patient Name: Mirna Meredith  : 1945  MRN: [id number]  Date of admission: 2021      Subjective   Subjective     Chief Complaint: Altered mental status    Summary: 76-year-old female with metastatic breast cancer, COPD, diabetes.  She had been admitted to the hospital in mid November with hydronephrosis and questionable ureteral stricture, underwent stenting of the right ureter on .  She was discharged to [facility] rehab after that.  Upon returning home she began getting confused again.  Urinalysis was positive for UTI and her chest x-ray showed possible pneumonia.  She is admitted to the medicine service.  She was started on IV antibiotics.  Hematology/oncology was consulted as well.    Interval Followup:   Patient's family has decided to make her a DO NOT RESUSCITATE which is very appropriate given her overall poor prognosis and advanced metastatic disease.  They wish to take the patient home with hospice on Tuesday next week.  Seen by speech has been cleared for puréed diet.  We will continue antibiotics while in the hospital    Review of Systems   Unable to obtain due to mental status  Objective   Objective     Vitals:   Temp:  [97.7 °F (36.5 °C)-99.3 °F (37.4 °C)] 98 °F (36.7 °C)  Heart Rate:  [105-140] 119  Resp:  [14-16] 16  BP: (136-160)/(68-86) 160/86  Physical Exam    Constitutional: Awake confused attempts to answer questions but does not make any sense   Eyes: Pupils equal, sclerae anicteric, no conjunctival injection   HENT: NCAT, mucous membranes moist   Neck: Supple, no lad   Respiratory: Diminished poor inspiratory effort no increased work of breathing noted   cardiovascular: RRR, no murmurs   gastrointestinal: Positive bowel sounds, soft, nontender, nondistended   Musculoskeletal: No bilateral ankle edema, no clubbing or cyanosis to extremities   Psychiatric: Unable to assess   neurologic: No focal deficits but  difficult to assess given mental status   skin: No rashes     Result Review    Result Review:  I have personally reviewed the results from the time of this admission to 12/10/2021 11:28 EST and agree with these findings:  [x]  Laboratory  [x]  Microbiology  [x]  Radiology  []  EKG/Telemetry   []  Cardiology/Vascular   []  Pathology  []  Old records  []  Other:    Assessment/Plan   Assessment / Plan     Assessment:  • Urinary tract infection, recurrent  • Metastatic breast cancer  • Recent ureteral stent placement  • Acute metabolic encephalopathy  • JOESPH   • Possible CAP  • Pleural effusion, left-sided  • Recent right ureteral stent placement    Plan:  · Patient family decided to make the patient DO NOT RESUSCITATE take her home with hospice on Tuesday.  This is very appropriate given her overall poor prognosis and advanced metastatic disease.  · Hospice referral  · Palliative care consultation appreciated  · Diet per speech and for comfort  · Continue antibiotics  · Continue nebulizer treatments  · Resumed Lopressor  · MRI obtained reviewed no acute changes noted  · Oncology consultation appreciate continue care per the recommendation  · PT OT speech therapy consultations  · Lovenox for DVT prophylaxis  · Discussed with RN         DVT prophylaxis:  Medical DVT prophylaxis orders are present.    CODE STATUS:   Medical Intervention Limits: NO artificial nutrition; Other; NO intubation (DNI)  Code Status (Patient has no pulse and is not breathing): No CPR (Do Not Attempt to Resuscitate)  Medical Interventions (Patient has pulse or is breathing): Limited Support  Additional Medical Interventions Limits: dnr    Electronically signed by KARLI Edwards, 12/10/21, 11:30 AM EST.  .          Attending Note:  I have seen and examined the patient and agree with the above information from Sarthak Martinez PA-C and have outlined plan of care.  76-year-old with metastatic cancer, UTI, recurrent.  Overall prognosis poor,  family has opted to make her DO NOT RESUSCITATE and planning to take her home with hospice on Tuesday of next week.  On exam she is resting, did not awaken her.  She is in no distress.  Electronically signed by Tremayne Fitzpatrick MD, 12/10/21, 4:37 PM EST.

## 2021-12-10 NOTE — THERAPY TREATMENT NOTE
Acute Care - Speech Language Pathology   Swallow Treatment Note  Felice     Patient Name: Mirna Meredith  : 1945  MRN: [id number]  Today's Date: 12/10/2021               Admit Date: 2021    Visit Dx:     ICD-10-CM ICD-9-CM   1. Weakness  R53.1 780.79   2. Altered mental status, unspecified altered mental status type  R41.82 780.97   3. Oropharyngeal dysphagia  R13.12 787.22     Patient Active Problem List   Diagnosis   • Abdominal pain   • Sepsis (HCC)   • Metabolic encephalopathy   • Acute renal failure (ARF) (HCC)   • Metastatic breast cancer (HCC)   • Chronic diarrhea   • Pancytopenia (HCC)   • Weakness     Past Medical History:   Diagnosis Date   • Cancer (HCC)     Breast with mets to colon and bones   • COPD (chronic obstructive pulmonary disease) (HCC)    • Depression    • Diabetes mellitus (HCC)    • Hyperlipidemia    • Hypertension      Past Surgical History:   Procedure Laterality Date   • CHOLECYSTECTOMY     • CYSTOSCOPY, RETROGRADE PYELOGRAM AND STENT INSERTION Right 2021    Procedure: CYSTOSCOPY RETROGRADE PYELOGRAM AND STENT INSERTION;  Surgeon: Allen Rodriguez MD;  Location: [facility] OR;  Service: Urology;  Laterality: Right;   • HYSTERECTOMY     • JOINT REPLACEMENT Bilateral     Knees   • MASTECTOMY Bilateral      SPEECH PATHOLOGY DYSPHAGIA TREATMENT    Subjective/Behavioral Observations: Patient opens eyes with verbal cues, smiling.  Minimal verbalizations.        Day/time of Treatment: 12/10/2021        Current Diet: N.p.o.        Treatment received: Treatment focused on trials of p.o. intake of nectar thickened liquids and purée solids        Results of treatment: With one-on-one feed, patient tolerated nectar thickened liquids by spoon and by controlled cup drink with mild swallow delays.  Laryngeal sounds remain clear to auscultation.  Trials of purée solids by spoon with patient consuming approximately 1 cup.  Lingual pumping with swallow delay however laryngeal  sounds remained clear to auscultation.        Progress toward goals: Improve mental status for trials of p.o. intake        Barriers to Achieving goals: Physical, age-related, medical status        Plan of care:/changes in plan: Patient with similar treatment results as 12/9/2021.  If patient continues to maintain safe alertness for p.o. intake, recommend initiate diet of nectar thick liquids and purée solids.  Discussed with nursing at bedside.                SLP Recommendation and Plan                                                             Plan of Care Reviewed With: patient          EDUCATION  The patient has been educated in the following areas:   Dysphagia (Swallowing Impairment).              Time Calculation:    Time Calculation- SLP     Row Name 12/10/21 1053             Time Calculation- SLP    SLP Stop Time 1045  -SN      SLP Received On 12/10/21  -SN              Untimed Charges    15047-ET Treatment Swallow Minutes 45  -SN              Total Minutes    Untimed Charges Total Minutes 45  -SN       Total Minutes 45  -SN            User Key  (r) = Recorded By, (t) = Taken By, (c) = Cosigned By    Initials Name Provider Type    SN Monica Sweeney SLP Speech and Language Pathologist                Therapy Charges for Today     Code Description Service Date Service Provider Modifiers Qty    53250153233 HC ST TREATMENT SWALLOW 3 12/9/2021 Monica Sweeney SLP GN 1    45900986012 HC ST TREATMENT SWALLOW 3 12/10/2021 Monica Sweeney SLP GN 1               GABY Kruse  12/10/2021

## 2021-12-10 NOTE — PLAN OF CARE
Goal Outcome Evaluation:       Patient's sons were here last night, instructed that all siblings need to be present to make healthcare decisions for patient due to not being able to get a definite answer.  Nixon, son verbalizes they will work together and try to get that done soon.  Patient has seemed more alert tonight but continues to be disoriented.

## 2021-12-10 NOTE — PROGRESS NOTES
[facility]     Progress Note    Patient Name: Mirna Meredith  : 1945  MRN: [id number]  Primary Care Physician:  Pan Pacheco MD  Date of admission: 2021    Subjective   Subjective     Chief Complaint: Is known to me for many years with history of ulcerative colitis and metastatic carcinoma of the breast she is recently getting more disoriented but she lives alone there is no one at home all the time gets more disoriented when patient is confined to the hospital room out to the rehab I had a long discussion with,Family today with 2 sisters patient was alert oriented she could recognize everybody I have also seen, in the note from swallow studies and it looks like that she is able to swallow and eat I recommended to the, and that the best thing would be that the she could go for comfort care at home if they want to take and continue with the oral pills for the cancer which she is taking because there is a chance she could respond better problems right now are not because of the cancer they are with overall situation and more mentally she does have evidence of dementia being followed by Dr. Pacheco in [place]    HPI:  Patient Reports patient's family long discussion and they are in the process of deciding what to do for siblings and they are thinking that they will probably take care of her at home will take some time off    Review of Systems   All systems were reviewed and negative except for: Reviewed    Objective   Objective     Vitals:   Temp:  [97.7 °F (36.5 °C)-99.3 °F (37.4 °C)] 98 °F (36.7 °C)  Heart Rate:  [105-140] 119  Resp:  [14-16] 16  BP: (136-160)/(68-86) 160/86    Physical Exam    Constitutional: Awake, alert   Eyes: PERRLA, sclerae anicteric, no conjunctival injection   HENT: NCAT, mucous membranes moist   Neck: Supple, no thyromegaly, no lymphadenopathy, trachea midline   Respiratory: Clear to auscultation bilaterally, nonlabored respirations    Cardiovascular: RRR, no  murmurs, rubs, or gallops, palpable pedal pulses bilaterally   Gastrointestinal: Positive bowel sounds, soft, nontender, nondistended   Musculoskeletal: No bilateral ankle edema, no clubbing or cyanosis to extremities   Psychiatric: Appropriate affect, cooperative   Neurologic: Oriented x 3, strength symmetric in all extremities, Cranial Nerves grossly intact to confrontation, speech clear   Skin: No rashes     Result Review    Result Review:  I have personally reviewed the results from the time of this admission to 12/10/2021 11:34 EST and agree with these findings:  []  Laboratory  []  Microbiology  []  Radiology  []  EKG/Telemetry   []  Cardiology/Vascular   []  Pathology  []  Old records  []  Other: Anemia  Most notable findings include: Anemia    Assessment/Plan   Assessment / Plan     Brief Patient Summary:  Mirna Meredith is a 76 y.o. female who patient's family to decide regarding whether to take home and probably with palliative care help or possible hospice    Active Hospital Problems:  Active Hospital Problems    Diagnosis    • Weakness        Plan:   Family to decide about taking her home    DVT prophylaxis:  Medical DVT prophylaxis orders are present.    CODE STATUS:   Medical Intervention Limits: NO artificial nutrition; Other; NO intubation (DNI)  Code Status (Patient has no pulse and is not breathing): No CPR (Do Not Attempt to Resuscitate)  Medical Interventions (Patient has pulse or is breathing): Limited Support  Additional Medical Interventions Limits: dnr    Disposition:  I expect patient to be discharged when they have made the decision regarding who is going to take care of her.    Electronically signed by Jose G Grissom MD, 12/10/21, 11:34 AM EST.

## 2021-12-10 NOTE — PLAN OF CARE
Goal Outcome Evaluation:  Plan of Care Reviewed With: patient    Patient with improvement in mental status and swallow function during dysphagia treatments on 12/9 and 12/10.  As long as patient able to maintain safe mental status, recommend reinitiate diet of puréed solids and nectar thickened liquids.  Discussed with nursing.

## 2021-12-10 NOTE — NURSING NOTE
Palliative care visit to further discuss goals of care. Patients daughters at bedside. Patients family had conversation with Dr. Grissom. Patients sons on telephone call. Ultimately, family has decided to take patient home with [facility] services. Patients family request patient be DNR, but maintain current treatment while inpatient. Patients family state they need time to get patients house ready, as it has been uninhabited for over 7 months. Updated primary rn and provider. Palliative care will continue to follow to support and assist.    KASIE Jung, RN  Palliative Care

## 2021-12-10 NOTE — PLAN OF CARE
Goal Outcome Evaluation:  Plan of Care Reviewed With: patient, family        Progress: no change  Outcome Summary: Code status was made to DNR.  Diet was changed from NPO to pureed and honey thickened liquids.   Patient was able to be fed small amounts.

## 2021-12-11 LAB
ALBUMIN SERPL-MCNC: 2.7 G/DL (ref 3.5–5.2)
ALBUMIN SERPL-MCNC: 2.9 G/DL (ref 3.5–5.2)
ALP SERPL-CCNC: 159 U/L (ref 39–117)
ALT SERPL W P-5'-P-CCNC: 16 U/L (ref 1–33)
ANION GAP SERPL CALCULATED.3IONS-SCNC: 10.1 MMOL/L (ref 5–15)
AST SERPL-CCNC: 60 U/L (ref 1–32)
BASOPHILS # BLD AUTO: 0.13 10*3/MM3 (ref 0–0.2)
BASOPHILS NFR BLD AUTO: 1.3 % (ref 0–1.5)
BILIRUB CONJ SERPL-MCNC: <0.2 MG/DL (ref 0–0.3)
BILIRUB INDIRECT SERPL-MCNC: ABNORMAL MG/DL
BILIRUB SERPL-MCNC: 0.2 MG/DL (ref 0–1.2)
BUN SERPL-MCNC: 23 MG/DL (ref 8–23)
BUN/CREAT SERPL: 22.8 (ref 7–25)
CALCIUM SPEC-SCNC: 11.1 MG/DL (ref 8.6–10.5)
CHLORIDE SERPL-SCNC: 97 MMOL/L (ref 98–107)
CK SERPL-CCNC: 36 U/L (ref 20–180)
CO2 SERPL-SCNC: 29.9 MMOL/L (ref 22–29)
CREAT SERPL-MCNC: 1.01 MG/DL (ref 0.57–1)
DEPRECATED RDW RBC AUTO: 59.3 FL (ref 37–54)
EOSINOPHIL # BLD AUTO: 0.11 10*3/MM3 (ref 0–0.4)
EOSINOPHIL NFR BLD AUTO: 1.1 % (ref 0.3–6.2)
ERYTHROCYTE [DISTWIDTH] IN BLOOD BY AUTOMATED COUNT: 18.5 % (ref 12.3–15.4)
GFR SERPL CREATININE-BSD FRML MDRD: 53 ML/MIN/1.73
GLUCOSE BLDC GLUCOMTR-MCNC: 109 MG/DL (ref 70–99)
GLUCOSE BLDC GLUCOMTR-MCNC: 112 MG/DL (ref 70–99)
GLUCOSE BLDC GLUCOMTR-MCNC: 149 MG/DL (ref 70–99)
GLUCOSE SERPL-MCNC: 115 MG/DL (ref 65–99)
HCT VFR BLD AUTO: 28.8 % (ref 34–46.6)
HGB BLD-MCNC: 9.1 G/DL (ref 12–15.9)
IMM GRANULOCYTES # BLD AUTO: 0.7 10*3/MM3 (ref 0–0.05)
IMM GRANULOCYTES NFR BLD AUTO: 7.2 % (ref 0–0.5)
LYMPHOCYTES # BLD AUTO: 1.21 10*3/MM3 (ref 0.7–3.1)
LYMPHOCYTES NFR BLD AUTO: 12.4 % (ref 19.6–45.3)
MAGNESIUM SERPL-MCNC: 1.9 MG/DL (ref 1.6–2.4)
MCH RBC QN AUTO: 27.9 PG (ref 26.6–33)
MCHC RBC AUTO-ENTMCNC: 31.6 G/DL (ref 31.5–35.7)
MCV RBC AUTO: 88.3 FL (ref 79–97)
MONOCYTES # BLD AUTO: 0.96 10*3/MM3 (ref 0.1–0.9)
MONOCYTES NFR BLD AUTO: 9.9 % (ref 5–12)
NEUTROPHILS NFR BLD AUTO: 6.62 10*3/MM3 (ref 1.7–7)
NEUTROPHILS NFR BLD AUTO: 68.1 % (ref 42.7–76)
NRBC BLD AUTO-RTO: 0.6 /100 WBC (ref 0–0.2)
PHOSPHATE SERPL-MCNC: 3.3 MG/DL (ref 2.5–4.5)
PLATELET # BLD AUTO: 207 10*3/MM3 (ref 140–450)
PMV BLD AUTO: 9.7 FL (ref 6–12)
POTASSIUM SERPL-SCNC: 4.1 MMOL/L (ref 3.5–5.2)
PROT SERPL-MCNC: 7 G/DL (ref 6–8.5)
RBC # BLD AUTO: 3.26 10*6/MM3 (ref 3.77–5.28)
SODIUM SERPL-SCNC: 137 MMOL/L (ref 136–145)
WBC NRBC COR # BLD: 9.73 10*3/MM3 (ref 3.4–10.8)

## 2021-12-11 PROCEDURE — 94799 UNLISTED PULMONARY SVC/PX: CPT

## 2021-12-11 PROCEDURE — 25010000002 MEROPENEM PER 100 MG: Performed by: PHYSICIAN ASSISTANT

## 2021-12-11 PROCEDURE — 85025 COMPLETE CBC W/AUTO DIFF WBC: CPT | Performed by: PHYSICIAN ASSISTANT

## 2021-12-11 PROCEDURE — 99232 SBSQ HOSP IP/OBS MODERATE 35: CPT | Performed by: INTERNAL MEDICINE

## 2021-12-11 PROCEDURE — 82550 ASSAY OF CK (CPK): CPT | Performed by: PHYSICIAN ASSISTANT

## 2021-12-11 PROCEDURE — 80069 RENAL FUNCTION PANEL: CPT | Performed by: PHYSICIAN ASSISTANT

## 2021-12-11 PROCEDURE — 83735 ASSAY OF MAGNESIUM: CPT | Performed by: PHYSICIAN ASSISTANT

## 2021-12-11 PROCEDURE — 82962 GLUCOSE BLOOD TEST: CPT

## 2021-12-11 PROCEDURE — 25010000002 ENOXAPARIN PER 10 MG: Performed by: HOSPITALIST

## 2021-12-11 PROCEDURE — 25010000002 MEROPENEM PER 100 MG: Performed by: INTERNAL MEDICINE

## 2021-12-11 PROCEDURE — 80076 HEPATIC FUNCTION PANEL: CPT | Performed by: PHYSICIAN ASSISTANT

## 2021-12-11 RX ORDER — IPRATROPIUM BROMIDE AND ALBUTEROL SULFATE 2.5; .5 MG/3ML; MG/3ML
3 SOLUTION RESPIRATORY (INHALATION) EVERY 6 HOURS PRN
Status: DISCONTINUED | OUTPATIENT
Start: 2021-12-11 | End: 2021-12-16 | Stop reason: HOSPADM

## 2021-12-11 RX ADMIN — ARFORMOTEROL TARTRATE 15 MCG: 15 SOLUTION RESPIRATORY (INHALATION) at 07:02

## 2021-12-11 RX ADMIN — MEROPENEM 1 G: 1 INJECTION, POWDER, FOR SOLUTION INTRAVENOUS at 20:59

## 2021-12-11 RX ADMIN — IPRATROPIUM BROMIDE AND ALBUTEROL SULFATE 3 ML: .5; 2.5 SOLUTION RESPIRATORY (INHALATION) at 07:02

## 2021-12-11 RX ADMIN — SERTRALINE 25 MG: 25 TABLET, FILM COATED ORAL at 08:44

## 2021-12-11 RX ADMIN — MEMANTINE 10 MG: 10 TABLET ORAL at 08:43

## 2021-12-11 RX ADMIN — SODIUM CHLORIDE 75 ML/HR: 9 INJECTION, SOLUTION INTRAVENOUS at 21:23

## 2021-12-11 RX ADMIN — MEROPENEM 1 G: 1 INJECTION, POWDER, FOR SOLUTION INTRAVENOUS at 13:54

## 2021-12-11 RX ADMIN — BUPROPION HYDROCHLORIDE 300 MG: 150 TABLET, EXTENDED RELEASE ORAL at 08:43

## 2021-12-11 RX ADMIN — MICONAZOLE NITRATE: 2 POWDER TOPICAL at 10:04

## 2021-12-11 RX ADMIN — ARFORMOTEROL TARTRATE 15 MCG: 15 SOLUTION RESPIRATORY (INHALATION) at 19:00

## 2021-12-11 RX ADMIN — FOLIC ACID 1 MG: 1 TABLET ORAL at 08:43

## 2021-12-11 RX ADMIN — FERROUS SULFATE TAB 325 MG (65 MG ELEMENTAL FE) 325 MG: 325 (65 FE) TAB at 08:43

## 2021-12-11 RX ADMIN — IPRATROPIUM BROMIDE AND ALBUTEROL SULFATE 3 ML: .5; 2.5 SOLUTION RESPIRATORY (INHALATION) at 00:57

## 2021-12-11 RX ADMIN — Medication 5 MG: at 20:58

## 2021-12-11 RX ADMIN — MICONAZOLE NITRATE: 2 POWDER TOPICAL at 20:59

## 2021-12-11 RX ADMIN — BUDESONIDE 0.5 MG: 0.5 SUSPENSION RESPIRATORY (INHALATION) at 18:59

## 2021-12-11 RX ADMIN — ANASTROZOLE 1 MG: 1 TABLET ORAL at 08:44

## 2021-12-11 RX ADMIN — ROSUVASTATIN CALCIUM 10 MG: 5 TABLET, FILM COATED ORAL at 20:59

## 2021-12-11 RX ADMIN — SENNOSIDES AND DOCUSATE SODIUM 2 TABLET: 50; 8.6 TABLET ORAL at 20:59

## 2021-12-11 RX ADMIN — MEMANTINE 10 MG: 10 TABLET ORAL at 20:59

## 2021-12-11 RX ADMIN — METOPROLOL TARTRATE 25 MG: 25 TABLET, FILM COATED ORAL at 20:59

## 2021-12-11 RX ADMIN — ENOXAPARIN SODIUM 40 MG: 40 INJECTION SUBCUTANEOUS at 08:42

## 2021-12-11 RX ADMIN — BUDESONIDE 0.5 MG: 0.5 SUSPENSION RESPIRATORY (INHALATION) at 07:02

## 2021-12-11 RX ADMIN — METOPROLOL TARTRATE 25 MG: 25 TABLET, FILM COATED ORAL at 08:43

## 2021-12-11 RX ADMIN — SENNOSIDES AND DOCUSATE SODIUM 2 TABLET: 50; 8.6 TABLET ORAL at 08:42

## 2021-12-11 RX ADMIN — MONTELUKAST SODIUM 10 MG: 10 TABLET, FILM COATED ORAL at 08:43

## 2021-12-11 RX ADMIN — MEROPENEM 1 G: 1 INJECTION, POWDER, FOR SOLUTION INTRAVENOUS at 01:12

## 2021-12-11 NOTE — PROGRESS NOTES
[facility]     Progress Note    Patient Name: Mirna Meredith  : 1945  MRN: [id number]  Primary Care Physician:  Pan Pacheco MD  Date of admission: 2021    Subjective   Subjective     Chief Complaint: Patient with extensively metastatic carcinoma of the breast and dementia her problems at this time are mainly because of dementia family is planning.To take her home and plan is to take her probably on Tuesday with the help of hospice    HPI:  Patient Reports patient admitted because of confusion disorientation with history of dementia and has had some recurrent infections as well    Review of Systems   All systems were reviewed and negative except for: Reviewed    Objective   Objective     Vitals:   Temp:  [97.7 °F (36.5 °C)-99 °F (37.2 °C)] 98.8 °F (37.1 °C)  Heart Rate:  [101-125] 106  Resp:  [16-20] 18  BP: (122-175)/(66-85) 122/69  Flow (L/min):  [2-3] 2    Physical Exam    Constitutional: Awake, alert   Eyes: PERRLA, sclerae anicteric, no conjunctival injection   HENT: NCAT, mucous membranes moist   Neck: Supple, no thyromegaly, no lymphadenopathy, trachea midline   Respiratory: Clear to auscultation bilaterally, nonlabored respirations    Cardiovascular: RRR, no murmurs, rubs, or gallops, palpable pedal pulses bilaterally   Gastrointestinal: Positive bowel sounds, soft, nontender, nondistended   Musculoskeletal: No bilateral ankle edema, no clubbing or cyanosis to extremities   Psychiatric: Appropriate affect, cooperative   Neurologic: Oriented x 3, strength symmetric in all extremities, Cranial Nerves grossly intact to confrontation, speech clear   Skin: No rashes     Result Review    Result Review:  I have personally reviewed the results from the time of this admission to 2021 10:50 EST and agree with these findings:  [x]  Laboratory  []  Microbiology  []  Radiology  []  EKG/Telemetry   []  Cardiology/Vascular   []  Pathology  []  Old records  []  Other: Anemia metastatic breast  cancer  Most notable findings include: Attic breast cancer with anemia and dementia    Assessment/Plan   Assessment / Plan     Brief Patient Summary:  Mirna Meredith is a 76 y.o. female who with dementia dysphagia was probably because she was drowsy but she is able to eat this week swallow study was normal    Active Hospital Problems:  Active Hospital Problems    Diagnosis    • Weakness        Plan:   Continue the supportive care patient to be sent home with the help of hospice    DVT prophylaxis:  Medical DVT prophylaxis orders are present.    CODE STATUS:   Medical Intervention Limits: NO artificial nutrition; Other; NO intubation (DNI)  Code Status (Patient has no pulse and is not breathing): No CPR (Do Not Attempt to Resuscitate)  Medical Interventions (Patient has pulse or is breathing): Limited Support  Additional Medical Interventions Limits: dnr    Disposition:  I expect patient to be discharged when.  Arrangements are made by the family to take her home.    Electronically signed by Jose G Grissom MD, 12/11/21, 10:50 AM EST.

## 2021-12-11 NOTE — PROGRESS NOTES
[facility]   Hospitalist Progress Note  Date: 2021  Patient Name: Mirna Meredith  : 1945  MRN: [id number]  Date of admission: 2021      Subjective   Subjective     Chief Complaint: Altered mental status    Summary: 76-year-old female with metastatic breast cancer, COPD, diabetes.  She had been admitted to the hospital in mid November with hydronephrosis and questionable ureteral stricture, underwent stenting of the right ureter on .  She was discharged to [facility] rehab after that.  Upon returning home she began getting confused again.  Urinalysis was positive for UTI and her chest x-ray showed possible pneumonia.  She is admitted to the medicine service.  She was started on IV antibiotics.  Hematology/oncology was consulted as well.    Interval Followup:   Patient's clinical status remains unchanged mental status unchanged.  Family has decided to make the patient a DO NOT RESUSCITATE plan on taking the patient home with hospice on Tuesday.  We feel this is very appropriate given her extensive metastatic disease.  Continue with diet per speech therapy recommendations continue with IV fluids and antibiotics while hospitalized.  Review of Systems   Unable to obtain due to mental status  Objective   Objective     Vitals:   Temp:  [97.8 °F (36.6 °C)-99 °F (37.2 °C)] 98.8 °F (37.1 °C)  Heart Rate:  [101-125] 106  Resp:  [16-20] 18  BP: (122-175)/(66-85) 122/69  Flow (L/min):  [2-3] 2  Physical Exam    Constitutional: Awake confused attempts to answer questions but does not make any sense   Eyes: Pupils equal, sclerae anicteric, no conjunctival injection   HENT: NCAT, mucous membranes moist   Neck: Supple, no lad   Respiratory: Diminished poor inspiratory effort no increased work of breathing noted   cardiovascular: RRR, no murmurs   gastrointestinal: Positive bowel sounds, soft, nontender, nondistended   Musculoskeletal: No bilateral ankle edema, no clubbing or cyanosis to  extremities   Psychiatric: Unable to assess   neurologic: No focal deficits but difficult to assess given mental status   skin: No rashes     Result Review    Result Review:  I have personally reviewed the results from the time of this admission to 12/11/2021 12:00 EST and agree with these findings:  [x]  Laboratory  [x]  Microbiology  [x]  Radiology  []  EKG/Telemetry   []  Cardiology/Vascular   []  Pathology  []  Old records  []  Other:    Assessment/Plan   Assessment / Plan     Assessment:  • Urinary tract infection, recurrent  • Metastatic breast cancer  • Recent ureteral stent placement  • Acute metabolic encephalopathy  • JOESPH   • Possible CAP  • Pleural effusion, left-sided  • Recent right ureteral stent placement    Plan:  · Patient family decided to make the patient DO NOT RESUSCITATE take her home with hospice on Tuesday.  This is very appropriate given her overall poor prognosis and advanced metastatic disease.  · Hospice referral likely home on Tuesday with hospice  · Palliative care consultation appreciated  · Diet per speech and for comfort  · Continue antibiotics  · Continue nebulizer treatments  · Resumed Lopressor  · MRI obtained reviewed no acute changes noted  · Oncology consultation appreciate continue care per the recommendation  · PT OT speech therapy consultations  · Lovenox for DVT prophylaxis  · Discussed with RN         DVT prophylaxis:  No DVT prophylaxis order currently exists.    CODE STATUS:   Medical Intervention Limits: NO artificial nutrition; Other; NO intubation (DNI)  Code Status (Patient has no pulse and is not breathing): No CPR (Do Not Attempt to Resuscitate)  Medical Interventions (Patient has pulse or is breathing): Limited Support  Additional Medical Interventions Limits: dnr    Electronically signed by KARLI Edwards, 12/11/21, 12:01 PM EST.  .  .          Attending Note:  I have seen and examined the patient and agree with the above information from Sarthak Martinez  CHILO and have outlined plan of care.  76-year-old with metastatic cancer, UTI, recurrent.  Overall prognosis poor, family has opted to make her DO NOT RESUSCITATE and planning to take her home with hospice on Tuesday of next week.  On exam today she is lying in bed, appears comfortable, not really speaking today.  Abdomen nontender.  Discussed with RN.  Electronically signed by Tremayne Fitzpatrick MD, 12/11/21, 7:21 PM EST.

## 2021-12-11 NOTE — PLAN OF CARE
Goal Outcome Evaluation:  Plan of Care Reviewed With: patient, family        Progress: no change  Outcome Summary: Patient's son brought new hearing aid batteries and patient began to talk more.  Blood sugars have remained good throughout the day.  Urine was a dark essence like cola earlier today.  The purewick was changed out and UA is attempted to be collected.  Patient has not voided enough to collect as of shift change.

## 2021-12-11 NOTE — PLAN OF CARE
Goal Outcome Evaluation:         Ongoing    Patient has had an uneventful night, rested quietly most of the night, received IV antibiotics and fluids as ordered without any complications.  Patient continues to run tachycardic

## 2021-12-12 LAB
ALBUMIN SERPL-MCNC: 2.9 G/DL (ref 3.5–5.2)
ANION GAP SERPL CALCULATED.3IONS-SCNC: 9.6 MMOL/L (ref 5–15)
BACTERIA UR QL AUTO: ABNORMAL /HPF
BASOPHILS # BLD AUTO: 0.12 10*3/MM3 (ref 0–0.2)
BASOPHILS NFR BLD AUTO: 1.2 % (ref 0–1.5)
BILIRUB UR QL STRIP: NEGATIVE
BUN SERPL-MCNC: 23 MG/DL (ref 8–23)
BUN/CREAT SERPL: 25.8 (ref 7–25)
CALCIUM SPEC-SCNC: 10.9 MG/DL (ref 8.6–10.5)
CHLORIDE SERPL-SCNC: 101 MMOL/L (ref 98–107)
CLARITY UR: CLEAR
CO2 SERPL-SCNC: 30.4 MMOL/L (ref 22–29)
COLOR UR: YELLOW
CREAT SERPL-MCNC: 0.89 MG/DL (ref 0.57–1)
DEPRECATED RDW RBC AUTO: 59.4 FL (ref 37–54)
EOSINOPHIL # BLD AUTO: 0.14 10*3/MM3 (ref 0–0.4)
EOSINOPHIL NFR BLD AUTO: 1.4 % (ref 0.3–6.2)
ERYTHROCYTE [DISTWIDTH] IN BLOOD BY AUTOMATED COUNT: 18.4 % (ref 12.3–15.4)
GFR SERPL CREATININE-BSD FRML MDRD: 61 ML/MIN/1.73
GLUCOSE BLDC GLUCOMTR-MCNC: 161 MG/DL (ref 70–99)
GLUCOSE BLDC GLUCOMTR-MCNC: 94 MG/DL (ref 70–99)
GLUCOSE BLDC GLUCOMTR-MCNC: 94 MG/DL (ref 70–99)
GLUCOSE SERPL-MCNC: 117 MG/DL (ref 65–99)
GLUCOSE UR STRIP-MCNC: NEGATIVE MG/DL
HCT VFR BLD AUTO: 28.1 % (ref 34–46.6)
HGB BLD-MCNC: 8.9 G/DL (ref 12–15.9)
HGB UR QL STRIP.AUTO: ABNORMAL
HYALINE CASTS UR QL AUTO: ABNORMAL /LPF
IMM GRANULOCYTES # BLD AUTO: 0.74 10*3/MM3 (ref 0–0.05)
IMM GRANULOCYTES NFR BLD AUTO: 7.2 % (ref 0–0.5)
KETONES UR QL STRIP: NEGATIVE
LEUKOCYTE ESTERASE UR QL STRIP.AUTO: ABNORMAL
LYMPHOCYTES # BLD AUTO: 1.29 10*3/MM3 (ref 0.7–3.1)
LYMPHOCYTES NFR BLD AUTO: 12.5 % (ref 19.6–45.3)
MAGNESIUM SERPL-MCNC: 1.8 MG/DL (ref 1.6–2.4)
MCH RBC QN AUTO: 28.2 PG (ref 26.6–33)
MCHC RBC AUTO-ENTMCNC: 31.7 G/DL (ref 31.5–35.7)
MCV RBC AUTO: 88.9 FL (ref 79–97)
MONOCYTES # BLD AUTO: 0.83 10*3/MM3 (ref 0.1–0.9)
MONOCYTES NFR BLD AUTO: 8 % (ref 5–12)
NEUTROPHILS NFR BLD AUTO: 69.7 % (ref 42.7–76)
NEUTROPHILS NFR BLD AUTO: 7.2 10*3/MM3 (ref 1.7–7)
NITRITE UR QL STRIP: NEGATIVE
NRBC BLD AUTO-RTO: 0.4 /100 WBC (ref 0–0.2)
PH UR STRIP.AUTO: <=5 [PH] (ref 5–8)
PHOSPHATE SERPL-MCNC: 2.7 MG/DL (ref 2.5–4.5)
PLATELET # BLD AUTO: 197 10*3/MM3 (ref 140–450)
PMV BLD AUTO: 9.8 FL (ref 6–12)
POTASSIUM SERPL-SCNC: 3.9 MMOL/L (ref 3.5–5.2)
PROT UR QL STRIP: ABNORMAL
RBC # BLD AUTO: 3.16 10*6/MM3 (ref 3.77–5.28)
RBC # UR STRIP: ABNORMAL /HPF
REF LAB TEST METHOD: ABNORMAL
SODIUM SERPL-SCNC: 141 MMOL/L (ref 136–145)
SP GR UR STRIP: 1.02 (ref 1–1.03)
SQUAMOUS #/AREA URNS HPF: ABNORMAL /HPF
UROBILINOGEN UR QL STRIP: ABNORMAL
WBC # UR STRIP: ABNORMAL /HPF
WBC NRBC COR # BLD: 10.32 10*3/MM3 (ref 3.4–10.8)

## 2021-12-12 PROCEDURE — 25010000002 MEROPENEM PER 100 MG: Performed by: PHYSICIAN ASSISTANT

## 2021-12-12 PROCEDURE — 87186 SC STD MICRODIL/AGAR DIL: CPT | Performed by: PHYSICIAN ASSISTANT

## 2021-12-12 PROCEDURE — 82962 GLUCOSE BLOOD TEST: CPT

## 2021-12-12 PROCEDURE — 85025 COMPLETE CBC W/AUTO DIFF WBC: CPT | Performed by: PHYSICIAN ASSISTANT

## 2021-12-12 PROCEDURE — 94799 UNLISTED PULMONARY SVC/PX: CPT

## 2021-12-12 PROCEDURE — 99232 SBSQ HOSP IP/OBS MODERATE 35: CPT | Performed by: INTERNAL MEDICINE

## 2021-12-12 PROCEDURE — 87077 CULTURE AEROBIC IDENTIFY: CPT | Performed by: PHYSICIAN ASSISTANT

## 2021-12-12 PROCEDURE — 81001 URINALYSIS AUTO W/SCOPE: CPT | Performed by: PHYSICIAN ASSISTANT

## 2021-12-12 PROCEDURE — 83735 ASSAY OF MAGNESIUM: CPT | Performed by: PHYSICIAN ASSISTANT

## 2021-12-12 PROCEDURE — 87086 URINE CULTURE/COLONY COUNT: CPT | Performed by: PHYSICIAN ASSISTANT

## 2021-12-12 PROCEDURE — 80069 RENAL FUNCTION PANEL: CPT | Performed by: PHYSICIAN ASSISTANT

## 2021-12-12 RX ADMIN — SENNOSIDES AND DOCUSATE SODIUM 2 TABLET: 50; 8.6 TABLET ORAL at 20:12

## 2021-12-12 RX ADMIN — ROSUVASTATIN CALCIUM 10 MG: 5 TABLET, FILM COATED ORAL at 20:12

## 2021-12-12 RX ADMIN — METOPROLOL TARTRATE 25 MG: 25 TABLET, FILM COATED ORAL at 20:12

## 2021-12-12 RX ADMIN — SENNOSIDES AND DOCUSATE SODIUM 2 TABLET: 50; 8.6 TABLET ORAL at 08:53

## 2021-12-12 RX ADMIN — BUDESONIDE 0.5 MG: 0.5 SUSPENSION RESPIRATORY (INHALATION) at 08:47

## 2021-12-12 RX ADMIN — MICONAZOLE NITRATE: 2 POWDER TOPICAL at 20:13

## 2021-12-12 RX ADMIN — MEMANTINE 10 MG: 10 TABLET ORAL at 20:12

## 2021-12-12 RX ADMIN — MICONAZOLE NITRATE: 2 POWDER TOPICAL at 08:56

## 2021-12-12 RX ADMIN — ANASTROZOLE 1 MG: 1 TABLET ORAL at 08:54

## 2021-12-12 RX ADMIN — FOLIC ACID 1 MG: 1 TABLET ORAL at 08:53

## 2021-12-12 RX ADMIN — FERROUS SULFATE TAB 325 MG (65 MG ELEMENTAL FE) 325 MG: 325 (65 FE) TAB at 08:53

## 2021-12-12 RX ADMIN — SODIUM CHLORIDE 75 ML/HR: 9 INJECTION, SOLUTION INTRAVENOUS at 20:16

## 2021-12-12 RX ADMIN — MEMANTINE 10 MG: 10 TABLET ORAL at 08:55

## 2021-12-12 RX ADMIN — BUPROPION HYDROCHLORIDE 300 MG: 150 TABLET, EXTENDED RELEASE ORAL at 08:55

## 2021-12-12 RX ADMIN — METOPROLOL TARTRATE 25 MG: 25 TABLET, FILM COATED ORAL at 08:53

## 2021-12-12 RX ADMIN — ARFORMOTEROL TARTRATE 15 MCG: 15 SOLUTION RESPIRATORY (INHALATION) at 08:47

## 2021-12-12 RX ADMIN — MEROPENEM 1 G: 1 INJECTION, POWDER, FOR SOLUTION INTRAVENOUS at 14:05

## 2021-12-12 RX ADMIN — MEROPENEM 1 G: 1 INJECTION, POWDER, FOR SOLUTION INTRAVENOUS at 20:12

## 2021-12-12 RX ADMIN — MONTELUKAST SODIUM 10 MG: 10 TABLET, FILM COATED ORAL at 08:53

## 2021-12-12 RX ADMIN — MEROPENEM 1 G: 1 INJECTION, POWDER, FOR SOLUTION INTRAVENOUS at 03:56

## 2021-12-12 RX ADMIN — SERTRALINE 25 MG: 25 TABLET, FILM COATED ORAL at 08:53

## 2021-12-12 NOTE — NURSING NOTE
Pt has not urinated all shift. Previous shift stated she had not urinated since empting suction bucket in the morning. Bladder scanned pt and she is retaining 623 ml, bladder is distended. Will pass on to dayshift considering the time. Kristy Ricketts RN

## 2021-12-12 NOTE — PLAN OF CARE
Goal Outcome Evaluation:  Plan of Care Reviewed With: patient        Progress: improving  Outcome Summary: Pt still unable to hear very well for communication. She had little to no urine output. Pt incontinent of BM, and pulled out iv. One period of lucidness show by patient. Pt purewick changed. Unable to get UA. Will continue to monitor patient closely. Kristy Ricketts RN

## 2021-12-12 NOTE — PROGRESS NOTES
[facility]   Hospitalist Progress Note  Date: 2021  Patient Name: Mirna Meredith  : 1945  MRN: [id number]  Date of admission: 2021      Subjective   Subjective     Chief Complaint: Altered mental status    Summary: 76-year-old female with metastatic breast cancer, COPD, diabetes.  She had been admitted to the hospital in mid November with hydronephrosis and questionable ureteral stricture, underwent stenting of the right ureter on .  She was discharged to [facility] rehab after that.  Upon returning home she began getting confused again.  Urinalysis was positive for UTI and her chest x-ray showed possible pneumonia.  She is admitted to the medicine service.  She was started on IV antibiotics.  Hematology/oncology was consulted as well.    Interval Followup:   Patient seen and examined clinical status relatively unchanged.  Perhaps a little more awake today.  Remains on IV antibiotics IV fluids very poor oral intake.  Some urinary retention per nursing staff.  Family planning to take the patient home on Tuesday with hospice.    Review of Systems   Unable to obtain due to mental status  Objective   Objective     Vitals:   Temp:  [97.4 °F (36.3 °C)-98.5 °F (36.9 °C)] 97.8 °F (36.6 °C)  Heart Rate:  [85-98] 87  Resp:  [16-18] 18  BP: (124-143)/(64-84) 143/78  Flow (L/min):  [2-4] 2  Physical Exam    Constitutional: Awake Dany to answer questions but difficult to understand   Eyes: Pupils equal, sclerae anicteric, no conjunctival injection   HENT: NCAT, mucous membranes moist   Neck: Supple, no lad   Respiratory: Diminished poor inspiratory effort no increased work of breathing noted   cardiovascular: RRR, no murmurs   gastrointestinal: Positive bowel sounds, soft, nontender, nondistended   Musculoskeletal: No bilateral ankle edema, no clubbing or cyanosis to extremities   Psychiatric: Unable to assess   neurologic: No focal deficits but difficult to assess given mental  status   skin: No rashes     Result Review    Result Review:  I have personally reviewed the results from the time of this admission to 12/12/2021 11:57 EST and agree with these findings:  [x]  Laboratory  [x]  Microbiology  [x]  Radiology  []  EKG/Telemetry   []  Cardiology/Vascular   []  Pathology  []  Old records  []  Other:    Assessment/Plan   Assessment / Plan     Assessment:  • Urinary tract infection, recurrent  • Metastatic breast cancer  • Recent ureteral stent placement  • Acute metabolic encephalopathy  • JOESPH   • Possible CAP  • Pleural effusion, left-sided  • Recent right ureteral stent placement    Plan:  · Patients family has decided to make her a DO NOT RESUSCITATE and take her home with hospice Tuesday.  This is very appropriate given her overall poor prognosis and advanced metastatic disease.  · Continue IV fluids IV antibiotics while Hospitalized Place, Awan catheter if patient continues to have urinary retention  · Hospice referral likely home on Tuesday with hospice  · Palliative care consultation appreciated  · Diet per speech and for comfort  · Continue antibiotics  · Continue nebulizer treatments as needed only  · Continue with Lopressor  · MRI obtained reviewed no acute changes noted  · Oncology consultation appreciate continue care per the recommendation  · PT OT speech therapy consultations  · Lovenox for DVT prophylaxis  · Discussed with RN         DVT prophylaxis:  No DVT prophylaxis order currently exists.    CODE STATUS:   Medical Intervention Limits: NO artificial nutrition; Other; NO intubation (DNI)  Code Status (Patient has no pulse and is not breathing): No CPR (Do Not Attempt to Resuscitate)  Medical Interventions (Patient has pulse or is breathing): Limited Support  Additional Medical Interventions Limits: dnr    Electronically signed by KARLI Edwards, 12/12/21, 12:01 PM EST.  .  .  .          Attending Note:  I have seen and examined the patient and agree with the above  information from Sarthak Martinez PA-C and have outlined plan of care.  76-year-old with metastatic cancer, UTI, recurrent.  Overall prognosis poor, family has opted to make her DO NOT RESUSCITATE and planning to take her home with hospice on Tuesday of next week.  She is in no distress, eyes open softly speaks occasionally but is still not communicating much.  We will insert a Awan today for urinary retention and comfort.  Electronically signed by Tremayne Fitzpatrick MD, 12/12/21, 4:04 PM EST.

## 2021-12-12 NOTE — PROGRESS NOTES
[facility]     Progress Note    Patient Name: Mirna Meredith  : 1945  MRN: [id number]  Primary Care Physician:  Pan Pacheco MD  Date of admission: 2021    Subjective   Subjective     Chief Complaint: She is alert and oriented doing well has no specific new complaints today, family is trying to take her home they are trying to get help at home    HPI:  Patient Reports admitted because of confusion disorientation    Review of Systems   All systems were reviewed and negative except for: Reviewed    Objective   Objective     Vitals:   Temp:  [97.4 °F (36.3 °C)-98.5 °F (36.9 °C)] 97.8 °F (36.6 °C)  Heart Rate:  [85-98] 87  Resp:  [16-18] 18  BP: (124-143)/(64-84) 143/78  Flow (L/min):  [2-4] 2    Physical Exam    Constitutional: Awake, alert   Eyes: PERRLA, sclerae anicteric, no conjunctival injection   HENT: NCAT, mucous membranes moist   Neck: Supple, no thyromegaly, no lymphadenopathy, trachea midline   Respiratory: Clear to auscultation bilaterally, nonlabored respirations    Cardiovascular: RRR, no murmurs, rubs, or gallops, palpable pedal pulses bilaterally   Gastrointestinal: Positive bowel sounds, soft, nontender, nondistended   Musculoskeletal: No bilateral ankle edema, no clubbing or cyanosis to extremities   Psychiatric: Appropriate affect, cooperative   Neurologic: Oriented x 3, strength symmetric in all extremities, Cranial Nerves grossly intact to confrontation, speech clear   Skin: No rashes     Result Review    Result Review:  I have personally reviewed the results from the time of this admission to 2021 11:11 EST and agree with these findings:  []  Laboratory  []  Microbiology  [x]  Radiology  []  EKG/Telemetry   []  Cardiology/Vascular   []  Pathology  []  Old records  []  Other: Static carcinoid  Most notable findings include: Anemia    Assessment/Plan   Assessment / Plan     Brief Patient Summary:  Mirna Meredith is a 76 y.o. female who admitted because of confusion  disorientation possible UTI    Active Hospital Problems:  Active Hospital Problems    Diagnosis    • Weakness        Plan:   Discharge home when family makes the arrangements    DVT prophylaxis:  No DVT prophylaxis order currently exists.    CODE STATUS:   Medical Intervention Limits: NO artificial nutrition; Other; NO intubation (DNI)  Code Status (Patient has no pulse and is not breathing): No CPR (Do Not Attempt to Resuscitate)  Medical Interventions (Patient has pulse or is breathing): Limited Support  Additional Medical Interventions Limits: dnr    Disposition:  I expect patient to be discharged family next arrangements plans will be made to discharge.    Electronically signed by Jose G Grissom MD, 12/12/21, 11:11 AM EST.

## 2021-12-13 LAB
ALBUMIN SERPL-MCNC: 2.7 G/DL (ref 3.5–5.2)
ANION GAP SERPL CALCULATED.3IONS-SCNC: 6 MMOL/L (ref 5–15)
ANISOCYTOSIS BLD QL: NORMAL
BASOPHILS # BLD AUTO: 0.14 10*3/MM3 (ref 0–0.2)
BASOPHILS NFR BLD AUTO: 1.4 % (ref 0–1.5)
BUN SERPL-MCNC: 19 MG/DL (ref 8–23)
BUN/CREAT SERPL: 22.6 (ref 7–25)
CALCIUM SPEC-SCNC: 10.8 MG/DL (ref 8.6–10.5)
CHLORIDE SERPL-SCNC: 102 MMOL/L (ref 98–107)
CO2 SERPL-SCNC: 32 MMOL/L (ref 22–29)
CREAT SERPL-MCNC: 0.84 MG/DL (ref 0.57–1)
DEPRECATED RDW RBC AUTO: 62.9 FL (ref 37–54)
EOSINOPHIL # BLD AUTO: 0.2 10*3/MM3 (ref 0–0.4)
EOSINOPHIL NFR BLD AUTO: 2 % (ref 0.3–6.2)
ERYTHROCYTE [DISTWIDTH] IN BLOOD BY AUTOMATED COUNT: 18.6 % (ref 12.3–15.4)
GFR SERPL CREATININE-BSD FRML MDRD: 66 ML/MIN/1.73
GLUCOSE BLDC GLUCOMTR-MCNC: 74 MG/DL (ref 70–99)
GLUCOSE BLDC GLUCOMTR-MCNC: 78 MG/DL (ref 70–99)
GLUCOSE BLDC GLUCOMTR-MCNC: 88 MG/DL (ref 70–99)
GLUCOSE SERPL-MCNC: 86 MG/DL (ref 65–99)
HCT VFR BLD AUTO: 29.3 % (ref 34–46.6)
HGB BLD-MCNC: 8.8 G/DL (ref 12–15.9)
IMM GRANULOCYTES # BLD AUTO: 0.8 10*3/MM3 (ref 0–0.05)
IMM GRANULOCYTES NFR BLD AUTO: 8.2 % (ref 0–0.5)
LYMPHOCYTES # BLD AUTO: 1.38 10*3/MM3 (ref 0.7–3.1)
LYMPHOCYTES NFR BLD AUTO: 14.1 % (ref 19.6–45.3)
MAGNESIUM SERPL-MCNC: 1.7 MG/DL (ref 1.6–2.4)
MCH RBC QN AUTO: 28 PG (ref 26.6–33)
MCHC RBC AUTO-ENTMCNC: 30 G/DL (ref 31.5–35.7)
MCV RBC AUTO: 93.3 FL (ref 79–97)
MONOCYTES # BLD AUTO: 0.68 10*3/MM3 (ref 0.1–0.9)
MONOCYTES NFR BLD AUTO: 7 % (ref 5–12)
NEUTROPHILS NFR BLD AUTO: 6.56 10*3/MM3 (ref 1.7–7)
NEUTROPHILS NFR BLD AUTO: 67.3 % (ref 42.7–76)
NRBC BLD AUTO-RTO: 0.2 /100 WBC (ref 0–0.2)
PHOSPHATE SERPL-MCNC: 2.7 MG/DL (ref 2.5–4.5)
PLAT MORPH BLD: NORMAL
PLATELET # BLD AUTO: 183 10*3/MM3 (ref 140–450)
PMV BLD AUTO: 9.8 FL (ref 6–12)
POLYCHROMASIA BLD QL SMEAR: NORMAL
POTASSIUM SERPL-SCNC: 4 MMOL/L (ref 3.5–5.2)
QT INTERVAL: 353 MS
RBC # BLD AUTO: 3.14 10*6/MM3 (ref 3.77–5.28)
SODIUM SERPL-SCNC: 140 MMOL/L (ref 136–145)
WBC MORPH BLD: NORMAL
WBC NRBC COR # BLD: 9.76 10*3/MM3 (ref 3.4–10.8)

## 2021-12-13 PROCEDURE — 99232 SBSQ HOSP IP/OBS MODERATE 35: CPT | Performed by: FAMILY MEDICINE

## 2021-12-13 PROCEDURE — 85025 COMPLETE CBC W/AUTO DIFF WBC: CPT | Performed by: PHYSICIAN ASSISTANT

## 2021-12-13 PROCEDURE — 25010000002 MEROPENEM PER 100 MG: Performed by: PHYSICIAN ASSISTANT

## 2021-12-13 PROCEDURE — 82962 GLUCOSE BLOOD TEST: CPT

## 2021-12-13 PROCEDURE — 80069 RENAL FUNCTION PANEL: CPT | Performed by: PHYSICIAN ASSISTANT

## 2021-12-13 PROCEDURE — 93010 ELECTROCARDIOGRAM REPORT: CPT | Performed by: INTERNAL MEDICINE

## 2021-12-13 PROCEDURE — 83735 ASSAY OF MAGNESIUM: CPT | Performed by: PHYSICIAN ASSISTANT

## 2021-12-13 PROCEDURE — 93005 ELECTROCARDIOGRAM TRACING: CPT | Performed by: INTERNAL MEDICINE

## 2021-12-13 PROCEDURE — 85007 BL SMEAR W/DIFF WBC COUNT: CPT | Performed by: PHYSICIAN ASSISTANT

## 2021-12-13 RX ORDER — LEVOFLOXACIN 250 MG/1
250 TABLET ORAL EVERY 24 HOURS
Status: DISCONTINUED | OUTPATIENT
Start: 2021-12-13 | End: 2021-12-14

## 2021-12-13 RX ADMIN — SODIUM CHLORIDE 50 ML/HR: 9 INJECTION, SOLUTION INTRAVENOUS at 17:22

## 2021-12-13 RX ADMIN — MONTELUKAST SODIUM 10 MG: 10 TABLET, FILM COATED ORAL at 10:12

## 2021-12-13 RX ADMIN — FERROUS SULFATE TAB 325 MG (65 MG ELEMENTAL FE) 325 MG: 325 (65 FE) TAB at 10:12

## 2021-12-13 RX ADMIN — MEMANTINE 10 MG: 10 TABLET ORAL at 20:29

## 2021-12-13 RX ADMIN — METOPROLOL TARTRATE 25 MG: 25 TABLET, FILM COATED ORAL at 10:11

## 2021-12-13 RX ADMIN — MICONAZOLE NITRATE: 2 POWDER TOPICAL at 10:11

## 2021-12-13 RX ADMIN — SERTRALINE 25 MG: 25 TABLET, FILM COATED ORAL at 10:11

## 2021-12-13 RX ADMIN — MEROPENEM 1 G: 1 INJECTION, POWDER, FOR SOLUTION INTRAVENOUS at 05:43

## 2021-12-13 RX ADMIN — LEVOFLOXACIN 250 MG: 250 TABLET, FILM COATED ORAL at 17:22

## 2021-12-13 RX ADMIN — MICONAZOLE NITRATE: 2 POWDER TOPICAL at 20:29

## 2021-12-13 RX ADMIN — FOLIC ACID 1 MG: 1 TABLET ORAL at 10:11

## 2021-12-13 RX ADMIN — SODIUM CHLORIDE 50 ML/HR: 9 INJECTION, SOLUTION INTRAVENOUS at 20:29

## 2021-12-13 RX ADMIN — ANASTROZOLE 1 MG: 1 TABLET ORAL at 10:11

## 2021-12-13 RX ADMIN — BUPROPION HYDROCHLORIDE 300 MG: 150 TABLET, EXTENDED RELEASE ORAL at 10:11

## 2021-12-13 RX ADMIN — MEMANTINE 10 MG: 10 TABLET ORAL at 10:11

## 2021-12-13 RX ADMIN — METOPROLOL TARTRATE 25 MG: 25 TABLET, FILM COATED ORAL at 20:29

## 2021-12-13 RX ADMIN — ROSUVASTATIN CALCIUM 10 MG: 5 TABLET, FILM COATED ORAL at 20:29

## 2021-12-13 NOTE — PROGRESS NOTES
[facility]   Hospitalist Progress Note  Date: 2021  Patient Name: Mirna Meredith  : 1945  MRN: [id number]  Date of admission: 2021      Subjective   Subjective     Chief Complaint: Altered mental status    Summary: 76-year-old female with metastatic breast cancer, COPD, diabetes.  She had been admitted to the hospital in mid November with hydronephrosis and questionable ureteral stricture, underwent stenting of the right ureter on .  She was discharged to [facility] rehab after that.  Upon returning home she began getting confused again.  Urinalysis was positive for UTI and her chest x-ray showed possible pneumonia.  She is admitted to the medicine service.  She was started on IV antibiotics.  Hematology/oncology was consulted as well.  Overall patient's prognosis felt to be very poor palliative care consulted family has decided to make the patient DO NOT RESUSCITATE requested hospice referral initially patient was to be discharged under the care of hospice on 2021 family have now decided to take the patient home with home health to try physical therapy and oral chemotherapeutic drug.    Interval Followup:   Patient resting in bed no significant change noted in mentation or clinical status. Tolerating puréed diet. Currently growing gram-positive cocci in her urine will discontinue Merrem start Levaquin. Initially family plan to take the patient home 2021 under the care of hospice but have since decided to take her home with home health so that they can try physical therapy occupational therapy and oral chemotherapeutic drug. Patient remains a DNR prognosis remains poor..    Review of Systems   Difficult to obtain due to mental status  Objective   Objective     Vitals:   Temp:  [97.3 °F (36.3 °C)-98.4 °F (36.9 °C)] 98.4 °F (36.9 °C)  Heart Rate:  [81-95] 84  Resp:  [18-20] 20  BP: (120-144)/(55-86) 123/55  Physical Exam    Constitutional: Awake tries to talk difficult  to understand eyes: Pupils equal, sclerae anicteric, no conjunctival injection   HENT: NCAT, mucous membranes moist   Neck: Supple, no lad   Respiratory: Diminished poor inspiratory effort no increased work of breathing noted   cardiovascular: RRR, no murmurs   gastrointestinal: Positive bowel sounds, soft, nontender, nondistended   Musculoskeletal: No bilateral ankle edema, no clubbing or cyanosis to extremities   Psychiatric: Unable to assess   neurologic: No focal deficits but difficult to assess given mental status   skin: No rashes     Result Review    Result Review:  I have personally reviewed the results from the time of this admission to 12/13/2021 11:40 EST and agree with these findings:  [x]  Laboratory  [x]  Microbiology  [x]  Radiology  []  EKG/Telemetry   []  Cardiology/Vascular   []  Pathology  []  Old records  []  Other:    Assessment/Plan   Assessment / Plan     Assessment:  • Urinary tract infection secondary to gram-positive cocci  • Urinary tract infection, recurrent  • Metastatic breast cancer  • Recent ureteral stent placement  • Acute metabolic encephalopathy  • JOESPH   • Possible CAP  • Pleural effusion, left-sided  • Recent right ureteral stent placement    Plan:  · DC Merrem start IV Levaquin pending urine cultures  · DC Awan catheter  · Continue gentle IV fluids  · Patient initially said to be discharged home under the care of hospice on 12/14/2021 family now wish to take the patient home with home health PT OT and trial of oral chemotherapeutic. Patient's family has hospice contact information should they change their mind. Patient remains a DO NOT RESUSCITATE  · Palliative care consultation appreciated  · Continue diet per speech therapy   · MRI obtained reviewed no acute changes noted  · Oncology consultation appreciate continue care per the recommendation  · PT OT speech therapy consultations  · SCDs for DVT prophylaxis  · Home tomorrow with home health  · Discussed with RN         DVT  prophylaxis:  Mechanical DVT prophylaxis orders are present.    CODE STATUS:   Medical Intervention Limits: NO artificial nutrition; Other; NO intubation (DNI)  Code Status (Patient has no pulse and is not breathing): No CPR (Do Not Attempt to Resuscitate)  Medical Interventions (Patient has pulse or is breathing): Limited Support  Additional Medical Interventions Limits: dnr    Electronically signed by KARLI Edwards, 12/13/21, 2:24 PM EST.    Attending documentation:  I reviewed the above documentation and independently reviewed and rounded and evaluated the patient and discussed the care plan with JAN Martinez PA-C, I agree with his findings and plan as documented, what I have added to the care plan and modified is as follows in my documentation and my medical decision making; 68-year-old female metastatic breast cancer, COPD, diabetes hospitalized on 12/4/2021 with confusion, urinalysis positive for UTI, chest x-ray with pneumonia, hospitalized, placed on IV antibiotics, hematology oncology consulted, mentation slightly improved but overall status underlying metastatic disease poor, patient's family met with palliative care to establish goals of care, made her a DNR but were undecided on how to proceed with any sort of treatments, maintenance IV fluids, difficulty swallowing, original plan was to take her home with home hospice on 12/14/2021 but family wishing to pursue physical therapy and resume home chemotherapy pill, goals of care do not align with home hospice, patient's mentation slowly improving with her ability to eat some further, her goals of care have paralleled the family's and we have started treatment for urinary tract infection, seen and examined on rounds this morning, no acute distress, no acute major overnight events, patient was eating breakfast with the PCA at the bedside, she appeared comfortable, she is unable to provide review of systems due to patient's altered mental status, she  made eye contact briefly, she appeared to be comfortable.  Labs reviewed from this morning, creatinine stable 0.84, white blood cell count 9000.  Urine culture from 12/12/2021 showed gram-positive cocci, started on levofloxacin, vitals reviewed, on physical exam, calm appearing female, confused in no acute distress, moist mucous membranes, poor inspiratory effort, regular rate rhythm, soft nontender abdomen, alert, confused, no lower extremity edema.  Stop Merrem, start Levaquin, continue IV fluids as her mentation is slowly improving, help with feeds, palliative care consulted, goals of care changed to remain aggressive enough to get her rehab at home, oncology consulted Dr. Love, recommendations appreciated, plans to resume oral chemotherapy, a.m. labs, full code, PT/OT, VTE prophylaxis with SCDs, clinical course to dictate further management, discussed with nurse at the bedside.  -KARLENE ROPER    Electronically signed by Philip Prabhakar MD, 12/13/21, 4:45 PM EST.

## 2021-12-13 NOTE — PROGRESS NOTES
[facility]     Progress Note    Patient Name: Mirna Meredith  : 1945  MRN: [id number]  Primary Care Physician:  Pan Pacheco MD  Date of admission: 2021    Subjective   Subjective     Chief Complaint: She is alert and oriented but extremely weak plans are being, to discharge home with the help of hospice family thing is, bring the home for her to get tomorrow    HPI:  Patient Reports patient extremely weak but alert and oriented    Review of Systems   All systems were reviewed and negative except for: Free of metastatic breast cancer as well as dementia    Objective   Objective     Vitals:   Temp:  [97.3 °F (36.3 °C)-98.4 °F (36.9 °C)] 98.1 °F (36.7 °C)  Heart Rate:  [81-96] 90  Resp:  [16-20] 20  BP: (120-144)/(55-86) 144/70  Flow (L/min):  [2] 2    Physical Exam    Constitutional: Awake, alert   Eyes: PERRLA, sclerae anicteric, no conjunctival injection   HENT: NCAT, mucous membranes moist   Neck: Supple, no thyromegaly, no lymphadenopathy, trachea midline   Respiratory: Clear to auscultation bilaterally, nonlabored respirations    Cardiovascular: RRR, no murmurs, rubs, or gallops, palpable pedal pulses bilaterally   Gastrointestinal: Positive bowel sounds, soft, nontender, nondistended   Musculoskeletal: No bilateral ankle edema, no clubbing or cyanosis to extremities   Psychiatric: Appropriate affect, cooperative   Neurologic: Oriented x 3, strength symmetric in all extremities, Cranial Nerves grossly intact to confrontation, speech clear   Skin: No rashes     Result Review    Result Review:  I have personally reviewed the results from the time of this admission to 2021 08:11 EST and agree with these findings:  [x]  Laboratory  []  Microbiology  []  Radiology  []  EKG/Telemetry   []  Cardiology/Vascular   []  Pathology  []  Old records  []  Other: Anemia  Most notable findings include: Anemia metastatic carcinoma of the breast    Assessment/Plan   Assessment / Plan     Brief  Patient Summary:  Mirna Meredith is a 76 y.o. female who patient is getting urinary retention Awan catheter has been inserted for comfort care    Active Hospital Problems:  Active Hospital Problems    Diagnosis    • Weakness        Plan:   Continue the current management supportive care    DVT prophylaxis:  Mechanical DVT prophylaxis orders are present.    CODE STATUS:   Medical Intervention Limits: NO artificial nutrition; Other; NO intubation (DNI)  Code Status (Patient has no pulse and is not breathing): No CPR (Do Not Attempt to Resuscitate)  Medical Interventions (Patient has pulse or is breathing): Limited Support  Additional Medical Interventions Limits: dnr    Disposition:  I expect patient to be discharged when arrangements are made for her to go home.    Electronically signed by Jose G Grissom MD, 12/13/21, 8:11 AM EST.

## 2021-12-13 NOTE — ACP (ADVANCE CARE PLANNING)
Educated on and completed EMS DNR with patients daughter at bedside. Original placed in chart.    MAGGY JungN, RN  Palliative Care

## 2021-12-13 NOTE — NURSING NOTE
Patient is currently on 4mtu. Collarborated with [facility] RN following [facility] evaluation of services. Patient is eligible for [facility] services- however, at this time- patients family do not accept [facility] services. Family requests patient have physical therapy at home and resume home chemotherapy pill. At this time this does not align with goals of [facility] services. Spoke with patients daughters at bedside. Patients daughters states patient ate better over the weekend. Patients daughters understand that if patient goes home and declines they can reach out to [facility] services at that time. Patients family request patient go home with home health at this time. Updated primary rn, provider, and . Palliative care will continue to follow to support and assist as needed.    MAGGY JungN, RN  Palliative Care

## 2021-12-13 NOTE — PLAN OF CARE
Goal Outcome Evaluation:  Plan of Care Reviewed With: patient        Progress: no change    No change in condition. Pt alert spontaneously and oriented to self only. VSS.

## 2021-12-14 LAB
ALBUMIN SERPL-MCNC: 2.7 G/DL (ref 3.5–5.2)
ALP SERPL-CCNC: 164 U/L (ref 39–117)
ALT SERPL W P-5'-P-CCNC: 14 U/L (ref 1–33)
ANION GAP SERPL CALCULATED.3IONS-SCNC: 9.8 MMOL/L (ref 5–15)
AST SERPL-CCNC: 62 U/L (ref 1–32)
BACTERIA SPEC AEROBE CULT: ABNORMAL
BASOPHILS # BLD AUTO: 0.14 10*3/MM3 (ref 0–0.2)
BASOPHILS NFR BLD AUTO: 1.3 % (ref 0–1.5)
BILIRUB CONJ SERPL-MCNC: <0.2 MG/DL (ref 0–0.3)
BILIRUB INDIRECT SERPL-MCNC: ABNORMAL MG/DL
BILIRUB SERPL-MCNC: 0.2 MG/DL (ref 0–1.2)
BUN SERPL-MCNC: 18 MG/DL (ref 8–23)
BUN/CREAT SERPL: 20.9 (ref 7–25)
CALCIUM SPEC-SCNC: 10.8 MG/DL (ref 8.6–10.5)
CHLORIDE SERPL-SCNC: 99 MMOL/L (ref 98–107)
CO2 SERPL-SCNC: 30.2 MMOL/L (ref 22–29)
CREAT SERPL-MCNC: 0.86 MG/DL (ref 0.57–1)
DEPRECATED RDW RBC AUTO: 61.7 FL (ref 37–54)
EOSINOPHIL # BLD AUTO: 0.18 10*3/MM3 (ref 0–0.4)
EOSINOPHIL NFR BLD AUTO: 1.7 % (ref 0.3–6.2)
ERYTHROCYTE [DISTWIDTH] IN BLOOD BY AUTOMATED COUNT: 18.4 % (ref 12.3–15.4)
GFR SERPL CREATININE-BSD FRML MDRD: 64 ML/MIN/1.73
GLUCOSE BLDC GLUCOMTR-MCNC: 148 MG/DL (ref 70–99)
GLUCOSE BLDC GLUCOMTR-MCNC: 69 MG/DL (ref 70–99)
GLUCOSE BLDC GLUCOMTR-MCNC: 90 MG/DL (ref 70–99)
GLUCOSE SERPL-MCNC: 77 MG/DL (ref 65–99)
HCT VFR BLD AUTO: 30.2 % (ref 34–46.6)
HGB BLD-MCNC: 9.3 G/DL (ref 12–15.9)
IMM GRANULOCYTES # BLD AUTO: 1.06 10*3/MM3 (ref 0–0.05)
IMM GRANULOCYTES NFR BLD AUTO: 10.2 % (ref 0–0.5)
LYMPHOCYTES # BLD AUTO: 1.69 10*3/MM3 (ref 0.7–3.1)
LYMPHOCYTES NFR BLD AUTO: 16.2 % (ref 19.6–45.3)
MAGNESIUM SERPL-MCNC: 1.6 MG/DL (ref 1.6–2.4)
MCH RBC QN AUTO: 28.4 PG (ref 26.6–33)
MCHC RBC AUTO-ENTMCNC: 30.8 G/DL (ref 31.5–35.7)
MCV RBC AUTO: 92.1 FL (ref 79–97)
MONOCYTES # BLD AUTO: 0.62 10*3/MM3 (ref 0.1–0.9)
MONOCYTES NFR BLD AUTO: 5.9 % (ref 5–12)
NEUTROPHILS NFR BLD AUTO: 6.74 10*3/MM3 (ref 1.7–7)
NEUTROPHILS NFR BLD AUTO: 64.7 % (ref 42.7–76)
NRBC BLD AUTO-RTO: 0.3 /100 WBC (ref 0–0.2)
PHOSPHATE SERPL-MCNC: 2.5 MG/DL (ref 2.5–4.5)
PLAT MORPH BLD: NORMAL
PLATELET # BLD AUTO: 182 10*3/MM3 (ref 140–450)
PMV BLD AUTO: 9.7 FL (ref 6–12)
POTASSIUM SERPL-SCNC: 3.8 MMOL/L (ref 3.5–5.2)
PROT SERPL-MCNC: 6.6 G/DL (ref 6–8.5)
RBC # BLD AUTO: 3.28 10*6/MM3 (ref 3.77–5.28)
RBC MORPH BLD: NORMAL
SODIUM SERPL-SCNC: 139 MMOL/L (ref 136–145)
WBC MORPH BLD: NORMAL
WBC NRBC COR # BLD: 10.43 10*3/MM3 (ref 3.4–10.8)

## 2021-12-14 PROCEDURE — 25010000002 VANCOMYCIN 5 G RECONSTITUTED SOLUTION: Performed by: FAMILY MEDICINE

## 2021-12-14 PROCEDURE — 85025 COMPLETE CBC W/AUTO DIFF WBC: CPT | Performed by: FAMILY MEDICINE

## 2021-12-14 PROCEDURE — 99232 SBSQ HOSP IP/OBS MODERATE 35: CPT | Performed by: FAMILY MEDICINE

## 2021-12-14 PROCEDURE — 84100 ASSAY OF PHOSPHORUS: CPT | Performed by: FAMILY MEDICINE

## 2021-12-14 PROCEDURE — 80076 HEPATIC FUNCTION PANEL: CPT | Performed by: FAMILY MEDICINE

## 2021-12-14 PROCEDURE — 82962 GLUCOSE BLOOD TEST: CPT

## 2021-12-14 PROCEDURE — 80048 BASIC METABOLIC PNL TOTAL CA: CPT | Performed by: FAMILY MEDICINE

## 2021-12-14 PROCEDURE — 85007 BL SMEAR W/DIFF WBC COUNT: CPT | Performed by: FAMILY MEDICINE

## 2021-12-14 PROCEDURE — 83735 ASSAY OF MAGNESIUM: CPT | Performed by: FAMILY MEDICINE

## 2021-12-14 PROCEDURE — 92526 ORAL FUNCTION THERAPY: CPT

## 2021-12-14 RX ADMIN — MEMANTINE 10 MG: 10 TABLET ORAL at 20:41

## 2021-12-14 RX ADMIN — ROSUVASTATIN CALCIUM 10 MG: 5 TABLET, FILM COATED ORAL at 20:41

## 2021-12-14 RX ADMIN — SODIUM CHLORIDE 50 ML/HR: 9 INJECTION, SOLUTION INTRAVENOUS at 22:52

## 2021-12-14 RX ADMIN — MICONAZOLE NITRATE: 2 POWDER TOPICAL at 20:47

## 2021-12-14 RX ADMIN — MEMANTINE 10 MG: 10 TABLET ORAL at 08:49

## 2021-12-14 RX ADMIN — METOPROLOL TARTRATE 25 MG: 25 TABLET, FILM COATED ORAL at 20:41

## 2021-12-14 RX ADMIN — DEXTROSE MONOHYDRATE 25 G: 100 INJECTION, SOLUTION INTRAVENOUS at 08:02

## 2021-12-14 RX ADMIN — SERTRALINE 25 MG: 25 TABLET, FILM COATED ORAL at 08:49

## 2021-12-14 RX ADMIN — FOLIC ACID 1 MG: 1 TABLET ORAL at 08:49

## 2021-12-14 RX ADMIN — ANASTROZOLE 1 MG: 1 TABLET ORAL at 08:49

## 2021-12-14 RX ADMIN — FERROUS SULFATE TAB 325 MG (65 MG ELEMENTAL FE) 325 MG: 325 (65 FE) TAB at 08:49

## 2021-12-14 RX ADMIN — MICONAZOLE NITRATE: 2 POWDER TOPICAL at 08:50

## 2021-12-14 RX ADMIN — MONTELUKAST SODIUM 10 MG: 10 TABLET, FILM COATED ORAL at 08:49

## 2021-12-14 RX ADMIN — METOPROLOL TARTRATE 25 MG: 25 TABLET, FILM COATED ORAL at 08:49

## 2021-12-14 RX ADMIN — VANCOMYCIN HYDROCHLORIDE 1750 MG: 5 INJECTION, POWDER, LYOPHILIZED, FOR SOLUTION INTRAVENOUS at 13:27

## 2021-12-14 RX ADMIN — BUPROPION HYDROCHLORIDE 300 MG: 150 TABLET, EXTENDED RELEASE ORAL at 08:49

## 2021-12-14 NOTE — PROGRESS NOTES
[facility]     Progress Note    Patient Name: Mirna Meredith  : 1945  MRN: [id number]  Primary Care Physician:  Pan Pacheco MD  Date of admission: 2021    Subjective   Subjective     Chief Complaint: Patient has dementia confusion but today she is alert oriented not in acute distress is able to eat and family has decided to take her home I want to hold off on the hospice at this time which probably is appropriate she will be continued on oral treatment for her metastatic breast cancer her family is going to take care of at home home health agency to help    HPI:  Patient Reports alert oriented not in acute distress    Review of Systems   All systems were reviewed and negative except for: Reviewed    Objective   Objective     Vitals:   Temp:  [97.3 °F (36.3 °C)-98.4 °F (36.9 °C)] 98.2 °F (36.8 °C)  Heart Rate:  [82-92] 86  Resp:  [20] 20  BP: (123-147)/(55-73) 138/73  Flow (L/min):  [2] 2    Physical Exam    Constitutional: Awake, alert   Eyes: PERRLA, sclerae anicteric, no conjunctival injection   HENT: NCAT, mucous membranes moist   Neck: Supple, no thyromegaly, no lymphadenopathy, trachea midline   Respiratory: Clear to auscultation bilaterally, nonlabored respirations    Cardiovascular: RRR, no murmurs, rubs, or gallops, palpable pedal pulses bilaterally   Gastrointestinal: Positive bowel sounds, soft, nontender, nondistended   Musculoskeletal: No bilateral ankle edema, no clubbing or cyanosis to extremities   Psychiatric: Appropriate affect, cooperative   Neurologic: Oriented x 3, strength symmetric in all extremities, Cranial Nerves grossly intact to confrontation, speech clear   Skin: No rashes     Result Review    Result Review:  I have personally reviewed the results from the time of this admission to 2021 08:07 EST and agree with these findings:  [x]  Laboratory  []  Microbiology  []  Radiology  []  EKG/Telemetry   []  Cardiology/Vascular   []  Pathology  []  Old records  []   Other: Anemia  Most notable findings include: Free of metastatic breast cancer    Assessment/Plan   Assessment / Plan     Brief Patient Summary:  Mirna Meredith is a 76 y.o. female who was admitted because of confusion disorientation which has resolved    Active Hospital Problems:  Active Hospital Problems    Diagnosis    • Weakness        Plan:   Patient is going to be discharged home with the home health helping her at home and palliative care will be following    DVT prophylaxis:  Mechanical DVT prophylaxis orders are present.    CODE STATUS:   Medical Intervention Limits: NO artificial nutrition; Other; NO intubation (DNI)  Code Status (Patient has no pulse and is not breathing): No CPR (Do Not Attempt to Resuscitate)  Medical Interventions (Patient has pulse or is breathing): Limited Support  Additional Medical Interventions Limits: dnr    Disposition:  I expect patient to be discharged plan is to discharge her home today if the family is ready.    Electronically signed by Jose G Grissom MD, 12/14/21, 8:07 AM EST.            Recognize danger situations.  For example, stress, drinking alcohol, urges to smoke, smoking cues, cigarette availability

## 2021-12-14 NOTE — PLAN OF CARE
Goal Outcome Evaluation:  Plan of Care Reviewed With: patient        Progress: no change  Outcome Summary: Poor appetite, minimal interaction with staff. Pt more alert but still only oriented to self. Awan removed at 1615, pt voided post removal. No acute issues. VSS

## 2021-12-14 NOTE — PLAN OF CARE
Goal Outcome Evaluation:  Plan of Care Reviewed With: patient        Progress: no change  Outcome Summary: Poor appetite, continues to pocket food resulting in having to suction oral cavity. Pt remains confused and has minimal interaction with staff. Pt very weak unable to roll self or assist with turning. PT/OT ordered. Plan for family to take pt home with home health/PT. No other issues. VSS.

## 2021-12-14 NOTE — THERAPY TREATMENT NOTE
Acute Care - Speech Language Pathology   Swallow Treatment Note  Felice     Patient Name: Mirna Meredith  : 1945  MRN: [id number]  Today's Date: 2021               Admit Date: 2021    Visit Dx:     ICD-10-CM ICD-9-CM   1. Weakness  R53.1 780.79   2. Altered mental status, unspecified altered mental status type  R41.82 780.97   3. Oropharyngeal dysphagia  R13.12 787.22   4. Metastatic breast cancer (HCC)  C50.919 174.9     Patient Active Problem List   Diagnosis   • Abdominal pain   • Sepsis (HCC)   • Metabolic encephalopathy   • Acute renal failure (ARF) (HCC)   • Metastatic breast cancer (HCC)   • Chronic diarrhea   • Pancytopenia (HCC)   • Weakness     Past Medical History:   Diagnosis Date   • Cancer (HCC)     Breast with mets to colon and bones   • COPD (chronic obstructive pulmonary disease) (HCC)    • Depression    • Diabetes mellitus (HCC)    • Hyperlipidemia    • Hypertension      Past Surgical History:   Procedure Laterality Date   • CHOLECYSTECTOMY     • CYSTOSCOPY, RETROGRADE PYELOGRAM AND STENT INSERTION Right 2021    Procedure: CYSTOSCOPY RETROGRADE PYELOGRAM AND STENT INSERTION;  Surgeon: Allen Rodriguez MD;  Location: [facility] OR;  Service: Urology;  Laterality: Right;   • HYSTERECTOMY     • JOINT REPLACEMENT Bilateral     Knees   • MASTECTOMY Bilateral    SPEECH PATHOLOGY DYSPHAGIA TREATMENT    Subjective/Behavioral Observations: awake with verbal cues. Nursing reports family wishing to take patient home with home health        Day/time of Treatment:21        Current Diet:pureed, nectar thick        Current Strategies:1:1 feed, small bites and sips, controlled drink, crush meds in applesauce        Treatment received:Treatment focused on tolerance of current diet recommendations. Possible d/c home today        Results of treatment:Delayed bolus formation and transit with pureed. Anterior residue following swallow completion however clears with assist with  nectar thick liquids. Delayed swallow with solids and liquids, 3-4 second delay with lingual pumping. No overt s/s of aspiration observed during treatment.        Progress toward goals:tolerating current diet         Barriers to Achieving goals: age related,         Plan of care:/changes in plan: continue per plan and diet upon d/c                  SLP Recommendation and Plan                                                                      Plan of Care Reviewed With: patient          EDUCATION  The patient has been educated in the following areas:   Dysphagia (Swallowing Impairment).              Time Calculation:    Time Calculation- SLP     Row Name 12/14/21 1208             Time Calculation- SLP    SLP Stop Time 0900  -      SLP Received On 12/14/21  -            User Key  (r) = Recorded By, (t) = Taken By, (c) = Cosigned By    Initials Name Provider Type    Monica Orozco SLP Speech and Language Pathologist                Therapy Charges for Today     Code Description Service Date Service Provider Modifiers Qty    [id number]  ST TREATMENT SWALLOW 3 12/14/2021 Monica Sweeney SLP GN 1               GABY Kruse  12/14/2021

## 2021-12-14 NOTE — PLAN OF CARE
Goal Outcome Evaluation:  Plan of Care Reviewed With: patient        Progress: no change     No change in condition. VSS. Planning for discharge with home health this week.

## 2021-12-14 NOTE — PROGRESS NOTES
[facility]   Hospitalist Progress Note  Date: 2021  Patient Name: Mirna Meredith  : 1945  MRN: [id number]  Date of admission: 2021      Subjective   Subjective     Admitted: 2021  Chief complaint: Altered mental status  Antibiotics: Vancomycin  Consults: Dr. Grissom-hematology/oncology  Summary:  76-year-old female with metastatic breast cancer, COPD, diabetes.  She had been admitted to the hospital in mid November with hydronephrosis and questionable ureteral stricture, underwent stenting of the right ureter on .  She was discharged to [facility] rehab after that.  Upon returning home she began getting confused again.  Urinalysis was positive for UTI and her chest x-ray showed possible pneumonia.  She is admitted to the medicine service.  She was started on IV antibiotics.  Hematology/oncology was consulted as well.  Overall patient's prognosis felt to be very poor palliative care consulted family has decided to make the patient DO NOT RESUSCITATE requested hospice referral initially patient was to be discharged under the care of hospice on 2021 family have now decided to take the patient home with home health to try physical therapy and oral chemotherapeutic drug.  Started on treatment for UTI, include ampicillin resistant Enterococcus, started on vancomycin 2021     Interval follow-up: Patient seen and examined this morning, no acute stress, no acute major night events, tolerated some diet, needs help with feeds, needs help with all ADLs, family was going to take her home but looking at her urine culture results, she has Enterococcus growing in her urine, resistant to ampicillin, daily start vancomycin, may need PICC line to complete course of antibiotics, she will have home health arrangements with facility with this process, PT and OT have been reconsulted, given change in care plan with the family.  Patient cannot provide any review of systems.    Review of  systems:  Unable to obtain review of systems due to patient's altered mental status    Objective   Objective     Vitals:   Temp:  [97.3 °F (36.3 °C)-98.8 °F (37.1 °C)] 98.1 °F (36.7 °C)  Heart Rate:  [82-95] 91  Resp:  [20] 20  BP: (124-154)/(56-87) 144/87  Flow (L/min):  [2] 2  Physical Exam    Constitutional: Awake, alert, unable to speak, makes eye contact, blinks seldomly, nursing staff reports she talks occasionally         Eyes: Pupils equal, sclerae anicteric, no conjunctival injection              HENT: NCAT, mucous membranes moist              Neck: Supple, palpable masses              Respiratory: Diminished poor inspiratory effort, no wheezing, rhonchi              cardiovascular: RRR, no murmurs              gastrointestinal: Positive bowel sounds, soft, nontender, nondistended              Musculoskeletal: No bilateral ankle edema, no clubbing or cyanosis to extremities              Psychiatric: Unable to assess              neurologic: No focal deficits but difficult to assess given mental status              Skin: No rashes   Result Review    Result Review:  I have personally reviewed the results from the time of this admission to 12/14/2021 16:21 EST and agree with these findings:  [x]  Laboratory   WBC   Date Value Ref Range Status   12/14/2021 10.43 3.40 - 10.80 10*3/mm3 Final     RBC   Date Value Ref Range Status   12/14/2021 3.28 (L) 3.77 - 5.28 10*6/mm3 Final     Hemoglobin   Date Value Ref Range Status   12/14/2021 9.3 (L) 12.0 - 15.9 g/dL Final     Hematocrit   Date Value Ref Range Status   12/14/2021 30.2 (L) 34.0 - 46.6 % Final     MCV   Date Value Ref Range Status   12/14/2021 92.1 79.0 - 97.0 fL Final     MCH   Date Value Ref Range Status   12/14/2021 28.4 26.6 - 33.0 pg Final     MCHC   Date Value Ref Range Status   12/14/2021 30.8 (L) 31.5 - 35.7 g/dL Final     RDW   Date Value Ref Range Status   12/14/2021 18.4 (H) 12.3 - 15.4 % Final     RDW-SD   Date Value Ref Range Status    12/14/2021 61.7 (H) 37.0 - 54.0 fl Final     MPV   Date Value Ref Range Status   12/14/2021 9.7 6.0 - 12.0 fL Final     Platelets   Date Value Ref Range Status   12/14/2021 182 140 - 450 10*3/mm3 Final     Neutrophil %   Date Value Ref Range Status   12/14/2021 64.7 42.7 - 76.0 % Final     Lymphocyte %   Date Value Ref Range Status   12/14/2021 16.2 (L) 19.6 - 45.3 % Final     Monocyte %   Date Value Ref Range Status   12/14/2021 5.9 5.0 - 12.0 % Final     Eosinophil %   Date Value Ref Range Status   12/14/2021 1.7 0.3 - 6.2 % Final     Basophil %   Date Value Ref Range Status   12/14/2021 1.3 0.0 - 1.5 % Final     Immature Grans %   Date Value Ref Range Status   12/14/2021 10.2 (H) 0.0 - 0.5 % Final     Neutrophils, Absolute   Date Value Ref Range Status   12/14/2021 6.74 1.70 - 7.00 10*3/mm3 Final     Lymphocytes, Absolute   Date Value Ref Range Status   12/14/2021 1.69 0.70 - 3.10 10*3/mm3 Final     Monocytes, Absolute   Date Value Ref Range Status   12/14/2021 0.62 0.10 - 0.90 10*3/mm3 Final     Eosinophils, Absolute   Date Value Ref Range Status   12/14/2021 0.18 0.00 - 0.40 10*3/mm3 Final     Basophils, Absolute   Date Value Ref Range Status   12/14/2021 0.14 0.00 - 0.20 10*3/mm3 Final     Immature Grans, Absolute   Date Value Ref Range Status   12/14/2021 1.06 (H) 0.00 - 0.05 10*3/mm3 Final     nRBC   Date Value Ref Range Status   12/14/2021 0.3 (H) 0.0 - 0.2 /100 WBC Final     Basic Metabolic Panel    Sodium Sodium   Date Value Ref Range Status   12/14/2021 139 136 - 145 mmol/L Final   12/13/2021 140 136 - 145 mmol/L Final   12/12/2021 141 136 - 145 mmol/L Final      Potassium Potassium   Date Value Ref Range Status   12/14/2021 3.8 3.5 - 5.2 mmol/L Final   12/13/2021 4.0 3.5 - 5.2 mmol/L Final   12/12/2021 3.9 3.5 - 5.2 mmol/L Final      Chloride Chloride   Date Value Ref Range Status   12/14/2021 99 98 - 107 mmol/L Final   12/13/2021 102 98 - 107 mmol/L Final   12/12/2021 101 98 - 107 mmol/L Final       Bicarbonate No results found for: PLASMABICARB   BUN BUN   Date Value Ref Range Status   12/14/2021 18 8 - 23 mg/dL Final   12/13/2021 19 8 - 23 mg/dL Final   12/12/2021 23 8 - 23 mg/dL Final      Creatinine Creatinine   Date Value Ref Range Status   12/14/2021 0.86 0.57 - 1.00 mg/dL Final   12/13/2021 0.84 0.57 - 1.00 mg/dL Final   12/12/2021 0.89 0.57 - 1.00 mg/dL Final      Calcium Calcium   Date Value Ref Range Status   12/14/2021 10.8 (H) 8.6 - 10.5 mg/dL Final   12/13/2021 10.8 (H) 8.6 - 10.5 mg/dL Final   12/12/2021 10.9 (H) 8.6 - 10.5 mg/dL Final      Glucose      No components found for: GLUCOSE.*       [x]  Microbiology No results found for: ACANTHNAEG, AFBCX, BPERTUSSISCX, BLOODCX  No results found for: BCIDPCR, CXREFLEX, CSFCX, CULTURETIS  No results found for: CULTURES, HSVCX, URCX  No results found for: EYECULTURE, GCCX, HSVCULTURE, LABHSV  No results found for: LEGIONELLA, MRSACX, MUMPSCX, MYCOPLASCX  No results found for: NOCARDIACX, STOOLCX  Urine Culture   Date Value Ref Range Status   12/12/2021 50,000 CFU/mL Enterococcus faecium (A)  Final     No results found for: VIRALCULTU, WOUNDCX    [x]  Radiology Adult Transthoracic Echo Complete W/ Cont if Necessary Per Protocol    Result Date: 11/15/2021  · Estimated right ventricular systolic pressure from tricuspid regurgitation is mildly elevated (35-45 mmHg). · Left ventricular diastolic function is consistent with (grade I) impaired relaxation. · Normal left ventricular systolic function, the estimated ejection fraction is 55% · Estimated left ventricular EF was in disagreement with the calculated left ventricular EF. Left ventricular ejection fraction appears to be 51 - 55%.      CT Abdomen Pelvis Without Contrast    Result Date: 12/5/2021  PROCEDURE: CT ABDOMEN PELVIS WO CONTRAST  COMPARISONS: [facility], CR, FL RETROGRADE PYELOGRAM IN OR, 11/14/2021, 11:54.  [facility], CT, CT ABD-PELVIS WO CONTRAST, 11/12/2021,  21:31.   [facility], CT, CT CHEST WO CONTRAST, 12/04/2021, 22:05.  INDICATIONS: right hydronephrosis w/ right ureteral stent; h/o breast ca  TECHNIQUE: 747 CT images were created without intravenous contrast.   PROTOCOL:   Standard imaging protocol performed    RADIATION:   Automated exposure control was utilized to minimize radiation dose.  FINDINGS: Motion artifact obscures detail.  There is a small amount of ascites, probably similar to the 11/12/2021 study.  Hydronephrosis is present on the right, as before.  A right ureteral stent is in place.  It appears to be in proper position and similar to prior recent exams.  No left-sided hydronephrosis.  No definite nephrolithiasis or ureterolithiasis.  There is greater distention of the urinary bladder.  No urinary bladder calculi are identified.  There is possible hepatosplenomegaly.  No definite nonenhanced CT evidence of hepatic metastases.  A small hiatal hernia may be present.  Extensive osseous metastatic disease is seen as on prior exams.  No definite pathologic fracture is suggested.  The left-sided pleural effusion has increased in size since the prior 11/12/2021 CT study.  The previously suggested possible pleural-based metastatic disease within the lower posterior thorax is less well appreciated on the current CT study.  It may be obscured by motion artifact.  There may be new atelectasis and/or pneumonia in the left lung base when compared with the prior 11/12/2021 study.  Formed stool is seen throughout the colon.  There may be fecal stasis as with constipation.  No mechanical bowel obstruction is suspected.  No pneumoperitoneum is seen.  The appendix is not clearly identified.  It may be obscured by the ascites.  The patient has undergone hysterectomy and cholecystectomy.  No definite acute pancreatitis.  No other acute findings are appreciated.  CONCLUSION: Increased left-sided pleural effusion is noted since 11/12/2021.  There has been  interval placement of a right ureteral stent since the prior 11/12/2021 CT study.  Otherwise, little interval change is seen since 11/12/2021 CT study with findings as detailed above.     ELIJAH DOUGLAS JR, MD       Electronically Signed and Approved By: ELIJAH DOUGLAS JR, MD on 12/05/2021 at 3:07             CT Head Without Contrast    Result Date: 12/4/2021  PROCEDURE: CT HEAD WO CONTRAST  COMPARISON: [facility], CT, CT HEAD WO CONTRAST, 11/11/2021, 19:57.  INDICATIONS: CONFUSION TODAY.  PROTOCOL:   Standard imaging protocol performed    RADIATION:   MA and/or KV was adjusted to minimize radiation dose.    TECHNIQUE: After obtaining the patient's consent, 130 CT images were obtained without non-ionic intravenous contrast material.  DISCUSSION:  A routine nonenhanced head CT was performed. No acute brain abnormality is identified. No acute intracranial hemorrhage. No acute infarction. No acute skull fracture. No midline shift or acute intracranial mass effect is seen.  Mild to moderate chronic small vessel ischemia/infarction is suspected. There are arterial calcifications. The extra-axial spaces and the ventricular system are prominent.  Age-indeterminate congestive and/or inflammatory changes involve the bilateral middle ear clefts, especially the mastoid air cell complexes, seen previously.  There may be mild age-indeterminate sinus disease, also seen previously.  No air-fluid interfaces are appreciated within the imaged paranasal sinuses.  Chronic leftward nasal septal deviation is present with an associated nasal spur.  Degenerative changes involve the cervical spine.  CONCLUSION:  No acute brain abnormality is seen.    ELIJAH DOUGLAS JR, MD       Electronically Signed and Approved By: ELIJAH DOUGLAS JR, MD on 12/04/2021 at 22:47             CT Chest Without Contrast Diagnostic    Result Date: 12/4/2021  PROCEDURE: CT CHEST WO CONTRAST DIAGNOSTIC  COMPARISONS: [facility], CT, CT  CHEST W CONTRAST DIAGNOSTIC, 7/30/2021, 11:43.   [facility], CR, XR CHEST 1 VW, 12/04/2021, 18:43.   [facility], CT, CT ABDOMEN PELVIS WO CONTRAST, 11/12/2021, 21:31.  INDICATIONS: SHORTNESS OF BREATH TODAY; HISTORY OF BREAST CARCINOMA.  TECHNIQUE: 564 CT images were created without the administration of contrast material.   PROTOCOL:   Standard imaging protocol performed    RADIATION:   Automated exposure control was utilized to minimize radiation dose.  FINDINGS: Motion artifact obscures detail.  The patient's upper extremities in the scan field of view obscure detail.  The performing CT technologist noted the patient was not able to cooperate for the study.  There is a moderate sized left pleural effusion.  It has increased since the 7/30/2021 CT exam.  Atelectasis and/or pneumonia may involve(s) the lower lobe of the left lung.  It is thought to be new since the prior 7/30/2021 study.  Minimal if any acute infiltrate(s) is (are) seen elsewhere.  There is a left-sided subclavian central venous line in place with its tip in the upper superior vena cava (SVC); its tip is not parallel to the wall of the superior vena cava (SVC).  It forms an angle, estimated at about 45 degrees.  Please correlate with its desired location.  The central tracheobronchial tree is well aerated without filling defect.  There is an air-fluid level in the thoracic esophagus, which may represent esophageal dysmotility and/or gastroesophageal reflux disease.  Small scattered noncalcified non-cavitating pulmonary nodules are seen, which measure about 7 mm in greatest diameter.  They are nonspecific.  Similar findings were seen on the prior 7/30/2021 study.  Consider low-dose chest CT examination follow-up if clinically warranted.  It appears that the patient has undergone bilateral mastectomies.  There is a partially visualized right ureteral stent in place.  It is new since the 7/30/2021 exam.  A small pericardial  effusion is suspected with a CT number 32 Hounsfield units or less.  No right pleural effusion.  A small amount of ascites is seen in the upper abdomen.  Diffuse osseous metastatic disease is seen, as before.  The patient has undergone cholecystectomy.  Extensive ossification of the anterior longitudinal ligament is seen and may represent diffuse idiopathic skeletal hyperostosis (DISH).  There are degenerative changes throughout the imaged spine.  There may be small prevascular lymph nodes within the mediastinum.  Residual thymic tissue is possible.  CONCLUSION:  1. There is a moderate sized left pleural effusion.  It has increased in size since the 7/30/2021 CT study.  2. Atelectasis and/or pneumonia is suspected in the left lung base.  Aspiration pneumonia would be in the differential diagnosis.  Consider close interval clinical and imaging follow-up to ensure a benign progression.  Bronchoscopy may be helpful in further assessment if clinically indicated.  3. Motion artifact obscures detail on the study.  4. The patient has undergone bilateral mastectomies and bilateral axillary lymph node dissection.  5. There is a left-sided central venous line in place with its tip in the upper superior vena cava (SVC), as discussed.  6. No pneumothorax is seen.  No pneumomediastinum.  7. No definite right-sided pleural effusion.  8. There may be borderline cardiac enlargement.  A trace amount of pericardial effusion is seen, new since the prior study.  9. There is diffuse osseous metastatic disease, as seen previously.  No definite acute pathologic fractures are appreciated. 10. Please see above comments for further detail.      ELIJAH DOUGLAS JR, MD       Electronically Signed and Approved By: ELIJAH DOUGLAS JR, MD on 12/04/2021 at 22:58             MRI Brain Without Contrast    Result Date: 12/7/2021  PROCEDURE: MRI BRAIN WO CONTRAST  COMPARISONS: [facility], CT, CT HEAD WO CONTRAST, 12/04/2021, 22:05.  [facility], CT, CT HEAD WO CONTRAST, 11/11/2021, 19:57.  [facility], CT, CT HEAD WO CONTRAST, 10/18/2021, 7:13.  [facility], MR, BRAIN W/O CONTRAST, 3/15/2017, 9:56.  INDICATIONS: MENTAL STATUS CHANGE, UNKNOWN CAUSE.  ALTERED MENTAL STATUS.  HISTORY OF LEFT BREAST CARCINOMA (2009).  TECHNIQUE: A variety of imaging planes and parameters were utilized for visualization of suspected pathology within the brain.  238 magnetic resonance (MR) images were obtained without contrast.  FINDINGS: No reduced ADC by DWI is identified to suggest an acute infarct. Slight motion artifact is seen on some of the sequences.  FLAIR/T2 hyperintensities are seen throughout the cerebral white matter, especially in the central (deep) and periventricular areas as well as subcortical regions. These findings are nonspecific but may represent moderate chronic small vessel ischemia/infarction.  The hippocampal formations are symmetric in size, signal intensity, and internal architecture. No abnormal susceptibility is seen on the SWI sequence except for the basal ganglia, consistent with mineralization.  There are degenerative changes of the partially imaged cervical spine.  There is an empty sella sign, which is nonspecific.  It may be related to central atrophy.  The extra-axial spaces and the ventricular system are prominent, suggesting central atrophy, as well.  Age-indeterminate congestive and/or inflammatory changes involve the bilateral mastoid air cell complexes, greater on the left, seen previously.  These findings are likely chronic.  There is mild restricted diffusion associated with the findings, which may be an indication of acquired cholesteatomas, bilaterally.  If clinically desired, nonenhanced temporal bone CT examination may be helpful in further imaging assessment.  Mild sinus disease is suggested, probably chronic.  No air-fluid interfaces are seen within the imaged paranasal sinuses.   CONCLUSION:  1. No acute brain abnormality is seen. No acute infarct. No acute intracranial hemorrhage. 2. There may be moderate chronic small vessel ischemia/infarction. 3. Slight motion artifact obscures detail on some of the sequences. 4. The other findings are as detailed above.    ELIJAH DOUGLAS JR, MD       Electronically Signed and Approved By: ELIJAH DOUGLAS JR, MD on 12/07/2021 at 20:39             MRI Lumbar Spine Without Contrast    Result Date: 11/16/2021  PROCEDURE: MRI LUMBAR SPINE WO CONTRAST  COMPARISON: [facility], CT, CT ABDOMEN PELVIS WO CONTRAST, 11/12/2021, 21:31.  INDICATIONS: METASTATIC BREAST CA. SEVERE LOWER BACK PAIN.  TECHNIQUE: A variety of imaging planes and parameters were utilized for visualization of suspected pathology.  FINDINGS:  PARASPINAL AREA: Subcutaneous edema in the fat of the lower back is nonspecific. BONES: Diffuse low signal throughout the lower thoracic spine, lumbar spine, included upper sacrum, consistent with diffuse osseous metastatic disease.  There are Modic type 2 degenerative endplate changes at L4-5 and L5-S1.  There is increased STIR signal in the T12 superior endplate on the left.  Lumbar vertebral bodies demonstrate adequate height. CORD/CAUDA EQUINA: Tip of the conus appears to terminate at the T12-L1 level.  There is tangling of cauda equina nerve roots from spinal canal stenosis.  LUMBAR DISC LEVELS: T12-L1: No significant disc/facet abnormality, spinal stenosis, or foraminal stenosis.  L1-L2: Posterior disc endplate complex causes mild-to-moderate bilateral neural foraminal narrowing without significant spinal canal stenosis. L2-L3: Posterior disc endplate complex, mild bilateral facet arthropathy, and ligamentum flavum hypertrophy cause minimal spinal canal narrowing, moderate to severe bilateral neural foraminal narrowing. L3-L4: Diffuse disc bulge, ligamentum flavum hypertrophy, severe right facet arthropathy, and moderate left facet  arthropathy cause moderate to severe spinal canal stenosis and moderate bilateral neural foraminal narrowing. L4-L5: Loss of disc height, posterior disc endplate complex, ligamentum flavum hypertrophy, and moderate bilateral facet arthropathy cause severe spinal canal stenosis, moderate to severe right neural foraminal narrowing, and severe left neural foraminal narrowing. L5-S1: Loss of disc height, posterior disc endplate complex, and moderate bilateral facet arthropathy cause mild-to-moderate spinal canal stenosis, bilateral lateral recess narrowing, severe right neural foraminal narrowing, and moderate to severe left neural foraminal narrowing.  CONCLUSION:  1. Diffusely abnormal bone marrow signal, consistent with known osseous metastatic disease. 2. Small area of increased STIR signal in the left side of the T12 superior endplate may be due to a nondisplaced fracture.  No significant vertebral body height loss. 3. Multilevel severe degenerative changes causing multilevel neural foraminal and spinal canal stenosis, as detailed above.  Spinal canal stenosis is severe at L4-5.      DEONNA GONZALEZ MD       Electronically Signed and Approved By: DEONNA GONZALEZ MD on 11/16/2021 at 14:34             XR Chest 1 View    Result Date: 12/4/2021  PROCEDURE: XR CHEST 1 VW  COMPARISONS: [facility], CR, XR CHEST 1 VW, 11/14/2021, 14:14.  [facility], CR, XR CHEST 1 VW, 11/11/2021, 18:10.  [facility], CR, XR CHEST 1 VW, 11/15/2021, 13:41.  INDICATIONS: WEAK/DIZZY/AMS TRIAGE PROTOCOL.  HISTORY OF BREAST CARCINOMA.  FINDINGS: A single AP upright portable chest radiograph was performed.  Asymmetric opacification involves the left lung base, especially medially, and may represent pneumonia, including aspiration pneumonia.  Probably minimal, if any, acute infiltrate is seen elsewhere.  No cardiomediastinal enlargement is suspected.  The thoracic aorta is atherosclerotic.  External  artifacts obscure detail.  Chronic calcified granulomatous disease involves the chest.  Surgical clips are projected over the bilateral axillary regions and the bilateral chest wall.  It is suspected that the patient has undergone bilateral mastectomies.  Degenerative changes involve the shoulders.  There may be chronic posttraumatic change of the distal right clavicle.  No pneumothorax is seen.  Minimal left pleural effusion is possible.  There is a left subclavian central venous line in place with its tip in the expected location of the upper superior vena cava (SVC) versus the central left brachiocephalic vein, as before.  Its distal portion is oriented at an angle (46 degrees) relative to the expected long-axis of the superior vena cava (SVC).  Please correlate clinically with regard to its desired position.  CONCLUSION:   1. Worsening progression is suggested radiographically with new opacification of the left lung base, especially in the retrocardiac region.  The finding may be artifactual.  However, pneumonia or atelectasis is possible.  Aspiration pneumonia would be in the differential diagnosis.  2. Please see above comments for further detail.     ELIJAH DOUGLAS JR, MD       Electronically Signed and Approved By: ELIJAH DOUGLAS JR, MD on 12/04/2021 at 19:18             XR Chest 1 View    Result Date: 11/15/2021  PROCEDURE: XR CHEST 1 VW  COMPARISON: [facility], , XR CHEST 1 VW, 11/14/2021, 14:14.  INDICATIONS: Hypoxia  FINDINGS:  No acute infiltrate or effusion is identified.  The cardiac and mediastinal silhouettes appear normal.  Axillary node dissections have been performed bilaterally.  There is a chronic, incompletely healed fracture of the distal right clavicle.  CONCLUSION:  1. No acute cardiopulmonary disease       Freddy Garzon M.D.       Electronically Signed and Approved By: Ferddy Garzon M.D. on 11/15/2021 at 14:13             []  EKG/Telemetry   []  Cardiology/Vascular   []   Pathology  [x]  Old records  []  Other:    Assessment/Plan   Assessment / Plan     Assessment/Plan:  Assessment:  Urinary tract infection secondary to Enterococcus  Urinary tract infection, recurrent  Metastatic breast cancer  Recent ureteral stent placement  Acute metabolic encephalopathy  JOESPH   Possible CAP  Pleural effusion, left-sided  Recent right ureteral stent placement     Plan:  Labs and imaging reviewed  Start vancomycin  Will need to possibly place PICC line depending on her disposition  Monitor dose vancomycin by AUC method  DC Levaquin  Continue gentle IV fluids  DNR  Palliative care consultation appreciated  Continue diet per speech therapy   Oncology consultation appreciate continue care per the recommendation  PT OT speech therapy reconsulted  Clinical course to dictate further management  SCDs for DVT prophylaxis  Discussed with RN    DVT prophylaxis:  Mechanical DVT prophylaxis orders are present.    CODE STATUS:   Medical Intervention Limits: NO artificial nutrition; Other; NO intubation (DNI)  Code Status (Patient has no pulse and is not breathing): No CPR (Do Not Attempt to Resuscitate)  Medical Interventions (Patient has pulse or is breathing): Limited Support  Additional Medical Interventions Limits: dnr        Electronically signed by Philip Prabhakar MD, 12/14/21, 4:21 PM EST.

## 2021-12-14 NOTE — PROGRESS NOTES
"Pharmacy to Dose Vancomycin Day: 1    76 y.o.female     Consulting Provider: Vanna  Clinical Indication: UTI  Goal -600 mg/L.hr  Duration of therapy: 7 days    165.1 cm (65\")       12/04/21 1916      Weight: 88 kg (194 lb 0.1 oz)         Estimated Creatinine Clearance: 59.1 mL/min (by C-G formula based on SCr of 0.86 mg/dL).  Results from last 7 days   Lab Units 12/14/21  0431 12/13/21  0447 12/12/21  0359 12/11/21  0502   BUN mg/dL 18 19 23 23   CREATININE mg/dL 0.86 0.84 0.89 1.01*       HD/PD/CRRT?: no    Lab Results   Component Value Date    WBC 10.43 12/14/2021      Temperature    12/14/21 0314 12/14/21 0730 12/14/21 1110   Temp: 98.2 °F (36.8 °C) 98.8 °F (37.1 °C) 98.8 °F (37.1 °C)   I/O last 3 completed shifts:  In: 670 [P.O.:120; I.V.:550]  Out: 950 [Urine:950]       Contrast Administered: no    Relevant Micro:   Microbiology Results (last 10 days)       Procedure Component Value - Date/Time    Urine Culture - Urine, Urine, Clean Catch [[id number]]  (Abnormal)  (Susceptibility) Collected: 12/12/21 0925    Lab Status: Final result Specimen: Urine, Clean Catch Updated: 12/14/21 0940     Urine Culture 50,000 CFU/mL Enterococcus faecium    Susceptibility      Enterococcus faecium  MOISES  Ampicillin  Resistant  Levofloxacin  Resistant  Nitrofurantoin  Susceptible  Tetracycline  Resistant  Vancomycin  Susceptible                 Urine Culture - Urine, Urine, Clean Catch [[id number]] Collected: 12/04/21 2252    Lab Status: Final result Specimen: Urine, Clean Catch Updated: 12/06/21 0921     Urine Culture <25,000 CFU/mL Mixed Penny Isolated    Narrative:      Specimen contains mixed organisms of questionable pathogenicity which indicates contamination with commensal penny.  Further identification is unlikely to provide clinically useful information.  Suggest recollection.    Blood Culture - Blood, Arm, Left [[id number]]  (Normal) Collected: 12/04/21 2248    Lab Status: Final result Specimen: Blood from Arm, " Left Updated: 12/09/21 2300     Blood Culture No growth at 5 days    Blood Culture - Blood, Arm, Left [[id number]]  (Normal) Collected: 12/04/21 2248    Lab Status: Final result Specimen: Blood from Arm, Left Updated: 12/09/21 2300     Blood Culture No growth at 5 days                 Other Antimicrobial Therapy: none    Assessment/Plan  Loading dose: vancomycin 1750mg iv x 1  Regimen: vancomycin 1500mg iv q24h  Exposure Target: AUC24 (range) 400-600 mg/L.hr    AUC24,ss: 471 mg/L.hr  PAUC*: 68 %  Ctrough,ss: 13.5 mg/L  Pconc*: 18 %  Tox.: 9 %      Will need trough level @ steady state.

## 2021-12-15 LAB
ALBUMIN SERPL-MCNC: 2.6 G/DL (ref 3.5–5.2)
ALP SERPL-CCNC: 157 U/L (ref 39–117)
ALT SERPL W P-5'-P-CCNC: 13 U/L (ref 1–33)
ANION GAP SERPL CALCULATED.3IONS-SCNC: 9.9 MMOL/L (ref 5–15)
AST SERPL-CCNC: 62 U/L (ref 1–32)
BASOPHILS # BLD AUTO: 0.04 10*3/MM3 (ref 0–0.2)
BASOPHILS NFR BLD AUTO: 0.4 % (ref 0–1.5)
BILIRUB CONJ SERPL-MCNC: <0.2 MG/DL (ref 0–0.3)
BILIRUB INDIRECT SERPL-MCNC: ABNORMAL MG/DL
BILIRUB SERPL-MCNC: 0.2 MG/DL (ref 0–1.2)
BUN SERPL-MCNC: 16 MG/DL (ref 8–23)
BUN/CREAT SERPL: 19.5 (ref 7–25)
CALCIUM SPEC-SCNC: 10.4 MG/DL (ref 8.6–10.5)
CHLORIDE SERPL-SCNC: 97 MMOL/L (ref 98–107)
CO2 SERPL-SCNC: 30.1 MMOL/L (ref 22–29)
CREAT SERPL-MCNC: 0.82 MG/DL (ref 0.57–1)
CRP SERPL-MCNC: 7.42 MG/DL (ref 0–0.5)
D DIMER PPP FEU-MCNC: 2.54 MG/L (FEU) (ref 0–0.59)
DEPRECATED RDW RBC AUTO: 59.5 FL (ref 37–54)
EOSINOPHIL # BLD AUTO: 0.19 10*3/MM3 (ref 0–0.4)
EOSINOPHIL NFR BLD AUTO: 2 % (ref 0.3–6.2)
ERYTHROCYTE [DISTWIDTH] IN BLOOD BY AUTOMATED COUNT: 18.4 % (ref 12.3–15.4)
ERYTHROCYTE [SEDIMENTATION RATE] IN BLOOD: 59 MM/HR (ref 0–30)
FERRITIN SERPL-MCNC: 2812 NG/ML (ref 13–150)
GFR SERPL CREATININE-BSD FRML MDRD: 67 ML/MIN/1.73
GLUCOSE BLDC GLUCOMTR-MCNC: 107 MG/DL (ref 70–99)
GLUCOSE BLDC GLUCOMTR-MCNC: 115 MG/DL (ref 70–99)
GLUCOSE BLDC GLUCOMTR-MCNC: 119 MG/DL (ref 70–99)
GLUCOSE BLDC GLUCOMTR-MCNC: 82 MG/DL (ref 70–99)
GLUCOSE SERPL-MCNC: 77 MG/DL (ref 65–99)
HCT VFR BLD AUTO: 28.5 % (ref 34–46.6)
HGB BLD-MCNC: 8.9 G/DL (ref 12–15.9)
IMM GRANULOCYTES # BLD AUTO: 1.04 10*3/MM3 (ref 0–0.05)
IMM GRANULOCYTES NFR BLD AUTO: 10.7 % (ref 0–0.5)
LYMPHOCYTES # BLD AUTO: 1.57 10*3/MM3 (ref 0.7–3.1)
LYMPHOCYTES NFR BLD AUTO: 16.1 % (ref 19.6–45.3)
MAGNESIUM SERPL-MCNC: 1.5 MG/DL (ref 1.6–2.4)
MCH RBC QN AUTO: 27.8 PG (ref 26.6–33)
MCHC RBC AUTO-ENTMCNC: 31.2 G/DL (ref 31.5–35.7)
MCV RBC AUTO: 89.1 FL (ref 79–97)
MONOCYTES # BLD AUTO: 0.65 10*3/MM3 (ref 0.1–0.9)
MONOCYTES NFR BLD AUTO: 6.7 % (ref 5–12)
NEUTROPHILS NFR BLD AUTO: 6.24 10*3/MM3 (ref 1.7–7)
NEUTROPHILS NFR BLD AUTO: 64.1 % (ref 42.7–76)
NRBC BLD AUTO-RTO: 0.4 /100 WBC (ref 0–0.2)
PHOSPHATE SERPL-MCNC: 2.7 MG/DL (ref 2.5–4.5)
PLATELET # BLD AUTO: 159 10*3/MM3 (ref 140–450)
PMV BLD AUTO: 9.7 FL (ref 6–12)
POTASSIUM SERPL-SCNC: 3.5 MMOL/L (ref 3.5–5.2)
PROT SERPL-MCNC: 6.6 G/DL (ref 6–8.5)
RBC # BLD AUTO: 3.2 10*6/MM3 (ref 3.77–5.28)
SODIUM SERPL-SCNC: 137 MMOL/L (ref 136–145)
WBC NRBC COR # BLD: 9.73 10*3/MM3 (ref 3.4–10.8)

## 2021-12-15 PROCEDURE — 85379 FIBRIN DEGRADATION QUANT: CPT | Performed by: FAMILY MEDICINE

## 2021-12-15 PROCEDURE — 82728 ASSAY OF FERRITIN: CPT | Performed by: FAMILY MEDICINE

## 2021-12-15 PROCEDURE — 85652 RBC SED RATE AUTOMATED: CPT | Performed by: FAMILY MEDICINE

## 2021-12-15 PROCEDURE — 82962 GLUCOSE BLOOD TEST: CPT

## 2021-12-15 PROCEDURE — 86140 C-REACTIVE PROTEIN: CPT | Performed by: FAMILY MEDICINE

## 2021-12-15 PROCEDURE — 92526 ORAL FUNCTION THERAPY: CPT

## 2021-12-15 PROCEDURE — 99239 HOSP IP/OBS DSCHRG MGMT >30: CPT | Performed by: FAMILY MEDICINE

## 2021-12-15 PROCEDURE — 83735 ASSAY OF MAGNESIUM: CPT | Performed by: FAMILY MEDICINE

## 2021-12-15 PROCEDURE — 80048 BASIC METABOLIC PNL TOTAL CA: CPT | Performed by: FAMILY MEDICINE

## 2021-12-15 PROCEDURE — 85025 COMPLETE CBC W/AUTO DIFF WBC: CPT | Performed by: FAMILY MEDICINE

## 2021-12-15 PROCEDURE — 25010000002 VANCOMYCIN 5 G RECONSTITUTED SOLUTION: Performed by: FAMILY MEDICINE

## 2021-12-15 PROCEDURE — 84100 ASSAY OF PHOSPHORUS: CPT | Performed by: FAMILY MEDICINE

## 2021-12-15 PROCEDURE — 80076 HEPATIC FUNCTION PANEL: CPT | Performed by: FAMILY MEDICINE

## 2021-12-15 RX ORDER — LINEZOLID 600 MG/1
600 TABLET, FILM COATED ORAL 2 TIMES DAILY
Qty: 18 TABLET | Refills: 0 | Status: SHIPPED | OUTPATIENT
Start: 2021-12-15 | End: 2021-12-24

## 2021-12-15 RX ADMIN — SENNOSIDES AND DOCUSATE SODIUM 2 TABLET: 50; 8.6 TABLET ORAL at 09:26

## 2021-12-15 RX ADMIN — VANCOMYCIN HYDROCHLORIDE 1500 MG: 5 INJECTION, POWDER, LYOPHILIZED, FOR SOLUTION INTRAVENOUS at 14:14

## 2021-12-15 RX ADMIN — FOLIC ACID 1 MG: 1 TABLET ORAL at 09:26

## 2021-12-15 RX ADMIN — ROSUVASTATIN CALCIUM 10 MG: 5 TABLET, FILM COATED ORAL at 21:00

## 2021-12-15 RX ADMIN — METOPROLOL TARTRATE 25 MG: 25 TABLET, FILM COATED ORAL at 21:00

## 2021-12-15 RX ADMIN — MEMANTINE 10 MG: 10 TABLET ORAL at 09:26

## 2021-12-15 RX ADMIN — SERTRALINE 25 MG: 25 TABLET, FILM COATED ORAL at 09:26

## 2021-12-15 RX ADMIN — MICONAZOLE NITRATE: 2 POWDER TOPICAL at 09:27

## 2021-12-15 RX ADMIN — BUPROPION HYDROCHLORIDE 300 MG: 150 TABLET, EXTENDED RELEASE ORAL at 09:26

## 2021-12-15 RX ADMIN — ANASTROZOLE 1 MG: 1 TABLET ORAL at 09:26

## 2021-12-15 RX ADMIN — FERROUS SULFATE TAB 325 MG (65 MG ELEMENTAL FE) 325 MG: 325 (65 FE) TAB at 09:26

## 2021-12-15 RX ADMIN — SENNOSIDES AND DOCUSATE SODIUM 2 TABLET: 50; 8.6 TABLET ORAL at 21:00

## 2021-12-15 RX ADMIN — MONTELUKAST SODIUM 10 MG: 10 TABLET, FILM COATED ORAL at 09:26

## 2021-12-15 RX ADMIN — MEMANTINE 10 MG: 10 TABLET ORAL at 21:00

## 2021-12-15 RX ADMIN — MICONAZOLE NITRATE: 2 POWDER TOPICAL at 21:00

## 2021-12-15 RX ADMIN — METOPROLOL TARTRATE 25 MG: 25 TABLET, FILM COATED ORAL at 09:26

## 2021-12-15 RX ADMIN — Medication 5 MG: at 19:53

## 2021-12-15 NOTE — THERAPY TREATMENT NOTE
Acute Care - Speech Language Pathology   Swallow Treatment Note  Felice     Patient Name: Mirna Meredith  : 1945  MRN: [id number]  Today's Date: 12/15/2021               Admit Date: 2021    Visit Dx:     ICD-10-CM ICD-9-CM   1. Weakness  R53.1 780.79   2. Altered mental status, unspecified altered mental status type  R41.82 780.97   3. Oropharyngeal dysphagia  R13.12 787.22   4. Metastatic breast cancer (HCC)  C50.919 174.9     Patient Active Problem List   Diagnosis   • Abdominal pain   • Sepsis (HCC)   • Metabolic encephalopathy   • Acute renal failure (ARF) (HCC)   • Metastatic breast cancer (HCC)   • Chronic diarrhea   • Pancytopenia (HCC)   • Weakness     Past Medical History:   Diagnosis Date   • Cancer (HCC)     Breast with mets to colon and bones   • COPD (chronic obstructive pulmonary disease) (HCC)    • Depression    • Diabetes mellitus (HCC)    • Hyperlipidemia    • Hypertension      Past Surgical History:   Procedure Laterality Date   • CHOLECYSTECTOMY     • CYSTOSCOPY, RETROGRADE PYELOGRAM AND STENT INSERTION Right 2021    Procedure: CYSTOSCOPY RETROGRADE PYELOGRAM AND STENT INSERTION;  Surgeon: Allen Rodriguez MD;  Location: [facility]  Service: Urology;  Laterality: Right;   • HYSTERECTOMY     • JOINT REPLACEMENT Bilateral     Knees   • MASTECTOMY Bilateral      SPEECH PATHOLOGY DYSPHAGIA TREATMENT    Subjective/Behavioral Observations: Patient awake, opens eyes and smiles to name        Day/time of Treatment: 12/15/2021        Current Diet: Puréed, nectar thickened liquids        Current Strategies: One-to-one feed, max cues to open mouth and remove consistency from spoon.        Treatment received: Treatment focused on tolerance of current diet.  Nursing notes patient pocketing purées overnight requiring assist with oral care and even suction from oral cavity x1.  Lung sounds are recorded as clear.        Results of treatment:During treatment, patient required  consistent cues to open mouth and form labial seal around spoon.  Holding both liquids and solids anteriorly in oral cavity with decreased oral transit.  Delayed initiation of swallow at times greater than 10 seconds.  Despite delay, no overt signs or symptoms of aspiration were observed however patient is considered at high risk secondary to extensiveness of swallow delay.           Progress toward goals: Patient plateauing toward goals.  On puréed solids nectar thick liquids with no overt signs or symptoms aspiration however patient with little p.o. intake and requiring increased cueing level for swallow initiation.  Nursing reports that patient's family are still wanting home discharge with home health services.  Likely discharge this date.        Barriers to Achieving goals: Age-related        Plan of care:/changes in plan: Continue plan of compensatory strategies, least restrictive diet of purée solids and nectar thickened liquids, positioning fully upright.  Patient continues to be at risk of aspiration, malnutrition and dehydration.                SLP Recommendation and Plan                                                                      Plan of Care Reviewed With: patient          EDUCATION  The patient has been educated in the following areas:   Dysphagia (Swallowing Impairment).              Time Calculation:    Time Calculation- SLP     Row Name 12/15/21 1048             Time Calculation- SLP    SLP Stop Time 0800  -SN      SLP Received On 12/15/21  -SN              Untimed Charges    19342-GQ Treatment Swallow Minutes 45  -SN              Total Minutes    Untimed Charges Total Minutes 45  -SN       Total Minutes 45  -SN            User Key  (r) = Recorded By, (t) = Taken By, (c) = Cosigned By    Initials Name Provider Type    Monica Orozco SLP Speech and Language Pathologist                Therapy Charges for Today     Code Description Service Date Service Provider Modifiers Qty    [id number]  HC ST TREATMENT SWALLOW 3 12/14/2021 Monica Sweeney, SLP GN 1    [id number] HC ST TREATMENT SWALLOW 3 12/15/2021 Monica Sweeney, GABY GN 1               GABY Kruse  12/15/2021

## 2021-12-15 NOTE — DISCHARGE INSTR - APPOINTMENTS
Follow-up appointment with Dr. Pacheco 12/22/21 3:20pm    Follow-up appointment with Dr. Grissom 12/27/21 1:45pm

## 2021-12-15 NOTE — PROGRESS NOTES
[facility]     Progress Note    Patient Name: Mirna Meredith  : 1945  MRN: [id number]  Primary Care Physician:  Pan Pacheco MD  Date of admission: 2021    Subjective   Subjective     Chief Complaint: Condition remains the same patient somewhat drowsy today. He has Enterococcus in the urine and is being treated with vancomycin    HPI:  Patient Reports mated with confusion disorientation UTI recurrent    Review of Systems   All systems were reviewed and negative except for: Reviewed    Objective   Objective     Vitals:   Temp:  [97.9 °F (36.6 °C)-98.8 °F (37.1 °C)] 97.9 °F (36.6 °C)  Heart Rate:  [] 108  Resp:  [16-20] 16  BP: (108-150)/(63-93) 150/93  Flow (L/min):  [2] 2    Physical Exam    Constitutional: Awake, alert   Eyes: PERRLA, sclerae anicteric, no conjunctival injection   HENT: NCAT, mucous membranes moist   Neck: Supple, no thyromegaly, no lymphadenopathy, trachea midline   Respiratory: Clear to auscultation bilaterally, nonlabored respirations    Cardiovascular: RRR, no murmurs, rubs, or gallops, palpable pedal pulses bilaterally   Gastrointestinal: Positive bowel sounds, soft, nontender, nondistended   Musculoskeletal: No bilateral ankle edema, no clubbing or cyanosis to extremities   Psychiatric: Appropriate affect, cooperative   Neurologic: Oriented x 3, strength symmetric in all extremities, Cranial Nerves grossly intact to confrontation, speech clear   Skin: No rashes     Result Review    Result Review:  I have personally reviewed the results from the time of this admission to 12/15/2021 08:15 EST and agree with these findings:  []  Laboratory  [x]  Microbiology  []  Radiology  []  EKG/Telemetry   []  Cardiology/Vascular   []  Pathology  []  Old records  []  Other: Enterococcus UTI  Most notable findings include: UTI anemia    Assessment/Plan   Assessment / Plan     Brief Patient Summary:  Mirna Meredith is a 76 y.o. female who metastatic breast cancer but her main  problem is dementia and recurrent urinary tract infection    Active Hospital Problems:  Active Hospital Problems    Diagnosis    • Weakness        Plan:   Continue IV antibiotics family wants to take her home after antibiotics have been even with home health    DVT prophylaxis:  Mechanical DVT prophylaxis orders are present.    CODE STATUS:   Medical Intervention Limits: NO artificial nutrition; Other; NO intubation (DNI)  Code Status (Patient has no pulse and is not breathing): No CPR (Do Not Attempt to Resuscitate)  Medical Interventions (Patient has pulse or is breathing): Limited Support  Additional Medical Interventions Limits: dnr    Disposition:  I expect patient to be discharged when patient is stable.    Electronically signed by Jose G Grissom MD, 12/15/21, 8:15 AM EST.

## 2021-12-15 NOTE — DISCHARGE SUMMARY
[facility]         HOSPITALIST  DISCHARGE SUMMARY    Patient Name: Mirna Meredith  : 1945  MRN: [id number]    Date of Admission: 2021  Date of Discharge:  12/15/2021    Primary Care Physician: Pan Pacheco MD    Consults     Date and Time Order Name Status Description    2021  3:32 PM Inpatient Urology Consult      2021 12:23 PM Hematology & Oncology Inpatient Consult      2021 12:26 AM Hospitalist (on-call MD unless specified)      2021  6:32 PM Inpatient Pulmonology Consult Completed           Active and Resolved Hospital Problems:  Urinary tract infection secondary to Enterococcus  Urinary tract infection, recurrent  Metastatic breast cancer  Recent ureteral stent placement  Acute metabolic encephalopathy  JOESPH   Possible CAP  Pleural effusion, left-sided  Recent right ureteral stent placement       Active Hospital Problems    Diagnosis POA   • Weakness [R53.1] Yes      Resolved Hospital Problems   No resolved problems to display.       Hospital Course     Hospital Course:  76-year-old female with metastatic breast cancer, COPD, diabetes.  She had been admitted to the hospital in mid November with hydronephrosis and questionable ureteral stricture, underwent stenting of the right ureter on .  [facility].  Upon returning home she began getting confused again.  Urinalysis was positive for UTI and her chest x-ray showed possible pneumonia.  She is admitted to the medicine service.  She was started on IV antibiotics.  Hematology/oncology was consulted as well.  Overall patient's prognosis felt to be very poor palliative care consulted family has decided to make the patient DO NOT RESUSCITATE requested hospice referral initially patient was to be discharged under the care of hospice on 2021 family have now decided to take the patient home with home health to try physical therapy and oral chemotherapeutic  drug.  Started on treatment for UTI, include ampicillin resistant Enterococcus, started on vancomycin 12/14/2021, opted to switch to p.o. Zyvox so she can go home and pursue home health with family, discharged in hemodynamically stable condition on 12/15/2021 to complete course of Zyvox for UTI, to follow-up with Dr. Grissom in 1 week for discussion on how to proceed with chemotherapy.  Overall prognosis is poor and guarded.  She is appropriate for palliative care and comfort measures only when family decides they are ready.  High risk for rehospitalization.    Day of Discharge     Vital Signs:  Temp:  [97.9 °F (36.6 °C)-99 °F (37.2 °C)] 98.4 °F (36.9 °C)  Heart Rate:  [] 85  Resp:  [16-18] 16  BP: (108-150)/(64-96) 140/64  Flow (L/min):  [2] 2  Review of systems:  Unable to obtain review of systems due to patient's altered mental status    Physical Exam                         Constitutional: Awake, alert, unable to speak, makes eye contact, blinks seldomly, nursing staff reports she talks occasionally              Eyes: Pupils equal, sclerae anicteric, no conjunctival injection              HENT: NCAT, mucous membranes moist              Neck: Supple, palpable masses              Respiratory: Diminished poor inspiratory effort, no wheezing, rhonchi              cardiovascular: RRR, no murmurs              gastrointestinal: Positive bowel sounds, soft, nontender, nondistended              Musculoskeletal: No bilateral ankle edema, no clubbing or cyanosis to extremities              Psychiatric: Unable to assess              neurologic: No focal deficits but difficult to assess given mental status              Skin: No rashes         Discharge Details        Discharge Medications      New Medications      Instructions Start Date   linezolid 600 MG tablet  Commonly known as: Zyvox   600 mg, Oral, 2 Times Daily         Continue These Medications      Instructions Start Date   anastrozole 1 MG tablet  Commonly  known as: ARIMIDEX   1 mg, Oral, Daily      buPROPion  MG 24 hr tablet  Commonly known as: WELLBUTRIN XL   300 mg, Oral, Daily      EnteraGam 5 g pack  Generic drug: SBI/Protein Isolate   1 packet, Oral, Daily      ferrous sulfate 325 (65 FE) MG EC tablet   325 mg, Oral, Daily      folic acid 1 MG tablet  Commonly known as: FOLVITE   1 mg, Oral, Daily      IBGARD PO   Oral      memantine 10 MG tablet  Commonly known as: NAMENDA   10 mg, Oral, 2 Times Daily      metFORMIN  MG 24 hr tablet  Commonly known as: GLUCOPHAGE-XR   500 mg, Oral, Daily With Breakfast      metoprolol tartrate 25 MG tablet  Commonly known as: LOPRESSOR   25 mg, Oral, 2 Times Daily      montelukast 10 MG tablet  Commonly known as: SINGULAIR   10 mg, Oral, Daily      ondansetron ODT 4 MG disintegrating tablet  Commonly known as: ZOFRAN-ODT   4 mg, Translingual, Every 6 Hours PRN      risperiDONE 3 MG tablet  Commonly known as: risperDAL   3 mg, Oral, Nightly      rosuvastatin 10 MG tablet  Commonly known as: CRESTOR   10 mg, Oral, Nightly      sertraline 25 MG tablet  Commonly known as: ZOLOFT   25 mg, Oral, Daily      vitamin E 400 UNIT capsule   1 capsule, Oral, Daily             Allergies   Allergen Reactions   • Penicillins Rash       Discharge Disposition:  [place] [facility] [place]    Diet:  Hospital:  Diet Order   Procedures   • Diet Dysphagia; II - Pureed; Nectar / Syrup Thick       Discharge Activity:   Activity Instructions    As tolerated.           CODE STATUS:  Code Status and Medical Interventions:   Ordered at: 12/10/21 1128     Medical Intervention Limits:    NO artificial nutrition    Other    NO intubation (DNI)     Code Status (Patient has no pulse and is not breathing):    No CPR (Do Not Attempt to Resuscitate)     Medical Interventions (Patient has pulse or is breathing):    Limited Support     Additional Medical Interventions Limits:    dnr         No future appointments.    Additional Instructions for the Follow-ups  that You Need to Schedule     Discharge Follow-up with PCP   As directed       Currently Documented PCP:    Pan Pacheco MD    PCP Phone Number:    [phone]     Follow Up Details: 3 to 7 days         Discharge Follow-up with Specified Provider: Dr Grissom in 1 week   As directed      To: Dr Grissom in 1 week               Pertinent  and/or Most Recent Results     PROCEDURES:   CT Abdomen Pelvis Without Contrast    Result Date: 12/5/2021  PROCEDURE: CT ABDOMEN PELVIS WO CONTRAST  COMPARISONS: [facility], CR, FL RETROGRADE PYELOGRAM IN OR, 11/14/2021, 11:54.  [facility], CT, CT ABD-PELVIS WO CONTRAST, 11/12/2021, 21:31.   [facility], CT, CT CHEST WO CONTRAST, 12/04/2021, 22:05.  INDICATIONS: right hydronephrosis w/ right ureteral stent; h/o breast ca  TECHNIQUE: 747 CT images were created without intravenous contrast.   PROTOCOL:   Standard imaging protocol performed    RADIATION:   Automated exposure control was utilized to minimize radiation dose.  FINDINGS: Motion artifact obscures detail.  There is a small amount of ascites, probably similar to the 11/12/2021 study.  Hydronephrosis is present on the right, as before.  A right ureteral stent is in place.  It appears to be in proper position and similar to prior recent exams.  No left-sided hydronephrosis.  No definite nephrolithiasis or ureterolithiasis.  There is greater distention of the urinary bladder.  No urinary bladder calculi are identified.  There is possible hepatosplenomegaly.  No definite nonenhanced CT evidence of hepatic metastases.  A small hiatal hernia may be present.  Extensive osseous metastatic disease is seen as on prior exams.  No definite pathologic fracture is suggested.  The left-sided pleural effusion has increased in size since the prior 11/12/2021 CT study.  The previously suggested possible pleural-based metastatic disease within the lower posterior thorax is less well appreciated on  the current CT study.  It may be obscured by motion artifact.  There may be new atelectasis and/or pneumonia in the left lung base when compared with the prior 11/12/2021 study.  Formed stool is seen throughout the colon.  There may be fecal stasis as with constipation.  No mechanical bowel obstruction is suspected.  No pneumoperitoneum is seen.  The appendix is not clearly identified.  It may be obscured by the ascites.  The patient has undergone hysterectomy and cholecystectomy.  No definite acute pancreatitis.  No other acute findings are appreciated.  CONCLUSION: Increased left-sided pleural effusion is noted since 11/12/2021.  There has been interval placement of a right ureteral stent since the prior 11/12/2021 CT study.  Otherwise, little interval change is seen since 11/12/2021 CT study with findings as detailed above.     ELIJAH DOUGLAS JR, MD       Electronically Signed and Approved By: ELIJAH DOUGLAS JR, MD on 12/05/2021 at 3:07             CT Head Without Contrast    Result Date: 12/4/2021  PROCEDURE: CT HEAD WO CONTRAST  COMPARISON: [facility], CT, CT HEAD WO CONTRAST, 11/11/2021, 19:57.  INDICATIONS: CONFUSION TODAY.  PROTOCOL:   Standard imaging protocol performed    RADIATION:   MA and/or KV was adjusted to minimize radiation dose.    TECHNIQUE: After obtaining the patient's consent, 130 CT images were obtained without non-ionic intravenous contrast material.  DISCUSSION:  A routine nonenhanced head CT was performed. No acute brain abnormality is identified. No acute intracranial hemorrhage. No acute infarction. No acute skull fracture. No midline shift or acute intracranial mass effect is seen.  Mild to moderate chronic small vessel ischemia/infarction is suspected. There are arterial calcifications. The extra-axial spaces and the ventricular system are prominent.  Age-indeterminate congestive and/or inflammatory changes involve the bilateral middle ear clefts, especially the mastoid  air cell complexes, seen previously.  There may be mild age-indeterminate sinus disease, also seen previously.  No air-fluid interfaces are appreciated within the imaged paranasal sinuses.  Chronic leftward nasal septal deviation is present with an associated nasal spur.  Degenerative changes involve the cervical spine.  CONCLUSION:  No acute brain abnormality is seen.    ELIJAH DOUGLAS JR, MD       Electronically Signed and Approved By: ELIJAH DOUGLAS JR, MD on 12/04/2021 at 22:47             CT Chest Without Contrast Diagnostic    Result Date: 12/4/2021  PROCEDURE: CT CHEST WO CONTRAST DIAGNOSTIC  COMPARISONS: [facility], CT, CT CHEST W CONTRAST DIAGNOSTIC, 7/30/2021, 11:43.   [facility], CR, XR CHEST 1 VW, 12/04/2021, 18:43.   [facility], CT, CT ABDOMEN PELVIS WO CONTRAST, 11/12/2021, 21:31.  INDICATIONS: SHORTNESS OF BREATH TODAY; HISTORY OF BREAST CARCINOMA.  TECHNIQUE: 564 CT images were created without the administration of contrast material.   PROTOCOL:   Standard imaging protocol performed    RADIATION:   Automated exposure control was utilized to minimize radiation dose.  FINDINGS: Motion artifact obscures detail.  The patient's upper extremities in the scan field of view obscure detail.  The performing CT technologist noted the patient was not able to cooperate for the study.  There is a moderate sized left pleural effusion.  It has increased since the 7/30/2021 CT exam.  Atelectasis and/or pneumonia may involve(s) the lower lobe of the left lung.  It is thought to be new since the prior 7/30/2021 study.  Minimal if any acute infiltrate(s) is (are) seen elsewhere.  There is a left-sided subclavian central venous line in place with its tip in the upper superior vena cava (SVC); its tip is not parallel to the wall of the superior vena cava (SVC).  It forms an angle, estimated at about 45 degrees.  Please correlate with its desired location.  The central  tracheobronchial tree is well aerated without filling defect.  There is an air-fluid level in the thoracic esophagus, which may represent esophageal dysmotility and/or gastroesophageal reflux disease.  Small scattered noncalcified non-cavitating pulmonary nodules are seen, which measure about 7 mm in greatest diameter.  They are nonspecific.  Similar findings were seen on the prior 7/30/2021 study.  Consider low-dose chest CT examination follow-up if clinically warranted.  It appears that the patient has undergone bilateral mastectomies.  There is a partially visualized right ureteral stent in place.  It is new since the 7/30/2021 exam.  A small pericardial effusion is suspected with a CT number 32 Hounsfield units or less.  No right pleural effusion.  A small amount of ascites is seen in the upper abdomen.  Diffuse osseous metastatic disease is seen, as before.  The patient has undergone cholecystectomy.  Extensive ossification of the anterior longitudinal ligament is seen and may represent diffuse idiopathic skeletal hyperostosis (DISH).  There are degenerative changes throughout the imaged spine.  There may be small prevascular lymph nodes within the mediastinum.  Residual thymic tissue is possible.  CONCLUSION:  1. There is a moderate sized left pleural effusion.  It has increased in size since the 7/30/2021 CT study.  2. Atelectasis and/or pneumonia is suspected in the left lung base.  Aspiration pneumonia would be in the differential diagnosis.  Consider close interval clinical and imaging follow-up to ensure a benign progression.  Bronchoscopy may be helpful in further assessment if clinically indicated.  3. Motion artifact obscures detail on the study.  4. The patient has undergone bilateral mastectomies and bilateral axillary lymph node dissection.  5. There is a left-sided central venous line in place with its tip in the upper superior vena cava (SVC), as discussed.  6. No pneumothorax is seen.  No  pneumomediastinum.  7. No definite right-sided pleural effusion.  8. There may be borderline cardiac enlargement.  A trace amount of pericardial effusion is seen, new since the prior study.  9. There is diffuse osseous metastatic disease, as seen previously.  No definite acute pathologic fractures are appreciated. 10. Please see above comments for further detail.      ELIJAH DOUGLAS JR, MD       Electronically Signed and Approved By: ELIJAH DOUGLAS JR, MD on 12/04/2021 at 22:58             MRI Brain Without Contrast    Result Date: 12/7/2021  PROCEDURE: MRI BRAIN WO CONTRAST  COMPARISONS: [facility], CT, CT HEAD WO CONTRAST, 12/04/2021, 22:05.  [facility], CT, CT HEAD WO CONTRAST, 11/11/2021, 19:57.  [facility], CT, CT HEAD WO CONTRAST, 10/18/2021, 7:13.  [facility], MR, BRAIN W/O CONTRAST, 3/15/2017, 9:56.  INDICATIONS: MENTAL STATUS CHANGE, UNKNOWN CAUSE.  ALTERED MENTAL STATUS.  HISTORY OF LEFT BREAST CARCINOMA (2009).  TECHNIQUE: A variety of imaging planes and parameters were utilized for visualization of suspected pathology within the brain.  238 magnetic resonance (MR) images were obtained without contrast.  FINDINGS: No reduced ADC by DWI is identified to suggest an acute infarct. Slight motion artifact is seen on some of the sequences.  FLAIR/T2 hyperintensities are seen throughout the cerebral white matter, especially in the central (deep) and periventricular areas as well as subcortical regions. These findings are nonspecific but may represent moderate chronic small vessel ischemia/infarction.  The hippocampal formations are symmetric in size, signal intensity, and internal architecture. No abnormal susceptibility is seen on the SWI sequence except for the basal ganglia, consistent with mineralization.  There are degenerative changes of the partially imaged cervical spine.  There is an empty sella sign, which is nonspecific.  It may be related  to central atrophy.  The extra-axial spaces and the ventricular system are prominent, suggesting central atrophy, as well.  Age-indeterminate congestive and/or inflammatory changes involve the bilateral mastoid air cell complexes, greater on the left, seen previously.  These findings are likely chronic.  There is mild restricted diffusion associated with the findings, which may be an indication of acquired cholesteatomas, bilaterally.  If clinically desired, nonenhanced temporal bone CT examination may be helpful in further imaging assessment.  Mild sinus disease is suggested, probably chronic.  No air-fluid interfaces are seen within the imaged paranasal sinuses.  CONCLUSION:  1. No acute brain abnormality is seen. No acute infarct. No acute intracranial hemorrhage. 2. There may be moderate chronic small vessel ischemia/infarction. 3. Slight motion artifact obscures detail on some of the sequences. 4. The other findings are as detailed above.    ELIJAH DOUGLAS JR, MD       Electronically Signed and Approved By: ELIJAH DOUGLAS JR, MD on 12/07/2021 at 20:39             MRI Lumbar Spine Without Contrast    Result Date: 11/16/2021  PROCEDURE: MRI LUMBAR SPINE WO CONTRAST  COMPARISON: [facility], CT, CT ABDOMEN PELVIS WO CONTRAST, 11/12/2021, 21:31.  INDICATIONS: METASTATIC BREAST CA. SEVERE LOWER BACK PAIN.  TECHNIQUE: A variety of imaging planes and parameters were utilized for visualization of suspected pathology.  FINDINGS:  PARASPINAL AREA: Subcutaneous edema in the fat of the lower back is nonspecific. BONES: Diffuse low signal throughout the lower thoracic spine, lumbar spine, included upper sacrum, consistent with diffuse osseous metastatic disease.  There are Modic type 2 degenerative endplate changes at L4-5 and L5-S1.  There is increased STIR signal in the T12 superior endplate on the left.  Lumbar vertebral bodies demonstrate adequate height. CORD/CAUDA EQUINA: Tip of the conus appears to  terminate at the T12-L1 level.  There is tangling of cauda equina nerve roots from spinal canal stenosis.  LUMBAR DISC LEVELS: T12-L1: No significant disc/facet abnormality, spinal stenosis, or foraminal stenosis.  L1-L2: Posterior disc endplate complex causes mild-to-moderate bilateral neural foraminal narrowing without significant spinal canal stenosis. L2-L3: Posterior disc endplate complex, mild bilateral facet arthropathy, and ligamentum flavum hypertrophy cause minimal spinal canal narrowing, moderate to severe bilateral neural foraminal narrowing. L3-L4: Diffuse disc bulge, ligamentum flavum hypertrophy, severe right facet arthropathy, and moderate left facet arthropathy cause moderate to severe spinal canal stenosis and moderate bilateral neural foraminal narrowing. L4-L5: Loss of disc height, posterior disc endplate complex, ligamentum flavum hypertrophy, and moderate bilateral facet arthropathy cause severe spinal canal stenosis, moderate to severe right neural foraminal narrowing, and severe left neural foraminal narrowing. L5-S1: Loss of disc height, posterior disc endplate complex, and moderate bilateral facet arthropathy cause mild-to-moderate spinal canal stenosis, bilateral lateral recess narrowing, severe right neural foraminal narrowing, and moderate to severe left neural foraminal narrowing.  CONCLUSION:  1. Diffusely abnormal bone marrow signal, consistent with known osseous metastatic disease. 2. Small area of increased STIR signal in the left side of the T12 superior endplate may be due to a nondisplaced fracture.  No significant vertebral body height loss. 3. Multilevel severe degenerative changes causing multilevel neural foraminal and spinal canal stenosis, as detailed above.  Spinal canal stenosis is severe at L4-5.      DEONNA GONZALEZ MD       Electronically Signed and Approved By: DEONNA GONZALEZ MD on 11/16/2021 at 14:34             XR Chest 1 View    Result Date:  12/4/2021  PROCEDURE: XR CHEST 1 VW  COMPARISONS: [facility], CR, XR CHEST 1 VW, 11/14/2021, 14:14.  [facility], CR, XR CHEST 1 VW, 11/11/2021, 18:10.  [facility], CR, XR CHEST 1 VW, 11/15/2021, 13:41.  INDICATIONS: WEAK/DIZZY/AMS TRIAGE PROTOCOL.  HISTORY OF BREAST CARCINOMA.  FINDINGS: A single AP upright portable chest radiograph was performed.  Asymmetric opacification involves the left lung base, especially medially, and may represent pneumonia, including aspiration pneumonia.  Probably minimal, if any, acute infiltrate is seen elsewhere.  No cardiomediastinal enlargement is suspected.  The thoracic aorta is atherosclerotic.  External artifacts obscure detail.  Chronic calcified granulomatous disease involves the chest.  Surgical clips are projected over the bilateral axillary regions and the bilateral chest wall.  It is suspected that the patient has undergone bilateral mastectomies.  Degenerative changes involve the shoulders.  There may be chronic posttraumatic change of the distal right clavicle.  No pneumothorax is seen.  Minimal left pleural effusion is possible.  There is a left subclavian central venous line in place with its tip in the expected location of the upper superior vena cava (SVC) versus the central left brachiocephalic vein, as before.  Its distal portion is oriented at an angle (46 degrees) relative to the expected long-axis of the superior vena cava (SVC).  Please correlate clinically with regard to its desired position.  CONCLUSION:   1. Worsening progression is suggested radiographically with new opacification of the left lung base, especially in the retrocardiac region.  The finding may be artifactual.  However, pneumonia or atelectasis is possible.  Aspiration pneumonia would be in the differential diagnosis.  2. Please see above comments for further detail.     ELIJAH DOUGLAS JR, MD       Electronically Signed and Approved By: ELIJAH DOUGLAS  JR ROPER on 12/04/2021 at 19:18               LAB RESULTS:      Lab 12/15/21  0452 12/14/21  0431 12/13/21  0447 12/12/21  0359 12/11/21  0502   WBC 9.73 10.43 9.76 10.32 9.73   HEMOGLOBIN 8.9* 9.3* 8.8* 8.9* 9.1*   HEMATOCRIT 28.5* 30.2* 29.3* 28.1* 28.8*   PLATELETS 159 182 183 197 207   NEUTROS ABS 6.24 6.74 6.56 7.20* 6.62   IMMATURE GRANS (ABS) 1.04* 1.06* 0.80* 0.74* 0.70*   LYMPHS ABS 1.57 1.69 1.38 1.29 1.21   MONOS ABS 0.65 0.62 0.68 0.83 0.96*   EOS ABS 0.19 0.18 0.20 0.14 0.11   MCV 89.1 92.1 93.3 88.9 88.3   SED RATE 59*  --   --   --   --    CRP 7.42*  --   --   --   --          Lab 12/15/21  0452 12/14/21  0431 12/13/21  0447 12/12/21  0359 12/11/21  0502   SODIUM 137 139 140 141 137   POTASSIUM 3.5 3.8 4.0 3.9 4.1   CHLORIDE 97* 99 102 101 97*   CO2 30.1* 30.2* 32.0* 30.4* 29.9*   ANION GAP 9.9 9.8 6.0 9.6 10.1   BUN 16 18 19 23 23   CREATININE 0.82 0.86 0.84 0.89 1.01*   GLUCOSE 77 77 86 117* 115*   CALCIUM 10.4 10.8* 10.8* 10.9* 11.1*   MAGNESIUM 1.5* 1.6 1.7 1.8 1.9   PHOSPHORUS 2.7 2.5 2.7 2.7 3.3         Lab 12/15/21  0452 12/14/21 0431 12/13/21 0447 12/12/21 0359 12/11/21  0502 12/10/21  0431 12/10/21  0431   TOTAL PROTEIN 6.6 6.6  --   --  7.0  --  7.0   ALBUMIN 2.60* 2.70* 2.70* 2.90* 2.90*  2.70*   < > 2.70*   ALT (SGPT) 13 14  --   --  16  --  16   AST (SGOT) 62* 62*  --   --  60*  --  61*   BILIRUBIN 0.2 0.2  --   --  0.2  --  0.3   BILIRUBIN DIRECT <0.2 <0.2  --   --  <0.2  --  <0.2   ALK PHOS 157* 164*  --   --  159*  --  148*    < > = values in this interval not displayed.                 Lab 12/15/21  0452   FERRITIN 2,812.00*         Brief Urine Lab Results  (Last result in the past 365 days)      Color   Clarity   Blood   Leuk Est   Nitrite   Protein   CREAT   Urine HCG        12/12/21 0925 Yellow   Clear   Small (1+)   Trace   Negative   Trace               Microbiology Results (last 10 days)     Procedure Component Value - Date/Time    Urine Culture - Urine, Urine, Clean Catch  [[id number]]  (Abnormal)  (Susceptibility) Collected: 12/12/21 0982    Lab Status: Final result Specimen: Urine, Clean Catch Updated: 12/14/21 0940     Urine Culture 50,000 CFU/mL Enterococcus faecium    Susceptibility      Enterococcus faecium     MOISES     Ampicillin Resistant     Levofloxacin Resistant     Nitrofurantoin Susceptible     Tetracycline Resistant     Vancomycin Susceptible                                            Results for orders placed during the hospital encounter of 11/11/21    Adult Transthoracic Echo Complete W/ Cont if Necessary Per Protocol    Interpretation Summary  · Estimated right ventricular systolic pressure from tricuspid regurgitation is mildly elevated (35-45 mmHg).  · Left ventricular diastolic function is consistent with (grade I) impaired relaxation.  · Normal left ventricular systolic function, the estimated ejection fraction is 55%  · Estimated left ventricular EF was in disagreement with the calculated left ventricular EF. Left ventricular ejection fraction appears to be 51 - 55%.      Labs Pending at Discharge: None        Time spent on Discharge including face to face service:  42 minutes    Electronically signed by Philip Prabhakar MD, 12/15/21, 3:44 PM EST.

## 2021-12-15 NOTE — PLAN OF CARE
Goal Outcome Evaluation:  Plan of Care Reviewed With: patient        Progress: no change  No change in condition. Pt alert but minimally verbally responsive.

## 2021-12-15 NOTE — SIGNIFICANT NOTE
12/15/21 1315   Plan   Final Discharge Disposition Code 06 - home with home health care   Final Note Pt discharging home today with [facility]. SW placed call to pts daughter-Sophia and inquired about the bedside table. Family is not needing a bedside table at this time. [employer] copay is $87. SW notified daughter of cost. SW also notified [facility] of pts discharge.

## 2021-12-15 NOTE — NURSING NOTE
Family called and said they wanted ems cancelled for pickup tonight as it is too late, notified md, will call ems at 0600 Thursday for pickup.

## 2021-12-16 VITALS
TEMPERATURE: 98.4 F | OXYGEN SATURATION: 97 % | DIASTOLIC BLOOD PRESSURE: 50 MMHG | RESPIRATION RATE: 16 BRPM | HEIGHT: 65 IN | HEART RATE: 78 BPM | BODY MASS INDEX: 32.32 KG/M2 | SYSTOLIC BLOOD PRESSURE: 116 MMHG | WEIGHT: 194 LBS

## 2021-12-16 LAB
ANION GAP SERPL CALCULATED.3IONS-SCNC: 8.5 MMOL/L (ref 5–15)
BUN SERPL-MCNC: 14 MG/DL (ref 8–23)
BUN/CREAT SERPL: 17.5 (ref 7–25)
CALCIUM SPEC-SCNC: 10.5 MG/DL (ref 8.6–10.5)
CHLORIDE SERPL-SCNC: 96 MMOL/L (ref 98–107)
CO2 SERPL-SCNC: 33.5 MMOL/L (ref 22–29)
CREAT SERPL-MCNC: 0.8 MG/DL (ref 0.57–1)
GFR SERPL CREATININE-BSD FRML MDRD: 69 ML/MIN/1.73
GLUCOSE BLDC GLUCOMTR-MCNC: 90 MG/DL (ref 70–99)
GLUCOSE BLDC GLUCOMTR-MCNC: 92 MG/DL (ref 70–99)
GLUCOSE SERPL-MCNC: 86 MG/DL (ref 65–99)
POTASSIUM SERPL-SCNC: 3.5 MMOL/L (ref 3.5–5.2)
SODIUM SERPL-SCNC: 138 MMOL/L (ref 136–145)
VANCOMYCIN TROUGH SERPL-MCNC: 15.48 MCG/ML (ref 5–20)

## 2021-12-16 PROCEDURE — 80048 BASIC METABOLIC PNL TOTAL CA: CPT | Performed by: FAMILY MEDICINE

## 2021-12-16 PROCEDURE — 92526 ORAL FUNCTION THERAPY: CPT

## 2021-12-16 PROCEDURE — 80202 ASSAY OF VANCOMYCIN: CPT | Performed by: FAMILY MEDICINE

## 2021-12-16 PROCEDURE — 82962 GLUCOSE BLOOD TEST: CPT

## 2021-12-16 RX ADMIN — MONTELUKAST SODIUM 10 MG: 10 TABLET, FILM COATED ORAL at 08:45

## 2021-12-16 RX ADMIN — SENNOSIDES AND DOCUSATE SODIUM 2 TABLET: 50; 8.6 TABLET ORAL at 08:45

## 2021-12-16 RX ADMIN — ANASTROZOLE 1 MG: 1 TABLET ORAL at 08:45

## 2021-12-16 RX ADMIN — BUPROPION HYDROCHLORIDE 300 MG: 150 TABLET, EXTENDED RELEASE ORAL at 08:45

## 2021-12-16 RX ADMIN — MICONAZOLE NITRATE: 2 POWDER TOPICAL at 08:46

## 2021-12-16 RX ADMIN — FOLIC ACID 1 MG: 1 TABLET ORAL at 08:45

## 2021-12-16 RX ADMIN — MEMANTINE 10 MG: 10 TABLET ORAL at 08:45

## 2021-12-16 RX ADMIN — METOPROLOL TARTRATE 25 MG: 25 TABLET, FILM COATED ORAL at 08:45

## 2021-12-16 RX ADMIN — SERTRALINE 25 MG: 25 TABLET, FILM COATED ORAL at 08:45

## 2021-12-16 RX ADMIN — FERROUS SULFATE TAB 325 MG (65 MG ELEMENTAL FE) 325 MG: 325 (65 FE) TAB at 08:45

## 2021-12-16 NOTE — PROGRESS NOTES
[facility]     Progress Note    Patient Name: Mirna Meredith  : 1945  MRN: [id number]  Primary Care Physician:  Pan Pacheco MD  Date of admission: 2021    Subjective   Subjective     Chief Complaint: Patient with metastatic breast cancer but not on chemotherapy had been taking taking 6 oral CDK 4 inhibitors day just recently started has not been able to give any trial of that as yet patient problem is dementia as well as depression being away from home so I am hoping that when she goes home and if family is able to take care of she probably will mentally get better physical disabilities with advanced age also patient is already on, palliative care is really not getting any active treatments for breast cancer is in remission right now but she does have stage IV disease    HPI:  Patient Reports patient admitted with UTI recurrent problems with old-age dementia depression    Review of Systems   All systems were reviewed and negative except for: Reviewed    Objective   Objective     Vitals:   Temp:  [97.9 °F (36.6 °C)-99 °F (37.2 °C)] 97.9 °F (36.6 °C)  Heart Rate:  [81-96] 92  Resp:  [16-18] 16  BP: (119-152)/(55-96) 131/70  Flow (L/min):  [2] 2    Physical Exam    Constitutional: Awake, alert   Eyes: PERRLA, sclerae anicteric, no conjunctival injection   HENT: NCAT, mucous membranes moist   Neck: Supple, no thyromegaly, no lymphadenopathy, trachea midline   Respiratory: Clear to auscultation bilaterally, nonlabored respirations    Cardiovascular: RRR, no murmurs, rubs, or gallops, palpable pedal pulses bilaterally   Gastrointestinal: Positive bowel sounds, soft, nontender, nondistended   Musculoskeletal: No bilateral ankle edema, no clubbing or cyanosis to extremities   Psychiatric: Appropriate affect, cooperative   Neurologic: Oriented x 3, strength symmetric in all extremities, Cranial Nerves grossly intact to confrontation, speech clear   Skin: No rashes     Result Review    Result  Review:  I have personally reviewed the results from the time of this admission to 12/16/2021 08:24 EST and agree with these findings:  [x]  Laboratory  []  Microbiology  []  Radiology  []  EKG/Telemetry   []  Cardiology/Vascular   []  Pathology  []  Old records  []  Other: Breast cancer  Most notable findings include: History of UTI has been on antibiotics    Assessment/Plan   Assessment / Plan     Brief Patient Summary:  Mirna Meredith is a 76 y.o. female who patient admitted with confusion disorientation    Active Hospital Problems:  Active Hospital Problems    Diagnosis    • Weakness        Plan:   Continue on oral antibiotics for UTI patient to be discharged home today    DVT prophylaxis:  Mechanical DVT prophylaxis orders are present.    CODE STATUS:   Medical Intervention Limits: NO artificial nutrition; Other; NO intubation (DNI)  Code Status (Patient has no pulse and is not breathing): No CPR (Do Not Attempt to Resuscitate)  Medical Interventions (Patient has pulse or is breathing): Limited Support  Additional Medical Interventions Limits: dnr    Disposition:  I expect patient to be discharged family  is planing to take her home today.    Electronically signed by Jose G Grissom MD, 12/16/21, 8:24 AM EST.

## 2021-12-16 NOTE — PLAN OF CARE
Goal Outcome Evaluation:         Outcome summary: Patient alert with no signs of distress or discomfort. VSS WNL. Patient on 2L of oxygen therapy via NC and maintained oxygen sats above 90%. Patient to discharge home with family and home health in AM.

## 2021-12-16 NOTE — NURSING NOTE
Attempted x4 to call family and let them know ems is picking up patient and will be there shortly, left a message for both daughters.

## 2021-12-17 ENCOUNTER — READMISSION MANAGEMENT (OUTPATIENT)
Dept: CALL CENTER | Facility: HOSPITAL | Age: 76
End: 2021-12-17

## 2021-12-17 NOTE — NURSING NOTE
Spoke to family and discussed discharge instructions and medications. Informed family that ems had been called to transport patient home. Family asked to be called when ems arrived at hospital to transport patient home.

## 2021-12-17 NOTE — NURSING NOTE
Family called and was upset that they had not been called earlier in day about patient transport by ems. This RN apologized for not calling them before calling ems and for any miscommunication on our part. Went over discharge instructions and medications with family again. Family then wanted to speak to  concerning home health that had been set up for patient.

## 2021-12-20 ENCOUNTER — READMISSION MANAGEMENT (OUTPATIENT)
Dept: CALL CENTER | Facility: HOSPITAL | Age: 76
End: 2021-12-20

## 2021-12-20 NOTE — OUTREACH NOTE
Medical Week 1 Survey      Responses   [facility] patient discharged from? [facility]   Does the patient have one of the following disease processes/diagnoses(primary or secondary)? Other   Week 1 attempt successful? No   Unsuccessful attempts Attempt 1          Keila Mayes RN

## 2021-12-21 ENCOUNTER — READMISSION MANAGEMENT (OUTPATIENT)
Dept: CALL CENTER | Facility: HOSPITAL | Age: 76
End: 2021-12-21

## 2021-12-21 NOTE — OUTREACH NOTE
Medical Week 1 Survey      Responses   [facility] patient discharged from? [facility]   Does the patient have one of the following disease processes/diagnoses(primary or secondary)? Other   Week 1 attempt successful? Yes   Call start time 1446   Discharge diagnosis Urinary tract infection secondary to Enterococcus   Person spoke with today (if not patient) and relationship Jessy- Daughter    Is the patient interested in additional calls from an ambulatory ?  NOTE:  applies to high risk patients requiring additional follow-up. No   Revoked No further contact(revokes)-requires comment   Graduated/Revoked comments HOSPICE per daughter          Candi Brewster RN

## 2022-08-08 NOTE — THERAPY TREATMENT NOTE
Acute Care - Speech Language Pathology   Swallow Treatment Note  Felice     Patient Name: Mirna Meredith  : 1945  MRN: [id number]  Today's Date: 2021               Admit Date: 2021    Visit Dx:     ICD-10-CM ICD-9-CM   1. Weakness  R53.1 780.79   2. Altered mental status, unspecified altered mental status type  R41.82 780.97   3. Oropharyngeal dysphagia  R13.12 787.22   4. Metastatic breast cancer (HCC)  C50.919 174.9     Patient Active Problem List   Diagnosis   • Abdominal pain   • Sepsis (HCC)   • Metabolic encephalopathy   • Acute renal failure (ARF) (HCC)   • Metastatic breast cancer (HCC)   • Chronic diarrhea   • Pancytopenia (HCC)   • Weakness     Past Medical History:   Diagnosis Date   • Cancer (HCC)     Breast with mets to colon and bones   • COPD (chronic obstructive pulmonary disease) (HCC)    • Depression    • Diabetes mellitus (HCC)    • Hyperlipidemia    • Hypertension      Past Surgical History:   Procedure Laterality Date   • CHOLECYSTECTOMY     • CYSTOSCOPY, RETROGRADE PYELOGRAM AND STENT INSERTION Right 2021    Procedure: CYSTOSCOPY RETROGRADE PYELOGRAM AND STENT INSERTION;  Surgeon: Allen Rodriguez MD;  Location: [facility];  Service: Urology;  Laterality: Right;   • HYSTERECTOMY     • JOINT REPLACEMENT Bilateral     Knees   • MASTECTOMY Bilateral    SPEECH PATHOLOGY DYSPHAGIA TREATMENT    Subjective/Behavioral Observations: Patient awake, opens eyes to verbalizations.  Smiles intermittently        Day/time of Treatment: 2021        Current Diet: Puréed, nectar thick liquids        Current Strategies: One-on-one assist for feeding, small bites and sips, controlled rate, slow rate, check swallow each bite, crushed meds in applesauce        Treatment received: Treatment focused on tolerance of current diet.  Patient was to be discharged on 12/15/2021 however discharge now planned for 2021.        Results of treatment: Patient requiring consistent  cues to open mouth and remove bolus from spoon.  Holding both nectar thick liquid and purée solids anteriorly oral cavity.  Moderate lingual pumping with max cues for bolus transit.  Swallow completion at times greater than 10 seconds however no overt signs or symptoms aspiration observed during treatment.  Patient tolerating approximately 30 mL of nectar thickened liquid by spoon and cup presentation.  Bites of purée only as patient clamping mouth after bites.  Oral care provided following treatment as patient exhibiting moderate residue anteriorly in oral cavity.        Barriers to Achieving goals: Age-related        Plan of care:/changes in plan: Patient to be discharged home with home health services.  If family wanting home health services, would benefit from follow-up speech therapy as patient is on iron altered diet.  Recommend continuation of diet, compensatory strategies at home.                  SLP Recommendation and Plan                                                                      Plan of Care Reviewed With: patient          EDUCATION  The patient has been educated in the following areas:   Dysphagia (Swallowing Impairment).              Time Calculation:    Time Calculation- SLP     Row Name 12/16/21 1104             Time Calculation- SLP    SLP Stop Time 0930  -SN      SLP Received On 12/16/21  -SN              Untimed Charges    22352-QZ Treatment Swallow Minutes 45  -SN              Total Minutes    Untimed Charges Total Minutes 45  -SN       Total Minutes 45  -SN            User Key  (r) = Recorded By, (t) = Taken By, (c) = Cosigned By    Initials Name Provider Type    SN Monica Sweeney SLP Speech and Language Pathologist                Therapy Charges for Today     Code Description Service Date Service Provider Modifiers Qty    79758440119 HC ST TREATMENT SWALLOW 3 12/15/2021 Monica Sweeney SLP GN 1    [id number] HC ST TREATMENT SWALLOW 3 12/16/2021 Monica Sweeney SLP GN 1                Monica Sweeney, SLP  12/16/2021   yes

## 2023-07-26 NOTE — ED PROVIDER NOTES
Subjective   This patient is seen in the emergency department after falling off of some steps at home and she fell 2 feet onto some concrete where she hit her face and head.  The patient did not lose consciousness and she had no other complaints of pain or injury.  Patient states that she did have some mild left lateral chest tenderness however this is now resolved.  The patient had no neck pain abdominal pain back pain or other pain or injury.  The patient did sustain a laceration to the forehead which is superficial and also has a nosebleed with some swelling to the nose.  The patient was recently diagnosed with metastatic breast cancer she also has a history of COPD hypertension diabetes.  The patient did not have a syncopal episode during this fall.          Review of Systems   Constitutional: Negative.    HENT: Positive for facial swelling, nosebleeds, rhinorrhea and sinus pain. Negative for sinus pressure, sneezing, sore throat, trouble swallowing and voice change.    Eyes: Negative.    Respiratory: Negative.    Cardiovascular: Positive for chest pain. Negative for palpitations and leg swelling.        Patient has a mild aching left lateral chest discomfort since she fell. She has no shortness of breath   Gastrointestinal: Negative for abdominal distention, abdominal pain, constipation, diarrhea, nausea and vomiting.        Patient denies any abdominal pain.   Endocrine: Negative.    Genitourinary: Negative.    Musculoskeletal: Negative for arthralgias, back pain, gait problem, joint swelling, myalgias, neck pain and neck stiffness.   Skin: Positive for wound.        Superficial laceration to the right forehead which will not require suturing   Allergic/Immunologic: Negative.    Neurological: Positive for numbness and headaches. Negative for dizziness, tremors, seizures, syncope, facial asymmetry, speech difficulty and light-headedness.   Hematological: Negative.    Psychiatric/Behavioral: Negative.         Past Medical History:   Diagnosis Date   • Cancer (CMS/HCC)     Breast with mets to colon and bones   • COPD (chronic obstructive pulmonary disease) (CMS/HCC)    • Depression    • Diabetes mellitus (CMS/HCC)    • Hyperlipidemia    • Hypertension        Allergies   Allergen Reactions   • Penicillins Rash       Past Surgical History:   Procedure Laterality Date   • CHOLECYSTECTOMY     • HYSTERECTOMY     • JOINT REPLACEMENT Bilateral     Knees   • MASTECTOMY Bilateral        History reviewed. No pertinent family history.    Social History     Socioeconomic History   • Marital status:      Spouse name: Not on file   • Number of children: Not on file   • Years of education: Not on file   • Highest education level: Not on file   Tobacco Use   • Smoking status: Former Smoker     Types: Cigarettes     Quit date: 1961     Years since quittin.2   Substance and Sexual Activity   • Alcohol use: Not Currently   • Drug use: Never   • Sexual activity: Defer           Objective   Physical Exam  Constitutional:       Appearance: Normal appearance.   HENT:      Head: Normocephalic and atraumatic.      Right Ear: External ear normal.      Left Ear: External ear normal.      Nose:      Comments: There is edema and mild deformity to the nasal bridge. There is mild bleeding noted from the right nare however there is no septal hematoma noted  bilaterally.     Mouth/Throat:      Mouth: Mucous membranes are moist.      Pharynx: Oropharynx is clear.   Eyes:      Extraocular Movements: Extraocular movements intact.      Conjunctiva/sclera: Conjunctivae normal.      Pupils: Pupils are equal, round, and reactive to light.   Cardiovascular:      Rate and Rhythm: Normal rate and regular rhythm.      Pulses: Normal pulses.      Heart sounds: Normal heart sounds.   Pulmonary:      Effort: Pulmonary effort is normal.      Breath sounds: Normal breath sounds.      Comments: There is mild left lateral chest wall tenderness noted  however there are no contusions abrasions lacerations noted. Breath sounds are equal.  Chest:      Chest wall: Tenderness present.   Abdominal:      General: Abdomen is flat. Bowel sounds are normal.      Palpations: Abdomen is soft.   Musculoskeletal:         General: Normal range of motion.      Cervical back: Normal range of motion and neck supple.   Skin:     General: Skin is warm and dry.      Capillary Refill: Capillary refill takes less than 2 seconds.   Neurological:      General: No focal deficit present.      Mental Status: She is alert and oriented to person, place, and time. Mental status is at baseline.   Psychiatric:         Mood and Affect: Mood normal.         Behavior: Behavior normal.         Thought Content: Thought content normal.         Judgment: Judgment normal.         Procedures           ED Course    I applied lidocaine with epinephrine to a cotton ball and applied it within the right nare and after this the patient had no further nasal bleeding.      XR Ribs Left With PA Chest    Result Date: 9/9/2021  Narrative: PROCEDURE: XR RIBS LEFT W PA CHEST  COMPARISON: 6/8/2021.  INDICATIONS: FALL TODAY; LEFT RIB PAIN.  HISTORY OF BREAST CARCINOMA.  FINDINGS: Five views were obtained.  No definite acute displaced left-sided rib fracture is appreciated.  If symptoms or clinical concerns persist, consider imaging follow-up.  No pneumothorax is seen.  There is borderline cardiac enlargement.  A left-sided central venous line is in place with its tip near the expected location of the upper superior vena cava (SVC) versus the central left brachiocephalic vein, similar to the prior exam.  Surgical clips are projected over the bilateral axillary regions and the right lower chest wall.  Probably no acute infiltrate is identified.  The thoracic aorta is atherosclerotic.  There are degenerative changes of the bilateral shoulders and the imaged spine.  There may be chronic calcified granulomatous disease of  the chest.  CONCLUSION: No acute displaced left-sided rib fracture is appreciated.  No pneumothorax is seen.     ELIJAH DOUGLAS JR, MD       Electronically Signed and Approved By: ELIJAH DOUGLAS JR, MD on 9/09/2021 at 21:42             CT Head Without Contrast    Result Date: 9/9/2021  Narrative: PROCEDURE: CT HEAD WO CONTRAST  COMPARISON:  None INDICATIONS: Trauma  PROTOCOL:   Standard imaging protocol performed    RADIATION:   DLP: 1751.8mGy*cm   MA and/or KV was adjusted to minimize radiation dose.     TECHNIQUE: After obtaining the patient's consent, CT images were obtained without non-ionic intravenous contrast material.  FINDINGS:  No midline shift.  Ventricles and sulci appear mildly prominent.  Basal cisterns appear patent.  There is empty sella morphology.  No definite extra-axial collection is seen.  No definite findings of acute hemorrhage, mass lesion, edema, or acute infarction.  There is mild hypoattenuation in the periventricular white matter suggesting mild chronic small vessel ischemic changes.  No acute calvarial abnormality is seen.  Please see separate report for evaluation of the maxillofacial structures.  There is partial opacification of the mastoid air cells, left greater than right.  There is also partial opacification of the left middle ear.  CONCLUSION:  1. No definite CT evidence of acute intracranial abnormality. 2. Volume loss and suspected mild chronic small vessel ischemic changes. 3. Partial opacification of the mastoid air cells, left greater than right, and partial opacification of the left middle ear. 4. Please see separate report for evaluation of the maxillofacial structures.     TADEO BRUNSON MD       Electronically Signed and Approved By: TADEO BRUNSON MD on 9/09/2021 at 21:32             CT Facial Bones Without Contrast    Result Date: 9/9/2021  Narrative: PROCEDURE: CT FACIAL BONES WO CONTRAST  COMPARISON: [facility], CT, CT CHEST W CONTRAST DIAGNOSTIC,  7/30/2021, 11:43.  INDICATIONS: Trauma  PROTOCOL:   Standard imaging protocol performed    RADIATION:      Automated exposure control was utilized to minimize radiation dose.  TECHNIQUE: After obtaining the patient's consent, CT images were created without non-ionic intravenous contrast.   FINDINGS:  There is partial opacification of the right maxillary sinus with air-fluid level and hyperdense fluid consistent with blood products.  There are comminuted nasal bone fractures with mild leftward and posterior displacement of right nasal bones.  There is mild leftward deviation of the nasal septum without significant displaced fracture seen at this time.  There are minimally displaced fractures of the anterior, lateral, and medial walls of the right maxillary sinus.  There also suspected nondisplaced fractures of the right pterygoid plate.  No definite displaced orbital fracture is seen.  The zygomatic arches appear intact.  Paranasal sinuses otherwise appear clear.  The patient is edentulous.  No displaced mandibular fracture is seen.  The globes and orbits appear grossly normal and symmetric.  There is nasal soft tissue prominence.  No other definite displaced facial bone fracture is seen.  There is partial opacification of the mastoid air cells, left greater than right.  There is also partial opacification of the left middle ear.  There is a focal lucency seen in the left lateral mass of C2 with lobular morphology measuring approximately 1.7 x 0.9 cm on image 34 of series 202. There are some surrounding sclerotic changes.  There appear to be multiple subtle smaller lucent lesions in the visualized upper cervical vertebral bodies.  CONCLUSION:  1. Comminuted nasal bone fractures with mild leftward and posterior displacement of right nasal bone. 2. Minimally displaced fractures of the anterior, lateral, and medial walls of the right maxillary sinus.  There are blood products in the right maxillary sinus with partial  opacification.  3. Suspected nondisplaced fracture of the right pterygoid plate. 4. Multiple lucent lesions in the visualized upper cervical spine, most prominently in the left lateral mass of C2.  Patient has a reported history of breast cancer with skeletal metastases.     TADEO BRUNSON MD       Electronically Signed and Approved By: TADEO BRUNSON MD on 9/09/2021 at 21:44                                                  MDM     I discussed the patient's CT findings with Dr. Brennan who states that he will see the patient in his clinic next Thursday at 1030. He indicates the patient does not need antibiotic she will be started on some mild pain medication and she is to avoid blowing her nose. She was told to use bacitracin ointment in her nares gently bilaterally.    Final diagnoses:   Fall, initial encounter   Closed fracture of nasal bone, initial encounter   Closed fracture of maxillary sinus, initial encounter (CMS/[facility])       ED Disposition  ED Disposition     ED Disposition Condition Comment    Discharge Stable           Adebayo Brennan MD  [address] [address] [phone]    On 9/16/2021  Call for appointment. You will be seen at 10:30 AM next Thursday.         Medication List      New Prescriptions    traMADol 50 MG tablet  Commonly known as: ULTRAM  Take 1 tablet by mouth Every 6 (Six) Hours As Needed for Moderate Pain  (Pain).        Stop    EnteraGam 5 g pack  Generic drug: SBI/Protein Isolate           Where to Get Your Medications      These medications were sent to [facility] - [place], [place] - [address] - [phone]  - [phone] [address] [address]    Phone: [phone]   · traMADol 50 MG tablet          Dalton Villasenor,   09/09/21 0892     110

## 2023-08-15 LAB
BH CV LOWER VASCULAR LEFT COMMON FEMORAL AUGMENT: NORMAL
BH CV LOWER VASCULAR LEFT COMMON FEMORAL COMPETENT: NORMAL
BH CV LOWER VASCULAR LEFT COMMON FEMORAL COMPRESS: NORMAL
BH CV LOWER VASCULAR LEFT COMMON FEMORAL PHASIC: NORMAL
BH CV LOWER VASCULAR LEFT COMMON FEMORAL SPONT: NORMAL
BH CV LOWER VASCULAR LEFT DISTAL FEMORAL COMPRESS: NORMAL
BH CV LOWER VASCULAR LEFT MID FEMORAL AUGMENT: NORMAL
BH CV LOWER VASCULAR LEFT MID FEMORAL COMPETENT: NORMAL
BH CV LOWER VASCULAR LEFT MID FEMORAL COMPRESS: NORMAL
BH CV LOWER VASCULAR LEFT MID FEMORAL PHASIC: NORMAL
BH CV LOWER VASCULAR LEFT MID FEMORAL SPONT: NORMAL
BH CV LOWER VASCULAR LEFT PERONEAL COMPRESS: NORMAL
BH CV LOWER VASCULAR LEFT POPLITEAL AUGMENT: NORMAL
BH CV LOWER VASCULAR LEFT POPLITEAL COMPETENT: NORMAL
BH CV LOWER VASCULAR LEFT POPLITEAL COMPRESS: NORMAL
BH CV LOWER VASCULAR LEFT POPLITEAL PHASIC: NORMAL
BH CV LOWER VASCULAR LEFT POPLITEAL SPONT: NORMAL
BH CV LOWER VASCULAR LEFT POSTERIOR TIBIAL COMPRESS: NORMAL
BH CV LOWER VASCULAR LEFT PROXIMAL FEMORAL COMPRESS: NORMAL
BH CV LOWER VASCULAR LEFT SAPHENOFEMORAL JUNCTION COMPRESS: NORMAL
BH CV LOWER VASCULAR RIGHT COMMON FEMORAL AUGMENT: NORMAL
BH CV LOWER VASCULAR RIGHT COMMON FEMORAL COMPETENT: NORMAL
BH CV LOWER VASCULAR RIGHT COMMON FEMORAL COMPRESS: NORMAL
BH CV LOWER VASCULAR RIGHT COMMON FEMORAL PHASIC: NORMAL
BH CV LOWER VASCULAR RIGHT COMMON FEMORAL SPONT: NORMAL
BH CV LOWER VASCULAR RIGHT DISTAL FEMORAL COMPRESS: NORMAL
BH CV LOWER VASCULAR RIGHT MID FEMORAL AUGMENT: NORMAL
BH CV LOWER VASCULAR RIGHT MID FEMORAL COMPETENT: NORMAL
BH CV LOWER VASCULAR RIGHT MID FEMORAL COMPRESS: NORMAL
BH CV LOWER VASCULAR RIGHT MID FEMORAL PHASIC: NORMAL
BH CV LOWER VASCULAR RIGHT MID FEMORAL SPONT: NORMAL
BH CV LOWER VASCULAR RIGHT PERONEAL COMPRESS: NORMAL
BH CV LOWER VASCULAR RIGHT POPLITEAL COMPETENT: NORMAL
BH CV LOWER VASCULAR RIGHT POPLITEAL COMPRESS: NORMAL
BH CV LOWER VASCULAR RIGHT POPLITEAL PHASIC: NORMAL
BH CV LOWER VASCULAR RIGHT POPLITEAL SPONT: NORMAL
BH CV LOWER VASCULAR RIGHT POSTERIOR TIBIAL COMPRESS: NORMAL
BH CV LOWER VASCULAR RIGHT PROXIMAL FEMORAL COMPRESS: NORMAL
BH CV LOWER VASCULAR RIGHT SAPHENOFEMORAL JUNCTION COMPRESS: NORMAL
MAXIMAL PREDICTED HEART RATE: 142 BPM
STRESS TARGET HR: 121 BPM

## 2023-11-07 LAB
BH CV LOWER VASCULAR LEFT COMMON FEMORAL AUGMENT: NORMAL
BH CV LOWER VASCULAR LEFT COMMON FEMORAL COMPETENT: NORMAL
BH CV LOWER VASCULAR LEFT COMMON FEMORAL COMPRESS: NORMAL
BH CV LOWER VASCULAR LEFT COMMON FEMORAL PHASIC: NORMAL
BH CV LOWER VASCULAR LEFT COMMON FEMORAL SPONT: NORMAL
BH CV LOWER VASCULAR LEFT DISTAL FEMORAL COMPRESS: NORMAL
BH CV LOWER VASCULAR LEFT MID FEMORAL AUGMENT: NORMAL
BH CV LOWER VASCULAR LEFT MID FEMORAL COMPETENT: NORMAL
BH CV LOWER VASCULAR LEFT MID FEMORAL COMPRESS: NORMAL
BH CV LOWER VASCULAR LEFT MID FEMORAL PHASIC: NORMAL
BH CV LOWER VASCULAR LEFT MID FEMORAL SPONT: NORMAL
BH CV LOWER VASCULAR LEFT PERONEAL COMPRESS: NORMAL
BH CV LOWER VASCULAR LEFT POPLITEAL AUGMENT: NORMAL
BH CV LOWER VASCULAR LEFT POPLITEAL COMPETENT: NORMAL
BH CV LOWER VASCULAR LEFT POPLITEAL COMPRESS: NORMAL
BH CV LOWER VASCULAR LEFT POPLITEAL PHASIC: NORMAL
BH CV LOWER VASCULAR LEFT POPLITEAL SPONT: NORMAL
BH CV LOWER VASCULAR LEFT POSTERIOR TIBIAL COMPRESS: NORMAL
BH CV LOWER VASCULAR LEFT PROXIMAL FEMORAL COMPRESS: NORMAL
BH CV LOWER VASCULAR LEFT SAPHENOFEMORAL JUNCTION COMPRESS: NORMAL
BH CV LOWER VASCULAR RIGHT COMMON FEMORAL AUGMENT: NORMAL
BH CV LOWER VASCULAR RIGHT COMMON FEMORAL COMPETENT: NORMAL
BH CV LOWER VASCULAR RIGHT COMMON FEMORAL COMPRESS: NORMAL
BH CV LOWER VASCULAR RIGHT COMMON FEMORAL PHASIC: NORMAL
BH CV LOWER VASCULAR RIGHT COMMON FEMORAL SPONT: NORMAL
BH CV LOWER VASCULAR RIGHT DISTAL FEMORAL COMPRESS: NORMAL
BH CV LOWER VASCULAR RIGHT MID FEMORAL AUGMENT: NORMAL
BH CV LOWER VASCULAR RIGHT MID FEMORAL COMPETENT: NORMAL
BH CV LOWER VASCULAR RIGHT MID FEMORAL COMPRESS: NORMAL
BH CV LOWER VASCULAR RIGHT MID FEMORAL PHASIC: NORMAL
BH CV LOWER VASCULAR RIGHT MID FEMORAL SPONT: NORMAL
BH CV LOWER VASCULAR RIGHT PERONEAL COMPRESS: NORMAL
BH CV LOWER VASCULAR RIGHT POPLITEAL COMPETENT: NORMAL
BH CV LOWER VASCULAR RIGHT POPLITEAL COMPRESS: NORMAL
BH CV LOWER VASCULAR RIGHT POPLITEAL PHASIC: NORMAL
BH CV LOWER VASCULAR RIGHT POPLITEAL SPONT: NORMAL
BH CV LOWER VASCULAR RIGHT POSTERIOR TIBIAL COMPRESS: NORMAL
BH CV LOWER VASCULAR RIGHT PROXIMAL FEMORAL COMPRESS: NORMAL
BH CV LOWER VASCULAR RIGHT SAPHENOFEMORAL JUNCTION COMPRESS: NORMAL
MAXIMAL PREDICTED HEART RATE: 142 BPM
STRESS TARGET HR: 121 BPM

## (undated) DEVICE — 3M™ STERI-DRAPE™ X-RAY IMAGE INTENSIFIER DRAPE, 10 PER CARTON / 4 CARTONS PER CASE, 1013: Brand: STERI-DRAPE™

## (undated) DEVICE — CYSTO PACK: Brand: MEDLINE INDUSTRIES, INC.

## (undated) DEVICE — GW PTFE/COAT STR/TP STFF/BDY .038 150CM STRL

## (undated) DEVICE — GOWN,REINFORCE,POLY,SIRUS,BREATH SLV,XLG: Brand: MEDLINE

## (undated) DEVICE — CYSTO/BLADDER IRRIGATION SET, REGULATING CLAMP

## (undated) DEVICE — GLV SURG SENSICARE SLT PF LF 6.5 STRL

## (undated) DEVICE — CUP SPECI 4OZ LF STRL

## (undated) DEVICE — MEDICINE CUP, GRADUATED, STER: Brand: MEDLINE

## (undated) DEVICE — SOL IRRG H2O BG 3000ML STRL

## (undated) DEVICE — CATH URETRL PA SPIRAL TP 5F

## (undated) DEVICE — Device

## (undated) DEVICE — TOWEL,OR,DSP,ST,BLUE,STD,4/PK,20PK/CS: Brand: MEDLINE

## (undated) DEVICE — VAGINAL PREP TRAY: Brand: MEDLINE INDUSTRIES, INC.